# Patient Record
Sex: MALE | Race: WHITE | Employment: OTHER | URBAN - NONMETROPOLITAN AREA
[De-identification: names, ages, dates, MRNs, and addresses within clinical notes are randomized per-mention and may not be internally consistent; named-entity substitution may affect disease eponyms.]

---

## 2017-01-03 ENCOUNTER — TELEPHONE (OUTPATIENT)
Dept: NEUROLOGY | Age: 62
End: 2017-01-03

## 2017-01-26 RX ORDER — OXYCODONE AND ACETAMINOPHEN 10; 325 MG/1; MG/1
1 TABLET ORAL EVERY 6 HOURS PRN
Qty: 90 TABLET | Refills: 0 | Status: SHIPPED | OUTPATIENT
Start: 2017-02-01 | End: 2017-02-27 | Stop reason: SDUPTHER

## 2017-02-27 RX ORDER — ALPRAZOLAM 1 MG/1
1 TABLET ORAL 3 TIMES DAILY PRN
Qty: 90 TABLET | Refills: 2 | Status: SHIPPED | OUTPATIENT
Start: 2017-03-01 | End: 2017-03-31

## 2017-02-27 RX ORDER — OXYCODONE AND ACETAMINOPHEN 10; 325 MG/1; MG/1
1 TABLET ORAL EVERY 6 HOURS PRN
Qty: 90 TABLET | Refills: 0 | Status: SHIPPED | OUTPATIENT
Start: 2017-02-27 | End: 2017-03-24 | Stop reason: SDUPTHER

## 2017-03-17 ENCOUNTER — TELEPHONE (OUTPATIENT)
Dept: NEUROLOGY | Age: 62
End: 2017-03-17

## 2017-03-24 RX ORDER — OXYCODONE AND ACETAMINOPHEN 10; 325 MG/1; MG/1
1 TABLET ORAL EVERY 6 HOURS PRN
Qty: 90 TABLET | Refills: 0 | Status: SHIPPED | OUTPATIENT
Start: 2017-03-31 | End: 2017-03-27 | Stop reason: SDUPTHER

## 2017-03-27 RX ORDER — OXYCODONE AND ACETAMINOPHEN 10; 325 MG/1; MG/1
1 TABLET ORAL EVERY 6 HOURS PRN
Qty: 90 TABLET | Refills: 0 | Status: SHIPPED | OUTPATIENT
Start: 2017-03-29 | End: 2017-04-26 | Stop reason: SDUPTHER

## 2017-03-30 ENCOUNTER — OFFICE VISIT (OUTPATIENT)
Dept: NEUROLOGY | Age: 62
End: 2017-03-30
Payer: MEDICARE

## 2017-03-30 VITALS
RESPIRATION RATE: 16 BRPM | SYSTOLIC BLOOD PRESSURE: 148 MMHG | BODY MASS INDEX: 40.42 KG/M2 | HEART RATE: 67 BPM | WEIGHT: 305 LBS | DIASTOLIC BLOOD PRESSURE: 90 MMHG | HEIGHT: 73 IN

## 2017-03-30 DIAGNOSIS — Z99.89 CPAP (CONTINUOUS POSITIVE AIRWAY PRESSURE) DEPENDENCE: ICD-10-CM

## 2017-03-30 DIAGNOSIS — M47.26 OSTEOARTHRITIS OF SPINE WITH RADICULOPATHY, LUMBAR REGION: Primary | ICD-10-CM

## 2017-03-30 DIAGNOSIS — R00.2 PALPITATIONS: ICD-10-CM

## 2017-03-30 DIAGNOSIS — G47.33 OSA (OBSTRUCTIVE SLEEP APNEA): ICD-10-CM

## 2017-03-30 DIAGNOSIS — R00.0 TACHYCARDIA: ICD-10-CM

## 2017-03-30 DIAGNOSIS — G89.29 CHRONIC BILATERAL LOW BACK PAIN WITH BILATERAL SCIATICA: ICD-10-CM

## 2017-03-30 DIAGNOSIS — M54.42 CHRONIC BILATERAL LOW BACK PAIN WITH BILATERAL SCIATICA: ICD-10-CM

## 2017-03-30 DIAGNOSIS — M54.41 CHRONIC BILATERAL LOW BACK PAIN WITH BILATERAL SCIATICA: ICD-10-CM

## 2017-03-30 PROCEDURE — 3017F COLORECTAL CA SCREEN DOC REV: CPT | Performed by: PHYSICIAN ASSISTANT

## 2017-03-30 PROCEDURE — 99214 OFFICE O/P EST MOD 30 MIN: CPT | Performed by: PHYSICIAN ASSISTANT

## 2017-03-30 PROCEDURE — G8484 FLU IMMUNIZE NO ADMIN: HCPCS | Performed by: PHYSICIAN ASSISTANT

## 2017-03-30 PROCEDURE — G8427 DOCREV CUR MEDS BY ELIG CLIN: HCPCS | Performed by: PHYSICIAN ASSISTANT

## 2017-03-30 PROCEDURE — 1036F TOBACCO NON-USER: CPT | Performed by: PHYSICIAN ASSISTANT

## 2017-03-30 PROCEDURE — G8417 CALC BMI ABV UP PARAM F/U: HCPCS | Performed by: PHYSICIAN ASSISTANT

## 2017-04-26 RX ORDER — ALPRAZOLAM 1 MG/1
1 TABLET ORAL 3 TIMES DAILY PRN
Qty: 90 TABLET | Refills: 2 | Status: SHIPPED | OUTPATIENT
Start: 2017-04-27 | End: 2017-05-25 | Stop reason: SDUPTHER

## 2017-04-26 RX ORDER — OXYCODONE AND ACETAMINOPHEN 10; 325 MG/1; MG/1
1 TABLET ORAL EVERY 6 HOURS PRN
Qty: 90 TABLET | Refills: 0 | Status: SHIPPED | OUTPATIENT
Start: 2017-04-28 | End: 2017-05-25 | Stop reason: SDUPTHER

## 2017-05-25 RX ORDER — ALPRAZOLAM 1 MG/1
1 TABLET ORAL 3 TIMES DAILY PRN
Qty: 90 TABLET | Refills: 2 | Status: SHIPPED | OUTPATIENT
Start: 2017-05-25 | End: 2017-06-22 | Stop reason: SDUPTHER

## 2017-05-25 RX ORDER — OXYCODONE AND ACETAMINOPHEN 10; 325 MG/1; MG/1
1 TABLET ORAL EVERY 6 HOURS PRN
Qty: 90 TABLET | Refills: 0 | Status: SHIPPED | OUTPATIENT
Start: 2017-05-25 | End: 2017-06-22 | Stop reason: SDUPTHER

## 2017-06-22 RX ORDER — ALPRAZOLAM 1 MG/1
1 TABLET ORAL 3 TIMES DAILY PRN
Qty: 90 TABLET | Refills: 0 | Status: SHIPPED | OUTPATIENT
Start: 2017-06-22 | End: 2017-07-21 | Stop reason: SDUPTHER

## 2017-06-22 RX ORDER — OXYCODONE AND ACETAMINOPHEN 10; 325 MG/1; MG/1
1 TABLET ORAL EVERY 6 HOURS PRN
Qty: 90 TABLET | Refills: 0 | Status: SHIPPED | OUTPATIENT
Start: 2017-06-22 | End: 2017-07-21 | Stop reason: SDUPTHER

## 2017-07-03 ENCOUNTER — OFFICE VISIT (OUTPATIENT)
Dept: NEUROLOGY | Age: 62
End: 2017-07-03
Payer: MEDICARE

## 2017-07-03 VITALS
HEIGHT: 73 IN | HEART RATE: 47 BPM | WEIGHT: 309 LBS | DIASTOLIC BLOOD PRESSURE: 69 MMHG | SYSTOLIC BLOOD PRESSURE: 115 MMHG | BODY MASS INDEX: 40.95 KG/M2 | OXYGEN SATURATION: 98 %

## 2017-07-03 DIAGNOSIS — G89.29 CHRONIC BILATERAL LOW BACK PAIN WITH BILATERAL SCIATICA: ICD-10-CM

## 2017-07-03 DIAGNOSIS — G47.33 OSA (OBSTRUCTIVE SLEEP APNEA): ICD-10-CM

## 2017-07-03 DIAGNOSIS — R00.2 PALPITATIONS: ICD-10-CM

## 2017-07-03 DIAGNOSIS — M54.6 THORACIC SPINE PAIN: ICD-10-CM

## 2017-07-03 DIAGNOSIS — M54.41 CHRONIC BILATERAL LOW BACK PAIN WITH BILATERAL SCIATICA: ICD-10-CM

## 2017-07-03 DIAGNOSIS — M47.26 OSTEOARTHRITIS OF SPINE WITH RADICULOPATHY, LUMBAR REGION: Primary | ICD-10-CM

## 2017-07-03 DIAGNOSIS — M54.42 CHRONIC BILATERAL LOW BACK PAIN WITH BILATERAL SCIATICA: ICD-10-CM

## 2017-07-03 PROCEDURE — 1036F TOBACCO NON-USER: CPT | Performed by: PSYCHIATRY & NEUROLOGY

## 2017-07-03 PROCEDURE — G8417 CALC BMI ABV UP PARAM F/U: HCPCS | Performed by: PSYCHIATRY & NEUROLOGY

## 2017-07-03 PROCEDURE — 99214 OFFICE O/P EST MOD 30 MIN: CPT | Performed by: PSYCHIATRY & NEUROLOGY

## 2017-07-03 PROCEDURE — 3017F COLORECTAL CA SCREEN DOC REV: CPT | Performed by: PSYCHIATRY & NEUROLOGY

## 2017-07-03 PROCEDURE — G8427 DOCREV CUR MEDS BY ELIG CLIN: HCPCS | Performed by: PSYCHIATRY & NEUROLOGY

## 2017-07-21 RX ORDER — OXYCODONE AND ACETAMINOPHEN 10; 325 MG/1; MG/1
1 TABLET ORAL EVERY 6 HOURS PRN
Qty: 90 TABLET | Refills: 0 | Status: SHIPPED | OUTPATIENT
Start: 2017-07-21 | End: 2017-08-17 | Stop reason: SDUPTHER

## 2017-07-21 RX ORDER — ALPRAZOLAM 1 MG/1
1 TABLET ORAL 3 TIMES DAILY PRN
Qty: 90 TABLET | Refills: 0 | Status: SHIPPED | OUTPATIENT
Start: 2017-07-21 | End: 2017-08-17 | Stop reason: SDUPTHER

## 2017-07-26 ENCOUNTER — HOSPITAL ENCOUNTER (OUTPATIENT)
Dept: NEUROLOGY | Age: 62
Discharge: HOME | End: 2017-07-26
Payer: MEDICARE

## 2017-07-26 ENCOUNTER — HOSPITAL ENCOUNTER (OUTPATIENT)
Dept: MRI IMAGING | Age: 62
Discharge: HOME | End: 2017-07-26
Payer: MEDICARE

## 2017-07-26 DIAGNOSIS — M54.42 CHRONIC BILATERAL LOW BACK PAIN WITH BILATERAL SCIATICA: ICD-10-CM

## 2017-07-26 DIAGNOSIS — M47.26 OSTEOARTHRITIS OF SPINE WITH RADICULOPATHY, LUMBAR REGION: ICD-10-CM

## 2017-07-26 DIAGNOSIS — M54.41 CHRONIC BILATERAL LOW BACK PAIN WITH BILATERAL SCIATICA: ICD-10-CM

## 2017-07-26 DIAGNOSIS — G89.29 CHRONIC BILATERAL LOW BACK PAIN WITH BILATERAL SCIATICA: ICD-10-CM

## 2017-07-26 PROCEDURE — 95908 NRV CNDJ TST 3-4 STUDIES: CPT

## 2017-07-26 PROCEDURE — 95908 NRV CNDJ TST 3-4 STUDIES: CPT | Performed by: PSYCHIATRY & NEUROLOGY

## 2017-07-26 PROCEDURE — 72148 MRI LUMBAR SPINE W/O DYE: CPT

## 2017-07-26 PROCEDURE — 95886 MUSC TEST DONE W/N TEST COMP: CPT

## 2017-07-26 PROCEDURE — 95886 MUSC TEST DONE W/N TEST COMP: CPT | Performed by: PSYCHIATRY & NEUROLOGY

## 2017-07-30 DIAGNOSIS — G89.29 CHRONIC BILATERAL LOW BACK PAIN WITH RIGHT-SIDED SCIATICA: ICD-10-CM

## 2017-07-30 DIAGNOSIS — M47.26 OSTEOARTHRITIS OF SPINE WITH RADICULOPATHY, LUMBAR REGION: Primary | ICD-10-CM

## 2017-07-30 DIAGNOSIS — M54.41 CHRONIC BILATERAL LOW BACK PAIN WITH RIGHT-SIDED SCIATICA: ICD-10-CM

## 2017-07-31 ENCOUNTER — TELEPHONE (OUTPATIENT)
Dept: NEUROLOGY | Age: 62
End: 2017-07-31

## 2017-08-01 ENCOUNTER — TELEPHONE (OUTPATIENT)
Dept: NEUROSURGERY | Age: 62
End: 2017-08-01

## 2017-08-09 ENCOUNTER — OFFICE VISIT (OUTPATIENT)
Dept: CARDIOLOGY | Age: 62
End: 2017-08-09
Payer: MEDICARE

## 2017-08-09 VITALS
HEIGHT: 73 IN | WEIGHT: 311 LBS | BODY MASS INDEX: 41.22 KG/M2 | DIASTOLIC BLOOD PRESSURE: 70 MMHG | SYSTOLIC BLOOD PRESSURE: 118 MMHG | HEART RATE: 70 BPM

## 2017-08-09 DIAGNOSIS — R00.0 TACHYCARDIA: Primary | ICD-10-CM

## 2017-08-09 DIAGNOSIS — I10 ESSENTIAL HYPERTENSION: ICD-10-CM

## 2017-08-09 DIAGNOSIS — R00.2 INTERMITTENT PALPITATIONS: ICD-10-CM

## 2017-08-09 DIAGNOSIS — Z82.49 FAMILY HISTORY OF ATRIAL FIBRILLATION: ICD-10-CM

## 2017-08-09 DIAGNOSIS — Z86.69 HISTORY OF SLEEP APNEA: ICD-10-CM

## 2017-08-09 PROCEDURE — 3017F COLORECTAL CA SCREEN DOC REV: CPT | Performed by: NURSE PRACTITIONER

## 2017-08-09 PROCEDURE — G8417 CALC BMI ABV UP PARAM F/U: HCPCS | Performed by: NURSE PRACTITIONER

## 2017-08-09 PROCEDURE — 1036F TOBACCO NON-USER: CPT | Performed by: NURSE PRACTITIONER

## 2017-08-09 PROCEDURE — 99215 OFFICE O/P EST HI 40 MIN: CPT | Performed by: NURSE PRACTITIONER

## 2017-08-09 PROCEDURE — 93000 ELECTROCARDIOGRAM COMPLETE: CPT | Performed by: NURSE PRACTITIONER

## 2017-08-09 PROCEDURE — G8427 DOCREV CUR MEDS BY ELIG CLIN: HCPCS | Performed by: NURSE PRACTITIONER

## 2017-08-09 RX ORDER — METOPROLOL SUCCINATE 25 MG/1
25 TABLET, EXTENDED RELEASE ORAL NIGHTLY
Qty: 30 TABLET | Refills: 5 | Status: SHIPPED | OUTPATIENT
Start: 2017-08-09 | End: 2017-09-18 | Stop reason: SINTOL

## 2017-08-17 ENCOUNTER — HOSPITAL ENCOUNTER (OUTPATIENT)
Dept: GENERAL RADIOLOGY | Age: 62
Discharge: HOME | End: 2017-08-17
Payer: MEDICARE

## 2017-08-17 ENCOUNTER — OFFICE VISIT (OUTPATIENT)
Dept: NEUROSURGERY | Age: 62
End: 2017-08-17
Payer: MEDICARE

## 2017-08-17 VITALS
HEIGHT: 73 IN | SYSTOLIC BLOOD PRESSURE: 118 MMHG | WEIGHT: 311 LBS | DIASTOLIC BLOOD PRESSURE: 75 MMHG | OXYGEN SATURATION: 97 % | HEART RATE: 56 BPM | BODY MASS INDEX: 41.22 KG/M2

## 2017-08-17 DIAGNOSIS — M48.061 FORAMINAL STENOSIS OF LUMBAR REGION: ICD-10-CM

## 2017-08-17 DIAGNOSIS — G89.29 CHRONIC MIDLINE LOW BACK PAIN WITHOUT SCIATICA: Primary | ICD-10-CM

## 2017-08-17 DIAGNOSIS — G89.29 CHRONIC MIDLINE LOW BACK PAIN WITHOUT SCIATICA: ICD-10-CM

## 2017-08-17 DIAGNOSIS — M54.50 CHRONIC MIDLINE LOW BACK PAIN WITHOUT SCIATICA: Primary | ICD-10-CM

## 2017-08-17 DIAGNOSIS — M54.50 CHRONIC MIDLINE LOW BACK PAIN WITHOUT SCIATICA: ICD-10-CM

## 2017-08-17 DIAGNOSIS — M48.061 LUMBAR CANAL STENOSIS: ICD-10-CM

## 2017-08-17 PROCEDURE — 3017F COLORECTAL CA SCREEN DOC REV: CPT | Performed by: NEUROLOGICAL SURGERY

## 2017-08-17 PROCEDURE — G8427 DOCREV CUR MEDS BY ELIG CLIN: HCPCS | Performed by: NEUROLOGICAL SURGERY

## 2017-08-17 PROCEDURE — 72110 X-RAY EXAM L-2 SPINE 4/>VWS: CPT

## 2017-08-17 PROCEDURE — 99203 OFFICE O/P NEW LOW 30 MIN: CPT | Performed by: NEUROLOGICAL SURGERY

## 2017-08-17 PROCEDURE — G8417 CALC BMI ABV UP PARAM F/U: HCPCS | Performed by: NEUROLOGICAL SURGERY

## 2017-08-17 PROCEDURE — 1036F TOBACCO NON-USER: CPT | Performed by: NEUROLOGICAL SURGERY

## 2017-08-17 RX ORDER — ALPRAZOLAM 1 MG/1
1 TABLET ORAL 3 TIMES DAILY PRN
Qty: 90 TABLET | Refills: 0 | Status: SHIPPED | OUTPATIENT
Start: 2017-08-17 | End: 2017-09-18 | Stop reason: SDUPTHER

## 2017-08-17 RX ORDER — OXYCODONE AND ACETAMINOPHEN 10; 325 MG/1; MG/1
1 TABLET ORAL EVERY 6 HOURS PRN
Qty: 90 TABLET | Refills: 0 | Status: SHIPPED | OUTPATIENT
Start: 2017-08-17 | End: 2017-09-18 | Stop reason: SDUPTHER

## 2017-08-17 RX ORDER — ZOLPIDEM TARTRATE 10 MG/1
TABLET ORAL
Refills: 2 | COMMUNITY
Start: 2017-05-30 | End: 2018-09-24 | Stop reason: DRUGHIGH

## 2017-08-17 ASSESSMENT — ENCOUNTER SYMPTOMS
EYES NEGATIVE: 1
ORTHOPNEA: 0
BACK PAIN: 1
RESPIRATORY NEGATIVE: 1
GASTROINTESTINAL NEGATIVE: 1

## 2017-08-23 ENCOUNTER — HOSPITAL ENCOUNTER (OUTPATIENT)
Dept: NON INVASIVE DIAGNOSTICS | Age: 62
Discharge: HOME | End: 2017-08-23
Payer: MEDICARE

## 2017-08-23 DIAGNOSIS — R00.2 INTERMITTENT PALPITATIONS: ICD-10-CM

## 2017-08-23 DIAGNOSIS — R00.0 TACHYCARDIA: ICD-10-CM

## 2017-08-23 DIAGNOSIS — I10 ESSENTIAL HYPERTENSION: ICD-10-CM

## 2017-08-23 LAB
LV EF: 63 %
LVEF MODALITY: NORMAL

## 2017-08-23 PROCEDURE — 93306 TTE W/DOPPLER COMPLETE: CPT

## 2017-09-05 ENCOUNTER — TELEPHONE (OUTPATIENT)
Dept: CARDIOLOGY | Age: 62
End: 2017-09-05

## 2017-09-05 DIAGNOSIS — R00.2 PALPITATIONS: Primary | ICD-10-CM

## 2017-09-14 ENCOUNTER — OFFICE VISIT (OUTPATIENT)
Dept: CARDIOLOGY | Age: 62
End: 2017-09-14
Payer: MEDICARE

## 2017-09-14 VITALS
DIASTOLIC BLOOD PRESSURE: 72 MMHG | SYSTOLIC BLOOD PRESSURE: 118 MMHG | HEART RATE: 72 BPM | WEIGHT: 315 LBS | HEIGHT: 73 IN | BODY MASS INDEX: 41.75 KG/M2

## 2017-09-14 DIAGNOSIS — R00.0 TACHYCARDIA: ICD-10-CM

## 2017-09-14 DIAGNOSIS — I10 ESSENTIAL HYPERTENSION: ICD-10-CM

## 2017-09-14 DIAGNOSIS — Z78.9 BETA-BLOCKER INTOLERANCE: Primary | ICD-10-CM

## 2017-09-14 PROCEDURE — G8417 CALC BMI ABV UP PARAM F/U: HCPCS | Performed by: INTERNAL MEDICINE

## 2017-09-14 PROCEDURE — 1036F TOBACCO NON-USER: CPT | Performed by: INTERNAL MEDICINE

## 2017-09-14 PROCEDURE — G8427 DOCREV CUR MEDS BY ELIG CLIN: HCPCS | Performed by: INTERNAL MEDICINE

## 2017-09-14 PROCEDURE — 99212 OFFICE O/P EST SF 10 MIN: CPT | Performed by: INTERNAL MEDICINE

## 2017-09-14 PROCEDURE — 3017F COLORECTAL CA SCREEN DOC REV: CPT | Performed by: INTERNAL MEDICINE

## 2017-09-18 RX ORDER — OXYCODONE AND ACETAMINOPHEN 10; 325 MG/1; MG/1
1 TABLET ORAL EVERY 6 HOURS PRN
Qty: 90 TABLET | Refills: 0 | Status: SHIPPED | OUTPATIENT
Start: 2017-09-18 | End: 2017-10-16 | Stop reason: SDUPTHER

## 2017-09-18 RX ORDER — ALPRAZOLAM 1 MG/1
1 TABLET ORAL 3 TIMES DAILY PRN
Qty: 90 TABLET | Refills: 0 | Status: SHIPPED | OUTPATIENT
Start: 2017-09-18 | End: 2017-10-16 | Stop reason: SDUPTHER

## 2017-10-03 ENCOUNTER — HOSPITAL ENCOUNTER (OUTPATIENT)
Dept: GENERAL RADIOLOGY | Age: 62
Discharge: HOME | End: 2017-10-03
Payer: MEDICARE

## 2017-10-03 ENCOUNTER — HOSPITAL ENCOUNTER (OUTPATIENT)
Age: 62
Setting detail: OUTPATIENT SURGERY
Discharge: HOME | End: 2017-10-03
Attending: PHYSICAL MEDICINE & REHABILITATION | Admitting: PHYSICAL MEDICINE & REHABILITATION

## 2017-10-03 VITALS
SYSTOLIC BLOOD PRESSURE: 154 MMHG | DIASTOLIC BLOOD PRESSURE: 93 MMHG | BODY MASS INDEX: 39.76 KG/M2 | HEART RATE: 59 BPM | OXYGEN SATURATION: 97 % | HEIGHT: 73 IN | WEIGHT: 300 LBS | RESPIRATION RATE: 18 BRPM

## 2017-10-03 DIAGNOSIS — R52 PAIN: ICD-10-CM

## 2017-10-03 PROCEDURE — 3209999900 FLUORO FOR SURGICAL PROCEDURES

## 2017-10-03 PROCEDURE — G8918 PT W/O PREOP ORDER IV AB PRO: HCPCS

## 2017-10-03 PROCEDURE — 62323 NJX INTERLAMINAR LMBR/SAC: CPT

## 2017-10-03 PROCEDURE — G8907 PT DOC NO EVENTS ON DISCHARG: HCPCS

## 2017-10-03 RX ORDER — 0.9 % SODIUM CHLORIDE 0.9 %
VIAL (ML) INJECTION PRN
Status: DISCONTINUED | OUTPATIENT
Start: 2017-10-03 | End: 2017-10-03 | Stop reason: HOSPADM

## 2017-10-03 RX ORDER — LIDOCAINE HYDROCHLORIDE 10 MG/ML
INJECTION, SOLUTION INFILTRATION; PERINEURAL PRN
Status: DISCONTINUED | OUTPATIENT
Start: 2017-10-03 | End: 2017-10-03 | Stop reason: HOSPADM

## 2017-10-03 RX ORDER — METHYLPREDNISOLONE ACETATE 80 MG/ML
INJECTION, SUSPENSION INTRA-ARTICULAR; INTRALESIONAL; INTRAMUSCULAR; SOFT TISSUE PRN
Status: DISCONTINUED | OUTPATIENT
Start: 2017-10-03 | End: 2017-10-03 | Stop reason: HOSPADM

## 2017-10-05 ENCOUNTER — OFFICE VISIT (OUTPATIENT)
Dept: NEUROLOGY | Age: 62
End: 2017-10-05
Payer: MEDICARE

## 2017-10-05 ENCOUNTER — HOSPITAL ENCOUNTER (EMERGENCY)
Age: 62
Discharge: HOME | End: 2017-10-05
Attending: EMERGENCY MEDICINE
Payer: MEDICARE

## 2017-10-05 ENCOUNTER — APPOINTMENT (OUTPATIENT)
Dept: CT IMAGING | Age: 62
End: 2017-10-05
Payer: MEDICARE

## 2017-10-05 VITALS
HEART RATE: 54 BPM | BODY MASS INDEX: 39.76 KG/M2 | OXYGEN SATURATION: 97 % | RESPIRATION RATE: 16 BRPM | TEMPERATURE: 98 F | WEIGHT: 300 LBS | HEIGHT: 73 IN | SYSTOLIC BLOOD PRESSURE: 136 MMHG | DIASTOLIC BLOOD PRESSURE: 84 MMHG

## 2017-10-05 VITALS
BODY MASS INDEX: 41.75 KG/M2 | HEART RATE: 76 BPM | HEIGHT: 73 IN | SYSTOLIC BLOOD PRESSURE: 155 MMHG | DIASTOLIC BLOOD PRESSURE: 89 MMHG | WEIGHT: 315 LBS | RESPIRATION RATE: 18 BRPM

## 2017-10-05 DIAGNOSIS — R73.9 HYPERGLYCEMIA: Primary | ICD-10-CM

## 2017-10-05 DIAGNOSIS — Z92.241 S/P EPIDURAL STEROID INJECTION: ICD-10-CM

## 2017-10-05 DIAGNOSIS — M47.26 OSTEOARTHRITIS OF SPINE WITH RADICULOPATHY, LUMBAR REGION: Primary | ICD-10-CM

## 2017-10-05 DIAGNOSIS — G89.29 CHRONIC BILATERAL LOW BACK PAIN WITH BILATERAL SCIATICA: ICD-10-CM

## 2017-10-05 DIAGNOSIS — M54.42 CHRONIC BILATERAL LOW BACK PAIN WITH BILATERAL SCIATICA: ICD-10-CM

## 2017-10-05 DIAGNOSIS — G47.33 OSA (OBSTRUCTIVE SLEEP APNEA): ICD-10-CM

## 2017-10-05 DIAGNOSIS — M54.41 CHRONIC BILATERAL LOW BACK PAIN WITH BILATERAL SCIATICA: ICD-10-CM

## 2017-10-05 LAB
ALBUMIN SERPL-MCNC: 4.3 G/DL (ref 3.5–5.2)
ALP BLD-CCNC: 89 U/L (ref 40–130)
ALT SERPL-CCNC: 21 U/L (ref 5–41)
AMYLASE: 48 U/L (ref 28–100)
ANION GAP SERPL CALCULATED.3IONS-SCNC: 14 MMOL/L (ref 7–19)
AST SERPL-CCNC: 12 U/L (ref 5–40)
BASOPHILS ABSOLUTE: 0 K/UL (ref 0–0.2)
BASOPHILS RELATIVE PERCENT: 0.6 % (ref 0–1)
BILIRUB SERPL-MCNC: 0.3 MG/DL (ref 0.2–1.2)
BILIRUBIN URINE: NEGATIVE
BLOOD, URINE: NEGATIVE
BUN BLDV-MCNC: 15 MG/DL (ref 8–23)
CALCIUM SERPL-MCNC: 9.4 MG/DL (ref 8.8–10.2)
CHLORIDE BLD-SCNC: 96 MMOL/L (ref 98–111)
CHP ED QC CHECK: NORMAL
CLARITY: CLEAR
CO2: 27 MMOL/L (ref 22–29)
COLOR: YELLOW
CREAT SERPL-MCNC: 0.9 MG/DL (ref 0.5–1.2)
EKG P AXIS: 50 DEGREES
EKG P-R INTERVAL: 154 MS
EKG Q-T INTERVAL: 396 MS
EKG QRS DURATION: 96 MS
EKG QTC CALCULATION (BAZETT): 418 MS
EKG T AXIS: 44 DEGREES
EOSINOPHILS ABSOLUTE: 0.1 K/UL (ref 0–0.6)
EOSINOPHILS RELATIVE PERCENT: 0.9 % (ref 0–5)
GFR NON-AFRICAN AMERICAN: >60
GLUCOSE BLD-MCNC: 179 MG/DL
GLUCOSE BLD-MCNC: 179 MG/DL (ref 70–99)
GLUCOSE BLD-MCNC: 187 MG/DL (ref 74–109)
GLUCOSE URINE: NEGATIVE MG/DL
HCT VFR BLD CALC: 43.9 % (ref 42–52)
HEMOGLOBIN: 14.4 G/DL (ref 14–18)
KETONES, URINE: NEGATIVE MG/DL
LEUKOCYTE ESTERASE, URINE: NEGATIVE
LIPASE: 33 U/L (ref 13–60)
LYMPHOCYTES ABSOLUTE: 1.8 K/UL (ref 1.1–4.5)
LYMPHOCYTES RELATIVE PERCENT: 26 % (ref 20–40)
MCH RBC QN AUTO: 28.9 PG (ref 27–31)
MCHC RBC AUTO-ENTMCNC: 32.8 G/DL (ref 33–37)
MCV RBC AUTO: 88.2 FL (ref 80–94)
MONOCYTES ABSOLUTE: 0.7 K/UL (ref 0–0.9)
MONOCYTES RELATIVE PERCENT: 10.4 % (ref 0–10)
NEUTROPHILS ABSOLUTE: 4.2 K/UL (ref 1.5–7.5)
NEUTROPHILS RELATIVE PERCENT: 61.7 % (ref 50–65)
NITRITE, URINE: NEGATIVE
PDW BLD-RTO: 13.8 % (ref 11.5–14.5)
PERFORMED ON: ABNORMAL
PERFORMED ON: NORMAL
PH UA: 6.5
PLATELET # BLD: 176 K/UL (ref 130–400)
PMV BLD AUTO: 11.2 FL (ref 9.4–12.4)
POC TROPONIN I: 0 NG/ML (ref 0–0.08)
POTASSIUM SERPL-SCNC: 3.8 MMOL/L (ref 3.5–5)
PROTEIN UA: NEGATIVE MG/DL
RBC # BLD: 4.98 M/UL (ref 4.7–6.1)
SODIUM BLD-SCNC: 137 MMOL/L (ref 136–145)
SPECIFIC GRAVITY UA: 1.01
TOTAL PROTEIN: 7.5 G/DL (ref 6.6–8.7)
UROBILINOGEN, URINE: 0.2 E.U./DL
WBC # BLD: 6.9 K/UL (ref 4.8–10.8)

## 2017-10-05 PROCEDURE — G8484 FLU IMMUNIZE NO ADMIN: HCPCS | Performed by: PSYCHIATRY & NEUROLOGY

## 2017-10-05 PROCEDURE — 81003 URINALYSIS AUTO W/O SCOPE: CPT

## 2017-10-05 PROCEDURE — 85025 COMPLETE CBC W/AUTO DIFF WBC: CPT

## 2017-10-05 PROCEDURE — 80053 COMPREHEN METABOLIC PANEL: CPT

## 2017-10-05 PROCEDURE — 3017F COLORECTAL CA SCREEN DOC REV: CPT | Performed by: PSYCHIATRY & NEUROLOGY

## 2017-10-05 PROCEDURE — 99213 OFFICE O/P EST LOW 20 MIN: CPT | Performed by: PSYCHIATRY & NEUROLOGY

## 2017-10-05 PROCEDURE — 93005 ELECTROCARDIOGRAM TRACING: CPT

## 2017-10-05 PROCEDURE — 99284 EMERGENCY DEPT VISIT MOD MDM: CPT

## 2017-10-05 PROCEDURE — G8427 DOCREV CUR MEDS BY ELIG CLIN: HCPCS | Performed by: PSYCHIATRY & NEUROLOGY

## 2017-10-05 PROCEDURE — G8417 CALC BMI ABV UP PARAM F/U: HCPCS | Performed by: PSYCHIATRY & NEUROLOGY

## 2017-10-05 PROCEDURE — 99283 EMERGENCY DEPT VISIT LOW MDM: CPT | Performed by: EMERGENCY MEDICINE

## 2017-10-05 PROCEDURE — 2580000003 HC RX 258: Performed by: EMERGENCY MEDICINE

## 2017-10-05 PROCEDURE — 1036F TOBACCO NON-USER: CPT | Performed by: PSYCHIATRY & NEUROLOGY

## 2017-10-05 PROCEDURE — 82150 ASSAY OF AMYLASE: CPT

## 2017-10-05 PROCEDURE — 82948 REAGENT STRIP/BLOOD GLUCOSE: CPT

## 2017-10-05 PROCEDURE — 70450 CT HEAD/BRAIN W/O DYE: CPT

## 2017-10-05 PROCEDURE — 36415 COLL VENOUS BLD VENIPUNCTURE: CPT

## 2017-10-05 PROCEDURE — 83690 ASSAY OF LIPASE: CPT

## 2017-10-05 PROCEDURE — 84484 ASSAY OF TROPONIN QUANT: CPT

## 2017-10-05 RX ORDER — LOVASTATIN 10 MG/1
10 TABLET ORAL DAILY
Status: ON HOLD | COMMUNITY
End: 2018-10-24

## 2017-10-05 RX ORDER — 0.9 % SODIUM CHLORIDE 0.9 %
1000 INTRAVENOUS SOLUTION INTRAVENOUS ONCE
Status: COMPLETED | OUTPATIENT
Start: 2017-10-05 | End: 2017-10-05

## 2017-10-05 RX ADMIN — SODIUM CHLORIDE 1000 ML: 9 INJECTION, SOLUTION INTRAVENOUS at 03:12

## 2017-10-05 ASSESSMENT — ENCOUNTER SYMPTOMS
VOMITING: 0
ABDOMINAL PAIN: 0
WHEEZING: 0
COUGH: 0
RHINORRHEA: 0
NAUSEA: 1
SHORTNESS OF BREATH: 0

## 2017-10-05 NOTE — ED PROVIDER NOTES
vomiting. Genitourinary: Positive for frequency. Negative for dysuria. Neurological: Positive for dizziness. Negative for syncope. All other systems reviewed and are negative. PAST MEDICAL HISTORY     Past Medical History:   Diagnosis Date    Acute sinusitis     Anxiety     Chronic back pain     Chronic bilateral low back pain with bilateral sciatica     Constipation     COPD (chronic obstructive pulmonary disease) (HCC)     CPAP (continuous positive airway pressure) dependence     12cm to 20cm    Depression     Hyperlipidemia     Hypertensive disorder     OA (osteoarthritis of the spine)     Obesity     SUSANA (obstructive sleep apnea)     AHI:  85.3 per HST 11/2013    Osteoarthritis of spine with radiculopathy, lumbar region     Otitis media     [facility]     Spinal stenosis     Type 2 diabetes mellitus ([facility] 3/23/2016    Unspecified sleep apnea     uses bipap    Vertigo          SURGICAL HISTORY       Past Surgical History:   Procedure Laterality Date    ADENOIDECTOMY      BROW LIFT      due to vision    COLONOSCOPY  30 yrs ago    [place], [place], normal per patient    HERNIA REPAIR      KNEE ARTHROSCOPY Left 2009    IN Girish Rodrick Radha 84 DX/THER SBST EPIDURAL/SUBARACH LUMBAR/SACRAL N/A 10/3/2017    L3-4 EPIDURAL STEROID INJECTION performed by Krystal Alejo at [address]       Previous Medications    ALPRAZOLAM (XANAX) 1 MG TABLET    Take 1 tablet by mouth 3 times daily as needed for Sleep    LISINOPRIL-HYDROCHLOROTHIAZIDE (PRINZIDE;ZESTORETIC) 20-12.5 MG PER TABLET    Take 1 tablet by mouth daily Indications: takes 1/2 tablet     METFORMIN (GLUCOPHAGE) 500 MG TABLET    Take 500 mg by mouth 2 times daily (with meals)    OXYCODONE-ACETAMINOPHEN (PERCOCET)  MG PER TABLET    Take 1 tablet by mouth every 6 hours as needed for Pain (Pain) . OXYCONTIN 20 MG EXTENDED RELEASE TABLET    Take 1 tablet by mouth every 12 hours . POLYETHYLENE GLYCOL (MIRALAX) POWD POWDER    Take 17 g by mouth every 3 days    ZOLPIDEM (AMBIEN) 10 MG TABLET    TK 1 T PO QHS       ALLERGIES     Clindamycin/lincomycin and Benadryl [diphenhydramine]    FAMILY HISTORY       Family History   Problem Relation Age of Onset    Heart Disease Father 64    Cancer Father      malignant tumor of pharynx    Diabetes Mother     Hypertension Mother     Diabetes Sister      x 2    Diabetes Brother     Colon Cancer Neg Hx     Colon Polyps Neg Hx     Liver Disease Neg Hx     Rectal Cancer Neg Hx     Stomach Cancer Neg Hx           SOCIAL HISTORY       Social History     Social History    Marital status:      Spouse name: N/A    Number of children: N/A    Years of education: N/A     Social History Main Topics    Smoking status: Former Smoker    Smokeless tobacco: Never Used    Alcohol use No    Drug use: No    Sexual activity: Not Asked     Other Topics Concern    None     Social History Narrative       SCREENINGS             PHYSICAL EXAM    (up to 7 for level 4, 8 or more for level 5)   ED Triage Vitals   BP Temp Temp Source Pulse Resp SpO2 Height Weight   10/05/17 0237 10/05/17 0237 10/05/17 0237 10/05/17 0237 10/05/17 0237 10/05/17 0237 10/05/17 0237 10/05/17 0237   183/94 98 °F (36.7 °C) Oral 82 16 97 % 6' 1\" (1.854 m) 300 lb (136.1 kg)       Physical Exam   Constitutional: He is oriented to person, place, and time. He is cooperative. HENT:   Head: Atraumatic. Mouth/Throat: Mucous membranes are not dry. No posterior oropharyngeal erythema. Eyes: Pupils are equal, round, and reactive to light. No scleral icterus. Neck: No tracheal deviation present. Cardiovascular: Normal rate, regular rhythm, normal heart sounds and intact distal pulses. No murmur heard. Pulmonary/Chest: Effort normal and breath sounds normal. No stridor. No respiratory distress. Abdominal: Soft. He exhibits no distension. There is no tenderness.  There is no guarding. Neurological: He is alert and oriented to person, place, and time. He has normal strength. Skin: No rash noted. No pallor. Nursing note and vitals reviewed. DIAGNOSTIC RESULTS     EKG: All EKG's are interpreted by the Emergency Department Physician who either signs or Co-signs this chart in the absence of a cardiologist.    Lamont Ortega: NSR @ 74, no ST changes    RADIOLOGY:   Non-plain film images such as CT, Ultrasound and MRI are read by the radiologist. Plain radiographic images are visualized and preliminarily interpreted by the emergency physician with the below findings:      CT Head WO Contrast    (Results Pending)     CT HEAD:    No identifiable acute or recent territorial infarct, which may initially be inapparent by CT and for which MRI is considered more sensitive. No bleed, midline shift, mass effect or hydrocephalus. Minimal periventricular white matter hypodensity that is most commonly seen on a chronic small vessel ischemic basis. Intracranial atherosclerosis is seen involving the cavernous ICAs. Trace ethmoid mucosal thickening. The mastoid air cells are clear. LABS:  Labs Reviewed   CBC WITH AUTO DIFFERENTIAL - Abnormal; Notable for the following:        Result Value    MCHC 32.8 (*)     Monocytes % 10.4 (*)     All other components within normal limits   COMPREHENSIVE METABOLIC PANEL - Abnormal; Notable for the following:     Chloride 96 (*)     Glucose 187 (*)     All other components within normal limits   POCT GLUCOSE - Abnormal; Notable for the following:     POC Glucose 179 (*)     All other components within normal limits   POCT GLUCOSE - Normal   AMYLASE   URINALYSIS   LIPASE   POCT TROPONIN   POCT VENOUS       All other labs were within normal range or not returned as of this dictation.     EMERGENCY DEPARTMENT COURSE and DIFFERENTIAL DIAGNOSIS/MDM:   Vitals:    Vitals:    10/05/17 0237 10/05/17 0246 10/05/17 0332 10/05/17 0432   BP: (!) 183/94  (!) 150/84

## 2017-10-05 NOTE — MR AVS SNAPSHOT
After Visit Summary             Gloria Beard   10/5/2017 1:45 PM   Office Visit    Description:  Male : 1955   Provider:  Jasper Nelson MD   Department:  [facility] Sleep              Your Follow-Up and Future Appointments         Below is a list of your follow-up and future appointments. This may not be a complete list as you may have made appointments directly with providers that we are not aware of or your providers may have made some for you. Please call your providers to confirm appointments. It is important to keep your appointments. Please bring your current insurance card, photo ID, co-pay, and all medication bottles to your appointment. If self-pay, payment is expected at the time of service. Your To-Do List     Future Appointments Provider Department Dept Phone    10/26/2017 1:45 PM Carmen Randolph MD [facility] [phone]    Please arrive 15 minutes prior to appointment time, bring insurance card and photo ID.     2018 1:45 PM Jasper Nelson MD [facility] [phone]    Please arrive 15 minutes prior to appointment, bring photo ID and insurance card. Follow-Up    Return in about 4 months (around 2018). Information from Your Visit        Department     Name Address Phone Fax    [address] [place] [address]  [address] [phone]      You Were Seen for:         Comments    Osteoarthritis of spine with radiculopathy, lumbar region   [[id number]]         Vital Signs     Blood Pressure Pulse Respirations Height Weight Body Mass Index    155/89 (Site: Right Arm) 76 18 6' 1\" (1.854 m) 316 lb (143.3 kg) 41.69 kg/m2    Smoking Status                   Former Smoker           Additional Information about your Body Mass Index (BMI)           Your BMI as listed above is considered obese (30 or more).  BMI is an Family history of atrial fibrillation    Thoracic spine pain    CPAP (continuous positive airway pressure) dependence    Osteoarthritis of spine with radiculopathy, lumbar region    Chronic bilateral low back pain with bilateral sciatica    Chest discomfort    Hypertension    Hypercholesteremia    Type 2 diabetes mellitus (HCC)    SUSANA (obstructive sleep apnea)    Obesity      Immunizations as of 10/5/2017     Name Date    Influenza Virus Vaccine 11/1/2014      Preventive Care        Date Due    Hepatitis C screening is recommended for all adults regardless of risk factors born between [facility] at least once (lifetime) who have never been tested. 1955    Diabetic Foot Exam 11/19/1965    Hemoglobin A1C (Test For Long-Term Glucose Control) 11/19/1965    Eye Exam By An Eye Doctor 11/19/1965    Cholesterol Screening 11/19/1965    HIV screening is recommended for all people regardless of risk factors  aged 15-65 years at least once (lifetime) who have never been HIV tested. 11/19/1970    Urine Check For Kidney Problems 11/19/1973    Tetanus Combination Vaccine (1 - Tdap) 11/19/1974    Pneumococcal Vaccine - Pneumovax for adults aged 19-64 years with: chronic heart disease, chronic lung disease, diabetes mellitus, alcoholism, chronic liver disease, or cigarette smoking. (1 of 1 - PPSV23) 11/19/1974    Zoster Vaccine 11/19/2015    Yearly Flu Vaccine (1) 9/1/2017    Colonoscopy 9/10/2020            [employer] Signup           Our records indicate that you have an active [employer] account. You can view your After Visit Summary by going to [email or web address] and logging in with your [employer] username and password. If you don't have a [employer] username and password but a parent or guardian has access to your record, the parent or guardian should login with their own [employer] username and password and access your record to view the After Visit Summary.      Additional Information If you have questions, please contact the physician practice where you receive care. Remember, MyChart is NOT to be used for urgent needs. For medical emergencies, dial 911. For questions regarding your MyChart account call [phone]. If you have a clinical question, please call your doctor's office.

## 2017-10-05 NOTE — PROGRESS NOTES
 AL NJX DX/THER SBST EPIDURAL/SUBARACH LUMBAR/SACRAL N/A 10/3/2017    L3-4 EPIDURAL STEROID INJECTION performed by Daina Agee at [address] [place] [address]  · None    Significant Injuries  · None    Habits  Tai Perez reports that he has quit smoking. He has never used smokeless tobacco. He reports that he does not drink alcohol or use illicit drugs. Family History   Problem Relation Age of Onset    Heart Disease Father 64    Cancer Father      malignant tumor of pharynx    Diabetes Mother     Hypertension Mother     Diabetes Sister      x 2    Diabetes Brother     Colon Cancer Neg Hx     Colon Polyps Neg Hx     Liver Disease Neg Hx     Rectal Cancer Neg Hx     Stomach Cancer Neg Hx        Social History  Tai Perez is , lives in [place], [place], and is disabled. Medications:  Current Outpatient Prescriptions   Medication Sig Dispense Refill    lovastatin (MEVACOR) 10 MG tablet Take 10 mg by mouth nightly      OXYCONTIN 20 MG extended release tablet Take 1 tablet by mouth every 12 hours . 60 each 0    oxyCODONE-acetaminophen (PERCOCET)  MG per tablet Take 1 tablet by mouth every 6 hours as needed for Pain (Pain) . 90 tablet 0    ALPRAZolam (XANAX) 1 MG tablet Take 1 tablet by mouth 3 times daily as needed for Sleep 90 tablet 0    zolpidem (AMBIEN) 10 MG tablet TK 1 T PO QHS  2    metFORMIN (GLUCOPHAGE) 500 MG tablet Take 500 mg by mouth 2 times daily (with meals)      polyethylene glycol (MIRALAX) POWD powder Take 17 g by mouth every 3 days      lisinopril-hydrochlorothiazide (PRINZIDE;ZESTORETIC) 20-12.5 MG per tablet Take 1 tablet by mouth daily Indications: takes 1/2 tablet        No current facility-administered medications for this visit. Allergies:   Allergies as of 10/05/2017 - Review Complete 10/05/2017   Allergen Reaction Noted    Clindamycin/lincomycin Anaphylaxis 07/15/2015    Benadryl [diphenhydramine] Other (See Comments) 07/15/2015       Examination:  Vitals:  BP (!) 155/89 (Site: Right Arm)  Pulse 76  Resp 18  Ht 6' 1\" (1.854 m)  Wt (!) 316 lb (143.3 kg)  BMI 41.69 kg/m2  General appearance:  alert and cooperative with exam  HEENT:  Sclera clear, anicteric, Oropharynx clear, no lesions, Neck supple with midline trachea, Thyroid without masses and Trachea midline  Heart[de-identified]  regular rate and rhythm, S1, S2 normal, no murmur, click, rub or gallop  Lungs:  clear to auscultation bilaterally  Extremities:  edema moderate lower leg and venous stasis dermatitis noted  Neurologic:  Extraocular movements are intact without nystagmus. Visual fields are full to confrontation. Facial movements are symmetrical and normal.  Speech is precise. Extremity strength is normal in both uppers and lowers. Deep tendon reflexes are intact and symmetrical.  Rapid alternating movements are unimpaired. Finger-to-nose testing is performed well, without dysmetria. Gait is normal.    Pertinent Diagnostic Studies:  JAKOB reviewed. NCS/EMG RLS suggested a mild PN. Assessment:       ICD-10-CM ICD-9-CM    1. Osteoarthritis of spine with radiculopathy, lumbar region M47.26 721.3    2. Chronic bilateral low back pain with bilateral sciatica M54.42 724.2     M54.41 724.3     G89.29 338.29    3. SUSANA (obstructive sleep apnea) G47.33 327.23    Clinically stable. I discussed risks of sedating and respiratory suppressant medications including the risks of combining medications. I discussed medication dependence and addiction. He has shown no indications of addiction, no diversionary tactics or expressions. Plan:   1. Continue present care  2. FU in 4 months.     Electronically signed by Henna Echeverria MD on 10/5/2017

## 2017-10-05 NOTE — ED AVS SNAPSHOT
After Visit Summary  (Discharge Instructions)    Medication List for Home    Based on the information you provided to us as well as any changes during this visit, the following is your updated medication list.  Compare this with your prescription bottles at home. If you have any questions or concerns, contact your primary care physician's office. Daily Medication List (This medication list can be shared with any Healthcare provider who is helping you manage your medications)      ASK your doctor about these medications if you have questions     ALPRAZolam 1 MG tablet   Commonly known as:  XANAX   Take 1 tablet by mouth 3 times daily as needed for Sleep       lisinopril-hydrochlorothiazide 20-12.5 MG per tablet   Commonly known as:  PRINZIDE;ZESTORETIC   Take 1 tablet by mouth daily Indications: takes 1/2 tablet       metFORMIN 500 MG tablet   Commonly known as:  GLUCOPHAGE   Take 500 mg by mouth 2 times daily (with meals)       oxyCODONE-acetaminophen  MG per tablet   Commonly known as:  PERCOCET   Take 1 tablet by mouth every 6 hours as needed for Pain (Pain) . OXYCONTIN 20 MG extended release tablet   Generic drug:  oxyCODONE   Take 1 tablet by mouth every 12 hours . polyethylene glycol Powd powder   Commonly known as:  MIRALAX   Take 17 g by mouth every 3 days       zolpidem 10 MG tablet   Commonly known as:  AMBIEN   TK 1 T PO QHS               Allergies as of 10/5/2017        Reactions    Clindamycin/lincomycin Anaphylaxis    Benadryl [Diphenhydramine] Other (See Comments)    \"makes legs jump around\"      Immunizations as of 10/5/2017     Name Date Dose VIS Date Route    Influenza Virus Vaccine 11/1/2014 -- -- --         After Visit Summary    This summary was created for you. Thank you for entrusting your care to us.   The following information includes details about your hospital/visit stay along with steps you should take to help with your recovery once you leave the hospital.  In this packet, you will find information about the topics listed below:    · Instructions about your medications including a list of your home medications  · A summary of your hospital visit  · Follow-up appointments once you have left the hospital  · Your care plan at home      You may receive a survey regarding the care you received during your stay. Your input is valuable to us. We encourage you to complete and return your survey in the envelope provided. We hope you will choose us in the future for your healthcare needs. Patient Information     Patient Name CHUY Arias 1955      Care Provided at:     Name Address Phone       [address] [phone]            Your Visit    Here you will find information about your visit, including the reason for your visit. Please take this sheet with you when you visit your doctor or other health care provider in the future. It will help determine the best possible medical care for you at that time. If you have any questions once you leave the hospital, please call the department phone number listed below. Diagnoses this visit     Your diagnoses were HYPERGLYCEMIA and S/P EPIDURAL STEROID INJECTION. Visit Information     Date of Visit Department Dept Phone    10/5/2017 [facility] EM[facility] [phone]      You were seen by     You were seen by Jermain Banda MD.       Follow-up Appointments    Below is a list of your follow-up and future appointments. This may not be a complete list as you may have made appointments directly with providers that we are not aware of or your providers may have made some for you. Please call your providers to confirm appointments. It is important to keep your appointments.  Please bring your current insurance card, photo ID, co-pay, and all medication bottles to your appointment. If self-pay, payment is expected at the time of service. Follow-up Information     Follow up with Clinton Issa MD.    Specialty:  General Surgery    Contact information:    [address] [place] [address] [phone]        Future Appointments     10/5/2017 1:45 PM     Appointment with Cesar Mccauley MD at [address] [place] [address] ([phone])   Please arrive 15 minutes prior to appointment, bring photo ID and insurance card. [address]       10/26/2017 1:45 PM     Appointment with Cuauhtemoc Girard MD at [facility] ([phone])   Please arrive 15 minutes prior to appointment time, bring insurance card and photo ID.    [address]         Preventive Care        Date Due    Hepatitis C screening is recommended for all adults regardless of risk factors born between [facility] at least once (lifetime) who have never been tested. 1955    Diabetic Foot Exam 11/19/1965    Hemoglobin A1C (Test For Long-Term Glucose Control) 11/19/1965    Eye Exam By An Eye Doctor 11/19/1965    Cholesterol Screening 11/19/1965    HIV screening is recommended for all people regardless of risk factors  aged 15-65 years at least once (lifetime) who have never been HIV tested.  11/19/1970    Urine Check For Kidney Problems 11/19/1973    Tetanus Combination Vaccine (1 - Tdap) 11/19/1974    Pneumococcal Vaccine - Pneumovax for adults aged 19-64 years with: chronic heart disease, chronic lung disease, diabetes mellitus, alcoholism, chronic liver disease, or cigarette smoking. (1 of 1 - PPSV23) 11/19/1974    Zoster Vaccine 11/19/2015    Yearly Flu Vaccine (1) 9/1/2017    Colonoscopy 9/10/2020                 Care Plan Once You Return Home    This section includes instructions you will need to follow once you leave the hospital.  Your care team will discuss these with you, so you and If you don't have a [employer] username and password but a parent or guardian has access to your record, the parent or guardian should login with their own [employer] username and password and access your record to view the After Visit Summary. Additional Information  If you have questions, please contact the physician practice where you receive care. Remember, MyChart is NOT to be used for urgent needs. For medical emergencies, dial 911. For questions regarding your MyChart account call [phone]. If you have a clinical question, please call your doctor's office. View your information online  ? Review your current list of  medications, immunization, and allergies. ? Review your future test results online . ? Review your discharge instructions provided by your caregivers at discharge    Certain functionality such as prescription refills, scheduling appointments or sending messages to your provider are not activated if your provider does not use [employer] in his/her office    For questions regarding your MyChart account call [phone]. If you have a clinical question, please call your doctor's office. The information on all pages of the After Visit Summary has been reviewed with me, the patient and/or responsible adult, by my health care provider(s). I had the opportunity to ask questions regarding this information. I understand I should dispose of my armband safely at home to protect my health information. A complete copy of the After Visit Summary has been given to me, the patient and/or responsible adult.          Patient Signature/Responsible Adult: ___________________________________    Nurse Signature: ___________________________________  Resident/MLP Signature: ___________________________________  Attending Signature: ___________________________________    Date:____________Time:____________              Discharge Instructions            Learning About High Blood Sugar What is high blood sugar? Your body turns the food you eat into glucose (sugar), which it uses for energy. But if your body isn't able to use the sugar right away, it can build up in your blood and lead to high blood sugar. When the amount of sugar in your blood stays too high for too much of the time, you may have diabetes. Diabetes is a disease that can cause serious health problems. The good news is that lifestyle changes may help you get your blood sugar back to normal and avoid or delay diabetes. What causes high blood sugar? Sugar (glucose) can build up in your blood if you:  · Are overweight. · Have a family history of diabetes. · Take certain medicines, such as steroids. What are the symptoms? Having high blood sugar may not cause any symptoms at all. Or it may make you feel very thirsty or very hungry. You may also urinate more often than usual, have blurry vision, or lose weight without trying. How is high blood sugar treated? You can take steps to lower your blood sugar level if you understand what makes it get higher. Your doctor may want you to learn how to test your blood sugar level at home. Then you can see how illness, stress, or different kinds of food or medicine raise or lower your blood sugar level. Other tests may be needed to see if you have diabetes. How can you prevent high blood sugar? · Watch your weight. If you're overweight, losing just a small amount of weight may help. Reducing fat around your waist is most important. · Limit the amount of calories, sweets, and unhealthy fat you eat. Ask your doctor if a dietitian can help you. A registered dietitian can help you create meal plans that fit your lifestyle. · Get at least 30 minutes of exercise on most days of the week. Exercise helps control your blood sugar. It also helps you maintain a healthy weight. Walking is a good choice.  You also may want to do other

## 2017-10-06 ENCOUNTER — HOSPITAL ENCOUNTER (EMERGENCY)
Age: 62
Discharge: HOME | End: 2017-10-06
Attending: EMERGENCY MEDICINE
Payer: MEDICARE

## 2017-10-06 VITALS
TEMPERATURE: 98 F | BODY MASS INDEX: 39.76 KG/M2 | SYSTOLIC BLOOD PRESSURE: 122 MMHG | HEIGHT: 73 IN | HEART RATE: 76 BPM | OXYGEN SATURATION: 99 % | RESPIRATION RATE: 20 BRPM | DIASTOLIC BLOOD PRESSURE: 78 MMHG | WEIGHT: 300 LBS

## 2017-10-06 DIAGNOSIS — F41.1 ANXIETY STATE: Primary | ICD-10-CM

## 2017-10-06 LAB
ACETAMINOPHEN LEVEL: <15 UG/ML
ALBUMIN SERPL-MCNC: 4.4 G/DL (ref 3.5–5.2)
ALP BLD-CCNC: 65 U/L (ref 40–130)
ALT SERPL-CCNC: 24 U/L (ref 5–41)
AMPHETAMINE SCREEN, URINE: NEGATIVE
ANION GAP SERPL CALCULATED.3IONS-SCNC: 14 MMOL/L (ref 7–19)
AST SERPL-CCNC: 14 U/L (ref 5–40)
BARBITURATE SCREEN URINE: NEGATIVE
BASOPHILS ABSOLUTE: 0.1 K/UL (ref 0–0.2)
BASOPHILS RELATIVE PERCENT: 0.8 % (ref 0–1)
BENZODIAZEPINE SCREEN, URINE: POSITIVE
BILIRUB SERPL-MCNC: 0.4 MG/DL (ref 0.2–1.2)
BILIRUBIN URINE: NEGATIVE
BLOOD, URINE: NEGATIVE
BUN BLDV-MCNC: 12 MG/DL (ref 8–23)
CALCIUM SERPL-MCNC: 10 MG/DL (ref 8.8–10.2)
CANNABINOID SCREEN URINE: NEGATIVE
CHLORIDE BLD-SCNC: 98 MMOL/L (ref 98–111)
CLARITY: CLEAR
CO2: 29 MMOL/L (ref 22–29)
COCAINE METABOLITE SCREEN URINE: NEGATIVE
COLOR: YELLOW
CREAT SERPL-MCNC: 0.9 MG/DL (ref 0.5–1.2)
EOSINOPHILS ABSOLUTE: 0.1 K/UL (ref 0–0.6)
EOSINOPHILS RELATIVE PERCENT: 0.9 % (ref 0–5)
ETHANOL: <10 MG/DL (ref 0–0.08)
GFR NON-AFRICAN AMERICAN: >60
GLUCOSE BLD-MCNC: 154 MG/DL (ref 74–109)
GLUCOSE URINE: NEGATIVE MG/DL
HCT VFR BLD CALC: 44.2 % (ref 42–52)
HEMOGLOBIN: 14.8 G/DL (ref 14–18)
KETONES, URINE: NEGATIVE MG/DL
LEUKOCYTE ESTERASE, URINE: NEGATIVE
LYMPHOCYTES ABSOLUTE: 1.6 K/UL (ref 1.1–4.5)
LYMPHOCYTES RELATIVE PERCENT: 24.3 % (ref 20–40)
Lab: ABNORMAL
MCH RBC QN AUTO: 29.2 PG (ref 27–31)
MCHC RBC AUTO-ENTMCNC: 33.5 G/DL (ref 33–37)
MCV RBC AUTO: 87.4 FL (ref 80–94)
MONOCYTES ABSOLUTE: 0.7 K/UL (ref 0–0.9)
MONOCYTES RELATIVE PERCENT: 10 % (ref 0–10)
NEUTROPHILS ABSOLUTE: 4.1 K/UL (ref 1.5–7.5)
NEUTROPHILS RELATIVE PERCENT: 63.5 % (ref 50–65)
NITRITE, URINE: NEGATIVE
OPIATE SCREEN URINE: NEGATIVE
PDW BLD-RTO: 13.8 % (ref 11.5–14.5)
PH UA: 7
PLATELET # BLD: 180 K/UL (ref 130–400)
PMV BLD AUTO: 10.9 FL (ref 9.4–12.4)
POTASSIUM SERPL-SCNC: 3.8 MMOL/L (ref 3.5–5)
PROTEIN UA: NEGATIVE MG/DL
RBC # BLD: 5.06 M/UL (ref 4.7–6.1)
SALICYLATE, SERUM: <3 MG/DL (ref 3–10)
SODIUM BLD-SCNC: 141 MMOL/L (ref 136–145)
SPECIFIC GRAVITY UA: 1.01
TOTAL PROTEIN: 7.8 G/DL (ref 6.6–8.7)
UROBILINOGEN, URINE: 1 E.U./DL
WBC # BLD: 6.5 K/UL (ref 4.8–10.8)

## 2017-10-06 PROCEDURE — 99283 EMERGENCY DEPT VISIT LOW MDM: CPT | Performed by: EMERGENCY MEDICINE

## 2017-10-06 PROCEDURE — G0480 DRUG TEST DEF 1-7 CLASSES: HCPCS

## 2017-10-06 PROCEDURE — 80307 DRUG TEST PRSMV CHEM ANLYZR: CPT

## 2017-10-06 PROCEDURE — 36415 COLL VENOUS BLD VENIPUNCTURE: CPT

## 2017-10-06 PROCEDURE — 85025 COMPLETE CBC W/AUTO DIFF WBC: CPT

## 2017-10-06 PROCEDURE — 96374 THER/PROPH/DIAG INJ IV PUSH: CPT

## 2017-10-06 PROCEDURE — 81003 URINALYSIS AUTO W/O SCOPE: CPT

## 2017-10-06 PROCEDURE — 99283 EMERGENCY DEPT VISIT LOW MDM: CPT

## 2017-10-06 PROCEDURE — 6360000002 HC RX W HCPCS: Performed by: EMERGENCY MEDICINE

## 2017-10-06 PROCEDURE — 80053 COMPREHEN METABOLIC PANEL: CPT

## 2017-10-06 RX ORDER — ALPRAZOLAM 1 MG/1
1 TABLET ORAL 3 TIMES DAILY PRN
Qty: 15 TABLET | Refills: 0 | Status: SHIPPED | OUTPATIENT
Start: 2017-10-06 | End: 2017-10-16

## 2017-10-06 RX ORDER — LORAZEPAM 2 MG/ML
2 INJECTION INTRAMUSCULAR ONCE
Status: COMPLETED | OUTPATIENT
Start: 2017-10-06 | End: 2017-10-06

## 2017-10-06 RX ADMIN — LORAZEPAM 2 MG: 2 INJECTION INTRAMUSCULAR; INTRAVENOUS at 19:05

## 2017-10-06 ASSESSMENT — ENCOUNTER SYMPTOMS
CHEST TIGHTNESS: 0
ALLERGIC/IMMUNOLOGIC NEGATIVE: 1
RESPIRATORY NEGATIVE: 1
EYES NEGATIVE: 1
EYE ITCHING: 0
ANAL BLEEDING: 0
APNEA: 0
COUGH: 0
ABDOMINAL DISTENTION: 0
ABDOMINAL PAIN: 0
CHOKING: 0
GASTROINTESTINAL NEGATIVE: 1
EYE DISCHARGE: 0

## 2017-10-06 NOTE — ED PROVIDER NOTES
adenopathy. Psychiatric/Behavioral: Negative for behavioral problems, confusion and decreased concentration. All other systems reviewed and are negative. PAST MEDICAL HISTORY     Past Medical History:   Diagnosis Date    Acute sinusitis     Anxiety     Chronic back pain     Chronic bilateral low back pain with bilateral sciatica     Constipation     COPD (chronic obstructive pulmonary disease) (HCC)     CPAP (continuous positive airway pressure) dependence     12cm to 20cm    Depression     Hyperlipidemia     Hypertensive disorder     OA (osteoarthritis of the spine)     Obesity     SUSANA (obstructive sleep apnea)     AHI:  85.3 per HST 11/2013    Osteoarthritis of spine with radiculopathy, lumbar region     Otitis media     [facility] arthritis [facility] [address]     Spinal stenosis     Type 2 diabetes mellitus ([facility] [address] 3/23/2016    Unspecified sleep apnea     uses bipap    Vertigo          SURGICAL HISTORY       Past Surgical History:   Procedure Laterality Date    ADENOIDECTOMY      BROW LIFT      due to vision    COLONOSCOPY  30 yrs ago    Yaniv Aceves, normal per patient    HERNIA REPAIR      KNEE ARTHROSCOPY Left 2009    IN [facility]ish [facility] [address]ha 84 DX/THER SBST EPIDURAL/SUBARACH LUMBAR/SACRAL N/A 10/3/2017    L3-4 EPIDURAL STEROID INJECTION performed by Fina Alejo at [address]       Previous Medications    ALPRAZOLAM (XANAX) 1 MG TABLET    Take 1 tablet by mouth 3 times daily as needed for Sleep    LISINOPRIL-HYDROCHLOROTHIAZIDE (PRINZIDE;ZESTORETIC) 20-12.5 MG PER TABLET    Take 1 tablet by mouth daily Indications: takes 1/2 tablet     LOVASTATIN (MEVACOR) 10 MG TABLET    Take 10 mg by mouth nightly    METFORMIN (GLUCOPHAGE) 500 MG TABLET    Take 500 mg by mouth 2 times daily (with meals)    OXYCODONE-ACETAMINOPHEN (PERCOCET)  MG PER TABLET    Take 1 tablet by mouth every 6 hours as needed for Pain (Pain) .     OXYCONTIN 20 MG EXTENDED RELEASE TABLET    Take 1 tablet by mouth every 12 hours . POLYETHYLENE GLYCOL (MIRALAX) POWD POWDER    Take 17 g by mouth every 3 days    ZOLPIDEM (AMBIEN) 10 MG TABLET    TK 1 T PO QHS       ALLERGIES     Clindamycin/lincomycin and Benadryl [diphenhydramine]    FAMILY HISTORY       Family History   Problem Relation Age of Onset    Heart Disease Father 64    Cancer Father      malignant tumor of pharynx    Diabetes Mother     Hypertension Mother     Diabetes Sister      x 2    Diabetes Brother     Colon Cancer Neg Hx     Colon Polyps Neg Hx     Liver Disease Neg Hx     Rectal Cancer Neg Hx     Stomach Cancer Neg Hx           SOCIAL HISTORY       Social History     Social History    Marital status:      Spouse name: N/A    Number of children: N/A    Years of education: N/A     Social History Main Topics    Smoking status: Former Smoker    Smokeless tobacco: Never Used    Alcohol use No    Drug use: No    Sexual activity: Not Asked     Other Topics Concern    None     Social History Narrative       SCREENINGS             PHYSICAL EXAM    (up to 7 for level 4, 8 or more for level 5)   ED Triage Vitals   BP Temp Temp src Pulse Resp SpO2 Height Weight   10/06/17 1709 10/06/17 1709 -- 10/06/17 1709 10/06/17 1709 10/06/17 1709 10/06/17 1709 10/06/17 1709   144/104 98.2 °F (36.8 °C)  66 17 97 % 6' 1\" (1.854 m) 300 lb (136.1 kg)       Physical Exam   Constitutional: He appears well-developed and well-nourished. HENT:   Head: Normocephalic and atraumatic. Eyes: Conjunctivae are normal. Pupils are equal, round, and reactive to light. Neck: No tracheal deviation present. No thyromegaly present. Cardiovascular: Normal rate and normal heart sounds. Pulmonary/Chest: No respiratory distress. He has no wheezes. He has no rales. Abdominal: He exhibits no distension. There is no tenderness. There is no rebound and no guarding. Musculoskeletal: He exhibits no edema, tenderness or deformity. Neurological: No cranial nerve deficit. Coordination normal.   Skin: No rash noted. No erythema. No pallor. DIAGNOSTIC RESULTS     EKG: All EKG's are interpreted by the Emergency Department Physician who either signs or Co-signs this chart in the absence of a cardiologist.        RADIOLOGY:   Non-plain film images such as CT, Ultrasound and MRI are read by the radiologist. Plain radiographic images are visualized and preliminarily interpreted by the emergency physician with the below findings:          No orders to display           LABS:  [address] - Abnormal; Notable for the following:        Result Value    Glucose 154 (*)     All other components within normal limits   URINE DRUG SCREEN - Abnormal; Notable for the following:     Benzodiazepine Screen, Urine Positive (*)     All other components within normal limits   ACETAMINOPHEN LEVEL   CBC WITH AUTO DIFFERENTIAL   ETHANOL   SALICYLATE LEVEL   URINALYSIS   CBC   COMPREHENSIVE METABOLIC PANEL   TSH WITHOUT REFLEX       All other labs were within normal range or not returned as of this dictation. EMERGENCY DEPARTMENT COURSE and DIFFERENTIAL DIAGNOSIS/MDM:   Vitals:    Vitals:    10/06/17 1709   BP: (!) 144/104   Pulse: 66   Resp: 17   Temp: 98.2 °F (36.8 °C)   SpO2: 97%   Weight: 300 lb (136.1 kg)   Height: 6' 1\" (1.854 m)       MDM    Reassessment    CONSULTS:  None    PROCEDURES:  Unless otherwise noted below, none     Procedures    FINAL IMPRESSION      1. Anxiety state          DISPOSITION/PLAN   DISPOSITION Decision to Discharge    PATIENT REFERRED TO:  No follow-up provider specified.     DISCHARGE MEDICATIONS:  New Prescriptions    ALPRAZOLAM (XANAX) 1 MG TABLET    Take 1 tablet by mouth 3 times daily as needed for Anxiety          (Please note that portions of this note were completed with a voice recognition program.  Efforts were made to edit the dictations but occasionally words are mis-transcribed.)    Joseph Edwards MD (electronically signed)  Attending Emergency Physician           Joseph Edwards MD  10/06/17 2029

## 2017-10-06 NOTE — ED AVS SNAPSHOT
After Visit Summary  (Discharge Instructions)    Medication List for Home    Based on the information you provided to us as well as any changes during this visit, the following is your updated medication list.  Compare this with your prescription bottles at home. If you have any questions or concerns, contact your primary care physician's office. Daily Medication List (This medication list can be shared with any Healthcare provider who is helping you manage your medications)      You told us you were taking these medications at home, but the amount or how often you take this medication has CHANGED     * ALPRAZolam 1 MG tablet   Commonly known as:  XANAX   Take 1 tablet by mouth 3 times daily as needed for Sleep   What changed:  Another medication with the same name was added. Make sure you understand how and when to take each. * ALPRAZolam 1 MG tablet   Commonly known as:  XANAX   Take 1 tablet by mouth 3 times daily as needed for Anxiety   What changed: You were already taking a medication with the same name, and this prescription was added. Make sure you understand how and when to take each. * Notice: This list has 2 medication(s) that are the same as other medications prescribed for you. Read the directions carefully, and ask your doctor or other care provider to review them with you. ASK your doctor about these medications if you have questions     lisinopril-hydrochlorothiazide 20-12.5 MG per tablet   Commonly known as:  PRINZIDE;ZESTORETIC   Take 1 tablet by mouth daily Indications: takes 1/2 tablet       lovastatin 10 MG tablet   Commonly known as:  MEVACOR   Take 10 mg by mouth nightly       metFORMIN 500 MG tablet   Commonly known as:  GLUCOPHAGE   Take 500 mg by mouth 2 times daily (with meals)       oxyCODONE-acetaminophen  MG per tablet   Commonly known as:  PERCOCET   Take 1 tablet by mouth every 6 hours as needed for Pain (Pain) . your provider does not use [facility] in his/her office    For questions regarding your MyChart account call [phone]. If you have a clinical question, please call your doctor's office. The information on all pages of the After Visit Summary has been reviewed with me, the patient and/or responsible adult, by my health care provider(s). I had the opportunity to ask questions regarding this information. I understand I should dispose of my armband safely at home to protect my health information. A complete copy of the After Visit Summary has been given to me, the patient and/or responsible adult. Patient Signature/Responsible Adult: ___________________________________    Nurse Signature: ___________________________________  Resident/MLP Signature: ___________________________________  Attending Signature: ___________________________________    Date:____________Time:____________              Discharge Instructions            Anxiety Disorder: Care Instructions  Your Care Instructions  Anxiety is a normal reaction to stress. Difficult situations can cause you to have symptoms such as sweaty palms and a nervous feeling. In an anxiety disorder, the symptoms are far more severe. Constant worry, muscle tension, trouble sleeping, nausea and diarrhea, and other symptoms can make normal daily activities difficult or impossible. These symptoms may occur for no reason, and they can affect your work, school, or social life. Medicines, counseling, and self-care can all help. Follow-up care is a key part of your treatment and safety. Be sure to make and go to all appointments, and call your doctor if you are having problems. It's also a good idea to know your test results and keep a list of the medicines you take. How can you care for yourself at home? · Take medicines exactly as directed. Call your doctor if you think you are having a problem with your medicine. · Do not hold your breath. · Breathe like this for 5 to 10 minutes. Notice the feeling of calmness throughout your whole body. As you continue to breathe slowly and deeply, relax by doing the following for another 5 to 10 minutes:  · Tighten and relax each muscle group in your body. You can begin at your toes and work your way up to your head. · Imagine your muscle groups relaxing and becoming heavy. · Empty your mind of all thoughts. · Let yourself relax more and more deeply. · Become aware of the state of calmness that surrounds you. · When your relaxation time is over, you can bring yourself back to alertness by moving your fingers and toes and then your hands and feet and then stretching and moving your entire body. Sometimes people fall asleep during relaxation, but they usually wake up shortly afterward. · Always give yourself time to return to full alertness before you drive a car or do anything that might cause an accident if you are not fully alert. Never play a relaxation tape while you drive a car. When should you call for help? Call 911 anytime you think you may need emergency care. For example, call if:  · You feel you cannot stop from hurting yourself or someone else. Keep the numbers for these national suicide hotlines: 0-646-208-TALK (1-880.576.8192) and 6-696-BHAEJUW (7-274.558.5802). If you or someone you know talks about suicide or feeling hopeless, get help right away. Watch closely for changes in your health, and be sure to contact your doctor if:  · You have anxiety or fear that affects your life. · You have symptoms of anxiety that are new or different from those you had before. Where can you learn more? Go to https://Online Dealernirewamy.Lookback. org and sign in to your [employer] account. Enter P754 in the [employer] box to learn more about \"Anxiety Disorder: Care Instructions. \"     If you do not have an account, please click on the \"Sign Up Now\" link. Current as of: July 26, 2016  Content Version: 11.3  © 2778-0234 [employer]. Care instructions adapted under license by [facility] ([facility]). If you have questions about a medical condition or this instruction, always ask your healthcare professional. [address] any warranty or liability for your use of this information.

## 2017-10-06 NOTE — ED NOTES
Bed: 18  Expected date:   Expected time:   Means of arrival:   Comments:  EMS WITHDRAWAL     Mane Nowak RN  10/06/17 2482

## 2017-10-07 NOTE — ED NOTES
ER MD Sanchez signed prescribed medications for Dr. Arlene Alcala in his absence     [facility], RN  10/06/17 2047

## 2017-10-09 LAB
EKG P AXIS: 57 DEGREES
EKG P-R INTERVAL: 176 MS
EKG Q-T INTERVAL: 426 MS
EKG QRS DURATION: 94 MS
EKG QTC CALCULATION (BAZETT): 425 MS
EKG T AXIS: 26 DEGREES

## 2017-10-16 RX ORDER — ALPRAZOLAM 1 MG/1
1 TABLET ORAL 3 TIMES DAILY PRN
Qty: 90 TABLET | Refills: 0 | Status: SHIPPED | OUTPATIENT
Start: 2017-10-16 | End: 2017-11-14 | Stop reason: SDUPTHER

## 2017-10-16 RX ORDER — OXYCODONE AND ACETAMINOPHEN 10; 325 MG/1; MG/1
1 TABLET ORAL EVERY 6 HOURS PRN
Qty: 90 TABLET | Refills: 0 | Status: SHIPPED | OUTPATIENT
Start: 2017-10-16 | End: 2017-11-14 | Stop reason: SDUPTHER

## 2017-10-25 ENCOUNTER — TELEPHONE (OUTPATIENT)
Dept: CARDIOLOGY | Age: 62
End: 2017-10-25

## 2017-11-09 ENCOUNTER — TELEPHONE (OUTPATIENT)
Dept: NEUROLOGY | Age: 62
End: 2017-11-09

## 2017-11-10 NOTE — TELEPHONE ENCOUNTER
Talked to patient via phone patient was in [facility] ER. Please review records. Patient feels like he is having electrical shocks at base of neck and head. Patient feels that the xanex 1mg  TID  is not working as well for him as good as it use to. Patient has tried to take the xanex 4 times daily seems to help with panic attaches.

## 2017-11-14 RX ORDER — ALPRAZOLAM 1 MG/1
1 TABLET ORAL 3 TIMES DAILY PRN
Qty: 90 TABLET | Refills: 0 | Status: SHIPPED | OUTPATIENT
Start: 2017-11-14 | End: 2017-11-27 | Stop reason: SDUPTHER

## 2017-11-14 RX ORDER — OXYCODONE AND ACETAMINOPHEN 10; 325 MG/1; MG/1
1 TABLET ORAL EVERY 6 HOURS PRN
Qty: 90 TABLET | Refills: 0 | Status: SHIPPED | OUTPATIENT
Start: 2017-11-14 | End: 2017-12-13 | Stop reason: SDUPTHER

## 2017-11-14 NOTE — TELEPHONE ENCOUNTER
Patient requesting refill for Xanax, Oxycodone, Percocet  Last ov 10/5/17  Next ov 11/16/17  Last rx 10/16/17  Romy Holguin October 2017

## 2017-11-16 ENCOUNTER — OFFICE VISIT (OUTPATIENT)
Dept: NEUROLOGY | Age: 62
End: 2017-11-16
Payer: MEDICARE

## 2017-11-16 VITALS
BODY MASS INDEX: 41.35 KG/M2 | WEIGHT: 312 LBS | SYSTOLIC BLOOD PRESSURE: 117 MMHG | HEIGHT: 73 IN | DIASTOLIC BLOOD PRESSURE: 72 MMHG | HEART RATE: 75 BPM

## 2017-11-16 DIAGNOSIS — R41.3 MEMORY LOSS: ICD-10-CM

## 2017-11-16 DIAGNOSIS — M54.2 CERVICALGIA: ICD-10-CM

## 2017-11-16 DIAGNOSIS — R00.2 PALPITATIONS: ICD-10-CM

## 2017-11-16 DIAGNOSIS — M54.42 CHRONIC BILATERAL LOW BACK PAIN WITH BILATERAL SCIATICA: ICD-10-CM

## 2017-11-16 DIAGNOSIS — R00.0 TACHYCARDIA: ICD-10-CM

## 2017-11-16 DIAGNOSIS — M47.26 OSTEOARTHRITIS OF SPINE WITH RADICULOPATHY, LUMBAR REGION: Primary | ICD-10-CM

## 2017-11-16 DIAGNOSIS — M54.6 THORACIC SPINE PAIN: ICD-10-CM

## 2017-11-16 DIAGNOSIS — G47.33 OSA (OBSTRUCTIVE SLEEP APNEA): ICD-10-CM

## 2017-11-16 DIAGNOSIS — R29.818 LHERMITTE'S SIGN POSITIVE: ICD-10-CM

## 2017-11-16 DIAGNOSIS — R27.0 ATAXIA: ICD-10-CM

## 2017-11-16 DIAGNOSIS — G89.29 CHRONIC BILATERAL LOW BACK PAIN WITH BILATERAL SCIATICA: ICD-10-CM

## 2017-11-16 DIAGNOSIS — M54.41 CHRONIC BILATERAL LOW BACK PAIN WITH BILATERAL SCIATICA: ICD-10-CM

## 2017-11-16 PROCEDURE — G8484 FLU IMMUNIZE NO ADMIN: HCPCS | Performed by: PSYCHIATRY & NEUROLOGY

## 2017-11-16 PROCEDURE — 3017F COLORECTAL CA SCREEN DOC REV: CPT | Performed by: PSYCHIATRY & NEUROLOGY

## 2017-11-16 PROCEDURE — 1036F TOBACCO NON-USER: CPT | Performed by: PSYCHIATRY & NEUROLOGY

## 2017-11-16 PROCEDURE — G8417 CALC BMI ABV UP PARAM F/U: HCPCS | Performed by: PSYCHIATRY & NEUROLOGY

## 2017-11-16 PROCEDURE — 99214 OFFICE O/P EST MOD 30 MIN: CPT | Performed by: PSYCHIATRY & NEUROLOGY

## 2017-11-16 PROCEDURE — G8427 DOCREV CUR MEDS BY ELIG CLIN: HCPCS | Performed by: PSYCHIATRY & NEUROLOGY

## 2017-11-16 NOTE — PATIENT INSTRUCTIONS
INSTRUCTIONS:  1. Will arrange an MRI head and cervical spine  2. Additional labs  3. Increase the alprazolam to 1 mg, 1 1/2 tablet three times a day. Can increase to 2 mg three times a day. 4. Will have Dr. Ines Fitch put in a loop recorder  5.  Continue BiPAP use

## 2017-11-16 NOTE — PROGRESS NOTES
[facility] Neurology Follow Up Encounter  2017 3:34 PM    Information:   Patient Name: Annmarie Mcbride  :   1955  Age:   64 y.o. MRN:   [id number]  Account #:  [de-identified]  Today:  17    Provider: Rupal Redman M.D. Chief Complaint:   Chief Complaint   Patient presents with    Follow-Up from Hospital     Pt went to ER        Subjective:   Annmarie Mcbride is a 64 y.o. man  with a history of chronic back pain and obstructive sleep apnea who is following up. He had an epidural injection about 7 weeks ago. It made him very nervous, tremulous. He went to the ER where labs, CT head were normal.  He was treated with IV Ativan that helped. He has a history of vertigo. He has had some dizziness with movements lately. Recently when he wakens and stretches his neck, he gets a shock like sensation in the left posterior neck and head. His balance is poor. He has dyspnea and tachycardia on exertion. He has had recurrent episodes of anxiety. He is using his CPAP and does sleep better. He still has some forgetfulness.       Objective:     Past Medical History:  Past Medical History:   Diagnosis Date    Acute sinusitis     Anxiety     Chronic back pain     Chronic bilateral low back pain with bilateral sciatica     Constipation     COPD (chronic obstructive pulmonary disease) (HCC)     CPAP (continuous positive airway pressure) dependence     12cm to 20cm    Depression     Hyperlipidemia     Hypertensive disorder     OA (osteoarthritis of the spine)     Obesity     SUSANA (obstructive sleep apnea)     AHI:  85.3 per HST 2013    Osteoarthritis of spine with radiculopathy, lumbar region     Otitis media     Rheumatoid arthritis ([address]     Spinal stenosis     Type 2 diabetes mellitus ([address] 3/23/2016    Unspecified sleep apnea     uses bipap    Vertigo        Past Surgical History:   Procedure Laterality Date    ADENOIDECTOMY      BROW LIFT      due to vision    COLONOSCOPY  30 yrs residual AHI is 0.9. Assessment:       ICD-10-CM ICD-9-CM    1. Osteoarthritis of spine with radiculopathy, lumbar region M47.26 721.3    2. Chronic bilateral low back pain with bilateral sciatica M54.42 724.2     M54.41 724.3     G89.29 338.29    3. Thoracic spine pain M54.6 724.1    4. SUSANA (obstructive sleep apnea) G47.33 327.23    5. Palpitations R00.2 785.1    6. Tachycardia R00.0 785.0    7. Memory loss R41.3 780.93    8. Cervicalgia M54.2 723.1    9. Ataxia R27.0 781.3    10. Lhermitte's sign positive R29.818 781.99    He has chronic back pain and sleep apnea that are stable. He has had episodes of vertigo. He balance impairment may be residua from that. He has developed spells of anxiety, palpitations, and dyspnea. He has had holter monitors and even a zio patch in the past.      Plan:   1. Will arrange an MRI head and cervical spine  2. Additional labs  3. Increase the alprazolam to 1 mg, 1 1/2 tablet three times a day. Can increase to 2 mg three times a day. 4. Will have Dr. Jayla Contreras put in a loop recorder  5. Continue BiPAP use  6. FU in 3 months.     Electronically signed by Donita Knott MD on 11/16/2017

## 2017-11-17 ENCOUNTER — TELEPHONE (OUTPATIENT)
Dept: NEUROSURGERY | Age: 62
End: 2017-11-17

## 2017-11-27 ENCOUNTER — TELEPHONE (OUTPATIENT)
Dept: NEUROLOGY | Age: 62
End: 2017-11-27

## 2017-11-27 RX ORDER — ALPRAZOLAM 1 MG/1
1.5 TABLET ORAL 3 TIMES DAILY PRN
Qty: 135 TABLET | Refills: 2 | Status: SHIPPED | OUTPATIENT
Start: 2017-11-27 | End: 2017-12-26 | Stop reason: SDUPTHER

## 2017-11-27 NOTE — TELEPHONE ENCOUNTER
Patient called to let Dr. Omar Brunner know the 1.5 mg of Xanax is working and that he will need to have a new prescription called into the pharmacy as discussed at his 11/16/17 office visit.

## 2017-12-04 ENCOUNTER — HOSPITAL ENCOUNTER (OUTPATIENT)
Dept: MRI IMAGING | Age: 62
Discharge: HOME | End: 2017-12-04
Payer: MEDICARE

## 2017-12-04 DIAGNOSIS — R00.2 PALPITATIONS: ICD-10-CM

## 2017-12-04 DIAGNOSIS — G89.29 CHRONIC BILATERAL LOW BACK PAIN WITH BILATERAL SCIATICA: ICD-10-CM

## 2017-12-04 DIAGNOSIS — M54.2 CERVICALGIA: ICD-10-CM

## 2017-12-04 DIAGNOSIS — R41.3 MEMORY LOSS: ICD-10-CM

## 2017-12-04 DIAGNOSIS — R00.0 TACHYCARDIA: ICD-10-CM

## 2017-12-04 DIAGNOSIS — G47.33 OSA (OBSTRUCTIVE SLEEP APNEA): ICD-10-CM

## 2017-12-04 DIAGNOSIS — R27.0 ATAXIA: ICD-10-CM

## 2017-12-04 DIAGNOSIS — M47.26 OSTEOARTHRITIS OF SPINE WITH RADICULOPATHY, LUMBAR REGION: ICD-10-CM

## 2017-12-04 DIAGNOSIS — R29.818 LHERMITTE'S SIGN POSITIVE: ICD-10-CM

## 2017-12-04 DIAGNOSIS — M54.41 CHRONIC BILATERAL LOW BACK PAIN WITH BILATERAL SCIATICA: ICD-10-CM

## 2017-12-04 DIAGNOSIS — M54.42 CHRONIC BILATERAL LOW BACK PAIN WITH BILATERAL SCIATICA: ICD-10-CM

## 2017-12-04 DIAGNOSIS — M54.6 THORACIC SPINE PAIN: ICD-10-CM

## 2017-12-04 PROCEDURE — 70551 MRI BRAIN STEM W/O DYE: CPT

## 2017-12-04 PROCEDURE — 72141 MRI NECK SPINE W/O DYE: CPT

## 2017-12-13 ENCOUNTER — OFFICE VISIT (OUTPATIENT)
Dept: CARDIOLOGY | Age: 62
End: 2017-12-13
Payer: MEDICARE

## 2017-12-13 VITALS
HEIGHT: 73 IN | BODY MASS INDEX: 41.75 KG/M2 | SYSTOLIC BLOOD PRESSURE: 120 MMHG | HEART RATE: 64 BPM | DIASTOLIC BLOOD PRESSURE: 68 MMHG | WEIGHT: 315 LBS

## 2017-12-13 DIAGNOSIS — M54.41 CHRONIC BILATERAL LOW BACK PAIN WITH BILATERAL SCIATICA: ICD-10-CM

## 2017-12-13 DIAGNOSIS — I49.1 PREMATURE ATRIAL CONTRACTIONS: ICD-10-CM

## 2017-12-13 DIAGNOSIS — M47.26 OSTEOARTHRITIS OF SPINE WITH RADICULOPATHY, LUMBAR REGION: ICD-10-CM

## 2017-12-13 DIAGNOSIS — M54.42 CHRONIC BILATERAL LOW BACK PAIN WITH BILATERAL SCIATICA: ICD-10-CM

## 2017-12-13 DIAGNOSIS — I10 ESSENTIAL HYPERTENSION: ICD-10-CM

## 2017-12-13 DIAGNOSIS — G47.33 OSA (OBSTRUCTIVE SLEEP APNEA): ICD-10-CM

## 2017-12-13 DIAGNOSIS — R27.0 ATAXIA: ICD-10-CM

## 2017-12-13 DIAGNOSIS — M54.2 CERVICALGIA: ICD-10-CM

## 2017-12-13 DIAGNOSIS — M54.6 THORACIC SPINE PAIN: ICD-10-CM

## 2017-12-13 DIAGNOSIS — Z86.69 HISTORY OF SLEEP APNEA: ICD-10-CM

## 2017-12-13 DIAGNOSIS — R29.818 LHERMITTE'S SIGN POSITIVE: ICD-10-CM

## 2017-12-13 DIAGNOSIS — R00.0 TACHYCARDIA: Primary | ICD-10-CM

## 2017-12-13 DIAGNOSIS — R00.0 TACHYCARDIA: ICD-10-CM

## 2017-12-13 DIAGNOSIS — G89.29 CHRONIC BILATERAL LOW BACK PAIN WITH BILATERAL SCIATICA: ICD-10-CM

## 2017-12-13 DIAGNOSIS — R41.3 MEMORY LOSS: ICD-10-CM

## 2017-12-13 DIAGNOSIS — R00.2 PALPITATIONS: ICD-10-CM

## 2017-12-13 LAB
FOLATE: 15.6 NG/ML (ref 4.5–32.2)
SEDIMENTATION RATE, ERYTHROCYTE: 12 MM/HR (ref 0–15)
TSH SERPL DL<=0.05 MIU/L-ACNC: 1.5 UIU/ML (ref 0.27–4.2)
VITAMIN B-12: 550 PG/ML (ref 211–946)

## 2017-12-13 PROCEDURE — 99214 OFFICE O/P EST MOD 30 MIN: CPT | Performed by: NURSE PRACTITIONER

## 2017-12-13 PROCEDURE — G8417 CALC BMI ABV UP PARAM F/U: HCPCS | Performed by: NURSE PRACTITIONER

## 2017-12-13 PROCEDURE — G8427 DOCREV CUR MEDS BY ELIG CLIN: HCPCS | Performed by: NURSE PRACTITIONER

## 2017-12-13 PROCEDURE — 1036F TOBACCO NON-USER: CPT | Performed by: NURSE PRACTITIONER

## 2017-12-13 PROCEDURE — G8484 FLU IMMUNIZE NO ADMIN: HCPCS | Performed by: NURSE PRACTITIONER

## 2017-12-13 PROCEDURE — 3017F COLORECTAL CA SCREEN DOC REV: CPT | Performed by: NURSE PRACTITIONER

## 2017-12-13 RX ORDER — DILTIAZEM HYDROCHLORIDE 180 MG/1
180 CAPSULE, COATED, EXTENDED RELEASE ORAL NIGHTLY
Qty: 30 CAPSULE | Refills: 5 | Status: SHIPPED | OUTPATIENT
Start: 2017-12-13 | End: 2017-12-19

## 2017-12-13 NOTE — TELEPHONE ENCOUNTER
Patient requesting refill on Oxycontin and Percocet  Last ov 10/5/17  Next ov 11/16/17  Last rx 10/16/17  Alek Bundy October 2017

## 2017-12-13 NOTE — PROGRESS NOTES
myalgia. No problems with gait. Extremities - no clubbing, cyanosis or edema. Skin  no color change or rash. No pallor. No new surgical incision. Neurologic  no speech difficulty, facial asymmetry or lateralizing weakness. No seizures, presyncope or syncope. No significant dizziness. Hematologic  no easy bruising or excessive bleeding. Psychiatric  no severe anxiety or insomnia. No confusion. All other review of systems are negative. Objective  Vital Signs - /68   Pulse 64   Ht 6' 1\" (1.854 m)   Wt (!) 317 lb (143.8 kg)   BMI 41.82 kg/m²   General - Daleen Nissen is alert, cooperative, and pleasant. Well groomed. No acute distress. Body habitus - Body mass index is 41.82 kg/m². HEENT  Head is normocephalic. No circumoral cyanosis. Dentition is normal.  EYES -   Lids normal without ptosis. No discharge, edema or subconjunctival hemorrhage. Neck - Symmetrical without apparent mass or lymphadenopathy. Respiratory - Normal respiratory effort without use of accessory muscles. Ausculatation reveals vesicular breath sounds without crackles, wheezes, rub or rhonchi. Cardiovascular  No jugular venous distention. Auscultation reveals regular rate and rhythm. No audible clicks, gallop or rub. No murmur. No lower extremity varicosities. No carotid bruits. Abdominal -  No visible distention, mass or pulsations. Extremities - No clubbing or cyanosis. No statis dermatitis or ulcers. No edema. Musculoskeletal -   No Osler's nodes. No kyphosis or scoliosis. Gait is even and regular without limp or shuffle. Ambulates without assistance. Skin -  Warm and dry; no rash or pallor. No new surgical wound. Neurological - No focal neurological deficits. Thought processes coherent. No apparent tremor. Oriented to person, place and time. Psychiatric -  Appropriate affect and mood. Assessment:    1. Tachycardia     2. Essential hypertension     3.  Premature atrial contractions     4. History of sleep apnea       Dr. Travis Suggs consulted as patient requested. Decreased Lisinopril HCT to 1/2 tab daily. Started Cardizem  mg (1) at bedtime. Dr. Travis Suggs discussed possibility of loop recorder with persistent arrhythmia issues. Also, discussed ongoing issues with tachy makenzie rhythms may warrant pacemaker therapy in the future. Patient had heart rate in 40's on BB therapy in the past which he did not tolerate well. Patient is compliant with medication regimen. BP Readings from Last 3 Encounters:   12/13/17 120/68   11/16/17 117/72   10/06/17 122/78    Pulse Readings from Last 3 Encounters:   12/13/17 64   11/16/17 75   10/06/17 76        Wt Readings from Last 3 Encounters:   12/13/17 (!) 317 lb (143.8 kg)   11/16/17 (!) 312 lb (141.5 kg)   10/06/17 300 lb (136.1 kg)     Plan  Previous cardiac history and records reviewed. Decrease Lisinopril HCT to 1/2 tablet daily. Start Diltiazem  mg (1) at bedtime to help reduce fast heart rates. Continue other current medications as prescribed. Continue to follow up with primary care provider for non cardiac medical problems. Call the office with any problems, questions or concerns at [phone]. Follow up as scheduled with your cardiologist- Dr. Travis Suggs 3 weeks. The following educational material has been included in this after visit summary for your review: tachycardia.     Additional instructions:  Coronary artery disease risk factors you can control: Smoking, high blood pressure, high cholesterol, diabetes, being overweight, lack of exercise and stress. Continue heart healthy diet. Take medications as directed. Exercise as tolerated. Strive for 15 minutes of exercise most days of the week. If asked to keep a blood pressure log, do so for 2 weeks. Call the office to report readings at [phone]. Blood pressure goal is 140/90 or less. If you are a diabetic, the goal is 130/80 or less.   If you are taking cholesterol lowering medications, it is recommended that lab work be checked annually. Always keep a current medication list. Bring your medications to every office visit.       HEENA Fairbanks

## 2017-12-14 RX ORDER — OXYCODONE AND ACETAMINOPHEN 10; 325 MG/1; MG/1
1 TABLET ORAL EVERY 6 HOURS PRN
Qty: 90 TABLET | Refills: 0 | Status: SHIPPED | OUTPATIENT
Start: 2017-12-14 | End: 2018-01-11 | Stop reason: SDUPTHER

## 2017-12-15 ENCOUNTER — TELEPHONE (OUTPATIENT)
Dept: NEUROLOGY | Age: 62
End: 2017-12-15

## 2017-12-15 LAB
SEX HORMONE BINDING GLOBULIN: 46 NMOL/L (ref 11–80)
TESTOSTERONE FREE PERCENT: 1.5 % (ref 1.6–2.9)
TESTOSTERONE FREE, CALC: 34 PG/ML (ref 47–244)
TESTOSTERONE TOTAL-MALE: 238 NG/DL (ref 300–720)

## 2017-12-15 NOTE — TELEPHONE ENCOUNTER
----- Message from Charla Harrison MD sent at 12/14/2017 11:47 PM CST -----  Not sure if he was notified of test results. MRI head was normal.  MRI C spine showed disc protrusions and arthritis. It is pretty bad at C3-4 and C5-6 with some indentation of the spinal cord and moderate spinal stenosis. Does not appear quite bad enough to require surgery but can refer to spine surgeon if he wants.   Labs were normal but testosterone is pending

## 2017-12-19 ENCOUNTER — TELEPHONE (OUTPATIENT)
Dept: CARDIOLOGY | Age: 62
End: 2017-12-19

## 2017-12-19 RX ORDER — VERAPAMIL HYDROCHLORIDE 100 MG/1
1 CAPSULE, EXTENDED RELEASE ORAL NIGHTLY
Qty: 30 CAPSULE | Refills: 3 | Status: SHIPPED | OUTPATIENT
Start: 2017-12-19 | End: 2018-04-15 | Stop reason: SDUPTHER

## 2017-12-19 NOTE — TELEPHONE ENCOUNTER
Patient spouse elizabeth was notified per NP to stop diltiazem and we will send over new prescription for verelan  mg 1 capsule at bedtime. Understanding voiced.

## 2017-12-27 RX ORDER — ALPRAZOLAM 1 MG/1
1.5 TABLET ORAL 3 TIMES DAILY PRN
Qty: 135 TABLET | Refills: 2 | Status: SHIPPED | OUTPATIENT
Start: 2017-12-27 | End: 2018-01-25 | Stop reason: SDUPTHER

## 2018-01-03 ENCOUNTER — OFFICE VISIT (OUTPATIENT)
Dept: CARDIOLOGY | Age: 63
End: 2018-01-03
Payer: MEDICARE

## 2018-01-03 VITALS
BODY MASS INDEX: 41.75 KG/M2 | HEART RATE: 64 BPM | DIASTOLIC BLOOD PRESSURE: 82 MMHG | HEIGHT: 73 IN | SYSTOLIC BLOOD PRESSURE: 134 MMHG | WEIGHT: 315 LBS

## 2018-01-03 DIAGNOSIS — I10 ESSENTIAL HYPERTENSION: ICD-10-CM

## 2018-01-03 DIAGNOSIS — R60.0 BILATERAL LOWER EXTREMITY EDEMA: Primary | ICD-10-CM

## 2018-01-03 DIAGNOSIS — I49.8 ATRIAL ARRHYTHMIA: ICD-10-CM

## 2018-01-03 PROCEDURE — G8427 DOCREV CUR MEDS BY ELIG CLIN: HCPCS | Performed by: INTERNAL MEDICINE

## 2018-01-03 PROCEDURE — 99213 OFFICE O/P EST LOW 20 MIN: CPT | Performed by: INTERNAL MEDICINE

## 2018-01-03 PROCEDURE — G8417 CALC BMI ABV UP PARAM F/U: HCPCS | Performed by: INTERNAL MEDICINE

## 2018-01-03 PROCEDURE — G8484 FLU IMMUNIZE NO ADMIN: HCPCS | Performed by: INTERNAL MEDICINE

## 2018-01-03 PROCEDURE — 1036F TOBACCO NON-USER: CPT | Performed by: INTERNAL MEDICINE

## 2018-01-03 PROCEDURE — 3017F COLORECTAL CA SCREEN DOC REV: CPT | Performed by: INTERNAL MEDICINE

## 2018-01-03 NOTE — PATIENT INSTRUCTIONS
Start taking Hydrochlorothiazide (HCTZ) 25 mg one tablet daily along with your Lisinopril/HCTZ    Lower Extremity Edema   Swelling of the feet, or edema, is the abundance of fluid found between the tissue cells. Edema is usually seen in the legs, ankles and feet, but can occur anywhere in the body. To reduce foot swelling it is recommended to: Increase activity with light exercise such as walking. Avoid standing for long periods of time. Sit with the feet elevated as much as possible. Limit sodium intake. Drink lots of water.

## 2018-01-04 RX ORDER — HYDROCHLOROTHIAZIDE 25 MG/1
25 TABLET ORAL DAILY
Qty: 30 TABLET | Refills: 3 | Status: SHIPPED | OUTPATIENT
Start: 2018-01-04 | End: 2018-06-29 | Stop reason: SDUPTHER

## 2018-01-04 NOTE — PROGRESS NOTES
[facility]  [place] [address], [address] [place] [address], [address] [place] [address], [address] [facility] [phone] [id number]  The following was transcribed by Chris Black M.T.     Harvey Shaffer : 1955, 58 y.o. Male        Office Visit:  1/3/2018    Chief Complaint   Patient presents with    Follow-up     3-week follow up for tachycardia - we decreased his Lisinopril HCTZ to 1/2 tablet daily and started Diltiazem  mg tablet at bedtime. Patient is having no symptoms today. HISTORY OF PRESENT ILLNESS  Mr. Harvey Shaffer is seen here for hypertension, atrial arrhythmia and tachycardia. This is a one month follow up. Mr. Alejandro De León present without angina, overt heart failure or syncope. He is having some lower extremity edema since his hydrochlorothiazide was cut back.       Patient Active Problem List   Diagnosis Code    Chest discomfort R07.89    Essential hypertension I10    Hypercholesteremia E78.00    Type 2 diabetes mellitus (HCC) E11.9    SUSANA (obstructive sleep apnea) G47.33    Obesity E66.9    Palpitations R00.2    Osteoarthritis of spine with radiculopathy, lumbar region M47.26    Chronic bilateral low back pain with bilateral sciatica M54.42, M54.41, G89.29    CPAP (continuous positive airway pressure) dependence Z99.89    Thoracic spine pain M54.6    History of sleep apnea Z86.69    Tachycardia R00.0    Intermittent palpitations R00.2    Family history of atrial fibrillation Z82.49    Memory loss R41.3    Premature atrial contractions I49.1     Past Medical History:   Diagnosis Date    Acute sinusitis     Anxiety     Chronic back pain     Chronic bilateral low back pain with bilateral sciatica     Constipation     COPD [facility]chronic obstructive pulmonary disease) ([facility])     CPAP (continuous positive airway pressure) dependence     12cm to 20cm    Depression     Hyperlipidemia     Hypertensive disorder     OA (osteoarthritis of the spine)     Obesity     SUSANA (obstructive sleep Take 10 mg by mouth nightly      metFORMIN (GLUCOPHAGE) 500 MG tablet Take 500 mg by mouth 2 times daily (with meals)      polyethylene glycol (MIRALAX) POWD powder Take 17 g by mouth every 3 days      lisinopril-hydrochlorothiazide (PRINZIDE;ZESTORETIC) 20-12.5 MG per tablet Take 0.5 tablets by mouth daily        Data:  BP Readings from Last 3 Encounters:   01/03/18 134/82   12/13/17 120/68   11/16/17 117/72    Pulse Readings from Last 3 Encounters:   01/03/18 64   12/13/17 64   11/16/17 75        REVIEW OF SYSTEMS  Constitutional:  Negative for diaphoresis, fever, appetite change or unexpected weight change. HENT:  Negative for nosebleeds, facial swelling, rhinorrhea and neck stiffness. RESPIRATORY:  Negative shortness of breath, cough or sputum production. No wheezing or stridor. CARDIOVASCULAR:  There is no angina, no overt heart failure, and no syncope. GASTROINTESTINAL:   Negative for abdominal distention. GENITOURINARY:  Negative for dysuria, urgency and frequency. MUSCULOSKELETAL:   Negative for myalgia, arthralgia and gait problem. SKIN:  Negative for color change, pallor, rash and wound. NEUROLOGICAL:   Negative for dizziness, weakness, light-headedness, numbness and headaches. Negative for speech difficulty. HEMATOLOGICAL:   Negative for bruising and bleeding easily. PSYCHIATRIC/BEHAVIORAL:   No excessive anxiety or confusion. Except as noted in the HPI, all other systems are negative. PHYSICAL EXAMINATION  GENERAL:  Alert and oriented x3 in no apparent distress. Short-term and long-term memory intact. Judgment intact. Oriented to time, place and person. No depression, anxiety or agitation. Vital Signs:  /82   Pulse 64   Ht 6' 1\" (1.854 m)   Wt (!) 323 lb (146.5 kg)   BMI 42.61 kg/m²    HEAD:  Normocephalic without evidence of old or recent trauma. EYES:  Sclerae clear. Conjunctivae pink. Pupils equal and round.   NOSE:  Negative nasal discharge or

## 2018-01-11 RX ORDER — OXYCODONE AND ACETAMINOPHEN 10; 325 MG/1; MG/1
1 TABLET ORAL EVERY 6 HOURS PRN
Qty: 90 TABLET | Refills: 0 | Status: SHIPPED | OUTPATIENT
Start: 2018-01-11 | End: 2018-02-09 | Stop reason: SDUPTHER

## 2018-01-11 NOTE — TELEPHONE ENCOUNTER
Patient requesting refill on medication. Patient would like to be able to  the medication on 1/12/18 due to the upcoming weather.          Patient requesting refill on Oxycontin and Percocet  Last ov 11/16/17  Next ov 2/5/18  Last rx 12/14/17  Katie Yanez October 2017

## 2018-01-25 RX ORDER — ALPRAZOLAM 1 MG/1
1.5 TABLET ORAL 3 TIMES DAILY PRN
Qty: 77 TABLET | Refills: 0 | Status: SHIPPED | OUTPATIENT
Start: 2018-01-25 | End: 2018-02-09 | Stop reason: SDUPTHER

## 2018-01-25 NOTE — TELEPHONE ENCOUNTER
Only teed up 17 days so the patient will be able to  all 3 of his medications the same time next month.        Last ov 11/16/17  Next ov 2/5/18  Last rx 12/27/17  Angy Sainz October 2017

## 2018-02-09 RX ORDER — ALPRAZOLAM 1 MG/1
1.5 TABLET ORAL 3 TIMES DAILY PRN
Qty: 135 TABLET | Refills: 0 | Status: SHIPPED | OUTPATIENT
Start: 2018-02-09 | End: 2018-03-07 | Stop reason: SDUPTHER

## 2018-02-09 RX ORDER — OXYCODONE AND ACETAMINOPHEN 10; 325 MG/1; MG/1
1 TABLET ORAL EVERY 6 HOURS PRN
Qty: 90 TABLET | Refills: 0 | Status: SHIPPED | OUTPATIENT
Start: 2018-02-09 | End: 2018-03-07 | Stop reason: SDUPTHER

## 2018-03-07 NOTE — TELEPHONE ENCOUNTER
Patient requesting refill on Oxycontin, Percocet, and Xanax     Last ov 11/16/17  Next ov 6/21/18  Last rx 2/9/18  Ezella Prow up to date

## 2018-03-08 RX ORDER — OXYCODONE AND ACETAMINOPHEN 10; 325 MG/1; MG/1
1 TABLET ORAL EVERY 6 HOURS PRN
Qty: 90 TABLET | Refills: 0 | Status: SHIPPED | OUTPATIENT
Start: 2018-03-08 | End: 2018-04-09 | Stop reason: SDUPTHER

## 2018-03-08 RX ORDER — ALPRAZOLAM 1 MG/1
1.5 TABLET ORAL 3 TIMES DAILY PRN
Qty: 135 TABLET | Refills: 2 | Status: SHIPPED | OUTPATIENT
Start: 2018-03-08 | End: 2018-04-09 | Stop reason: SDUPTHER

## 2018-04-09 RX ORDER — ALPRAZOLAM 1 MG/1
1.5 TABLET ORAL 3 TIMES DAILY PRN
Qty: 135 TABLET | Refills: 2 | Status: SHIPPED | OUTPATIENT
Start: 2018-04-09 | End: 2018-08-29 | Stop reason: SDUPTHER

## 2018-04-09 RX ORDER — OXYCODONE AND ACETAMINOPHEN 10; 325 MG/1; MG/1
1 TABLET ORAL EVERY 6 HOURS PRN
Qty: 90 TABLET | Refills: 0 | Status: SHIPPED | OUTPATIENT
Start: 2018-04-09 | End: 2018-05-07 | Stop reason: SDUPTHER

## 2018-04-16 RX ORDER — VERAPAMIL HYDROCHLORIDE 100 MG/1
CAPSULE, EXTENDED RELEASE ORAL
Qty: 30 CAPSULE | Refills: 5 | Status: SHIPPED | OUTPATIENT
Start: 2018-04-16 | End: 2018-07-03 | Stop reason: SDUPTHER

## 2018-05-07 RX ORDER — OXYCODONE AND ACETAMINOPHEN 10; 325 MG/1; MG/1
1 TABLET ORAL EVERY 6 HOURS PRN
Qty: 90 TABLET | Refills: 0 | Status: SHIPPED | OUTPATIENT
Start: 2018-05-07 | End: 2018-06-05 | Stop reason: SDUPTHER

## 2018-06-05 DIAGNOSIS — M54.42 CHRONIC BILATERAL LOW BACK PAIN WITH BILATERAL SCIATICA: ICD-10-CM

## 2018-06-05 DIAGNOSIS — G89.29 CHRONIC BILATERAL LOW BACK PAIN WITH BILATERAL SCIATICA: ICD-10-CM

## 2018-06-05 DIAGNOSIS — M54.41 CHRONIC BILATERAL LOW BACK PAIN WITH BILATERAL SCIATICA: ICD-10-CM

## 2018-06-05 DIAGNOSIS — M47.26 OSTEOARTHRITIS OF SPINE WITH RADICULOPATHY, LUMBAR REGION: Primary | ICD-10-CM

## 2018-06-05 RX ORDER — OXYCODONE AND ACETAMINOPHEN 10; 325 MG/1; MG/1
1 TABLET ORAL EVERY 6 HOURS PRN
Qty: 90 TABLET | Refills: 0 | Status: SHIPPED | OUTPATIENT
Start: 2018-06-05 | End: 2018-07-02 | Stop reason: SDUPTHER

## 2018-06-21 ENCOUNTER — OFFICE VISIT (OUTPATIENT)
Dept: NEUROLOGY | Age: 63
End: 2018-06-21
Payer: MEDICARE

## 2018-06-21 VITALS
DIASTOLIC BLOOD PRESSURE: 86 MMHG | WEIGHT: 272 LBS | SYSTOLIC BLOOD PRESSURE: 138 MMHG | HEART RATE: 65 BPM | BODY MASS INDEX: 36.05 KG/M2 | HEIGHT: 73 IN

## 2018-06-21 DIAGNOSIS — G89.29 CHRONIC BILATERAL LOW BACK PAIN WITH BILATERAL SCIATICA: Primary | ICD-10-CM

## 2018-06-21 DIAGNOSIS — M54.42 CHRONIC BILATERAL LOW BACK PAIN WITH BILATERAL SCIATICA: Primary | ICD-10-CM

## 2018-06-21 DIAGNOSIS — M05.742 RHEUMATOID ARTHRITIS INVOLVING BOTH HANDS WITH POSITIVE RHEUMATOID FACTOR (HCC): ICD-10-CM

## 2018-06-21 DIAGNOSIS — M05.741 RHEUMATOID ARTHRITIS INVOLVING BOTH HANDS WITH POSITIVE RHEUMATOID FACTOR (HCC): ICD-10-CM

## 2018-06-21 DIAGNOSIS — M54.41 CHRONIC BILATERAL LOW BACK PAIN WITH BILATERAL SCIATICA: Primary | ICD-10-CM

## 2018-06-21 PROCEDURE — 1036F TOBACCO NON-USER: CPT | Performed by: PSYCHIATRY & NEUROLOGY

## 2018-06-21 PROCEDURE — 99213 OFFICE O/P EST LOW 20 MIN: CPT | Performed by: PSYCHIATRY & NEUROLOGY

## 2018-06-21 PROCEDURE — 3017F COLORECTAL CA SCREEN DOC REV: CPT | Performed by: PSYCHIATRY & NEUROLOGY

## 2018-06-21 PROCEDURE — G8427 DOCREV CUR MEDS BY ELIG CLIN: HCPCS | Performed by: PSYCHIATRY & NEUROLOGY

## 2018-06-21 PROCEDURE — G8417 CALC BMI ABV UP PARAM F/U: HCPCS | Performed by: PSYCHIATRY & NEUROLOGY

## 2018-06-29 DIAGNOSIS — R60.0 BILATERAL LOWER EXTREMITY EDEMA: ICD-10-CM

## 2018-06-29 RX ORDER — HYDROCHLOROTHIAZIDE 25 MG/1
25 TABLET ORAL DAILY
Qty: 30 TABLET | Refills: 5 | Status: SHIPPED | OUTPATIENT
Start: 2018-06-29 | End: 2018-07-03 | Stop reason: SDUPTHER

## 2018-07-02 DIAGNOSIS — G89.29 CHRONIC BILATERAL LOW BACK PAIN WITH BILATERAL SCIATICA: ICD-10-CM

## 2018-07-02 DIAGNOSIS — M47.26 OSTEOARTHRITIS OF SPINE WITH RADICULOPATHY, LUMBAR REGION: ICD-10-CM

## 2018-07-02 DIAGNOSIS — M54.41 CHRONIC BILATERAL LOW BACK PAIN WITH BILATERAL SCIATICA: ICD-10-CM

## 2018-07-02 DIAGNOSIS — M54.42 CHRONIC BILATERAL LOW BACK PAIN WITH BILATERAL SCIATICA: ICD-10-CM

## 2018-07-02 RX ORDER — OXYCODONE AND ACETAMINOPHEN 10; 325 MG/1; MG/1
1 TABLET ORAL EVERY 6 HOURS PRN
Qty: 90 TABLET | Refills: 0 | Status: SHIPPED | OUTPATIENT
Start: 2018-07-02 | End: 2018-07-31 | Stop reason: SDUPTHER

## 2018-07-03 ENCOUNTER — OFFICE VISIT (OUTPATIENT)
Dept: CARDIOLOGY | Age: 63
End: 2018-07-03
Payer: MEDICARE

## 2018-07-03 VITALS
DIASTOLIC BLOOD PRESSURE: 68 MMHG | SYSTOLIC BLOOD PRESSURE: 128 MMHG | BODY MASS INDEX: 38.57 KG/M2 | HEART RATE: 70 BPM | HEIGHT: 73 IN | WEIGHT: 291 LBS

## 2018-07-03 DIAGNOSIS — R00.2 PALPITATIONS: Primary | ICD-10-CM

## 2018-07-03 DIAGNOSIS — I10 ESSENTIAL HYPERTENSION: ICD-10-CM

## 2018-07-03 DIAGNOSIS — G47.33 OSA (OBSTRUCTIVE SLEEP APNEA): ICD-10-CM

## 2018-07-03 PROCEDURE — G8427 DOCREV CUR MEDS BY ELIG CLIN: HCPCS | Performed by: CLINICAL NURSE SPECIALIST

## 2018-07-03 PROCEDURE — G8417 CALC BMI ABV UP PARAM F/U: HCPCS | Performed by: CLINICAL NURSE SPECIALIST

## 2018-07-03 PROCEDURE — 99213 OFFICE O/P EST LOW 20 MIN: CPT | Performed by: CLINICAL NURSE SPECIALIST

## 2018-07-03 PROCEDURE — 1036F TOBACCO NON-USER: CPT | Performed by: CLINICAL NURSE SPECIALIST

## 2018-07-03 PROCEDURE — 3017F COLORECTAL CA SCREEN DOC REV: CPT | Performed by: CLINICAL NURSE SPECIALIST

## 2018-07-03 RX ORDER — HYDROCHLOROTHIAZIDE 25 MG/1
25 TABLET ORAL DAILY
Qty: 90 TABLET | Refills: 3 | Status: SHIPPED | OUTPATIENT
Start: 2018-07-03 | End: 2020-03-09

## 2018-07-03 RX ORDER — VERAPAMIL HYDROCHLORIDE 100 MG/1
1 CAPSULE, EXTENDED RELEASE ORAL NIGHTLY
Qty: 90 CAPSULE | Refills: 3 | Status: SHIPPED | OUTPATIENT
Start: 2018-07-03 | End: 2019-07-15 | Stop reason: SDUPTHER

## 2018-07-03 ASSESSMENT — ENCOUNTER SYMPTOMS
BLURRED VISION: 0
SHORTNESS OF BREATH: 0
COUGH: 0
ORTHOPNEA: 0
HEARTBURN: 0
BLOOD IN STOOL: 0
NAUSEA: 0
VOMITING: 0

## 2018-07-03 NOTE — PROGRESS NOTES
Take 500 mg by mouth 2 times daily (with meals)      polyethylene glycol (MIRALAX) POWD powder Take 17 g by mouth every 3 days      lisinopril-hydrochlorothiazide (PRINZIDE;ZESTORETIC) 20-12.5 MG per tablet Take 0.5 tablets by mouth daily        No current facility-administered medications for this visit. Allergies: Clindamycin/lincomycin and Benadryl [diphenhydramine]    Review of Systems  Review of Systems   Constitutional: Negative for chills, fever and malaise/fatigue. HENT: Negative for nosebleeds. Eyes: Negative for blurred vision. Respiratory: Negative for cough and shortness of breath. Cardiovascular: Negative for chest pain, palpitations, orthopnea, leg swelling and PND. Gastrointestinal: Negative for blood in stool, heartburn, nausea and vomiting. Musculoskeletal: Negative for falls and myalgias. Skin: Negative for rash. Neurological: Negative for dizziness, sensory change, speech change and focal weakness. Endo/Heme/Allergies: Does not bruise/bleed easily. Psychiatric/Behavioral: Negative for depression. The patient is not nervous/anxious. Objective  Vital Signs - /68   Pulse 70   Ht 6' 1\" (1.854 m)   Wt 291 lb (132 kg)   BMI 38.39 kg/m²   Physical Exam   Constitutional: He is oriented to person, place, and time. He appears well-developed and well-nourished. No distress. Obese   HENT:   Head: Normocephalic and atraumatic. Eyes: Pupils are equal, round, and reactive to light. Right eye exhibits no discharge. Left eye exhibits no discharge. Neck: No JVD present. No tracheal deviation present. Cardiovascular: Normal rate, regular rhythm, normal heart sounds and intact distal pulses. Exam reveals no gallop and no friction rub. No murmur heard. No carotid bruit   Pulmonary/Chest: Effort normal and breath sounds normal. No respiratory distress. He has no wheezes. He has no rales. Abdominal: Soft. There is no tenderness.    Musculoskeletal: He exhibits no edema. Normal gait and station   Neurological: He is alert and oriented to person, place, and time. No cranial nerve deficit. Skin: Skin is warm and dry. No rash noted. Psychiatric: He has a normal mood and affect. His behavior is normal. Judgment normal.   Nursing note and vitals reviewed. Assessment:     Diagnosis Orders   1. Palpitations   Well controlled on Verapamil    2. Essential hypertension   Well-controlled current regimen     3. SUSANA (obstructive sleep apnea)   Wearing CPAP regularly       Patient's symptoms have been well controlled for a long period of time. He may follow with our office on an as-needed basis and continue to follow with his PCP    Stable cardiovascular status. No evidence of overt heart failure, angina or dysrhythmia. Plan    Follow up as needed  Call for palpitations    Call with any questions or concerns  Follow up with Amilcar Guerra MD for non cardiac problems  Report any new problems  Cardiovascular Fitness-Exercise as tolerated. Strive for 15 minutes of exercise most days of the week. Cardiac / Healthy Diet  Continue current medications as directed  Continue plan of treatment  It is always recommended that you bring your medications bottles with you to each visit - this is for your safety!        HEENA Gimenez

## 2018-07-03 NOTE — PATIENT INSTRUCTIONS
Follow up as needed  Call for palpitations    Call with any questions or concerns  Follow up with Rebekah Jauregui MD for non cardiac problems  Report any new problems  Cardiovascular Fitness-Exercise as tolerated. Strive for 15 minutes of exercise most days of the week. Cardiac / Healthy Diet  Continue current medications as directed  Continue plan of treatment  It is always recommended that you bring your medications bottles with you to each visit - this is for your safety! Patient Education        Palpitations: Care Instructions  Your Care Instructions    Heart palpitations are the uncomfortable sensation that your heart is beating fast or irregularly. You might feel pounding or fluttering in your chest. It might feel like your heart is skipping a beat. Although palpitations may be caused by a heart problem, they also occur because of stress, fatigue, or use of alcohol, caffeine, or nicotine. Many medicines, including diet pills, antihistamines, decongestants, and some herbal products, can cause heart palpitations. Nearly everyone has palpitations from time to time. Depending on your symptoms, your doctor may need to do more tests to try to find the cause of your palpitations. Follow-up care is a key part of your treatment and safety. Be sure to make and go to all appointments, and call your doctor if you are having problems. It's also a good idea to know your test results and keep a list of the medicines you take. How can you care for yourself at home? · Avoid caffeine, nicotine, and excess alcohol. · Do not take illegal drugs, such as methamphetamines and cocaine. · Do not take weight loss or diet medicines unless you talk with your doctor first.  · Get plenty of sleep. · Do not overeat. · If you have palpitations again, take deep breaths and try to relax. This may slow a racing heart.   · If you start to feel lightheaded, lie down to avoid injuries that might result if you pass out and fall down.  · Keep a record of your palpitations and bring it to your next doctor's appointment. Write down:  ¨ The date and time. ¨ Your pulse. (If your heart is beating fast, it may be hard to count your pulse.)  ¨ What you were doing when the palpitations started. ¨ How long the palpitations lasted. ¨ Any other symptoms. · If an activity causes palpitations, slow down or stop. Talk to your doctor before you do that activity again. · Take your medicines exactly as prescribed. Call your doctor if you think you are having a problem with your medicine. When should you call for help? Call 911 anytime you think you may need emergency care. For example, call if:    · You passed out (lost consciousness).     · You have symptoms of a heart attack. These may include:  ¨ Chest pain or pressure, or a strange feeling in the chest.  ¨ Sweating. ¨ Shortness of breath. ¨ Pain, pressure, or a strange feeling in the back, neck, jaw, or upper belly or in one or both shoulders or arms. ¨ Lightheadedness or sudden weakness. ¨ A fast or irregular heartbeat. After you call 911, the  may tell you to chew 1 adult-strength or 2 to 4 low-dose aspirin. Wait for an ambulance. Do not try to drive yourself.     · You have symptoms of a stroke. These may include:  ¨ Sudden numbness, tingling, weakness, or loss of movement in your face, arm, or leg, especially on only one side of your body. ¨ Sudden vision changes. ¨ Sudden trouble speaking. ¨ Sudden confusion or trouble understanding simple statements. ¨ Sudden problems with walking or balance. ¨ A sudden, severe headache that is different from past headaches.    Call your doctor now or seek immediate medical care if:    · You have heart palpitations and:  ¨ Are dizzy or lightheaded, or you feel like you may faint.   ¨ Have new or increased shortness of breath.    Watch closely for changes in your health, and be sure to contact your doctor if:    · You continue to have heart

## 2018-07-18 ENCOUNTER — TELEPHONE (OUTPATIENT)
Dept: NEUROLOGY | Age: 63
End: 2018-07-18

## 2018-07-18 NOTE — TELEPHONE ENCOUNTER
Pt wife called stating that Dr. Vin Hills told him at last OV he was going to change some medication after pt obtained labs from Dr. Alfie Gonzalez. Pt has completed labs they are scanned into media. Pt wife states he is in a lot of pain and hasen't been outside in days. They are wanting to see if Dr. Vin Hills will start him on the Plaquenil discussed at [address]

## 2018-07-31 DIAGNOSIS — M47.26 OSTEOARTHRITIS OF SPINE WITH RADICULOPATHY, LUMBAR REGION: ICD-10-CM

## 2018-07-31 DIAGNOSIS — M54.42 CHRONIC BILATERAL LOW BACK PAIN WITH BILATERAL SCIATICA: ICD-10-CM

## 2018-07-31 DIAGNOSIS — M54.41 CHRONIC BILATERAL LOW BACK PAIN WITH BILATERAL SCIATICA: ICD-10-CM

## 2018-07-31 DIAGNOSIS — G89.29 CHRONIC BILATERAL LOW BACK PAIN WITH BILATERAL SCIATICA: ICD-10-CM

## 2018-07-31 RX ORDER — OXYCODONE AND ACETAMINOPHEN 10; 325 MG/1; MG/1
1 TABLET ORAL EVERY 6 HOURS PRN
Qty: 90 TABLET | Refills: 0 | Status: SHIPPED | OUTPATIENT
Start: 2018-08-01 | End: 2018-08-29 | Stop reason: SDUPTHER

## 2018-07-31 NOTE — TELEPHONE ENCOUNTER
Requested Prescriptions     Pending Prescriptions Disp Refills    oxyCODONE-acetaminophen (PERCOCET)  MG per tablet 90 tablet 0     Sig: Take 1 tablet by mouth every 6 hours as needed for Pain (Pain) for up to 30 days. Cecily Bhatti Date: 8/1/18    OXYCONTIN 20 MG extended release tablet 60 each 0     Sig: Take 1 tablet by mouth every 12 hours for 30 days. Cecily Bhatti Date: 8/1/18       Last OV 6/21/18  Next OV 10/22/18  Last Rx 7/2/18  Lary Donald up to date

## 2018-08-13 ENCOUNTER — HOSPITAL ENCOUNTER (EMERGENCY)
Age: 63
Discharge: HOME | End: 2018-08-13
Attending: EMERGENCY MEDICINE
Payer: MEDICARE

## 2018-08-13 ENCOUNTER — TELEPHONE (OUTPATIENT)
Dept: NEUROLOGY | Age: 63
End: 2018-08-13

## 2018-08-13 ENCOUNTER — APPOINTMENT (OUTPATIENT)
Dept: GENERAL RADIOLOGY | Age: 63
End: 2018-08-13
Payer: MEDICARE

## 2018-08-13 ENCOUNTER — OFFICE VISIT (OUTPATIENT)
Dept: CARDIOLOGY | Age: 63
End: 2018-08-13
Payer: MEDICARE

## 2018-08-13 VITALS
OXYGEN SATURATION: 97 % | TEMPERATURE: 97.7 F | SYSTOLIC BLOOD PRESSURE: 108 MMHG | DIASTOLIC BLOOD PRESSURE: 66 MMHG | HEART RATE: 69 BPM | WEIGHT: 285 LBS | HEIGHT: 72 IN | BODY MASS INDEX: 38.6 KG/M2 | RESPIRATION RATE: 12 BRPM

## 2018-08-13 VITALS
HEIGHT: 73 IN | SYSTOLIC BLOOD PRESSURE: 138 MMHG | BODY MASS INDEX: 38.3 KG/M2 | DIASTOLIC BLOOD PRESSURE: 78 MMHG | WEIGHT: 289 LBS | HEART RATE: 90 BPM

## 2018-08-13 DIAGNOSIS — G47.33 OSA (OBSTRUCTIVE SLEEP APNEA): ICD-10-CM

## 2018-08-13 DIAGNOSIS — R00.2 PALPITATIONS: Primary | ICD-10-CM

## 2018-08-13 DIAGNOSIS — R00.0 TACHYCARDIA: Primary | ICD-10-CM

## 2018-08-13 DIAGNOSIS — R00.2 PALPITATIONS: ICD-10-CM

## 2018-08-13 DIAGNOSIS — I10 ESSENTIAL HYPERTENSION: ICD-10-CM

## 2018-08-13 DIAGNOSIS — R53.83 FATIGUE, UNSPECIFIED TYPE: ICD-10-CM

## 2018-08-13 LAB
ALBUMIN SERPL-MCNC: 4.1 G/DL (ref 3.5–5.2)
ALP BLD-CCNC: 72 U/L (ref 40–130)
ALT SERPL-CCNC: 21 U/L (ref 5–41)
ANION GAP SERPL CALCULATED.3IONS-SCNC: 11 MMOL/L (ref 7–19)
AST SERPL-CCNC: 17 U/L (ref 5–40)
BASOPHILS ABSOLUTE: 0.1 K/UL (ref 0–0.2)
BASOPHILS RELATIVE PERCENT: 1.1 % (ref 0–1)
BILIRUB SERPL-MCNC: <0.2 MG/DL (ref 0.2–1.2)
BILIRUBIN URINE: NEGATIVE
BLOOD, URINE: NEGATIVE
BUN BLDV-MCNC: 16 MG/DL (ref 8–23)
CALCIUM SERPL-MCNC: 9.6 MG/DL (ref 8.8–10.2)
CHLORIDE BLD-SCNC: 98 MMOL/L (ref 98–111)
CLARITY: CLEAR
CO2: 28 MMOL/L (ref 22–29)
COLOR: YELLOW
CREAT SERPL-MCNC: 1 MG/DL (ref 0.5–1.2)
EOSINOPHILS ABSOLUTE: 0.1 K/UL (ref 0–0.6)
EOSINOPHILS RELATIVE PERCENT: 2.4 % (ref 0–5)
GFR NON-AFRICAN AMERICAN: >60
GLUCOSE BLD-MCNC: 103 MG/DL
GLUCOSE BLD-MCNC: 103 MG/DL (ref 70–99)
GLUCOSE BLD-MCNC: 120 MG/DL (ref 74–109)
GLUCOSE URINE: NEGATIVE MG/DL
HCT VFR BLD CALC: 43.2 % (ref 42–52)
HEMOGLOBIN: 13.7 G/DL (ref 14–18)
INR BLD: 0.99 (ref 0.88–1.18)
KETONES, URINE: NEGATIVE MG/DL
LEUKOCYTE ESTERASE, URINE: NEGATIVE
LIPASE: 31 U/L (ref 13–60)
LYMPHOCYTES ABSOLUTE: 2.1 K/UL (ref 1.1–4.5)
LYMPHOCYTES RELATIVE PERCENT: 38.9 % (ref 20–40)
MCH RBC QN AUTO: 28.1 PG (ref 27–31)
MCHC RBC AUTO-ENTMCNC: 31.7 G/DL (ref 33–37)
MCV RBC AUTO: 88.7 FL (ref 80–94)
MONOCYTES ABSOLUTE: 0.7 K/UL (ref 0–0.9)
MONOCYTES RELATIVE PERCENT: 12.4 % (ref 0–10)
NEUTROPHILS ABSOLUTE: 2.4 K/UL (ref 1.5–7.5)
NEUTROPHILS RELATIVE PERCENT: 45 % (ref 50–65)
NITRITE, URINE: NEGATIVE
PDW BLD-RTO: 14.5 % (ref 11.5–14.5)
PERFORMED ON: ABNORMAL
PERFORMED ON: NORMAL
PH UA: 6
PLATELET # BLD: 158 K/UL (ref 130–400)
PMV BLD AUTO: 11.1 FL (ref 9.4–12.4)
POC TROPONIN I: 0 NG/ML (ref 0–0.08)
POTASSIUM SERPL-SCNC: 3.8 MMOL/L (ref 3.5–5)
PRO-BNP: 155 PG/ML (ref 0–900)
PROTEIN UA: NEGATIVE MG/DL
PROTHROMBIN TIME: 13 SEC (ref 12–14.6)
RBC # BLD: 4.87 M/UL (ref 4.7–6.1)
SODIUM BLD-SCNC: 137 MMOL/L (ref 136–145)
SPECIFIC GRAVITY UA: 1.01
TOTAL PROTEIN: 7.1 G/DL (ref 6.6–8.7)
TROPONIN: <0.01 NG/ML (ref 0–0.03)
TROPONIN: <0.01 NG/ML (ref 0–0.03)
URINE REFLEX TO CULTURE: NORMAL
UROBILINOGEN, URINE: 0.2 E.U./DL
WBC # BLD: 5.3 K/UL (ref 4.8–10.8)

## 2018-08-13 PROCEDURE — 99213 OFFICE O/P EST LOW 20 MIN: CPT | Performed by: CLINICAL NURSE SPECIALIST

## 2018-08-13 PROCEDURE — 83880 ASSAY OF NATRIURETIC PEPTIDE: CPT

## 2018-08-13 PROCEDURE — 99284 EMERGENCY DEPT VISIT MOD MDM: CPT | Performed by: EMERGENCY MEDICINE

## 2018-08-13 PROCEDURE — 82948 REAGENT STRIP/BLOOD GLUCOSE: CPT

## 2018-08-13 PROCEDURE — 71046 X-RAY EXAM CHEST 2 VIEWS: CPT

## 2018-08-13 PROCEDURE — 99285 EMERGENCY DEPT VISIT HI MDM: CPT

## 2018-08-13 PROCEDURE — 0296T PR EXT ECG > 48HR TO 21 DAY RCRD W/CONECT INTL RCRD: CPT | Performed by: CLINICAL NURSE SPECIALIST

## 2018-08-13 PROCEDURE — 36415 COLL VENOUS BLD VENIPUNCTURE: CPT

## 2018-08-13 PROCEDURE — 6360000002 HC RX W HCPCS: Performed by: EMERGENCY MEDICINE

## 2018-08-13 PROCEDURE — 83690 ASSAY OF LIPASE: CPT

## 2018-08-13 PROCEDURE — 93005 ELECTROCARDIOGRAM TRACING: CPT

## 2018-08-13 PROCEDURE — G8427 DOCREV CUR MEDS BY ELIG CLIN: HCPCS | Performed by: CLINICAL NURSE SPECIALIST

## 2018-08-13 PROCEDURE — 1036F TOBACCO NON-USER: CPT | Performed by: CLINICAL NURSE SPECIALIST

## 2018-08-13 PROCEDURE — 2580000003 HC RX 258: Performed by: EMERGENCY MEDICINE

## 2018-08-13 PROCEDURE — 84484 ASSAY OF TROPONIN QUANT: CPT

## 2018-08-13 PROCEDURE — 3017F COLORECTAL CA SCREEN DOC REV: CPT | Performed by: CLINICAL NURSE SPECIALIST

## 2018-08-13 PROCEDURE — 96374 THER/PROPH/DIAG INJ IV PUSH: CPT

## 2018-08-13 PROCEDURE — 85025 COMPLETE CBC W/AUTO DIFF WBC: CPT

## 2018-08-13 PROCEDURE — 81003 URINALYSIS AUTO W/O SCOPE: CPT

## 2018-08-13 PROCEDURE — G8417 CALC BMI ABV UP PARAM F/U: HCPCS | Performed by: CLINICAL NURSE SPECIALIST

## 2018-08-13 PROCEDURE — 85610 PROTHROMBIN TIME: CPT

## 2018-08-13 PROCEDURE — 80053 COMPREHEN METABOLIC PANEL: CPT

## 2018-08-13 RX ORDER — 0.9 % SODIUM CHLORIDE 0.9 %
1000 INTRAVENOUS SOLUTION INTRAVENOUS ONCE
Status: COMPLETED | OUTPATIENT
Start: 2018-08-13 | End: 2018-08-13

## 2018-08-13 RX ORDER — ONDANSETRON 2 MG/ML
4 INJECTION INTRAMUSCULAR; INTRAVENOUS ONCE
Status: COMPLETED | OUTPATIENT
Start: 2018-08-13 | End: 2018-08-13

## 2018-08-13 RX ADMIN — ONDANSETRON 4 MG: 2 INJECTION INTRAMUSCULAR; INTRAVENOUS at 02:30

## 2018-08-13 RX ADMIN — SODIUM CHLORIDE 1000 ML: 9 INJECTION, SOLUTION INTRAVENOUS at 03:03

## 2018-08-13 ASSESSMENT — ENCOUNTER SYMPTOMS
VOMITING: 0
BLURRED VISION: 0
NAUSEA: 1
ORTHOPNEA: 0
VOMITING: 0
HEARTBURN: 0
COUGH: 0
SHORTNESS OF BREATH: 1
ABDOMINAL PAIN: 0
SHORTNESS OF BREATH: 0
NAUSEA: 1
COUGH: 0

## 2018-08-13 NOTE — ED NOTES
Pt able to stand without assistance during orthostatic BP readings.      Rivera Mata RN  08/13/18 [id number]

## 2018-08-13 NOTE — PATIENT INSTRUCTIONS
Version: 11.7  © 5410-5198 [employer]. Care instructions adapted under license by [facility] ([facility]). If you have questions about a medical condition or this instruction, always ask your healthcare professional. [address] any warranty or liability for your use of this information.

## 2018-08-13 NOTE — ED NOTES
ASSESSMENT: Pt presents feeling weakness throughout body, emesis and adv of hypotension but is a little hypertensive at this time    PT ALERT/ORIENTED X4. PUPILS EQUAL/REACTIVE    SKIN:  WARM/DRY PINK CAPILLARY REFILL < 2SECS    CARDIAC:  S1 S2 NOTED     LUNGS: CLEAR UPPER AND LOWER LOBES, RESPIRATIONS EVEN/UNLABORED     ABDOMEN: BOWEL SOUNDS NOTED UPPER AND LOWER QUADRANTS                     SOFT AND NONTENDER. EXTREMITIES:  BILATERAL DP AND PT AND NO EDEMA NOTED. NO DISTRESS NOTED. SIDE RAILS UP AND CALL LIGHT IN REACH.      Cameron Bowman RN  08/13/18 2201

## 2018-08-13 NOTE — PROGRESS NOTES
[facility] [address]  [address]  Phone: [phone]  Fax: [phone]    OFFICE VISIT:  2018    Adam Sahu - : 1955    Reason For Visit:  Lisandro Zaragoza is a 58 y.o. male who is here for Follow-up (ER FU  patient is having fasst heart rates  ) and Palpitations    HPI   Patient follows with our office the history of palpitations. He has been well-controlled until recently on verapamil. He states the last 10 days he's been having more frequent episodes of fast heart rate. He has documented some of these episodes on his home pulse oximeter. Sometimes rates or spasms 190 and can last up to 40 minutes. He feels extremely fatigued, nauseated, and has symptoms of near-syncope while the episodes occur. He reports he is staying well-hydrated and avoids caffeine other than one cup of coffee in the morning. The episodes are very scary to him and he went to the ER very early this morning, however by the time he got there his symptoms had subsided. Nothing was found via blood work. Patient has had a negative stress test in the past. Patient states his mother had a history of atrial fib     Santa Valencia MD is PCP.   Adam Sahu has the following history as recorded in [facility]:    Patient Active Problem List    Diagnosis Date Noted    Palpitations      Priority: High    Rheumatoid arthritis involving both hands with positive rheumatoid factor ([facility] [address] 2018    Premature atrial contractions 2017    Memory loss 2017    History of sleep apnea 2017    Tachycardia 2017    Intermittent palpitations 2017    Family history of atrial fibrillation 2017    Thoracic spine pain 2017    CPAP (continuous positive airway pressure) dependence     Osteoarthritis of spine with radiculopathy, lumbar region     Chronic bilateral low back pain with bilateral sciatica     Chest discomfort 2016    Essential well-nourished. No distress. Obese   HENT:   Head: Normocephalic and atraumatic. Eyes: Pupils are equal, round, and reactive to light. Right eye exhibits no discharge. Left eye exhibits no discharge. Neck: No JVD present. No tracheal deviation present. Cardiovascular: Normal rate, regular rhythm, normal heart sounds and intact distal pulses. Exam reveals no gallop and no friction rub. No murmur heard. No carotid bruit   Pulmonary/Chest: Effort normal and breath sounds normal. No respiratory distress. He has no wheezes. He has no rales. Abdominal: Soft. There is no tenderness. Musculoskeletal: He exhibits no edema. Normal gait and station   Neurological: He is alert and oriented to person, place, and time. No cranial nerve deficit. Skin: Skin is warm and dry. No rash noted. Psychiatric: He has a normal mood and affect. His behavior is normal. Judgment normal.   Nursing note and vitals reviewed. Assessment:     Diagnosis Orders   1. Tachycardia  VA EXT ECG > 48HR TO 21 DAY RCRD W/CONECT INTL RCRD   2. Palpitations     3. SUSANA (obstructive sleep apnea)     4. Essential hypertension         Patient with tachycardia/palpitations happening more frequently. Plan will be to check a two-week Cardio-Key monitor. I am suspicious for A. fib with his family history and his sleep apnea. He states he treats his sleep apnea regularly. Told patient he can wear the monitor up to 2 weeks, but as soon as he has an episode that is captured, he can remove the monitor and send it in for evaluation. Go to ER for worsening symptoms    Plan    Orders Placed This Encounter   Procedures    VA EXT ECG > 48HR TO 21 DAY RCRD W/CONECT INTL RCRD     No Follow-up on file.    Cardio Key Heart Monitor up to 2 weeks (send in once episode occurs)  May use vagal manuveurs to slow heart rate  Continue Verapamil      Call with any questions or concerns  Follow up with Ayanna Singh MD for non cardiac problems  Report any new

## 2018-08-13 NOTE — ED PROVIDER NOTES
metFORMIN (GLUCOPHAGE) 500 MG tablet Take 500 mg by mouth 2 times daily (with meals)      polyethylene glycol (MIRALAX) POWD powder Take 17 g by mouth every 3 days      lisinopril-hydrochlorothiazide (PRINZIDE;ZESTORETIC) 20-12.5 MG per tablet Take 0.5 tablets by mouth daily Historical Med             ALLERGIES     Clindamycin/lincomycin; Benadryl [diphenhydramine]; and Prednisone    FAMILY HISTORY       Family History   Problem Relation Age of Onset    Heart Disease Father 64    Cancer Father         malignant tumor of pharynx    Diabetes Mother     Hypertension Mother     Diabetes Sister         x 2    Diabetes Brother     Colon Cancer Neg Hx     Colon Polyps Neg Hx     Liver Disease Neg Hx     Rectal Cancer Neg Hx     Stomach Cancer Neg Hx           SOCIAL HISTORY       Social History     Social History    Marital status:      Spouse name: N/A    Number of children: N/A    Years of education: N/A     Social History Main Topics    Smoking status: Former Smoker    Smokeless tobacco: Never Used    Alcohol use No    Drug use: No    Sexual activity: No     Other Topics Concern    None     Social History Narrative    None       SCREENINGS             PHYSICAL EXAM    (up to 7 for level 4, 8 or more for level 5)     ED Triage Vitals [08/13/18 0202]   BP Temp Temp Source Pulse Resp SpO2 Height Weight   (!) 153/97 98.1 °F (36.7 °C) Temporal 70 18 95 % 6' (1.829 m) 285 lb (129.3 kg)       Physical Exam   Constitutional: He is oriented to person, place, and time. He appears well-developed and well-nourished. Obese sitting up in bed appears fatigued   HENT:   Head: Normocephalic and atraumatic. Eyes: Pupils are equal, round, and reactive to light. EOM are normal.   Neck: Normal range of motion. Neck supple. Cardiovascular: Normal rate, regular rhythm and normal heart sounds. Pulmonary/Chest: Effort normal and breath sounds normal. No respiratory distress. He has no wheezes.    Abdominal: Soft. Bowel sounds are normal. There is no tenderness. Musculoskeletal: Normal range of motion. He exhibits no edema or tenderness. Neurological: He is alert and oriented to person, place, and time. Skin: Skin is warm and dry. Psychiatric:   Mildly anxious    Vitals reviewed. DIAGNOSTIC RESULTS     EKG: All EKG's are interpreted by the Emergency Department Physician who either signs or Co-signs this chart in the absence of a cardiologist.  EKG sinus 66 no acute ST wave changes. RADIOLOGY:   Non-plain film images such as CT, Ultrasound and MRI are read by the radiologist. Plain radiographic images are visualized and preliminarily interpreted by the emergency physician with the below findings:        Interpretation per the Radiologist below, if available at the time of this note:    XR CHEST STANDARD (2 VW)   Final Result   Impression:   No acute cardiopulmonary disease. Signed by Dr Christopher Wheeler on 8/13/2018 7:10 AM            ED BEDSIDE ULTRASOUND:   Performed by ED Physician - none    LABS:  Labs Reviewed   COMPREHENSIVE METABOLIC PANEL - Abnormal; Notable for the following:        Result Value    Glucose 120 (*)     All other components within normal limits   CBC WITH AUTO DIFFERENTIAL - Abnormal; Notable for the following:     Hemoglobin 13.7 (*)     MCHC 31.7 (*)     Neutrophils % 45.0 (*)     Monocytes % 12.4 (*)     Basophils % 1.1 (*)     All other components within normal limits   POCT GLUCOSE - Abnormal; Notable for the following:     POC Glucose 103 (*)     All other components within normal limits   POCT GLUCOSE - Normal   LIPASE   PROTIME-INR   TROPONIN   BRAIN NATRIURETIC PEPTIDE   URINE RT REFLEX TO CULTURE   TROPONIN   POCT TROPONIN   POCT VENOUS       All other labs were within normal range or not returned as of this dictation.     EMERGENCY DEPARTMENT COURSE and DIFFERENTIAL DIAGNOSIS/MDM:   Vitals:    Vitals:    08/13/18 0202 08/13/18 0415   BP: (!) 153/97 108/66   Pulse: 70 69 Resp: 18 12   Temp: 98.1 °F (36.7 °C) 97.7 °F (36.5 °C)   TempSrc: Temporal Oral   SpO2: 95% 97%   Weight: 285 lb (129.3 kg)    Height: 6' (1.829 m)        MDM  Number of Diagnoses or Management Options  Fatigue, unspecified type:   Palpitations:   Diagnosis management comments: Pt with pSVT vs pAF likely. Nothing captured. Trops neg hours later stable for dc. Somewhat anxious about it all but HR and vss in ER. Discussed with him to see cardiology get a ZIO patch or something like that again. Amount and/or Complexity of Data Reviewed  Clinical lab tests: ordered and reviewed  Tests in the radiology section of CPT®: ordered and reviewed  Decide to obtain previous medical records or to obtain history from someone other than the patient: yes  Obtain history from someone other than the patient: yes  Independent visualization of images, tracings, or specimens: yes    Patient Progress  Patient progress: improved        CONSULTS:  None    PROCEDURES:  Unless otherwise noted below, none     Procedures    FINAL IMPRESSION      1. Palpitations    2.  Fatigue, unspecified type          DISPOSITION/PLAN   DISPOSITION Decision To Discharge 08/13/2018 04:41:03 AM      PATIENT REFERRED TO:  Dora Overton MD  [address] [place]  [phone]    Schedule an appointment as soon as possible for a visit   asap to discuss your palpitations and your need for possible ZIO patch again    Mishel Felton MD  [address] [place] [address]  [phone]    Schedule an appointment as soon as possible for a visit in 3 days        DISCHARGE MEDICATIONS:  Discharge Medication List as of 8/13/2018  4:42 AM             (Please note that portions of this note were completed with a voice recognition program.  Efforts were made to edit the dictations but occasionally words are mis-transcribed.)    Kerrie Manzanares MD (electronically signed)  Attending Emergency Physician          Kerrie Manzanares, MD  08/16/18 1147

## 2018-08-15 LAB
EKG P AXIS: 26 DEGREES
EKG P AXIS: 63 DEGREES
EKG P-R INTERVAL: 146 MS
EKG P-R INTERVAL: 150 MS
EKG Q-T INTERVAL: 434 MS
EKG Q-T INTERVAL: 484 MS
EKG QRS DURATION: 92 MS
EKG QRS DURATION: 94 MS
EKG QTC CALCULATION (BAZETT): 444 MS
EKG QTC CALCULATION (BAZETT): 476 MS
EKG T AXIS: 20 DEGREES
EKG T AXIS: 28 DEGREES

## 2018-08-22 ENCOUNTER — TELEPHONE (OUTPATIENT)
Dept: CARDIOLOGY | Age: 63
End: 2018-08-22

## 2018-08-22 NOTE — TELEPHONE ENCOUNTER
Please let patient know his heart monitor shows runs of SVT, or fast heart rate which is likely what he is experiencing.   I would recommend adding metoprolol XL 25 mg daily to suppress fast heart rate

## 2018-08-27 DIAGNOSIS — I10 ESSENTIAL HYPERTENSION: Primary | ICD-10-CM

## 2018-08-27 RX ORDER — METOPROLOL SUCCINATE 25 MG/1
25 TABLET, EXTENDED RELEASE ORAL DAILY
Qty: 30 TABLET | Refills: 3 | Status: ON HOLD | OUTPATIENT
Start: 2018-08-27 | End: 2018-10-23 | Stop reason: ALTCHOICE

## 2018-08-29 DIAGNOSIS — F41.9 ANXIETY: Primary | ICD-10-CM

## 2018-08-29 DIAGNOSIS — M47.26 OSTEOARTHRITIS OF SPINE WITH RADICULOPATHY, LUMBAR REGION: ICD-10-CM

## 2018-08-29 DIAGNOSIS — M54.42 CHRONIC BILATERAL LOW BACK PAIN WITH BILATERAL SCIATICA: ICD-10-CM

## 2018-08-29 DIAGNOSIS — G89.29 CHRONIC BILATERAL LOW BACK PAIN WITH BILATERAL SCIATICA: ICD-10-CM

## 2018-08-29 DIAGNOSIS — M54.41 CHRONIC BILATERAL LOW BACK PAIN WITH BILATERAL SCIATICA: ICD-10-CM

## 2018-08-29 RX ORDER — OXYCODONE AND ACETAMINOPHEN 10; 325 MG/1; MG/1
1 TABLET ORAL EVERY 6 HOURS PRN
Qty: 90 TABLET | Refills: 0 | Status: SHIPPED | OUTPATIENT
Start: 2018-08-31 | End: 2018-10-01 | Stop reason: SDUPTHER

## 2018-08-29 RX ORDER — ALPRAZOLAM 1 MG/1
1.5 TABLET ORAL 3 TIMES DAILY PRN
Qty: 135 TABLET | Refills: 2 | Status: SHIPPED | OUTPATIENT
Start: 2018-08-29 | End: 2018-09-24 | Stop reason: DRUGHIGH

## 2018-09-24 ENCOUNTER — OFFICE VISIT (OUTPATIENT)
Dept: CARDIOLOGY | Age: 63
End: 2018-09-24
Payer: MEDICARE

## 2018-09-24 VITALS
WEIGHT: 290 LBS | HEIGHT: 73 IN | SYSTOLIC BLOOD PRESSURE: 112 MMHG | DIASTOLIC BLOOD PRESSURE: 66 MMHG | BODY MASS INDEX: 38.43 KG/M2 | HEART RATE: 96 BPM

## 2018-09-24 DIAGNOSIS — R00.2 PALPITATIONS: Primary | ICD-10-CM

## 2018-09-24 DIAGNOSIS — I47.1 PAROXYSMAL SVT (SUPRAVENTRICULAR TACHYCARDIA) (HCC): ICD-10-CM

## 2018-09-24 DIAGNOSIS — Z99.89 CPAP (CONTINUOUS POSITIVE AIRWAY PRESSURE) DEPENDENCE: ICD-10-CM

## 2018-09-24 DIAGNOSIS — R06.02 SHORTNESS OF BREATH: ICD-10-CM

## 2018-09-24 PROBLEM — I47.10 PAROXYSMAL SVT (SUPRAVENTRICULAR TACHYCARDIA): Status: ACTIVE | Noted: 2018-09-24

## 2018-09-24 PROCEDURE — 3017F COLORECTAL CA SCREEN DOC REV: CPT | Performed by: CLINICAL NURSE SPECIALIST

## 2018-09-24 PROCEDURE — 1036F TOBACCO NON-USER: CPT | Performed by: CLINICAL NURSE SPECIALIST

## 2018-09-24 PROCEDURE — G8417 CALC BMI ABV UP PARAM F/U: HCPCS | Performed by: CLINICAL NURSE SPECIALIST

## 2018-09-24 PROCEDURE — 99213 OFFICE O/P EST LOW 20 MIN: CPT | Performed by: CLINICAL NURSE SPECIALIST

## 2018-09-24 PROCEDURE — G8427 DOCREV CUR MEDS BY ELIG CLIN: HCPCS | Performed by: CLINICAL NURSE SPECIALIST

## 2018-09-24 RX ORDER — ZOLPIDEM TARTRATE 10 MG/1
10 TABLET ORAL PRN
COMMUNITY
End: 2019-12-05 | Stop reason: SDUPTHER

## 2018-09-24 RX ORDER — ALPRAZOLAM 1 MG/1
1 TABLET ORAL 3 TIMES DAILY
COMMUNITY
End: 2018-09-27 | Stop reason: SDUPTHER

## 2018-09-24 RX ORDER — OXYCODONE HCL 20 MG/1
20 TABLET, FILM COATED, EXTENDED RELEASE ORAL 2 TIMES DAILY
COMMUNITY
End: 2018-09-27 | Stop reason: SDUPTHER

## 2018-09-24 ASSESSMENT — ENCOUNTER SYMPTOMS
COUGH: 0
VOMITING: 0
HEARTBURN: 0
ORTHOPNEA: 0
SHORTNESS OF BREATH: 1
BLURRED VISION: 0
NAUSEA: 0

## 2018-09-24 NOTE — PATIENT INSTRUCTIONS
[place] at the [address] [facility] and [address] [facility] located on the first floor of [place] [address] through hospital main entrance and turn immediately to your left. Patient's contact number:  [phone] (home)      Lexiscan Stress Test      Lexiscan (regadenoson injection) is a prescription drug given through an IV line that increases blood flow through the arteries of the heart during a cardiac nuclear stress test.     There are two parts to a Lexiscan stress test: the rest portion and the exercise portion. For the rest portion, a radioactive tracer is injected into your arm through the IV. After 30 to 60 minutes, the process of imaging will begin. A nuclear camera will be placed on your chest area and images are taken for the next 15 to 20 minutes. For the exercise portion, a nurse will attach EKG electrodes to your chest to monitor your heart rate. The drug Enedina Lowe is administered to simulate stress on the heart. Your heart rhythm will then be monitored for the next few minutes. Your blood pressure will also be monitored throughout the exercise portion. [place] through the exercise portion, a second round of radioactive tracer is injected into your body. Your heart rate and EKG will be monitored for another few minutes after administering the drug. Test Preparation:     Bring a list of your current medications. Do not take any of your medications the morning of the test, but bring all morning medications with you as you will take them after the stress portion of the test is completed.  Do not eat Bananas 24 hours prior to test.     No caffeine 24 hours prior to the testing. This includes: coffee, pop/soda, chocolate, cold medications, etc.  Any product that might contain caffeine.  No nicotine or alcohol 12 hours prior to your test.    Nothing to eat or drink 4-6 hours prior to appointment time.   It is okay to drink small amounts of water during the four hours prior

## 2018-09-27 ENCOUNTER — TELEPHONE (OUTPATIENT)
Dept: NEUROLOGY | Age: 63
End: 2018-09-27

## 2018-09-27 DIAGNOSIS — F41.9 ANXIETY: Primary | ICD-10-CM

## 2018-09-27 DIAGNOSIS — M54.42 CHRONIC BILATERAL LOW BACK PAIN WITH BILATERAL SCIATICA: ICD-10-CM

## 2018-09-27 DIAGNOSIS — M47.26 OSTEOARTHRITIS OF SPINE WITH RADICULOPATHY, LUMBAR REGION: ICD-10-CM

## 2018-09-27 DIAGNOSIS — M54.41 CHRONIC BILATERAL LOW BACK PAIN WITH BILATERAL SCIATICA: ICD-10-CM

## 2018-09-27 DIAGNOSIS — G89.29 CHRONIC BILATERAL LOW BACK PAIN WITH BILATERAL SCIATICA: ICD-10-CM

## 2018-09-27 RX ORDER — ALPRAZOLAM 1 MG/1
1 TABLET ORAL 3 TIMES DAILY PRN
Qty: 90 TABLET | Refills: 2 | Status: SHIPPED | OUTPATIENT
Start: 2018-09-27 | End: 2018-10-22 | Stop reason: SDUPTHER

## 2018-09-27 RX ORDER — OXYCODONE AND ACETAMINOPHEN 10; 325 MG/1; MG/1
1 TABLET ORAL EVERY 6 HOURS PRN
Qty: 90 TABLET | Refills: 0 | Status: CANCELLED | OUTPATIENT
Start: 2018-09-27 | End: 2018-10-27

## 2018-09-27 RX ORDER — OXYCODONE HCL 20 MG/1
20 TABLET, FILM COATED, EXTENDED RELEASE ORAL 2 TIMES DAILY
Qty: 60 TABLET | Refills: 0 | Status: SHIPPED | OUTPATIENT
Start: 2018-09-27 | End: 2018-10-26 | Stop reason: SDUPTHER

## 2018-09-27 NOTE — TELEPHONE ENCOUNTER
Requested Prescriptions     Pending Prescriptions Disp Refills    oxyCODONE-acetaminophen (PERCOCET)  MG per tablet 90 tablet 0     Sig: Take 1 tablet by mouth every 6 hours as needed for Pain (Pain) for up to 30 days. .     Last ov 6/21/2018  Next ov 10/22/2018  Last rx   8/31/2018  Deepika Funes

## 2018-09-27 NOTE — TELEPHONE ENCOUNTER
Requested Prescriptions     Pending Prescriptions Disp Refills    ALPRAZolam (XANAX) 1 MG tablet       Sig: Take 1 tablet by mouth 3 times daily. Rey Nobles oxyCODONE (OXYCONTIN) 20 MG extended release tablet       Sig: Take 1 tablet by mouth 2 times daily. .     Last ov 6/21/2018  Next ov 10/22/2018  Last rx   8/29/2018  Arelis Olson

## 2018-10-01 DIAGNOSIS — M47.26 OSTEOARTHRITIS OF SPINE WITH RADICULOPATHY, LUMBAR REGION: ICD-10-CM

## 2018-10-01 DIAGNOSIS — M54.42 CHRONIC BILATERAL LOW BACK PAIN WITH BILATERAL SCIATICA: ICD-10-CM

## 2018-10-01 DIAGNOSIS — G89.29 CHRONIC BILATERAL LOW BACK PAIN WITH BILATERAL SCIATICA: ICD-10-CM

## 2018-10-01 DIAGNOSIS — M54.41 CHRONIC BILATERAL LOW BACK PAIN WITH BILATERAL SCIATICA: ICD-10-CM

## 2018-10-01 RX ORDER — OXYCODONE AND ACETAMINOPHEN 10; 325 MG/1; MG/1
1 TABLET ORAL EVERY 6 HOURS PRN
Qty: 90 TABLET | Refills: 0 | Status: SHIPPED | OUTPATIENT
Start: 2018-10-01 | End: 2018-10-26 | Stop reason: SDUPTHER

## 2018-10-01 NOTE — TELEPHONE ENCOUNTER
Requested Prescriptions     Pending Prescriptions Disp Refills    oxyCODONE-acetaminophen (PERCOCET)  MG per tablet 90 tablet 0     Sig: Take 1 tablet by mouth every 6 hours as needed for Pain (Pain) for up to 30 days. .       Last OV 6/21/18  Next OV 10/22/18  Last Rx 8/31/18  Nida Prieto up to date.  Just needs scanned in

## 2018-10-12 ENCOUNTER — HOSPITAL ENCOUNTER (OUTPATIENT)
Dept: NON INVASIVE DIAGNOSTICS | Age: 63
Discharge: HOME | End: 2018-10-12
Payer: MEDICARE

## 2018-10-12 ENCOUNTER — HOSPITAL ENCOUNTER (OUTPATIENT)
Dept: NUCLEAR MEDICINE | Age: 63
Discharge: HOME | End: 2018-10-14
Payer: MEDICARE

## 2018-10-12 ENCOUNTER — HOSPITAL ENCOUNTER (OUTPATIENT)
Dept: NUCLEAR MEDICINE | Age: 63
End: 2018-10-12
Payer: MEDICARE

## 2018-10-12 DIAGNOSIS — R00.2 PALPITATIONS: ICD-10-CM

## 2018-10-12 DIAGNOSIS — R06.02 SHORTNESS OF BREATH: ICD-10-CM

## 2018-10-12 PROCEDURE — 3430000000 HC RX DIAGNOSTIC RADIOPHARMACEUTICAL: Performed by: CLINICAL NURSE SPECIALIST

## 2018-10-12 PROCEDURE — 78452 HT MUSCLE IMAGE SPECT MULT: CPT

## 2018-10-12 PROCEDURE — A9500 TC99M SESTAMIBI: HCPCS | Performed by: CLINICAL NURSE SPECIALIST

## 2018-10-12 PROCEDURE — 93017 CV STRESS TEST TRACING ONLY: CPT

## 2018-10-12 PROCEDURE — 6360000002 HC RX W HCPCS: Performed by: CLINICAL NURSE SPECIALIST

## 2018-10-12 RX ADMIN — REGADENOSON 0.4 MG: 0.08 INJECTION, SOLUTION INTRAVENOUS at 14:45

## 2018-10-12 RX ADMIN — TETRAKIS(2-METHOXYISOBUTYLISOCYANIDE)COPPER(I) TETRAFLUOROBORATE 30 MILLICURIE: 1 INJECTION, POWDER, LYOPHILIZED, FOR SOLUTION INTRAVENOUS at 14:45

## 2018-10-12 RX ADMIN — TETRAKIS(2-METHOXYISOBUTYLISOCYANIDE)COPPER(I) TETRAFLUOROBORATE 10 MILLICURIE: 1 INJECTION, POWDER, LYOPHILIZED, FOR SOLUTION INTRAVENOUS at 14:45

## 2018-10-13 ENCOUNTER — OFFICE VISIT (OUTPATIENT)
Dept: URGENT CARE | Age: 63
End: 2018-10-13
Payer: MEDICARE

## 2018-10-13 VITALS
WEIGHT: 290.38 LBS | RESPIRATION RATE: 14 BRPM | HEART RATE: 68 BPM | BODY MASS INDEX: 38.48 KG/M2 | DIASTOLIC BLOOD PRESSURE: 74 MMHG | TEMPERATURE: 98.8 F | HEIGHT: 73 IN | SYSTOLIC BLOOD PRESSURE: 115 MMHG | OXYGEN SATURATION: 95 %

## 2018-10-13 DIAGNOSIS — J02.9 ACUTE PHARYNGITIS, UNSPECIFIED ETIOLOGY: ICD-10-CM

## 2018-10-13 DIAGNOSIS — J02.9 SORE THROAT: Primary | ICD-10-CM

## 2018-10-13 LAB — S PYO AG THROAT QL: NORMAL

## 2018-10-13 PROCEDURE — 99213 OFFICE O/P EST LOW 20 MIN: CPT | Performed by: CLINICAL NURSE SPECIALIST

## 2018-10-13 PROCEDURE — 87880 STREP A ASSAY W/OPTIC: CPT | Performed by: CLINICAL NURSE SPECIALIST

## 2018-10-13 RX ORDER — AZITHROMYCIN 250 MG/1
TABLET, FILM COATED ORAL
Qty: 1 PACKET | Refills: 1 | Status: SHIPPED | OUTPATIENT
Start: 2018-10-13 | End: 2018-10-17

## 2018-10-13 ASSESSMENT — ENCOUNTER SYMPTOMS
GASTROINTESTINAL NEGATIVE: 1
COUGH: 1
EYES NEGATIVE: 1

## 2018-10-13 NOTE — PROGRESS NOTES
[address] [facility] CARE  [address] [facility] [place] [address]  Dept: [phone]  Loc: [phone]    Jearldine Boeck is a 58 y.o. male who presents today for his medical conditions/complaintsas noted below. Jearldine Boeck is c/o of Cough (onset 2 days ago (HX Bronchitis)); Chest Congestion; and Pharyngitis        HPI:     Mr. Ritchie Gambino arrives here today with complaints of cough and congestion. This started yesterday and continues to worsen. Denies fever, chest congestion, or shortness of breath.          Past Medical History:   Diagnosis Date    Acute sinusitis     Anxiety     Chronic back pain     Chronic bilateral low back pain with bilateral sciatica     Constipation     [facility] obstructive [facility] disease) [facility]     CPAP (continuous positive airway pressure) dependence     12cm to 20cm    Depression     Hyperlipidemia     Hypertensive disorder     OA (osteoarthritis of the spine)     Obesity     SUSANA (obstructive sleep apnea)     AHI:  85.3 per HST 11/2013    Osteoarthritis of spine with radiculopathy, lumbar region     Otitis media     Rhe[facility]     Spinal stenosis     Type 2 diabetes mellitus ([facility] 3/23/2016    Unspecified sleep apnea     uses bipap    Vertigo       Past Surgical History:   Procedure Laterality Date    ADENOIDECTOMY      BROW LIFT      due to vision    COLONOSCOPY  27 yrs ago    [place], normal per patient    HERNIA REPAIR      KNEE ARTHROSCOPY Left 2009    VT Girish Rodrick Radha 84 DX/THER SBST EPIDURAL/SUBARACH LUMBAR/SACRAL N/A 10/3/2017    L3-4 EPIDURAL STEROID INJECTION performed by Melissa Arevalo at [facility] OR    TONSILLECTOMY         Family History   Problem Relation Age of Onset    Heart Disease Father 64    Cancer Father         malignant tumor of pharynx    Diabetes Mother     Hypertension Mother     Diabetes Sister         x 2    Diabetes Brother     Colon Cancer Neg Hx     Colon infections with home care. This may include drinking lots of fluids and taking over-the-counter pain medicine. You will probably feel better in 4 to 10 days. The doctor has checked you carefully, but problems can develop later. If you notice any problems or new symptoms, get medical treatment right away. Follow-up care is a key part of your treatment and safety. Be sure to make and go to all appointments, and call your doctor if you are having problems. It's also a good idea to know your test results and keep a list of the medicines you take. How can you care for yourself at home? · To prevent dehydration, drink plenty of fluids, enough so that your urine is light yellow or clear like water. Choose water and other caffeine-free clear liquids until you feel better. If you have kidney, heart, or liver disease and have to limit fluids, talk with your doctor before you increase the amount of fluids you drink. · Take an over-the-counter pain medicine, such as acetaminophen (Tylenol), ibuprofen (Advil, Motrin), or naproxen (Aleve). Read and follow all instructions on the label. · Before you use cough and cold medicines, check the label. These medicines may not be safe for young children or for people with certain health problems. · Be careful when taking over-the-counter cold or flu medicines and Tylenol at the same time. Many of these medicines have acetaminophen, which is Tylenol. Read the labels to make sure that you are not taking more than the recommended dose. Too much acetaminophen (Tylenol) can be harmful. · Get plenty of rest.  · Do not smoke or allow others to smoke around you. If you need help quitting, talk to your doctor about stop-smoking programs and medicines. These can increase your chances of quitting for good. When should you call for help? Call 911 anytime you think you may need emergency care.  For example, call if:    · You have severe trouble breathing.    Call your doctor now or seek immediate medical care if:    · You seem to be getting much sicker.     · You have new or worse trouble breathing.     · You have a new or higher fever.     · You have a new rash.    Watch closely for changes in your health, and be sure to contact your doctor if:    · You have a new symptom, such as a sore throat, an earache, or sinus pain.     · You cough more deeply or more often, especially if you notice more mucus or a change in the color of your mucus.     · You do not get better as expected. Where can you learn more? Go to https://[employer]eb.TeamStreamz. org and sign in to your c8apps account. Enter M973 in the [employer] box to learn more about \"Upper Respiratory Infection (Cold): Care Instructions. \"     If you do not have an account, please click on the \"Sign Up Now\" link. Current as of: December 6, 2017  Content Version: 11.7  © 5202-2085 [employer], [employer]. Care instructions adapted under license by [facility] ([facility]). If you have questions about a medical condition or this instruction, always ask your healthcare professional. [place] [address] any warranty or liability for your use of this information.              Electronically signed by HEENA Martin on 10/13/2018 at 2:19 PM

## 2018-10-15 LAB
LV EF: 53 %
LVEF MODALITY: NORMAL

## 2018-10-22 ENCOUNTER — OFFICE VISIT (OUTPATIENT)
Dept: NEUROLOGY | Age: 63
End: 2018-10-22
Payer: MEDICARE

## 2018-10-22 VITALS
SYSTOLIC BLOOD PRESSURE: 121 MMHG | DIASTOLIC BLOOD PRESSURE: 74 MMHG | RESPIRATION RATE: 20 BRPM | WEIGHT: 294.2 LBS | HEART RATE: 60 BPM | HEIGHT: 73 IN | BODY MASS INDEX: 38.99 KG/M2

## 2018-10-22 DIAGNOSIS — G89.29 CHRONIC BILATERAL LOW BACK PAIN WITH BILATERAL SCIATICA: Primary | ICD-10-CM

## 2018-10-22 DIAGNOSIS — M05.741 RHEUMATOID ARTHRITIS INVOLVING BOTH HANDS WITH POSITIVE RHEUMATOID FACTOR (HCC): ICD-10-CM

## 2018-10-22 DIAGNOSIS — M54.42 CHRONIC BILATERAL LOW BACK PAIN WITH BILATERAL SCIATICA: Primary | ICD-10-CM

## 2018-10-22 DIAGNOSIS — M05.742 RHEUMATOID ARTHRITIS INVOLVING BOTH HANDS WITH POSITIVE RHEUMATOID FACTOR (HCC): ICD-10-CM

## 2018-10-22 DIAGNOSIS — M54.41 CHRONIC BILATERAL LOW BACK PAIN WITH BILATERAL SCIATICA: Primary | ICD-10-CM

## 2018-10-22 DIAGNOSIS — F41.9 ANXIETY: ICD-10-CM

## 2018-10-22 DIAGNOSIS — G47.33 OSA (OBSTRUCTIVE SLEEP APNEA): ICD-10-CM

## 2018-10-22 LAB
C-REACTIVE PROTEIN: 0.73 MG/DL (ref 0–0.5)
RHEUMATOID FACTOR: 34 IU/ML
SEDIMENTATION RATE, ERYTHROCYTE: 22 MM/HR (ref 0–15)

## 2018-10-22 PROCEDURE — 3017F COLORECTAL CA SCREEN DOC REV: CPT | Performed by: PSYCHIATRY & NEUROLOGY

## 2018-10-22 PROCEDURE — 99214 OFFICE O/P EST MOD 30 MIN: CPT | Performed by: PSYCHIATRY & NEUROLOGY

## 2018-10-22 PROCEDURE — G8417 CALC BMI ABV UP PARAM F/U: HCPCS | Performed by: PSYCHIATRY & NEUROLOGY

## 2018-10-22 PROCEDURE — G8484 FLU IMMUNIZE NO ADMIN: HCPCS | Performed by: PSYCHIATRY & NEUROLOGY

## 2018-10-22 PROCEDURE — 1036F TOBACCO NON-USER: CPT | Performed by: PSYCHIATRY & NEUROLOGY

## 2018-10-22 PROCEDURE — G8427 DOCREV CUR MEDS BY ELIG CLIN: HCPCS | Performed by: PSYCHIATRY & NEUROLOGY

## 2018-10-22 RX ORDER — ALPRAZOLAM 1 MG/1
1.5 TABLET ORAL 3 TIMES DAILY PRN
Qty: 135 TABLET | Refills: 2 | Status: SHIPPED | OUTPATIENT
Start: 2018-10-22 | End: 2019-01-16 | Stop reason: SDUPTHER

## 2018-10-22 NOTE — PROGRESS NOTES
nightly 90 capsule 3    hydrochlorothiazide (HYDRODIURIL) 25 MG tablet Take 1 tablet by mouth daily 90 tablet 3    lovastatin (MEVACOR) 10 MG tablet Take 10 mg by mouth daily       metFORMIN (GLUCOPHAGE) 500 MG tablet Take 500 mg by mouth 2 times daily (with meals)      polyethylene glycol (MIRALAX) POWD powder Take 17 g by mouth daily       lisinopril-hydrochlorothiazide (PRINZIDE;ZESTORETIC) 20-12.5 MG per tablet Take 0.5 tablets by mouth daily       metoprolol succinate (TOPROL XL) 25 MG extended release tablet Take 1 tablet by mouth daily 30 tablet 3     No current facility-administered medications for this visit. Allergies:   Allergies as of 10/22/2018 - Review Complete 10/22/2018   Allergen Reaction Noted    Clindamycin/lincomycin Anaphylaxis 07/15/2015    Benadryl [diphenhydramine] Other (See Comments) 07/15/2015    Prednisone  08/13/2018       Review of Systems:  General ROS: negative for - chills or fever  Psychological ROS: negative for - anxiety or depression  ENT ROS: negative for - headaches or sinus pain  Hematological and Lymphatic ROS: negative for - bleeding problems, bruising or swollen lymph nodes  Respiratory ROS: negative for - cough, hemoptysis or shortness of breath  Cardiovascular ROS: negative for - chest pain or palpitations  Gastrointestinal ROS: negative for - blood in stools, constipation, diarrhea or nausea/vomiting  Genito-Urinary ROS: negative for - hematuria or urinary frequency/urgency  Musculoskeletal ROS: positive for - joint pain, joint stiffness or joint swelling  Neurological ROS: negative for - memory loss, numbness/tingling or weakness     Examination:  Vitals:  /74   Pulse 60   Resp 20   Ht 6' 1\" (1.854 m)   Wt 294 lb 3.2 oz (133.4 kg)   BMI 38.82 kg/m²   General appearance:  alert and cooperative with exam  HEENT:  Sclera clear, anicteric, Oropharynx clear, no lesions, Neck supple with midline trachea, Thyroid without masses and Trachea

## 2018-10-23 ENCOUNTER — HOSPITAL ENCOUNTER (OUTPATIENT)
Dept: CARDIAC CATH/INVASIVE PROCEDURES | Age: 63
Setting detail: OBSERVATION
Discharge: HOME | End: 2018-10-24
Attending: INTERNAL MEDICINE | Admitting: INTERNAL MEDICINE
Payer: MEDICARE

## 2018-10-23 ENCOUNTER — APPOINTMENT (OUTPATIENT)
Dept: GENERAL RADIOLOGY | Age: 63
End: 2018-10-23
Attending: INTERNAL MEDICINE
Payer: MEDICARE

## 2018-10-23 PROBLEM — R93.1 ABNORMAL NUCLEAR CARDIAC IMAGING TEST: Status: ACTIVE | Noted: 2018-10-23

## 2018-10-23 LAB
ANION GAP SERPL CALCULATED.3IONS-SCNC: 10 MMOL/L (ref 7–19)
BUN BLDV-MCNC: 11 MG/DL (ref 8–23)
CALCIUM SERPL-MCNC: 9.7 MG/DL (ref 8.8–10.2)
CHLORIDE BLD-SCNC: 100 MMOL/L (ref 98–111)
CHOLESTEROL, TOTAL: 172 MG/DL (ref 160–199)
CO2: 30 MMOL/L (ref 22–29)
CREAT SERPL-MCNC: 0.9 MG/DL (ref 0.5–1.2)
GFR NON-AFRICAN AMERICAN: >60
GLUCOSE BLD-MCNC: 143 MG/DL (ref 74–109)
GLUCOSE BLD-MCNC: 145 MG/DL (ref 70–99)
HCT VFR BLD CALC: 41.6 % (ref 42–52)
HDLC SERPL-MCNC: 50 MG/DL (ref 55–121)
HEMOGLOBIN: 13.2 G/DL (ref 14–18)
LDL CHOLESTEROL CALCULATED: 106 MG/DL
MCH RBC QN AUTO: 28.6 PG (ref 27–31)
MCHC RBC AUTO-ENTMCNC: 31.7 G/DL (ref 33–37)
MCV RBC AUTO: 90 FL (ref 80–94)
PDW BLD-RTO: 14 % (ref 11.5–14.5)
PERFORMED ON: ABNORMAL
PLATELET # BLD: 165 K/UL (ref 130–400)
PMV BLD AUTO: 10.6 FL (ref 9.4–12.4)
POTASSIUM REFLEX MAGNESIUM: 4.2 MMOL/L (ref 3.5–5)
RBC # BLD: 4.62 M/UL (ref 4.7–6.1)
SODIUM BLD-SCNC: 140 MMOL/L (ref 136–145)
TRIGL SERPL-MCNC: 81 MG/DL (ref 0–149)
TROPONIN: <0.01 NG/ML (ref 0–0.03)
WBC # BLD: 5 K/UL (ref 4.8–10.8)

## 2018-10-23 PROCEDURE — 93458 L HRT ARTERY/VENTRICLE ANGIO: CPT | Performed by: INTERNAL MEDICINE

## 2018-10-23 PROCEDURE — 85027 COMPLETE CBC AUTOMATED: CPT

## 2018-10-23 PROCEDURE — G0378 HOSPITAL OBSERVATION PER HR: HCPCS

## 2018-10-23 PROCEDURE — C1760 CLOSURE DEV, VASC: HCPCS

## 2018-10-23 PROCEDURE — C1874 STENT, COATED/COV W/DEL SYS: HCPCS

## 2018-10-23 PROCEDURE — 92928 PRQ TCAT PLMT NTRAC ST 1 LES: CPT | Performed by: INTERNAL MEDICINE

## 2018-10-23 PROCEDURE — 84484 ASSAY OF TROPONIN QUANT: CPT

## 2018-10-23 PROCEDURE — 82948 REAGENT STRIP/BLOOD GLUCOSE: CPT

## 2018-10-23 PROCEDURE — 6370000000 HC RX 637 (ALT 250 FOR IP)

## 2018-10-23 PROCEDURE — 2700000000 HC OXYGEN THERAPY PER DAY

## 2018-10-23 PROCEDURE — 6370000000 HC RX 637 (ALT 250 FOR IP): Performed by: INTERNAL MEDICINE

## 2018-10-23 PROCEDURE — 6360000002 HC RX W HCPCS

## 2018-10-23 PROCEDURE — 71046 X-RAY EXAM CHEST 2 VIEWS: CPT

## 2018-10-23 PROCEDURE — C1894 INTRO/SHEATH, NON-LASER: HCPCS

## 2018-10-23 PROCEDURE — 2580000003 HC RX 258: Performed by: INTERNAL MEDICINE

## 2018-10-23 PROCEDURE — 2709999900 HC NON-CHARGEABLE SUPPLY

## 2018-10-23 PROCEDURE — C1769 GUIDE WIRE: HCPCS

## 2018-10-23 PROCEDURE — 80048 BASIC METABOLIC PNL TOTAL CA: CPT

## 2018-10-23 PROCEDURE — 80061 LIPID PANEL: CPT

## 2018-10-23 PROCEDURE — C1887 CATHETER, GUIDING: HCPCS

## 2018-10-23 PROCEDURE — 36415 COLL VENOUS BLD VENIPUNCTURE: CPT

## 2018-10-23 RX ORDER — LISINOPRIL AND HYDROCHLOROTHIAZIDE 20; 12.5 MG/1; MG/1
0.5 TABLET ORAL DAILY
Status: DISCONTINUED | OUTPATIENT
Start: 2018-10-23 | End: 2018-10-23

## 2018-10-23 RX ORDER — ASPIRIN 81 MG/1
81 TABLET, CHEWABLE ORAL DAILY
Status: DISCONTINUED | OUTPATIENT
Start: 2018-10-24 | End: 2018-10-24 | Stop reason: HOSPADM

## 2018-10-23 RX ORDER — POLYETHYLENE GLYCOL 3350 17 G/17G
17 POWDER, FOR SOLUTION ORAL DAILY
Status: DISCONTINUED | OUTPATIENT
Start: 2018-10-23 | End: 2018-10-24 | Stop reason: HOSPADM

## 2018-10-23 RX ORDER — SODIUM CHLORIDE 0.9 % (FLUSH) 0.9 %
10 SYRINGE (ML) INJECTION PRN
Status: DISCONTINUED | OUTPATIENT
Start: 2018-10-23 | End: 2018-10-24 | Stop reason: HOSPADM

## 2018-10-23 RX ORDER — VERAPAMIL HYDROCHLORIDE 100 MG/1
100 CAPSULE, EXTENDED RELEASE ORAL NIGHTLY
Status: DISCONTINUED | OUTPATIENT
Start: 2018-10-23 | End: 2018-10-24 | Stop reason: HOSPADM

## 2018-10-23 RX ORDER — OXYCODONE AND ACETAMINOPHEN 10; 325 MG/1; MG/1
1 TABLET ORAL EVERY 6 HOURS PRN
Status: DISCONTINUED | OUTPATIENT
Start: 2018-10-23 | End: 2018-10-24 | Stop reason: HOSPADM

## 2018-10-23 RX ORDER — ZOLPIDEM TARTRATE 5 MG/1
10 TABLET ORAL PRN
Status: DISCONTINUED | OUTPATIENT
Start: 2018-10-23 | End: 2018-10-24 | Stop reason: HOSPADM

## 2018-10-23 RX ORDER — HYDROCHLOROTHIAZIDE 25 MG/1
6.25 TABLET ORAL DAILY
Status: DISCONTINUED | OUTPATIENT
Start: 2018-10-24 | End: 2018-10-24 | Stop reason: HOSPADM

## 2018-10-23 RX ORDER — OXYCODONE HCL 20 MG/1
20 TABLET, FILM COATED, EXTENDED RELEASE ORAL 2 TIMES DAILY
Status: DISCONTINUED | OUTPATIENT
Start: 2018-10-23 | End: 2018-10-24 | Stop reason: HOSPADM

## 2018-10-23 RX ORDER — ONDANSETRON 2 MG/ML
4 INJECTION INTRAMUSCULAR; INTRAVENOUS EVERY 6 HOURS PRN
Status: DISCONTINUED | OUTPATIENT
Start: 2018-10-23 | End: 2018-10-24 | Stop reason: HOSPADM

## 2018-10-23 RX ORDER — LISINOPRIL 10 MG/1
10 TABLET ORAL DAILY
Status: DISCONTINUED | OUTPATIENT
Start: 2018-10-24 | End: 2018-10-24 | Stop reason: HOSPADM

## 2018-10-23 RX ORDER — SODIUM CHLORIDE 9 MG/ML
INJECTION, SOLUTION INTRAVENOUS CONTINUOUS
Status: DISCONTINUED | OUTPATIENT
Start: 2018-10-23 | End: 2018-10-24 | Stop reason: HOSPADM

## 2018-10-23 RX ORDER — SODIUM CHLORIDE 0.9 % (FLUSH) 0.9 %
10 SYRINGE (ML) INJECTION EVERY 12 HOURS SCHEDULED
Status: DISCONTINUED | OUTPATIENT
Start: 2018-10-23 | End: 2018-10-24 | Stop reason: HOSPADM

## 2018-10-23 RX ORDER — PRASUGREL 10 MG/1
10 TABLET, FILM COATED ORAL DAILY
Status: DISCONTINUED | OUTPATIENT
Start: 2018-10-24 | End: 2018-10-24 | Stop reason: HOSPADM

## 2018-10-23 RX ORDER — SIMVASTATIN 10 MG
10 TABLET ORAL NIGHTLY
Status: DISCONTINUED | OUTPATIENT
Start: 2018-10-24 | End: 2018-10-24

## 2018-10-23 RX ORDER — ACETAMINOPHEN 325 MG/1
650 TABLET ORAL EVERY 4 HOURS PRN
Status: DISCONTINUED | OUTPATIENT
Start: 2018-10-23 | End: 2018-10-24 | Stop reason: HOSPADM

## 2018-10-23 RX ADMIN — VERAPAMIL HYDROCHLORIDE 100 MG: 100 CAPSULE, EXTENDED RELEASE ORAL at 20:42

## 2018-10-23 RX ADMIN — SODIUM CHLORIDE: 9 INJECTION, SOLUTION INTRAVENOUS at 19:36

## 2018-10-23 RX ADMIN — SODIUM CHLORIDE: 9 INJECTION, SOLUTION INTRAVENOUS at 13:04

## 2018-10-23 RX ADMIN — POLYETHYLENE GLYCOL 3350 17 G: 17 POWDER, FOR SOLUTION ORAL at 21:27

## 2018-10-23 NOTE — H&P
monitor. Patient was given metoprolol which caused severe fatigue. He no longer wants to take this medication and wants to continue his verapamil only. He continues to treat his sleep apnea regularly. With his shortness of breath and SVT, plan will be to check a Lexiscan nuclear stress test     Plan     Orders Placed This Encounter   Procedures    NM MYOCARDIAL SPECT REST EXERCISE OR RX       With myocardial perfusion study with sestamibi       Standing Status:   Future       Standing Expiration Date:   9/24/2019       Order Specific Question:   Reason for Exam?       Answer:   Shortness of breath       Order Specific Question:   Procedure Type       Answer:   Rx       Order Specific Question:   Reason for exam:       Answer:   palpitations, shortness of breath      Return in about 3 months (around 12/24/2018) for Dr. Dolores Mcconnell. [facility] Nuclear Stress Test  Call for worsening palpitations     Call with any questions or concerns  Follow up with Brigette Vargas MD for non cardiac problems  Report any new problems  Cardiovascular Fitness-Exercise as tolerated. Strive for 15 minutes of exercise most days of the week. Cardiac / Healthy Diet  Continue current medications as directed  Continue plan of treatment  It is always recommended that you bring your medications bottles with you to each visit - this is for your safety!         Castro Cedillo, APRN             10/23/2018       Proposed Procedure:  Selective left heart and coronary arteriography, (femoral approach), today     :  LASHELL Morgan MD    Indications:  12/12/2018  lexiscan Positive for inferior MI + myocardial ischemia, EF 53%, 2% ischemic myocardium on stress, low risk findings, AUC indication 15, AUC score 4    I have discussed the risks, benefits and options with the patient and his family. They appear to understand, have no questions, and wish to proceed. This procedure is scheduled for today.       Electronically signed by Darcy Lo

## 2018-10-24 VITALS
BODY MASS INDEX: 38.3 KG/M2 | HEIGHT: 73 IN | WEIGHT: 289 LBS | OXYGEN SATURATION: 95 % | HEART RATE: 77 BPM | TEMPERATURE: 98.7 F | SYSTOLIC BLOOD PRESSURE: 134 MMHG | RESPIRATION RATE: 16 BRPM | DIASTOLIC BLOOD PRESSURE: 67 MMHG

## 2018-10-24 LAB
ANA IGG, ELISA: DETECTED
EKG P AXIS: 2 DEGREES
EKG P-R INTERVAL: 154 MS
EKG Q-T INTERVAL: 446 MS
EKG QRS DURATION: 92 MS
EKG QTC CALCULATION (BAZETT): 446 MS
EKG T AXIS: 21 DEGREES
GLUCOSE BLD-MCNC: 124 MG/DL (ref 70–99)
HCT VFR BLD CALC: 36.7 % (ref 42–52)
HEMOGLOBIN: 11.7 G/DL (ref 14–18)
MCH RBC QN AUTO: 28.7 PG (ref 27–31)
MCHC RBC AUTO-ENTMCNC: 31.9 G/DL (ref 33–37)
MCV RBC AUTO: 90.2 FL (ref 80–94)
PDW BLD-RTO: 14 % (ref 11.5–14.5)
PERFORMED ON: ABNORMAL
PLATELET # BLD: 161 K/UL (ref 130–400)
PMV BLD AUTO: 11.4 FL (ref 9.4–12.4)
RBC # BLD: 4.07 M/UL (ref 4.7–6.1)
TROPONIN: <0.01 NG/ML (ref 0–0.03)
TROPONIN: <0.01 NG/ML (ref 0–0.03)
WBC # BLD: 5.5 K/UL (ref 4.8–10.8)

## 2018-10-24 PROCEDURE — 85027 COMPLETE CBC AUTOMATED: CPT

## 2018-10-24 PROCEDURE — 93005 ELECTROCARDIOGRAM TRACING: CPT

## 2018-10-24 PROCEDURE — 36415 COLL VENOUS BLD VENIPUNCTURE: CPT

## 2018-10-24 PROCEDURE — 2580000003 HC RX 258: Performed by: INTERNAL MEDICINE

## 2018-10-24 PROCEDURE — 82948 REAGENT STRIP/BLOOD GLUCOSE: CPT

## 2018-10-24 PROCEDURE — 6370000000 HC RX 637 (ALT 250 FOR IP): Performed by: INTERNAL MEDICINE

## 2018-10-24 PROCEDURE — 84484 ASSAY OF TROPONIN QUANT: CPT

## 2018-10-24 PROCEDURE — G0378 HOSPITAL OBSERVATION PER HR: HCPCS

## 2018-10-24 PROCEDURE — 99217 PR OBSERVATION CARE DISCHARGE MANAGEMENT: CPT | Performed by: INTERNAL MEDICINE

## 2018-10-24 RX ORDER — METOPROLOL SUCCINATE 25 MG/1
25 TABLET, EXTENDED RELEASE ORAL DAILY
Qty: 30 TABLET | Refills: 5 | Status: CANCELLED | OUTPATIENT
Start: 2018-10-24

## 2018-10-24 RX ORDER — METOPROLOL SUCCINATE 25 MG/1
25 TABLET, EXTENDED RELEASE ORAL DAILY
Qty: 30 TABLET | Refills: 3 | Status: SHIPPED | OUTPATIENT
Start: 2018-10-24 | End: 2018-11-27 | Stop reason: SINTOL

## 2018-10-24 RX ORDER — ASPIRIN 81 MG/1
81 TABLET, CHEWABLE ORAL DAILY
Qty: 30 TABLET | Refills: 5 | Status: SHIPPED | OUTPATIENT
Start: 2018-10-25 | End: 2019-03-19 | Stop reason: SDUPTHER

## 2018-10-24 RX ORDER — SIMVASTATIN 40 MG
40 TABLET ORAL NIGHTLY
Status: DISCONTINUED | OUTPATIENT
Start: 2018-10-24 | End: 2018-10-24 | Stop reason: HOSPADM

## 2018-10-24 RX ORDER — METOPROLOL SUCCINATE 25 MG/1
25 TABLET, EXTENDED RELEASE ORAL DAILY
Status: DISCONTINUED | OUTPATIENT
Start: 2018-10-24 | End: 2018-10-24 | Stop reason: HOSPADM

## 2018-10-24 RX ORDER — PRASUGREL 10 MG/1
10 TABLET, FILM COATED ORAL DAILY
Qty: 30 TABLET | Refills: 5 | Status: SHIPPED | OUTPATIENT
Start: 2018-10-25 | End: 2019-04-14 | Stop reason: SDUPTHER

## 2018-10-24 RX ORDER — LOVASTATIN 10 MG/1
40 TABLET ORAL DAILY
Qty: 30 TABLET | Refills: 3 | Status: SHIPPED | OUTPATIENT
Start: 2018-10-24 | End: 2019-02-16 | Stop reason: SDUPTHER

## 2018-10-24 RX ADMIN — HYDROCHLOROTHIAZIDE 6.25 MG: 25 TABLET ORAL at 07:25

## 2018-10-24 RX ADMIN — OXYCODONE HYDROCHLORIDE 20 MG: 20 TABLET, FILM COATED, EXTENDED RELEASE ORAL at 07:25

## 2018-10-24 RX ADMIN — ASPIRIN 81 MG 81 MG: 81 TABLET ORAL at 07:25

## 2018-10-24 RX ADMIN — ALPRAZOLAM 1.5 MG: 1 TABLET ORAL at 07:26

## 2018-10-24 RX ADMIN — LISINOPRIL 10 MG: 10 TABLET ORAL at 07:25

## 2018-10-24 RX ADMIN — Medication 10 ML: at 07:26

## 2018-10-24 RX ADMIN — PRASUGREL 10 MG: 10 TABLET, FILM COATED ORAL at 07:25

## 2018-10-24 ASSESSMENT — PAIN SCALES - GENERAL: PAINLEVEL_OUTOF10: 4

## 2018-10-24 NOTE — DISCHARGE SUMMARY
diet    Labs: For convenience and continuity at follow-up the following most recent labs are provided:    CBC:    Lab Results   Component Value Date    WBC 5.5 10/24/2018    HGB 11.7 10/24/2018    HCT 36.7 10/24/2018     10/24/2018       Renal:    Lab Results   Component Value Date     10/23/2018    K 4.2 10/23/2018     10/23/2018    CO2 30 10/23/2018    BUN 11 10/23/2018    CREATININE 0.9 10/23/2018    CALCIUM 9.7 10/23/2018         Discharge Medications:     Current Discharge Medication List           Details   aspirin 81 MG chewable tablet Take 1 tablet by mouth daily  Qty: 30 tablet, Refills: 5      prasugrel (EFFIENT) 10 MG TABS Take 1 tablet by mouth daily  Qty: 30 tablet, Refills: 5      metoprolol succinate (TOPROL XL) 25 MG extended release tablet Take 1 tablet by mouth daily  Qty: 30 tablet, Refills: 3              Details   metFORMIN (GLUCOPHAGE) 500 MG tablet Hold Metformin for 48 hrs after cardiac catheterization. Resume on 10/26/18. Qty: 60 tablet, Refills: 3      lovastatin (MEVACOR) 10 MG tablet Take 4 tablets by mouth daily  Qty: 30 tablet, Refills: 3              Details   ALPRAZolam (XANAX) 1 MG tablet Take 1.5 tablets by mouth 3 times daily as needed for Sleep or Anxiety for up to 90 days. Janice Saner: 135 tablet, Refills: 2    Associated Diagnoses: Anxiety      oxyCODONE-acetaminophen (PERCOCET)  MG per tablet Take 1 tablet by mouth every 6 hours as needed for Pain (Pain) for up to 30 days. Janice Saner: 90 tablet, Refills: 0    Comments: Fill on 8/31/18  Associated Diagnoses: Osteoarthritis of spine with radiculopathy, lumbar region; Chronic bilateral low back pain with bilateral sciatica      oxyCODONE (OXYCONTIN) 20 MG extended release tablet Take 1 tablet by mouth 2 times daily for 30 days. Janice Saner: 60 tablet, Refills: 0    Associated Diagnoses: Osteoarthritis of spine with radiculopathy, lumbar region; Chronic bilateral low back pain with bilateral sciatica      zolpidem (AMBIEN)

## 2018-10-24 NOTE — PROGRESS NOTES
Pt's bedrest is over. Pt's cath site is without any signs of hematoma, no bleeding, no bruising. Pedal pulses are +2. Will continue to monitor.

## 2018-10-24 NOTE — PROGRESS NOTES
4 Eyes Skin Assessment    Jearldine Boeck is being assessed upon: Admission    I agree that I, Alysa Vigil, along with Steven Ny RN (either 2 RN's or 1 LPN and 1 RN) have performed a thorough Head to Toe Skin Assessment on the patient. ALL assessment sites listed below have been assessed. Areas assessed by both nurses:     [x]   Head, Face, and Ears   [x]   Shoulders, Back, and Chest  [x]   Arms, Elbows, and Hands   [x]   Coccyx, Sacrum, and Ischium  [x]   Legs, Feet, and Heels    Does the Patient have Skin Breakdown?  No    [place] Prevention initiated: Yes  Wound Care Orders initiated: NA    [facility] nurse consulted for Pressure Injury (Stage 3,4, Unstageable, DTI, NWPT, and Complex wounds) and New or Established Ostomies: NA        Primary Nurse eSignature: Alysa Vigil RN on 10/23/2018 at 10:30 PM      Co-Signer eSignature: Electronically signed by Francisco Kelley RN on 10/24/18 at 12:37 AM

## 2018-10-24 NOTE — PLAN OF CARE
Problem: Falls - Risk of:  Goal: Will remain free from falls  Will remain free from falls   Outcome: Met This Shift    Goal: Absence of physical injury  Absence of physical injury   Outcome: Met This Shift      Problem: Tissue Perfusion - Cardiopulmonary, Altered:  Goal: Absence of angina  Absence of angina  Outcome: Met This Shift    Goal: Hemodynamic stability will improve  Hemodynamic stability will improve  Outcome: Ongoing

## 2018-10-25 LAB — ANA BY IFA: ABNORMAL

## 2018-10-26 ENCOUNTER — TELEPHONE (OUTPATIENT)
Dept: NEUROLOGY | Age: 63
End: 2018-10-26

## 2018-10-26 DIAGNOSIS — M54.42 CHRONIC BILATERAL LOW BACK PAIN WITH BILATERAL SCIATICA: ICD-10-CM

## 2018-10-26 DIAGNOSIS — M47.26 OSTEOARTHRITIS OF SPINE WITH RADICULOPATHY, LUMBAR REGION: ICD-10-CM

## 2018-10-26 DIAGNOSIS — G89.29 CHRONIC BILATERAL LOW BACK PAIN WITH BILATERAL SCIATICA: ICD-10-CM

## 2018-10-26 DIAGNOSIS — M54.41 CHRONIC BILATERAL LOW BACK PAIN WITH BILATERAL SCIATICA: ICD-10-CM

## 2018-10-26 RX ORDER — OXYCODONE HCL 20 MG/1
20 TABLET, FILM COATED, EXTENDED RELEASE ORAL 2 TIMES DAILY
Qty: 60 TABLET | Refills: 0 | Status: SHIPPED | OUTPATIENT
Start: 2018-10-28 | End: 2018-11-26 | Stop reason: SDUPTHER

## 2018-10-26 RX ORDER — OXYCODONE AND ACETAMINOPHEN 10; 325 MG/1; MG/1
1 TABLET ORAL EVERY 6 HOURS PRN
Qty: 90 TABLET | Refills: 0 | Status: SHIPPED | OUTPATIENT
Start: 2018-10-28 | End: 2018-11-26 | Stop reason: SDUPTHER

## 2018-10-26 NOTE — TELEPHONE ENCOUNTER
----- Message from Carmella Magana MD sent at 10/25/2018 11:59 PM CDT -----  Labs showed a high rheumatoid factor and high OJSE R confirming rheumatoid arthritis. Have him start Methotrexate 2.5 mg tablets, 4 pills once a week. Called in the [facility].  I noted he had to have a stent placed. MTX can cause nausea so watch for that. It is a mild immune suppressant so he will need to have influenza, pneumococcal, and shingles vaccines. It can elevated liver enzymes so will be checking those in a month of two. Have him call in a month let us know if helping. May have to up the dose.

## 2018-11-06 ENCOUNTER — TELEPHONE (OUTPATIENT)
Dept: NEUROLOGY | Age: 63
End: 2018-11-06

## 2018-11-16 ENCOUNTER — PATIENT MESSAGE (OUTPATIENT)
Dept: NEUROLOGY | Age: 63
End: 2018-11-16

## 2018-11-26 DIAGNOSIS — M54.41 CHRONIC BILATERAL LOW BACK PAIN WITH BILATERAL SCIATICA: ICD-10-CM

## 2018-11-26 DIAGNOSIS — G89.29 CHRONIC BILATERAL LOW BACK PAIN WITH BILATERAL SCIATICA: ICD-10-CM

## 2018-11-26 DIAGNOSIS — M47.26 OSTEOARTHRITIS OF SPINE WITH RADICULOPATHY, LUMBAR REGION: ICD-10-CM

## 2018-11-26 DIAGNOSIS — M54.42 CHRONIC BILATERAL LOW BACK PAIN WITH BILATERAL SCIATICA: ICD-10-CM

## 2018-11-26 RX ORDER — OXYCODONE HCL 20 MG/1
20 TABLET, FILM COATED, EXTENDED RELEASE ORAL 2 TIMES DAILY
Qty: 60 TABLET | Refills: 0 | Status: SHIPPED | OUTPATIENT
Start: 2018-11-27 | End: 2018-11-27 | Stop reason: SDUPTHER

## 2018-11-26 RX ORDER — OXYCODONE AND ACETAMINOPHEN 10; 325 MG/1; MG/1
1 TABLET ORAL EVERY 6 HOURS PRN
Qty: 90 TABLET | Refills: 0 | Status: SHIPPED | OUTPATIENT
Start: 2018-11-27 | End: 2018-12-21 | Stop reason: SDUPTHER

## 2018-11-26 NOTE — TELEPHONE ENCOUNTER
From: Agustin Mack  Sent: 11/23/2018 3:18 PM CST  Subject: Medication Renewal Request    Jose Jean.  Wander Olivo would like a refill of the following medications:     methotrexate (RHEUMATREX) 2.5 MG chemo tablet [Hola Bosch MD]     oxyCODONE (OXYCONTIN) 20 MG extended release tablet Argenis Mcdaniel MD]     oxyCODONE-acetaminophen (PERCOCET)  MG per tablet Argenis Mcdaniel MD]    Preferred pharmacy: [address], [address] [place] [address] [phone]

## 2018-11-27 ENCOUNTER — OFFICE VISIT (OUTPATIENT)
Dept: CARDIOLOGY | Age: 63
End: 2018-11-27
Payer: MEDICARE

## 2018-11-27 VITALS
SYSTOLIC BLOOD PRESSURE: 90 MMHG | DIASTOLIC BLOOD PRESSURE: 52 MMHG | WEIGHT: 290 LBS | HEIGHT: 73 IN | BODY MASS INDEX: 38.43 KG/M2 | HEART RATE: 60 BPM

## 2018-11-27 DIAGNOSIS — E78.00 HYPERCHOLESTEREMIA: ICD-10-CM

## 2018-11-27 DIAGNOSIS — M47.26 OSTEOARTHRITIS OF SPINE WITH RADICULOPATHY, LUMBAR REGION: ICD-10-CM

## 2018-11-27 DIAGNOSIS — I10 ESSENTIAL HYPERTENSION: ICD-10-CM

## 2018-11-27 DIAGNOSIS — G89.29 CHRONIC BILATERAL LOW BACK PAIN WITH BILATERAL SCIATICA: ICD-10-CM

## 2018-11-27 DIAGNOSIS — I25.10 CORONARY ARTERY DISEASE INVOLVING NATIVE CORONARY ARTERY OF NATIVE HEART WITHOUT ANGINA PECTORIS: ICD-10-CM

## 2018-11-27 DIAGNOSIS — M54.41 CHRONIC BILATERAL LOW BACK PAIN WITH BILATERAL SCIATICA: ICD-10-CM

## 2018-11-27 DIAGNOSIS — M54.42 CHRONIC BILATERAL LOW BACK PAIN WITH BILATERAL SCIATICA: ICD-10-CM

## 2018-11-27 DIAGNOSIS — I47.1 PAROXYSMAL SVT (SUPRAVENTRICULAR TACHYCARDIA) (HCC): Primary | ICD-10-CM

## 2018-11-27 PROCEDURE — G8484 FLU IMMUNIZE NO ADMIN: HCPCS | Performed by: INTERNAL MEDICINE

## 2018-11-27 PROCEDURE — G8598 ASA/ANTIPLAT THER USED: HCPCS | Performed by: INTERNAL MEDICINE

## 2018-11-27 PROCEDURE — 1036F TOBACCO NON-USER: CPT | Performed by: INTERNAL MEDICINE

## 2018-11-27 PROCEDURE — G8428 CUR MEDS NOT DOCUMENT: HCPCS | Performed by: INTERNAL MEDICINE

## 2018-11-27 PROCEDURE — 99212 OFFICE O/P EST SF 10 MIN: CPT | Performed by: INTERNAL MEDICINE

## 2018-11-27 PROCEDURE — G8417 CALC BMI ABV UP PARAM F/U: HCPCS | Performed by: INTERNAL MEDICINE

## 2018-11-27 PROCEDURE — 3017F COLORECTAL CA SCREEN DOC REV: CPT | Performed by: INTERNAL MEDICINE

## 2018-11-27 RX ORDER — LISINOPRIL 10 MG/1
10 TABLET ORAL DAILY
COMMUNITY
End: 2019-07-01 | Stop reason: SDUPTHER

## 2018-11-27 RX ORDER — OXYCODONE HCL 20 MG/1
20 TABLET, FILM COATED, EXTENDED RELEASE ORAL 2 TIMES DAILY
Qty: 60 TABLET | Refills: 0 | Status: SHIPPED | OUTPATIENT
Start: 2018-11-27 | End: 2018-12-21 | Stop reason: SDUPTHER

## 2018-11-27 NOTE — PROGRESS NOTES
to 20cm    Depression     Hyperlipidemia     Hypertensive disorder     OA (osteoarthritis of the spine)     Obesity     SUSANA (obstructive sleep apnea)     AHI:  85.3 per HST 11/2013    Osteoarthritis of spine with radiculopathy, lumbar region     Otitis media     [facility]     Spinal stenosis     Type 2 diabetes mellitus ([facility] 3/23/2016    Unspecified sleep apnea     uses bipap    Vertigo      Past Surgical History:   Procedure Laterality Date    ADENOIDECTOMY      BROW LIFT      due to vision    COLONOSCOPY  27 yrs ago    [place] [facility], normal per patient   [address]  ARTHROSCOPY Left 2009    MS Girish Garcia 84 DX/THER SBST EPIDURAL/SUBARACH LUMBAR/SACRAL N/A 10/3/2017    L3-4 EPIDURAL STEROID INJECTION performed by Bonnie Alejo at [address] [place] [address] TONSILLECTOMY       Family History   Problem Relation Age of Onset    Heart Disease Father 64    Cancer Father         malignant tumor of pharynx    Diabetes Mother     Hypertension Mother     Diabetes Sister         x 2    Diabetes Brother     Colon Cancer Neg Hx     Colon Polyps Neg Hx     Liver Disease Neg Hx     Rectal Cancer Neg Hx     Stomach Cancer Neg Hx      Social History   Substance Use Topics    Smoking status: Former Smoker     Types: Cigarettes    Smokeless tobacco: Never Used    Alcohol use No          Review of Systems:    General:      Complaint / Symptom Yes / No / Description if Yes       Fatigue No   Weight gain N/A   Insomnia N/A       Respiratory:        Complaint / Symptom Yes / No / Description if Yes       Cough No   Horseness N/A       Cardiovascular:    Complaint / Symptom Yes / No / Description if Yes       Chest Pain No   Shortness of Air / Orthopnea No   Presyncope / Syncope No   Palpitations No         Objective:    BP (!) 90/52   Pulse 60   Ht 6' 1\" (1.854 m)   Wt 290 lb (131.5 kg)   BMI 38.26 kg/m²     GENERAL - well developed and well nourished, conversant  HEENT -  PERRLA, Hearing

## 2018-12-21 DIAGNOSIS — M47.26 OSTEOARTHRITIS OF SPINE WITH RADICULOPATHY, LUMBAR REGION: ICD-10-CM

## 2018-12-21 DIAGNOSIS — M54.41 CHRONIC BILATERAL LOW BACK PAIN WITH BILATERAL SCIATICA: ICD-10-CM

## 2018-12-21 DIAGNOSIS — G89.29 CHRONIC BILATERAL LOW BACK PAIN WITH BILATERAL SCIATICA: ICD-10-CM

## 2018-12-21 DIAGNOSIS — M54.42 CHRONIC BILATERAL LOW BACK PAIN WITH BILATERAL SCIATICA: ICD-10-CM

## 2018-12-21 RX ORDER — OXYCODONE HCL 20 MG/1
20 TABLET, FILM COATED, EXTENDED RELEASE ORAL 2 TIMES DAILY
Qty: 60 TABLET | Refills: 0 | Status: SHIPPED | OUTPATIENT
Start: 2018-12-27 | End: 2019-01-23 | Stop reason: SDUPTHER

## 2018-12-21 RX ORDER — OXYCODONE AND ACETAMINOPHEN 10; 325 MG/1; MG/1
1 TABLET ORAL EVERY 6 HOURS PRN
Qty: 90 TABLET | Refills: 0 | Status: SHIPPED | OUTPATIENT
Start: 2018-12-27 | End: 2019-01-23 | Stop reason: SDUPTHER

## 2018-12-29 DIAGNOSIS — R60.0 BILATERAL LOWER EXTREMITY EDEMA: ICD-10-CM

## 2018-12-30 DIAGNOSIS — M05.742 RHEUMATOID ARTHRITIS INVOLVING BOTH HANDS WITH POSITIVE RHEUMATOID FACTOR (HCC): Primary | ICD-10-CM

## 2018-12-30 DIAGNOSIS — M05.741 RHEUMATOID ARTHRITIS INVOLVING BOTH HANDS WITH POSITIVE RHEUMATOID FACTOR (HCC): Primary | ICD-10-CM

## 2018-12-31 RX ORDER — HYDROCHLOROTHIAZIDE 25 MG/1
25 TABLET ORAL DAILY
Qty: 30 TABLET | Refills: 5 | Status: SHIPPED | OUTPATIENT
Start: 2018-12-31 | End: 2019-03-19 | Stop reason: SDUPTHER

## 2019-01-16 DIAGNOSIS — F41.9 ANXIETY: ICD-10-CM

## 2019-01-16 RX ORDER — ALPRAZOLAM 1 MG/1
TABLET ORAL
Qty: 135 TABLET | Refills: 2 | Status: SHIPPED | OUTPATIENT
Start: 2019-01-20 | End: 2019-04-18 | Stop reason: SDUPTHER

## 2019-01-23 DIAGNOSIS — M05.741 RHEUMATOID ARTHRITIS INVOLVING BOTH HANDS WITH POSITIVE RHEUMATOID FACTOR (HCC): Primary | ICD-10-CM

## 2019-01-23 DIAGNOSIS — M47.26 OSTEOARTHRITIS OF SPINE WITH RADICULOPATHY, LUMBAR REGION: ICD-10-CM

## 2019-01-23 DIAGNOSIS — G89.29 CHRONIC BILATERAL LOW BACK PAIN WITH BILATERAL SCIATICA: ICD-10-CM

## 2019-01-23 DIAGNOSIS — M54.41 CHRONIC BILATERAL LOW BACK PAIN WITH BILATERAL SCIATICA: ICD-10-CM

## 2019-01-23 DIAGNOSIS — M05.742 RHEUMATOID ARTHRITIS INVOLVING BOTH HANDS WITH POSITIVE RHEUMATOID FACTOR (HCC): Primary | ICD-10-CM

## 2019-01-23 DIAGNOSIS — M54.42 CHRONIC BILATERAL LOW BACK PAIN WITH BILATERAL SCIATICA: ICD-10-CM

## 2019-01-23 RX ORDER — OXYCODONE HCL 20 MG/1
20 TABLET, FILM COATED, EXTENDED RELEASE ORAL 2 TIMES DAILY
Qty: 60 TABLET | Refills: 0 | Status: SHIPPED | OUTPATIENT
Start: 2019-01-23 | End: 2019-02-20 | Stop reason: SDUPTHER

## 2019-01-23 RX ORDER — OXYCODONE AND ACETAMINOPHEN 10; 325 MG/1; MG/1
1 TABLET ORAL EVERY 6 HOURS PRN
Qty: 90 TABLET | Refills: 0 | Status: SHIPPED | OUTPATIENT
Start: 2019-01-23 | End: 2019-02-20 | Stop reason: SDUPTHER

## 2019-02-01 ENCOUNTER — TELEPHONE (OUTPATIENT)
Dept: NEUROLOGY | Age: 64
End: 2019-02-01

## 2019-02-18 RX ORDER — LOVASTATIN 40 MG/1
TABLET ORAL
Qty: 90 TABLET | Refills: 3 | Status: SHIPPED | OUTPATIENT
Start: 2019-02-18 | End: 2020-02-10

## 2019-02-20 DIAGNOSIS — M54.41 CHRONIC BILATERAL LOW BACK PAIN WITH BILATERAL SCIATICA: ICD-10-CM

## 2019-02-20 DIAGNOSIS — G89.29 CHRONIC BILATERAL LOW BACK PAIN WITH BILATERAL SCIATICA: ICD-10-CM

## 2019-02-20 DIAGNOSIS — M54.42 CHRONIC BILATERAL LOW BACK PAIN WITH BILATERAL SCIATICA: ICD-10-CM

## 2019-02-20 DIAGNOSIS — M47.26 OSTEOARTHRITIS OF SPINE WITH RADICULOPATHY, LUMBAR REGION: ICD-10-CM

## 2019-02-20 RX ORDER — OXYCODONE AND ACETAMINOPHEN 10; 325 MG/1; MG/1
1 TABLET ORAL EVERY 6 HOURS PRN
Qty: 90 TABLET | Refills: 0 | Status: SHIPPED | OUTPATIENT
Start: 2019-02-22 | End: 2019-03-20 | Stop reason: SDUPTHER

## 2019-02-20 RX ORDER — OXYCODONE HCL 20 MG/1
20 TABLET, FILM COATED, EXTENDED RELEASE ORAL 2 TIMES DAILY
Qty: 60 TABLET | Refills: 0 | Status: SHIPPED | OUTPATIENT
Start: 2019-02-22 | End: 2019-03-20 | Stop reason: SDUPTHER

## 2019-03-12 ENCOUNTER — OFFICE VISIT (OUTPATIENT)
Dept: NEUROLOGY | Age: 64
End: 2019-03-12
Payer: MEDICARE

## 2019-03-12 VITALS
HEIGHT: 73 IN | WEIGHT: 289 LBS | HEART RATE: 74 BPM | BODY MASS INDEX: 38.3 KG/M2 | SYSTOLIC BLOOD PRESSURE: 117 MMHG | RESPIRATION RATE: 18 BRPM | DIASTOLIC BLOOD PRESSURE: 74 MMHG

## 2019-03-12 DIAGNOSIS — M54.42 CHRONIC BILATERAL LOW BACK PAIN WITH BILATERAL SCIATICA: Primary | ICD-10-CM

## 2019-03-12 DIAGNOSIS — M05.741 RHEUMATOID ARTHRITIS INVOLVING BOTH HANDS WITH POSITIVE RHEUMATOID FACTOR (HCC): ICD-10-CM

## 2019-03-12 DIAGNOSIS — M54.41 CHRONIC BILATERAL LOW BACK PAIN WITH BILATERAL SCIATICA: Primary | ICD-10-CM

## 2019-03-12 DIAGNOSIS — M47.26 OSTEOARTHRITIS OF SPINE WITH RADICULOPATHY, LUMBAR REGION: ICD-10-CM

## 2019-03-12 DIAGNOSIS — G89.29 CHRONIC BILATERAL LOW BACK PAIN WITH BILATERAL SCIATICA: Primary | ICD-10-CM

## 2019-03-12 DIAGNOSIS — M05.742 RHEUMATOID ARTHRITIS INVOLVING BOTH HANDS WITH POSITIVE RHEUMATOID FACTOR (HCC): ICD-10-CM

## 2019-03-12 DIAGNOSIS — G47.33 OSA (OBSTRUCTIVE SLEEP APNEA): ICD-10-CM

## 2019-03-12 PROCEDURE — G8417 CALC BMI ABV UP PARAM F/U: HCPCS | Performed by: PSYCHIATRY & NEUROLOGY

## 2019-03-12 PROCEDURE — 3017F COLORECTAL CA SCREEN DOC REV: CPT | Performed by: PSYCHIATRY & NEUROLOGY

## 2019-03-12 PROCEDURE — 1036F TOBACCO NON-USER: CPT | Performed by: PSYCHIATRY & NEUROLOGY

## 2019-03-12 PROCEDURE — 99214 OFFICE O/P EST MOD 30 MIN: CPT | Performed by: PSYCHIATRY & NEUROLOGY

## 2019-03-12 PROCEDURE — G8484 FLU IMMUNIZE NO ADMIN: HCPCS | Performed by: PSYCHIATRY & NEUROLOGY

## 2019-03-12 PROCEDURE — G8427 DOCREV CUR MEDS BY ELIG CLIN: HCPCS | Performed by: PSYCHIATRY & NEUROLOGY

## 2019-03-18 ENCOUNTER — TELEPHONE (OUTPATIENT)
Dept: NEUROLOGY | Age: 64
End: 2019-03-18

## 2019-03-19 DIAGNOSIS — R60.0 BILATERAL LOWER EXTREMITY EDEMA: ICD-10-CM

## 2019-03-19 RX ORDER — HYDROCHLOROTHIAZIDE 25 MG/1
TABLET ORAL
Qty: 30 TABLET | Refills: 5 | Status: SHIPPED | OUTPATIENT
Start: 2019-03-19 | End: 2019-03-25

## 2019-03-19 RX ORDER — ASPIRIN 81 MG/1
81 TABLET, CHEWABLE ORAL DAILY
Qty: 30 TABLET | Refills: 5 | Status: SHIPPED | OUTPATIENT
Start: 2019-03-19 | End: 2019-09-03 | Stop reason: SDUPTHER

## 2019-03-20 DIAGNOSIS — M54.42 CHRONIC BILATERAL LOW BACK PAIN WITH BILATERAL SCIATICA: ICD-10-CM

## 2019-03-20 DIAGNOSIS — M47.26 OSTEOARTHRITIS OF SPINE WITH RADICULOPATHY, LUMBAR REGION: ICD-10-CM

## 2019-03-20 DIAGNOSIS — G89.29 CHRONIC BILATERAL LOW BACK PAIN WITH BILATERAL SCIATICA: ICD-10-CM

## 2019-03-20 DIAGNOSIS — M54.41 CHRONIC BILATERAL LOW BACK PAIN WITH BILATERAL SCIATICA: ICD-10-CM

## 2019-03-20 RX ORDER — LIDOCAINE 50 MG/G
1 PATCH TOPICAL EVERY 12 HOURS
Qty: 30 PATCH | Refills: 0 | Status: SHIPPED | OUTPATIENT
Start: 2019-03-20 | End: 2019-04-03

## 2019-03-21 RX ORDER — OXYCODONE AND ACETAMINOPHEN 10; 325 MG/1; MG/1
1 TABLET ORAL EVERY 6 HOURS PRN
Qty: 90 TABLET | Refills: 0 | Status: SHIPPED | OUTPATIENT
Start: 2019-03-23 | End: 2019-04-18 | Stop reason: SDUPTHER

## 2019-03-21 RX ORDER — OXYCODONE HCL 20 MG/1
20 TABLET, FILM COATED, EXTENDED RELEASE ORAL 2 TIMES DAILY
Qty: 60 TABLET | Refills: 0 | Status: SHIPPED | OUTPATIENT
Start: 2019-03-23 | End: 2019-04-18 | Stop reason: SDUPTHER

## 2019-03-25 ENCOUNTER — OFFICE VISIT (OUTPATIENT)
Dept: PRIMARY CARE CLINIC | Age: 64
End: 2019-03-25
Payer: MEDICARE

## 2019-03-25 VITALS
HEART RATE: 85 BPM | TEMPERATURE: 98.7 F | HEIGHT: 73 IN | OXYGEN SATURATION: 98 % | SYSTOLIC BLOOD PRESSURE: 143 MMHG | WEIGHT: 289 LBS | DIASTOLIC BLOOD PRESSURE: 72 MMHG | BODY MASS INDEX: 38.3 KG/M2

## 2019-03-25 DIAGNOSIS — M54.41 CHRONIC BILATERAL LOW BACK PAIN WITH BILATERAL SCIATICA: ICD-10-CM

## 2019-03-25 DIAGNOSIS — Z12.5 PROSTATE CANCER SCREENING: ICD-10-CM

## 2019-03-25 DIAGNOSIS — E78.00 HYPERCHOLESTEREMIA: ICD-10-CM

## 2019-03-25 DIAGNOSIS — G89.29 CHRONIC BILATERAL LOW BACK PAIN WITH BILATERAL SCIATICA: ICD-10-CM

## 2019-03-25 DIAGNOSIS — M05.742 RHEUMATOID ARTHRITIS INVOLVING BOTH HANDS WITH POSITIVE RHEUMATOID FACTOR (HCC): ICD-10-CM

## 2019-03-25 DIAGNOSIS — E66.01 CLASS 2 SEVERE OBESITY WITH SERIOUS COMORBIDITY AND BODY MASS INDEX (BMI) OF 38.0 TO 38.9 IN ADULT, UNSPECIFIED OBESITY TYPE (HCC): ICD-10-CM

## 2019-03-25 DIAGNOSIS — I47.1 PAROXYSMAL SVT (SUPRAVENTRICULAR TACHYCARDIA) (HCC): ICD-10-CM

## 2019-03-25 DIAGNOSIS — E11.9 TYPE 2 DIABETES MELLITUS WITHOUT COMPLICATION, WITHOUT LONG-TERM CURRENT USE OF INSULIN (HCC): ICD-10-CM

## 2019-03-25 DIAGNOSIS — M05.741 RHEUMATOID ARTHRITIS INVOLVING BOTH HANDS WITH POSITIVE RHEUMATOID FACTOR (HCC): ICD-10-CM

## 2019-03-25 DIAGNOSIS — I10 ESSENTIAL HYPERTENSION: Primary | ICD-10-CM

## 2019-03-25 DIAGNOSIS — M54.42 CHRONIC BILATERAL LOW BACK PAIN WITH BILATERAL SCIATICA: ICD-10-CM

## 2019-03-25 DIAGNOSIS — G47.33 OSA (OBSTRUCTIVE SLEEP APNEA): ICD-10-CM

## 2019-03-25 PROCEDURE — G8427 DOCREV CUR MEDS BY ELIG CLIN: HCPCS | Performed by: NURSE PRACTITIONER

## 2019-03-25 PROCEDURE — G8417 CALC BMI ABV UP PARAM F/U: HCPCS | Performed by: NURSE PRACTITIONER

## 2019-03-25 PROCEDURE — 3046F HEMOGLOBIN A1C LEVEL >9.0%: CPT | Performed by: NURSE PRACTITIONER

## 2019-03-25 PROCEDURE — 99214 OFFICE O/P EST MOD 30 MIN: CPT | Performed by: NURSE PRACTITIONER

## 2019-03-25 PROCEDURE — G8484 FLU IMMUNIZE NO ADMIN: HCPCS | Performed by: NURSE PRACTITIONER

## 2019-03-25 PROCEDURE — 1036F TOBACCO NON-USER: CPT | Performed by: NURSE PRACTITIONER

## 2019-03-25 PROCEDURE — 2022F DILAT RTA XM EVC RTNOPTHY: CPT | Performed by: NURSE PRACTITIONER

## 2019-03-25 PROCEDURE — 3017F COLORECTAL CA SCREEN DOC REV: CPT | Performed by: NURSE PRACTITIONER

## 2019-03-25 RX ORDER — ALPRAZOLAM 1 MG/1
1.5 TABLET ORAL 3 TIMES DAILY PRN
COMMUNITY
End: 2019-05-20

## 2019-03-25 ASSESSMENT — ENCOUNTER SYMPTOMS
VOMITING: 0
TROUBLE SWALLOWING: 0
NAUSEA: 0
SORE THROAT: 0
RHINORRHEA: 0
DIARRHEA: 0
COUGH: 0
ABDOMINAL PAIN: 0
BACK PAIN: 1
CONSTIPATION: 0
SHORTNESS OF BREATH: 0

## 2019-03-25 ASSESSMENT — PATIENT HEALTH QUESTIONNAIRE - PHQ9
SUM OF ALL RESPONSES TO PHQ9 QUESTIONS 1 & 2: 0
1. LITTLE INTEREST OR PLEASURE IN DOING THINGS: 0
SUM OF ALL RESPONSES TO PHQ QUESTIONS 1-9: 0
2. FEELING DOWN, DEPRESSED OR HOPELESS: 0
SUM OF ALL RESPONSES TO PHQ QUESTIONS 1-9: 0

## 2019-03-26 ENCOUNTER — PATIENT MESSAGE (OUTPATIENT)
Dept: NEUROLOGY | Age: 64
End: 2019-03-26

## 2019-03-26 DIAGNOSIS — E11.9 TYPE 2 DIABETES MELLITUS WITHOUT COMPLICATION, WITHOUT LONG-TERM CURRENT USE OF INSULIN (HCC): ICD-10-CM

## 2019-03-26 DIAGNOSIS — E78.00 HYPERCHOLESTEREMIA: ICD-10-CM

## 2019-03-26 DIAGNOSIS — I10 ESSENTIAL HYPERTENSION: ICD-10-CM

## 2019-03-26 DIAGNOSIS — Z12.5 PROSTATE CANCER SCREENING: ICD-10-CM

## 2019-03-26 LAB
ALBUMIN SERPL-MCNC: 4.2 G/DL (ref 3.5–5.2)
ALP BLD-CCNC: 64 U/L (ref 40–130)
ALT SERPL-CCNC: 20 U/L (ref 5–41)
ANION GAP SERPL CALCULATED.3IONS-SCNC: 15 MMOL/L (ref 7–19)
AST SERPL-CCNC: 16 U/L (ref 5–40)
BASOPHILS ABSOLUTE: 0 K/UL (ref 0–0.2)
BASOPHILS RELATIVE PERCENT: 0.9 % (ref 0–1)
BILIRUB SERPL-MCNC: 0.4 MG/DL (ref 0.2–1.2)
BUN BLDV-MCNC: 13 MG/DL (ref 8–23)
CALCIUM SERPL-MCNC: 9.7 MG/DL (ref 8.8–10.2)
CHLORIDE BLD-SCNC: 100 MMOL/L (ref 98–111)
CHOLESTEROL, TOTAL: 144 MG/DL (ref 160–199)
CO2: 27 MMOL/L (ref 22–29)
CREAT SERPL-MCNC: 0.9 MG/DL (ref 0.5–1.2)
CREATININE URINE: 68.8 MG/DL (ref 4.2–622)
EOSINOPHILS ABSOLUTE: 0.1 K/UL (ref 0–0.6)
EOSINOPHILS RELATIVE PERCENT: 2.3 % (ref 0–5)
GFR NON-AFRICAN AMERICAN: >60
GLUCOSE BLD-MCNC: 137 MG/DL (ref 74–109)
HBA1C MFR BLD: 6.4 % (ref 4–6)
HCT VFR BLD CALC: 42.8 % (ref 42–52)
HDLC SERPL-MCNC: 50 MG/DL (ref 55–121)
HEMOGLOBIN: 13.5 G/DL (ref 14–18)
LDL CHOLESTEROL CALCULATED: 76 MG/DL
LYMPHOCYTES ABSOLUTE: 1.6 K/UL (ref 1.1–4.5)
LYMPHOCYTES RELATIVE PERCENT: 36 % (ref 20–40)
MCH RBC QN AUTO: 29.2 PG (ref 27–31)
MCHC RBC AUTO-ENTMCNC: 31.5 G/DL (ref 33–37)
MCV RBC AUTO: 92.4 FL (ref 80–94)
MICROALBUMIN UR-MCNC: <1.2 MG/DL (ref 0–19)
MICROALBUMIN/CREAT UR-RTO: NORMAL MG/G
MONOCYTES ABSOLUTE: 0.6 K/UL (ref 0–0.9)
MONOCYTES RELATIVE PERCENT: 13.2 % (ref 0–10)
NEUTROPHILS ABSOLUTE: 2.1 K/UL (ref 1.5–7.5)
NEUTROPHILS RELATIVE PERCENT: 47.4 % (ref 50–65)
PDW BLD-RTO: 13.5 % (ref 11.5–14.5)
PLATELET # BLD: 151 K/UL (ref 130–400)
PMV BLD AUTO: 12 FL (ref 9.4–12.4)
POTASSIUM SERPL-SCNC: 3.6 MMOL/L (ref 3.5–5)
PROSTATE SPECIFIC ANTIGEN: 1.52 NG/ML (ref 0–4)
RBC # BLD: 4.63 M/UL (ref 4.7–6.1)
SODIUM BLD-SCNC: 142 MMOL/L (ref 136–145)
T4 FREE: 1 NG/DL (ref 0.9–1.7)
TOTAL PROTEIN: 7.1 G/DL (ref 6.6–8.7)
TRIGL SERPL-MCNC: 92 MG/DL (ref 0–149)
TSH SERPL DL<=0.05 MIU/L-ACNC: 1.67 UIU/ML (ref 0.27–4.2)
WBC # BLD: 4.4 K/UL (ref 4.8–10.8)

## 2019-04-03 RX ORDER — LIDOCAINE 50 MG/G
1 PATCH TOPICAL DAILY
COMMUNITY
End: 2019-07-01

## 2019-04-15 RX ORDER — PRASUGREL 10 MG/1
TABLET, FILM COATED ORAL
Qty: 30 TABLET | Refills: 5 | Status: SHIPPED | OUTPATIENT
Start: 2019-04-15 | End: 2019-10-11 | Stop reason: SDUPTHER

## 2019-04-19 ENCOUNTER — HOSPITAL ENCOUNTER (OUTPATIENT)
Dept: GENERAL RADIOLOGY | Age: 64
Discharge: HOME | End: 2019-04-19
Payer: MEDICARE

## 2019-04-19 DIAGNOSIS — M25.541 PAIN IN JOINTS OF RIGHT HAND: ICD-10-CM

## 2019-04-19 DIAGNOSIS — M25.542 PAIN IN JOINTS OF LEFT HAND: ICD-10-CM

## 2019-04-19 PROCEDURE — 73130 X-RAY EXAM OF HAND: CPT

## 2019-05-19 DIAGNOSIS — M47.26 OSTEOARTHRITIS OF SPINE WITH RADICULOPATHY, LUMBAR REGION: ICD-10-CM

## 2019-05-19 DIAGNOSIS — G89.29 CHRONIC BILATERAL LOW BACK PAIN WITH BILATERAL SCIATICA: ICD-10-CM

## 2019-05-19 DIAGNOSIS — M54.42 CHRONIC BILATERAL LOW BACK PAIN WITH BILATERAL SCIATICA: ICD-10-CM

## 2019-05-19 DIAGNOSIS — M54.41 CHRONIC BILATERAL LOW BACK PAIN WITH BILATERAL SCIATICA: ICD-10-CM

## 2019-05-19 DIAGNOSIS — F41.9 ANXIETY: ICD-10-CM

## 2019-05-19 RX ORDER — ALPRAZOLAM 1 MG/1
TABLET ORAL
Qty: 135 TABLET | Refills: 0 | Status: CANCELLED | OUTPATIENT
Start: 2019-05-19 | End: 2019-06-18

## 2019-05-20 RX ORDER — OXYCODONE HCL 20 MG/1
20 TABLET, FILM COATED, EXTENDED RELEASE ORAL 2 TIMES DAILY
Qty: 60 TABLET | Refills: 0 | Status: SHIPPED | OUTPATIENT
Start: 2019-05-22 | End: 2019-06-18 | Stop reason: SDUPTHER

## 2019-05-20 RX ORDER — ALPRAZOLAM 1 MG/1
TABLET ORAL
Qty: 135 TABLET | Refills: 3 | Status: SHIPPED | OUTPATIENT
Start: 2019-05-20 | End: 2019-09-12 | Stop reason: SDUPTHER

## 2019-05-20 RX ORDER — OXYCODONE AND ACETAMINOPHEN 10; 325 MG/1; MG/1
1 TABLET ORAL EVERY 6 HOURS PRN
Qty: 90 TABLET | Refills: 0 | Status: SHIPPED | OUTPATIENT
Start: 2019-05-22 | End: 2019-06-18 | Stop reason: SDUPTHER

## 2019-06-18 DIAGNOSIS — M54.42 CHRONIC BILATERAL LOW BACK PAIN WITH BILATERAL SCIATICA: ICD-10-CM

## 2019-06-18 DIAGNOSIS — M54.41 CHRONIC BILATERAL LOW BACK PAIN WITH BILATERAL SCIATICA: ICD-10-CM

## 2019-06-18 DIAGNOSIS — M47.26 OSTEOARTHRITIS OF SPINE WITH RADICULOPATHY, LUMBAR REGION: ICD-10-CM

## 2019-06-18 DIAGNOSIS — G89.29 CHRONIC BILATERAL LOW BACK PAIN WITH BILATERAL SCIATICA: ICD-10-CM

## 2019-06-19 RX ORDER — OXYCODONE AND ACETAMINOPHEN 10; 325 MG/1; MG/1
1 TABLET ORAL EVERY 6 HOURS PRN
Qty: 90 TABLET | Refills: 0 | Status: SHIPPED | OUTPATIENT
Start: 2019-06-21 | End: 2019-06-20 | Stop reason: SDUPTHER

## 2019-06-19 RX ORDER — OXYCODONE HCL 20 MG/1
20 TABLET, FILM COATED, EXTENDED RELEASE ORAL 2 TIMES DAILY
Qty: 60 TABLET | Refills: 0 | Status: SHIPPED | OUTPATIENT
Start: 2019-06-21 | End: 2019-07-19 | Stop reason: SDUPTHER

## 2019-06-20 ENCOUNTER — OFFICE VISIT (OUTPATIENT)
Dept: NEUROLOGY | Age: 64
End: 2019-06-20
Payer: MEDICARE

## 2019-06-20 VITALS
SYSTOLIC BLOOD PRESSURE: 138 MMHG | DIASTOLIC BLOOD PRESSURE: 81 MMHG | WEIGHT: 288 LBS | HEART RATE: 83 BPM | HEIGHT: 73 IN | RESPIRATION RATE: 16 BRPM | BODY MASS INDEX: 38.17 KG/M2

## 2019-06-20 DIAGNOSIS — M54.42 CHRONIC BILATERAL LOW BACK PAIN WITH BILATERAL SCIATICA: ICD-10-CM

## 2019-06-20 DIAGNOSIS — M47.26 OSTEOARTHRITIS OF SPINE WITH RADICULOPATHY, LUMBAR REGION: Primary | ICD-10-CM

## 2019-06-20 DIAGNOSIS — M54.41 CHRONIC BILATERAL LOW BACK PAIN WITH BILATERAL SCIATICA: ICD-10-CM

## 2019-06-20 DIAGNOSIS — M05.742 RHEUMATOID ARTHRITIS INVOLVING BOTH HANDS WITH POSITIVE RHEUMATOID FACTOR (HCC): ICD-10-CM

## 2019-06-20 DIAGNOSIS — G89.29 CHRONIC BILATERAL LOW BACK PAIN WITH BILATERAL SCIATICA: ICD-10-CM

## 2019-06-20 DIAGNOSIS — M05.741 RHEUMATOID ARTHRITIS INVOLVING BOTH HANDS WITH POSITIVE RHEUMATOID FACTOR (HCC): ICD-10-CM

## 2019-06-20 PROCEDURE — 1036F TOBACCO NON-USER: CPT | Performed by: PSYCHIATRY & NEUROLOGY

## 2019-06-20 PROCEDURE — 99213 OFFICE O/P EST LOW 20 MIN: CPT | Performed by: PSYCHIATRY & NEUROLOGY

## 2019-06-20 PROCEDURE — G8427 DOCREV CUR MEDS BY ELIG CLIN: HCPCS | Performed by: PSYCHIATRY & NEUROLOGY

## 2019-06-20 PROCEDURE — G8417 CALC BMI ABV UP PARAM F/U: HCPCS | Performed by: PSYCHIATRY & NEUROLOGY

## 2019-06-20 PROCEDURE — 3017F COLORECTAL CA SCREEN DOC REV: CPT | Performed by: PSYCHIATRY & NEUROLOGY

## 2019-06-20 RX ORDER — HYDROXYCHLOROQUINE SULFATE 200 MG/1
400 TABLET, FILM COATED ORAL DAILY
Refills: 2 | COMMUNITY
Start: 2019-04-29

## 2019-06-20 RX ORDER — OXYCODONE AND ACETAMINOPHEN 10; 325 MG/1; MG/1
1 TABLET ORAL EVERY 6 HOURS PRN
Qty: 120 TABLET | Refills: 0 | Status: SHIPPED | OUTPATIENT
Start: 2019-06-21 | End: 2019-07-19 | Stop reason: SDUPTHER

## 2019-06-20 NOTE — PROGRESS NOTES
[facility]  [address] [place] [address] [address], [address]  [facility], [facility] [address]  Phone [phone]  Fax [phone]     [facility] Follow Up Encounter  2019 4:00 PM    Information:   Patient Name: Lucretia Velasquez  :   1955  Age:   61 y.o. MRN:   [id number]  Account #:  [de-identified]  Today:  19    Provider: Lesli Simpson M.D. Chief Complaint:   Chief Complaint   Patient presents with    Follow-up       Subjective:   Lucretia Velasquez is a 61 y.o. man with a history of chronic back pain, rheumatoid arthritis, and obstructive sleep apnea who is following up. He uses his BiPAP every night.  His back pain is stable but does require daily analgesics which does help.  He complains of pain in his hands. Akila Alvarado says he can hardly use them.  He has stiffness and cannot make a fist. Akila Alvarado has had side effects to steroids in the past (epidural injection). He has had enlarging finger joints and right knee joint.  His finger joints are very tender and he has pain with movement of them.  His RA factor was high at 34. He was treated with methotrexate but did not tolerate that. He has seen Dr. Rhett Sterling. He was started on Plaquenil.         Objective:     Past Medical History:  Past Medical History:   Diagnosis Date    Acute sinusitis     Anxiety     Chronic back pain     Chronic bilateral low back pain with bilateral sciatica     Constipation     [facility]     CPAP (continuous positive airway pressure) dependence     12cm to 20cm    Depression     Hyperlipidemia     Hypertensive disorder     OA (osteoarthritis of the spine)     Obesity     SUSANA (obstructive sleep apnea)     AHI:  85.3 per HST 2013    Osteoarthritis of spine with radiculopathy, lumbar region     Otitis media     Rheumatoid arthritis ([address]     Spinal stenosis     Type 2 diabetes mellitus ([address] 3/23/2016    Unspecified sleep apnea     uses bipap    Vertigo        Past Surgical History: TABLET BY MOUTH EVERY DAY 90 tablet 3    lisinopril (PRINIVIL;ZESTRIL) 10 MG tablet Take 10 mg by mouth daily      metFORMIN (GLUCOPHAGE) 500 MG tablet Hold Metformin for 48 hrs after cardiac catheterization. Resume on 10/26/18. (Patient taking differently: Take 500 mg by mouth 2 times daily (with meals) Hold Metformin for 48 hrs after cardiac catheterization. Resume on 10/26/18.) 60 tablet 3    zolpidem (AMBIEN) 10 MG tablet Take 10 mg by mouth as needed for Sleep. Rosalene Rolling verapamil (VERELAN PM) 100 MG CP24 extended release capsule Take 1 capsule by mouth nightly 90 capsule 3    hydrochlorothiazide (HYDRODIURIL) 25 MG tablet Take 1 tablet by mouth daily 90 tablet 3    polyethylene glycol (MIRALAX) POWD powder Take 17 g by mouth daily       lidocaine (LIDODERM) 5 % Place 1 patch onto the skin daily 12 hours on, 12 hours off. No current facility-administered medications for this visit. Allergies:   Allergies as of 06/20/2019 - Review Complete 06/20/2019   Allergen Reaction Noted    Clindamycin/lincomycin Anaphylaxis 07/15/2015    Benadryl [diphenhydramine] Other (See Comments) 07/15/2015    Prednisone  08/13/2018       Review of Systems:  General ROS: negative for - chills or fever  Psychological ROS: negative for - anxiety or depression  ENT ROS: negative for - headaches or sinus pain  Hematological and Lymphatic ROS: negative for - bleeding problems, bruising or swollen lymph nodes  Respiratory ROS: negative for - cough, hemoptysis or shortness of breath  Cardiovascular ROS: negative for - chest pain or palpitations  Gastrointestinal ROS: negative for - blood in stools, constipation, diarrhea or nausea/vomiting  Genito-Urinary ROS: negative for - hematuria or urinary frequency/urgency  Musculoskeletal ROS: positive for - joint pain, joint stiffness or joint swelling  Neurological ROS: negative for - memory loss, numbness/tingling or weakness     Examination:  Vitals:  /81   Pulse 83

## 2019-07-01 ENCOUNTER — OFFICE VISIT (OUTPATIENT)
Dept: PRIMARY CARE CLINIC | Age: 64
End: 2019-07-01
Payer: MEDICARE

## 2019-07-01 VITALS
OXYGEN SATURATION: 94 % | DIASTOLIC BLOOD PRESSURE: 75 MMHG | TEMPERATURE: 97.9 F | SYSTOLIC BLOOD PRESSURE: 134 MMHG | BODY MASS INDEX: 38.13 KG/M2 | WEIGHT: 289 LBS | HEART RATE: 91 BPM

## 2019-07-01 DIAGNOSIS — E78.00 HYPERCHOLESTEREMIA: ICD-10-CM

## 2019-07-01 DIAGNOSIS — I10 ESSENTIAL HYPERTENSION: ICD-10-CM

## 2019-07-01 DIAGNOSIS — E11.9 TYPE 2 DIABETES MELLITUS WITHOUT COMPLICATION, WITHOUT LONG-TERM CURRENT USE OF INSULIN (HCC): Primary | ICD-10-CM

## 2019-07-01 DIAGNOSIS — Z11.4 ENCOUNTER FOR SCREENING FOR HIV: ICD-10-CM

## 2019-07-01 DIAGNOSIS — Z11.59 NEED FOR HEPATITIS C SCREENING TEST: ICD-10-CM

## 2019-07-01 PROCEDURE — 3044F HG A1C LEVEL LT 7.0%: CPT | Performed by: NURSE PRACTITIONER

## 2019-07-01 PROCEDURE — 1036F TOBACCO NON-USER: CPT | Performed by: NURSE PRACTITIONER

## 2019-07-01 PROCEDURE — G8417 CALC BMI ABV UP PARAM F/U: HCPCS | Performed by: NURSE PRACTITIONER

## 2019-07-01 PROCEDURE — G8427 DOCREV CUR MEDS BY ELIG CLIN: HCPCS | Performed by: NURSE PRACTITIONER

## 2019-07-01 PROCEDURE — 2022F DILAT RTA XM EVC RTNOPTHY: CPT | Performed by: NURSE PRACTITIONER

## 2019-07-01 PROCEDURE — 99214 OFFICE O/P EST MOD 30 MIN: CPT | Performed by: NURSE PRACTITIONER

## 2019-07-01 PROCEDURE — 3017F COLORECTAL CA SCREEN DOC REV: CPT | Performed by: NURSE PRACTITIONER

## 2019-07-01 RX ORDER — LISINOPRIL 10 MG/1
10 TABLET ORAL DAILY
Qty: 90 TABLET | Refills: 3 | Status: SHIPPED | OUTPATIENT
Start: 2019-07-01 | End: 2019-10-21

## 2019-07-01 ASSESSMENT — ENCOUNTER SYMPTOMS
VOMITING: 0
ABDOMINAL PAIN: 0
SHORTNESS OF BREATH: 0
SORE THROAT: 0
RHINORRHEA: 0
CONSTIPATION: 0
COUGH: 0
DIARRHEA: 0
NAUSEA: 0
TROUBLE SWALLOWING: 0

## 2019-07-01 NOTE — PROGRESS NOTES
HENT: Negative for ear pain, rhinorrhea, sore throat and trouble swallowing. Eyes: Negative for visual disturbance. Respiratory: Negative for cough and shortness of breath. Cardiovascular: Negative for chest pain, palpitations and leg swelling. Gastrointestinal: Negative for abdominal pain, constipation, diarrhea, nausea and vomiting. Genitourinary: Negative for flank pain. Musculoskeletal: Negative for arthralgias, myalgias, neck pain and neck stiffness. Neurological: Negative for headaches. Psychiatric/Behavioral: Negative for decreased concentration and sleep disturbance. The patient is not nervous/anxious. Prior to Visit Medications    Medication Sig Taking? Authorizing Provider   lisinopril (PRINIVIL;ZESTRIL) 10 MG tablet Take 1 tablet by mouth daily Yes HEENA Dubose   hydroxychloroquine (PLAQUENIL) 200 MG tablet TK 2 TS PO QD WF OR MILK Yes Historical Provider, MD   VOLTAREN 1 % GEL APPLY 2 GRAMS TO HAND QID Yes Historical Provider, MD   oxyCODONE-acetaminophen (PERCOCET)  MG per tablet Take 1 tablet by mouth every 6 hours as needed for Pain (Pain) for up to 30 days. Yes Blanka Fair MD   oxyCODONE (OXYCONTIN) 20 MG extended release tablet Take 1 tablet by mouth 2 times daily for 30 days. Yes Blanka Fair MD   prasugrel (EFFIENT) 10 MG TABS TAKE 1 TABLET BY MOUTH EVERY DAY Yes HEENA Fernandez   aspirin 81 MG chewable tablet Take 1 tablet by mouth daily Yes HEENA Smith   lovastatin (MEVACOR) 40 MG tablet TAKE 1 TABLET BY MOUTH EVERY DAY Yes HEENA Fernandez   metFORMIN (GLUCOPHAGE) 500 MG tablet Hold Metformin for 48 hrs after cardiac catheterization. Resume on 10/26/18. Patient taking differently: Take 500 mg by mouth 2 times daily (with meals) Hold Metformin for 48 hrs after cardiac catheterization. Resume on 10/26/18. Yes Dorothy Harris MD   zolpidem (AMBIEN) 10 MG tablet Take 10 mg by mouth as needed for Sleep. . Yes

## 2019-07-15 RX ORDER — VERAPAMIL HYDROCHLORIDE 100 MG/1
CAPSULE, EXTENDED RELEASE ORAL
Qty: 90 CAPSULE | Refills: 3 | Status: SHIPPED | OUTPATIENT
Start: 2019-07-15 | End: 2019-10-14 | Stop reason: DRUGHIGH

## 2019-07-19 DIAGNOSIS — G89.29 CHRONIC BILATERAL LOW BACK PAIN WITH BILATERAL SCIATICA: ICD-10-CM

## 2019-07-19 DIAGNOSIS — M54.41 CHRONIC BILATERAL LOW BACK PAIN WITH BILATERAL SCIATICA: ICD-10-CM

## 2019-07-19 DIAGNOSIS — M47.26 OSTEOARTHRITIS OF SPINE WITH RADICULOPATHY, LUMBAR REGION: ICD-10-CM

## 2019-07-19 DIAGNOSIS — M54.42 CHRONIC BILATERAL LOW BACK PAIN WITH BILATERAL SCIATICA: ICD-10-CM

## 2019-07-22 ENCOUNTER — TELEPHONE (OUTPATIENT)
Dept: NEUROLOGY | Age: 64
End: 2019-07-22

## 2019-07-22 RX ORDER — OXYCODONE HCL 20 MG/1
20 TABLET, FILM COATED, EXTENDED RELEASE ORAL 2 TIMES DAILY
Qty: 60 TABLET | Refills: 0 | Status: SHIPPED | OUTPATIENT
Start: 2019-07-22 | End: 2019-08-20 | Stop reason: SDUPTHER

## 2019-07-22 RX ORDER — OXYCODONE AND ACETAMINOPHEN 10; 325 MG/1; MG/1
1 TABLET ORAL EVERY 6 HOURS PRN
Qty: 120 TABLET | Refills: 0 | Status: SHIPPED | OUTPATIENT
Start: 2019-07-22 | End: 2019-08-20 | Stop reason: SDUPTHER

## 2019-08-14 ENCOUNTER — PATIENT MESSAGE (OUTPATIENT)
Dept: PRIMARY CARE CLINIC | Age: 64
End: 2019-08-14

## 2019-08-14 RX ORDER — NALOXONE HYDROCHLORIDE 4 MG/.1ML
1 SPRAY NASAL PRN
Qty: 1 EACH | Refills: 0 | Status: SHIPPED | OUTPATIENT
Start: 2019-08-14 | End: 2019-08-27

## 2019-08-14 RX ORDER — LANCETS
1 EACH MISCELLANEOUS DAILY
Qty: 100 EACH | Refills: 3 | Status: SHIPPED | OUTPATIENT
Start: 2019-08-14

## 2019-08-20 DIAGNOSIS — G89.29 CHRONIC BILATERAL LOW BACK PAIN WITH BILATERAL SCIATICA: ICD-10-CM

## 2019-08-20 DIAGNOSIS — M54.41 CHRONIC BILATERAL LOW BACK PAIN WITH BILATERAL SCIATICA: ICD-10-CM

## 2019-08-20 DIAGNOSIS — M54.42 CHRONIC BILATERAL LOW BACK PAIN WITH BILATERAL SCIATICA: ICD-10-CM

## 2019-08-20 DIAGNOSIS — M47.26 OSTEOARTHRITIS OF SPINE WITH RADICULOPATHY, LUMBAR REGION: ICD-10-CM

## 2019-08-20 RX ORDER — OXYCODONE AND ACETAMINOPHEN 10; 325 MG/1; MG/1
1 TABLET ORAL EVERY 6 HOURS PRN
Qty: 120 TABLET | Refills: 0 | Status: SHIPPED | OUTPATIENT
Start: 2019-08-21 | End: 2019-09-17 | Stop reason: SDUPTHER

## 2019-08-20 RX ORDER — OXYCODONE HCL 20 MG/1
20 TABLET, FILM COATED, EXTENDED RELEASE ORAL 2 TIMES DAILY
Qty: 60 TABLET | Refills: 0 | Status: SHIPPED | OUTPATIENT
Start: 2019-08-21 | End: 2019-09-17 | Stop reason: SDUPTHER

## 2019-08-20 NOTE — TELEPHONE ENCOUNTER
Requested Prescriptions     Pending Prescriptions Disp Refills    oxyCODONE (OXYCONTIN) 20 MG extended release tablet 60 tablet 0     Sig: Take 1 tablet by mouth 2 times daily for 30 days.  oxyCODONE-acetaminophen (PERCOCET)  MG per tablet 120 tablet 0     Sig: Take 1 tablet by mouth every 6 hours as needed for Pain (Pain) for up to 30 days.        Last Office Visit:  6/20/2019  Next Office Visit:  10/21/2019  Last Medication Refill:  7/22/19   Jose Mike up to date:  8/18/19    *RX updated to reflect   8/21/19  fill date*

## 2019-08-27 ENCOUNTER — OFFICE VISIT (OUTPATIENT)
Dept: CARDIOLOGY | Age: 64
End: 2019-08-27
Payer: MEDICARE

## 2019-08-27 VITALS
BODY MASS INDEX: 38.04 KG/M2 | SYSTOLIC BLOOD PRESSURE: 130 MMHG | HEIGHT: 73 IN | DIASTOLIC BLOOD PRESSURE: 78 MMHG | WEIGHT: 287 LBS | HEART RATE: 66 BPM

## 2019-08-27 DIAGNOSIS — E78.2 MIXED HYPERLIPIDEMIA: ICD-10-CM

## 2019-08-27 DIAGNOSIS — Z95.5 HISTORY OF CORONARY ARTERY STENT PLACEMENT: ICD-10-CM

## 2019-08-27 DIAGNOSIS — I25.10 CORONARY ARTERY DISEASE INVOLVING NATIVE CORONARY ARTERY OF NATIVE HEART WITHOUT ANGINA PECTORIS: Primary | ICD-10-CM

## 2019-08-27 DIAGNOSIS — I10 ESSENTIAL HYPERTENSION: ICD-10-CM

## 2019-08-27 DIAGNOSIS — I47.1 PAROXYSMAL SVT (SUPRAVENTRICULAR TACHYCARDIA) (HCC): ICD-10-CM

## 2019-08-27 PROCEDURE — G8427 DOCREV CUR MEDS BY ELIG CLIN: HCPCS | Performed by: NURSE PRACTITIONER

## 2019-08-27 PROCEDURE — G8598 ASA/ANTIPLAT THER USED: HCPCS | Performed by: NURSE PRACTITIONER

## 2019-08-27 PROCEDURE — G8417 CALC BMI ABV UP PARAM F/U: HCPCS | Performed by: NURSE PRACTITIONER

## 2019-08-27 PROCEDURE — 3017F COLORECTAL CA SCREEN DOC REV: CPT | Performed by: NURSE PRACTITIONER

## 2019-08-27 PROCEDURE — 99213 OFFICE O/P EST LOW 20 MIN: CPT | Performed by: NURSE PRACTITIONER

## 2019-08-27 PROCEDURE — 1036F TOBACCO NON-USER: CPT | Performed by: NURSE PRACTITIONER

## 2019-08-27 NOTE — PROGRESS NOTES
[facility], [place]. [address], [address], [address] [facility]  [phone] office  [phone] fax      OFFICE VISIT:  8/27/2019    [place] [address]: 1955  Reason For Visit:  Thu Bah is a 61 y.o. male who is here for 6 Month Follow-Up (Patient denies any cardiac symptoms, the back of patients R leg after heart cath still hurts.); Hypertension; and Hyperlipidemia    History:   Diagnosis Orders   1. Coronary artery disease involving native coronary artery of native heart without angina pectoris     2. Paroxysmal SVT (supraventricular tachycardia) ([facility] [address]     3. Essential hypertension     4. Mixed hyperlipidemia     5. History of coronary artery stent placement       The patient presents today for cardiology follow up. Patient is s/p SYL to proximal LAD on 10/23/18. Only has arrhythmia sensation when lying on left side at night. The patient denies symptoms to suggest myocardial ischemia, heart failure or sustained arrhythmia. BP is well controlled on current regimen. The patient's PCP monitors cholesterol. Lisbet Meyer denies exertional chest pain, shortness of breath, orthopnea, paroxysmal nocturnal dyspnea, syncope, presyncope, sensed arrhythmia, edema and fatigue. The patient denies numbness or weakness to suggest cerebrovascular accident or transient ischemic attack.     Annemarie Aponte has the following history as recorded in [facility]:  Patient Active Problem List   Diagnosis Code    Chest discomfort R07.89    Essential hypertension I10    Hypercholesteremia E78.00    Type 2 diabetes mellitus ([address] E11.9    SUSANA (obstructive sleep apnea) G47.33    Obesity E66.9    Palpitations R00.2    Osteoarthritis of spine with radiculopathy, lumbar region M47.26    Chronic bilateral low back pain with bilateral sciatica M54.42, M54.41, G89.29    CPAP (continuous positive airway pressure) dependence Z99.89    Thoracic spine pain

## 2019-08-27 NOTE — PATIENT INSTRUCTIONS
day. Dark green, deep orange, red, or yellow fruits and vegetables are especially good for you. Examples include spinach, carrots, peaches, and berries.     · Foods high in fiber can reduce your cholesterol and provide important vitamins and minerals. High-fiber foods include whole-grain cereals and breads, oatmeal, beans, brown rice, citrus fruits, and apples.     · Limit drinks and foods with added sugar. These include candy, desserts, and soda pop.    Lifestyle changes    · If your doctor recommends it, get more exercise. Walking is a good choice. Bit by bit, increase the amount you walk every day. Try for at least 30 minutes on most days of the week. You also may want to swim, bike, or do other activities.     · Do not smoke. If you need help quitting, talk to your doctor about stop-smoking programs and medicines. These can increase your chances of quitting for good. Quitting smoking may be the most important step you can take to protect your heart. It is never too late to quit. You will get health benefits right away.     · Limit alcohol to 2 drinks a day for men and 1 drink a day for women. Too much alcohol can cause health problems. Medicines    · Take your medicines exactly as prescribed. Call your doctor if you think you are having a problem with your medicine.     · If your doctor recommends aspirin, take the amount directed each day. Make sure you take aspirin and not another kind of pain reliever, such as acetaminophen (Tylenol). If you take ibuprofen (such as Advil or Motrin) for other problems, take aspirin at least 2 hours before taking ibuprofen. When should you call for help? Call 911 if you have symptoms of a heart attack.  These may include:    · Chest pain or pressure, or a strange feeling in the chest.     · Sweating.     · Shortness of breath.     · Pain, pressure, or a strange feeling in the back, neck, jaw, or upper belly or in one or both shoulders or arms.     · Lightheadedness or

## 2019-09-03 RX ORDER — ASPIRIN 81 MG
TABLET,CHEWABLE ORAL
Qty: 30 TABLET | Refills: 5 | Status: SHIPPED | OUTPATIENT
Start: 2019-09-03 | End: 2020-03-02

## 2019-09-11 DIAGNOSIS — R60.0 BILATERAL LOWER EXTREMITY EDEMA: ICD-10-CM

## 2019-09-11 RX ORDER — HYDROCHLOROTHIAZIDE 25 MG/1
TABLET ORAL
Qty: 30 TABLET | Refills: 5 | Status: SHIPPED | OUTPATIENT
Start: 2019-09-11 | End: 2019-10-14

## 2019-09-12 DIAGNOSIS — F41.9 ANXIETY: ICD-10-CM

## 2019-09-12 RX ORDER — ALPRAZOLAM 1 MG/1
TABLET ORAL
Qty: 135 TABLET | Refills: 3 | Status: SHIPPED | OUTPATIENT
Start: 2019-09-12 | End: 2019-10-12

## 2019-09-17 DIAGNOSIS — M54.42 CHRONIC BILATERAL LOW BACK PAIN WITH BILATERAL SCIATICA: ICD-10-CM

## 2019-09-17 DIAGNOSIS — M54.41 CHRONIC BILATERAL LOW BACK PAIN WITH BILATERAL SCIATICA: ICD-10-CM

## 2019-09-17 DIAGNOSIS — M47.26 OSTEOARTHRITIS OF SPINE WITH RADICULOPATHY, LUMBAR REGION: ICD-10-CM

## 2019-09-17 DIAGNOSIS — G89.29 CHRONIC BILATERAL LOW BACK PAIN WITH BILATERAL SCIATICA: ICD-10-CM

## 2019-09-18 RX ORDER — OXYCODONE HCL 20 MG/1
20 TABLET, FILM COATED, EXTENDED RELEASE ORAL 2 TIMES DAILY
Qty: 60 TABLET | Refills: 0 | Status: SHIPPED | OUTPATIENT
Start: 2019-09-20 | End: 2019-10-17 | Stop reason: SDUPTHER

## 2019-09-18 RX ORDER — OXYCODONE AND ACETAMINOPHEN 10; 325 MG/1; MG/1
1 TABLET ORAL EVERY 6 HOURS PRN
Qty: 120 TABLET | Refills: 0 | Status: SHIPPED | OUTPATIENT
Start: 2019-09-20 | End: 2019-10-17 | Stop reason: SDUPTHER

## 2019-09-30 ENCOUNTER — HOSPITAL ENCOUNTER (EMERGENCY)
Age: 64
Discharge: HOME | End: 2019-09-30
Attending: EMERGENCY MEDICINE
Payer: MEDICARE

## 2019-09-30 VITALS
BODY MASS INDEX: 37.6 KG/M2 | WEIGHT: 285 LBS | SYSTOLIC BLOOD PRESSURE: 114 MMHG | HEART RATE: 79 BPM | TEMPERATURE: 98.4 F | RESPIRATION RATE: 22 BRPM | OXYGEN SATURATION: 95 % | DIASTOLIC BLOOD PRESSURE: 67 MMHG

## 2019-09-30 DIAGNOSIS — I47.1 PAROXYSMAL SUPRAVENTRICULAR TACHYCARDIA (HCC): Primary | ICD-10-CM

## 2019-09-30 LAB
ALBUMIN SERPL-MCNC: 4.9 G/DL (ref 3.5–5.2)
ALP BLD-CCNC: 92 U/L (ref 40–130)
ALT SERPL-CCNC: 21 U/L (ref 5–41)
ANION GAP SERPL CALCULATED.3IONS-SCNC: 15 MMOL/L (ref 7–19)
APTT: 30.2 SEC (ref 26–36.2)
AST SERPL-CCNC: 17 U/L (ref 5–40)
BASOPHILS ABSOLUTE: 0.1 K/UL (ref 0–0.2)
BASOPHILS RELATIVE PERCENT: 0.7 % (ref 0–1)
BILIRUB SERPL-MCNC: 0.3 MG/DL (ref 0.2–1.2)
BUN BLDV-MCNC: 19 MG/DL (ref 8–23)
CALCIUM SERPL-MCNC: 10.5 MG/DL (ref 8.8–10.2)
CHLORIDE BLD-SCNC: 97 MMOL/L (ref 98–111)
CO2: 27 MMOL/L (ref 22–29)
CREAT SERPL-MCNC: 1.1 MG/DL (ref 0.5–1.2)
EOSINOPHILS ABSOLUTE: 0.1 K/UL (ref 0–0.6)
EOSINOPHILS RELATIVE PERCENT: 1.2 % (ref 0–5)
GFR NON-AFRICAN AMERICAN: >60
GLUCOSE BLD-MCNC: 160 MG/DL (ref 74–109)
HCT VFR BLD CALC: 47.3 % (ref 42–52)
HEMOGLOBIN: 15.3 G/DL (ref 14–18)
IMMATURE GRANULOCYTES #: 0 K/UL
INR BLD: 1.11 (ref 0.88–1.18)
LYMPHOCYTES ABSOLUTE: 1.7 K/UL (ref 1.1–4.5)
LYMPHOCYTES RELATIVE PERCENT: 20.4 % (ref 20–40)
MCH RBC QN AUTO: 28.5 PG (ref 27–31)
MCHC RBC AUTO-ENTMCNC: 32.3 G/DL (ref 33–37)
MCV RBC AUTO: 88.2 FL (ref 80–94)
MONOCYTES ABSOLUTE: 0.8 K/UL (ref 0–0.9)
MONOCYTES RELATIVE PERCENT: 9.4 % (ref 0–10)
NEUTROPHILS ABSOLUTE: 5.6 K/UL (ref 1.5–7.5)
NEUTROPHILS RELATIVE PERCENT: 67.9 % (ref 50–65)
PDW BLD-RTO: 13.8 % (ref 11.5–14.5)
PLATELET # BLD: 180 K/UL (ref 130–400)
PMV BLD AUTO: 11.7 FL (ref 9.4–12.4)
POTASSIUM SERPL-SCNC: 4 MMOL/L (ref 3.5–5)
PROTHROMBIN TIME: 13.7 SEC (ref 12–14.6)
RBC # BLD: 5.36 M/UL (ref 4.7–6.1)
SODIUM BLD-SCNC: 139 MMOL/L (ref 136–145)
T4 FREE: 1.2 NG/DL (ref 0.9–1.7)
TOTAL PROTEIN: 8.5 G/DL (ref 6.6–8.7)
TROPONIN: <0.01 NG/ML (ref 0–0.03)
TSH SERPL DL<=0.05 MIU/L-ACNC: 1.14 UIU/ML (ref 0.27–4.2)
WBC # BLD: 8.2 K/UL (ref 4.8–10.8)

## 2019-09-30 PROCEDURE — 93005 ELECTROCARDIOGRAM TRACING: CPT | Performed by: EMERGENCY MEDICINE

## 2019-09-30 PROCEDURE — 85610 PROTHROMBIN TIME: CPT

## 2019-09-30 PROCEDURE — 84443 ASSAY THYROID STIM HORMONE: CPT

## 2019-09-30 PROCEDURE — 99285 EMERGENCY DEPT VISIT HI MDM: CPT

## 2019-09-30 PROCEDURE — 85025 COMPLETE CBC W/AUTO DIFF WBC: CPT

## 2019-09-30 PROCEDURE — 84439 ASSAY OF FREE THYROXINE: CPT

## 2019-09-30 PROCEDURE — 80053 COMPREHEN METABOLIC PANEL: CPT

## 2019-09-30 PROCEDURE — 2500000003 HC RX 250 WO HCPCS: Performed by: EMERGENCY MEDICINE

## 2019-09-30 PROCEDURE — 84484 ASSAY OF TROPONIN QUANT: CPT

## 2019-09-30 PROCEDURE — 85730 THROMBOPLASTIN TIME PARTIAL: CPT

## 2019-09-30 PROCEDURE — 36415 COLL VENOUS BLD VENIPUNCTURE: CPT

## 2019-09-30 PROCEDURE — 96374 THER/PROPH/DIAG INJ IV PUSH: CPT

## 2019-09-30 RX ORDER — DILTIAZEM HYDROCHLORIDE 5 MG/ML
25 INJECTION INTRAVENOUS ONCE
Status: COMPLETED | OUTPATIENT
Start: 2019-09-30 | End: 2019-09-30

## 2019-09-30 RX ORDER — DILTIAZEM HYDROCHLORIDE 5 MG/ML
0.25 INJECTION INTRAVENOUS ONCE
Status: DISCONTINUED | OUTPATIENT
Start: 2019-09-30 | End: 2019-09-30

## 2019-09-30 RX ADMIN — DILTIAZEM HYDROCHLORIDE 25 MG: 5 INJECTION INTRAVENOUS at 19:27

## 2019-09-30 ASSESSMENT — ENCOUNTER SYMPTOMS
SINUS PRESSURE: 0
NAUSEA: 0
CHEST TIGHTNESS: 1
EYE PAIN: 0
ABDOMINAL PAIN: 0
WHEEZING: 0
COLOR CHANGE: 0
TROUBLE SWALLOWING: 0
BACK PAIN: 0
SHORTNESS OF BREATH: 1
PHOTOPHOBIA: 0
VOMITING: 0
CONSTIPATION: 0
DIARRHEA: 0

## 2019-10-01 LAB
EKG P AXIS: 39 DEGREES
EKG P AXIS: NORMAL DEGREES
EKG P-R INTERVAL: 186 MS
EKG P-R INTERVAL: NORMAL MS
EKG Q-T INTERVAL: 284 MS
EKG Q-T INTERVAL: 372 MS
EKG QRS DURATION: 84 MS
EKG QRS DURATION: 88 MS
EKG QTC CALCULATION (BAZETT): 422 MS
EKG QTC CALCULATION (BAZETT): 459 MS
EKG T AXIS: 37 DEGREES
EKG T AXIS: 42 DEGREES

## 2019-10-01 PROCEDURE — 93010 ELECTROCARDIOGRAM REPORT: CPT | Performed by: INTERNAL MEDICINE

## 2019-10-02 ENCOUNTER — TELEPHONE (OUTPATIENT)
Dept: CARDIOLOGY | Age: 64
End: 2019-10-02

## 2019-10-11 RX ORDER — PRASUGREL 10 MG/1
TABLET, FILM COATED ORAL
Qty: 30 TABLET | Refills: 5 | Status: SHIPPED | OUTPATIENT
Start: 2019-10-11 | End: 2019-12-05

## 2019-10-14 ENCOUNTER — OFFICE VISIT (OUTPATIENT)
Dept: CARDIOLOGY | Age: 64
End: 2019-10-14
Payer: MEDICARE

## 2019-10-14 VITALS
HEART RATE: 60 BPM | HEIGHT: 73 IN | SYSTOLIC BLOOD PRESSURE: 110 MMHG | BODY MASS INDEX: 38.3 KG/M2 | DIASTOLIC BLOOD PRESSURE: 70 MMHG | WEIGHT: 289 LBS

## 2019-10-14 DIAGNOSIS — I10 ESSENTIAL HYPERTENSION: ICD-10-CM

## 2019-10-14 DIAGNOSIS — Z95.5 HISTORY OF CORONARY ARTERY STENT PLACEMENT: ICD-10-CM

## 2019-10-14 DIAGNOSIS — I47.1 PAROXYSMAL SVT (SUPRAVENTRICULAR TACHYCARDIA) (HCC): Primary | ICD-10-CM

## 2019-10-14 DIAGNOSIS — I25.10 CORONARY ARTERY DISEASE INVOLVING NATIVE CORONARY ARTERY OF NATIVE HEART WITHOUT ANGINA PECTORIS: ICD-10-CM

## 2019-10-14 DIAGNOSIS — E78.2 MIXED HYPERLIPIDEMIA: ICD-10-CM

## 2019-10-14 PROCEDURE — G8484 FLU IMMUNIZE NO ADMIN: HCPCS | Performed by: NURSE PRACTITIONER

## 2019-10-14 PROCEDURE — 93000 ELECTROCARDIOGRAM COMPLETE: CPT | Performed by: NURSE PRACTITIONER

## 2019-10-14 PROCEDURE — G8417 CALC BMI ABV UP PARAM F/U: HCPCS | Performed by: NURSE PRACTITIONER

## 2019-10-14 PROCEDURE — 1036F TOBACCO NON-USER: CPT | Performed by: NURSE PRACTITIONER

## 2019-10-14 PROCEDURE — 3017F COLORECTAL CA SCREEN DOC REV: CPT | Performed by: NURSE PRACTITIONER

## 2019-10-14 PROCEDURE — G8427 DOCREV CUR MEDS BY ELIG CLIN: HCPCS | Performed by: NURSE PRACTITIONER

## 2019-10-14 PROCEDURE — 99214 OFFICE O/P EST MOD 30 MIN: CPT | Performed by: NURSE PRACTITIONER

## 2019-10-14 PROCEDURE — G8598 ASA/ANTIPLAT THER USED: HCPCS | Performed by: NURSE PRACTITIONER

## 2019-10-14 RX ORDER — ALPRAZOLAM 1 MG/1
1 TABLET ORAL 3 TIMES DAILY PRN
COMMUNITY
End: 2020-01-09 | Stop reason: SDUPTHER

## 2019-10-14 RX ORDER — FLUTICASONE PROPIONATE 50 MCG
SPRAY, SUSPENSION (ML) NASAL
COMMUNITY
End: 2019-12-05 | Stop reason: SDUPTHER

## 2019-10-14 RX ORDER — VERAPAMIL HYDROCHLORIDE 200 MG/1
200 CAPSULE, EXTENDED RELEASE ORAL NIGHTLY
Qty: 30 CAPSULE | Refills: 5 | Status: SHIPPED | OUTPATIENT
Start: 2019-10-14 | End: 2020-04-06

## 2019-10-17 ENCOUNTER — TELEPHONE (OUTPATIENT)
Dept: CARDIOLOGY | Age: 64
End: 2019-10-17

## 2019-10-17 DIAGNOSIS — M54.42 CHRONIC BILATERAL LOW BACK PAIN WITH BILATERAL SCIATICA: ICD-10-CM

## 2019-10-17 DIAGNOSIS — G89.29 CHRONIC BILATERAL LOW BACK PAIN WITH BILATERAL SCIATICA: ICD-10-CM

## 2019-10-17 DIAGNOSIS — M54.41 CHRONIC BILATERAL LOW BACK PAIN WITH BILATERAL SCIATICA: ICD-10-CM

## 2019-10-17 DIAGNOSIS — M47.26 OSTEOARTHRITIS OF SPINE WITH RADICULOPATHY, LUMBAR REGION: ICD-10-CM

## 2019-10-18 RX ORDER — OXYCODONE AND ACETAMINOPHEN 10; 325 MG/1; MG/1
1 TABLET ORAL EVERY 6 HOURS PRN
Qty: 120 TABLET | Refills: 0 | Status: SHIPPED | OUTPATIENT
Start: 2019-10-18 | End: 2019-11-20 | Stop reason: SDUPTHER

## 2019-10-18 RX ORDER — OXYCODONE HCL 20 MG/1
20 TABLET, FILM COATED, EXTENDED RELEASE ORAL 2 TIMES DAILY
Qty: 60 TABLET | Refills: 0 | Status: SHIPPED | OUTPATIENT
Start: 2019-10-18 | End: 2019-11-20 | Stop reason: SDUPTHER

## 2019-10-21 ENCOUNTER — OFFICE VISIT (OUTPATIENT)
Dept: NEUROLOGY | Age: 64
End: 2019-10-21
Payer: MEDICARE

## 2019-10-21 VITALS
HEART RATE: 62 BPM | DIASTOLIC BLOOD PRESSURE: 81 MMHG | RESPIRATION RATE: 16 BRPM | HEIGHT: 73 IN | SYSTOLIC BLOOD PRESSURE: 136 MMHG | BODY MASS INDEX: 38.78 KG/M2 | WEIGHT: 292.6 LBS

## 2019-10-21 DIAGNOSIS — M54.41 CHRONIC BILATERAL LOW BACK PAIN WITH BILATERAL SCIATICA: ICD-10-CM

## 2019-10-21 DIAGNOSIS — G47.33 OSA (OBSTRUCTIVE SLEEP APNEA): ICD-10-CM

## 2019-10-21 DIAGNOSIS — M05.741 RHEUMATOID ARTHRITIS INVOLVING BOTH HANDS WITH POSITIVE RHEUMATOID FACTOR (HCC): ICD-10-CM

## 2019-10-21 DIAGNOSIS — G89.29 CHRONIC BILATERAL LOW BACK PAIN WITH BILATERAL SCIATICA: ICD-10-CM

## 2019-10-21 DIAGNOSIS — M54.42 CHRONIC BILATERAL LOW BACK PAIN WITH BILATERAL SCIATICA: ICD-10-CM

## 2019-10-21 DIAGNOSIS — M05.742 RHEUMATOID ARTHRITIS INVOLVING BOTH HANDS WITH POSITIVE RHEUMATOID FACTOR (HCC): ICD-10-CM

## 2019-10-21 DIAGNOSIS — M47.26 OSTEOARTHRITIS OF SPINE WITH RADICULOPATHY, LUMBAR REGION: Primary | ICD-10-CM

## 2019-10-21 PROCEDURE — 3017F COLORECTAL CA SCREEN DOC REV: CPT | Performed by: PSYCHIATRY & NEUROLOGY

## 2019-10-21 PROCEDURE — G8598 ASA/ANTIPLAT THER USED: HCPCS | Performed by: PSYCHIATRY & NEUROLOGY

## 2019-10-21 PROCEDURE — G8427 DOCREV CUR MEDS BY ELIG CLIN: HCPCS | Performed by: PSYCHIATRY & NEUROLOGY

## 2019-10-21 PROCEDURE — 99213 OFFICE O/P EST LOW 20 MIN: CPT | Performed by: PSYCHIATRY & NEUROLOGY

## 2019-10-21 PROCEDURE — G8417 CALC BMI ABV UP PARAM F/U: HCPCS | Performed by: PSYCHIATRY & NEUROLOGY

## 2019-10-21 PROCEDURE — G8484 FLU IMMUNIZE NO ADMIN: HCPCS | Performed by: PSYCHIATRY & NEUROLOGY

## 2019-10-21 PROCEDURE — 1036F TOBACCO NON-USER: CPT | Performed by: PSYCHIATRY & NEUROLOGY

## 2019-11-19 DIAGNOSIS — G89.29 CHRONIC BILATERAL LOW BACK PAIN WITH BILATERAL SCIATICA: ICD-10-CM

## 2019-11-19 DIAGNOSIS — M54.42 CHRONIC BILATERAL LOW BACK PAIN WITH BILATERAL SCIATICA: ICD-10-CM

## 2019-11-19 DIAGNOSIS — M47.26 OSTEOARTHRITIS OF SPINE WITH RADICULOPATHY, LUMBAR REGION: ICD-10-CM

## 2019-11-19 DIAGNOSIS — M54.41 CHRONIC BILATERAL LOW BACK PAIN WITH BILATERAL SCIATICA: ICD-10-CM

## 2019-11-20 RX ORDER — OXYCODONE AND ACETAMINOPHEN 10; 325 MG/1; MG/1
1 TABLET ORAL EVERY 6 HOURS PRN
Qty: 120 TABLET | Refills: 0 | Status: SHIPPED | OUTPATIENT
Start: 2019-11-20 | End: 2019-12-18 | Stop reason: SDUPTHER

## 2019-11-20 RX ORDER — OXYCODONE HCL 20 MG/1
20 TABLET, FILM COATED, EXTENDED RELEASE ORAL 2 TIMES DAILY
Qty: 60 TABLET | Refills: 0 | Status: SHIPPED | OUTPATIENT
Start: 2019-11-20 | End: 2019-12-13

## 2019-11-25 DIAGNOSIS — E11.9 TYPE 2 DIABETES MELLITUS WITHOUT COMPLICATION, WITHOUT LONG-TERM CURRENT USE OF INSULIN (HCC): ICD-10-CM

## 2019-11-25 DIAGNOSIS — Z11.4 ENCOUNTER FOR SCREENING FOR HIV: ICD-10-CM

## 2019-11-25 DIAGNOSIS — E78.00 HYPERCHOLESTEREMIA: ICD-10-CM

## 2019-11-25 DIAGNOSIS — Z11.59 NEED FOR HEPATITIS C SCREENING TEST: ICD-10-CM

## 2019-11-25 LAB
ALBUMIN SERPL-MCNC: 4.1 G/DL (ref 3.5–5.2)
ALP BLD-CCNC: 77 U/L (ref 40–130)
ALT SERPL-CCNC: 13 U/L (ref 5–41)
ANION GAP SERPL CALCULATED.3IONS-SCNC: 15 MMOL/L (ref 7–19)
AST SERPL-CCNC: 14 U/L (ref 5–40)
BILIRUB SERPL-MCNC: 0.4 MG/DL (ref 0.2–1.2)
BUN BLDV-MCNC: 10 MG/DL (ref 8–23)
CALCIUM SERPL-MCNC: 9.7 MG/DL (ref 8.8–10.2)
CHLORIDE BLD-SCNC: 99 MMOL/L (ref 98–111)
CHOLESTEROL, TOTAL: 123 MG/DL (ref 160–199)
CO2: 28 MMOL/L (ref 22–29)
CREAT SERPL-MCNC: 0.8 MG/DL (ref 0.5–1.2)
GFR NON-AFRICAN AMERICAN: >60
GLUCOSE BLD-MCNC: 133 MG/DL (ref 74–109)
HBA1C MFR BLD: 6.2 % (ref 4–6)
HDLC SERPL-MCNC: 47 MG/DL (ref 55–121)
HEPATITIS C ANTIBODY INTERPRETATION: NORMAL
LDL CHOLESTEROL CALCULATED: 62 MG/DL
POTASSIUM SERPL-SCNC: 4.4 MMOL/L (ref 3.5–5)
SODIUM BLD-SCNC: 142 MMOL/L (ref 136–145)
TOTAL PROTEIN: 7 G/DL (ref 6.6–8.7)
TRIGL SERPL-MCNC: 71 MG/DL (ref 0–149)

## 2019-11-26 ENCOUNTER — OFFICE VISIT (OUTPATIENT)
Dept: CARDIOLOGY | Age: 64
End: 2019-11-26
Payer: MEDICARE

## 2019-11-26 ENCOUNTER — TELEPHONE (OUTPATIENT)
Dept: PRIMARY CARE CLINIC | Age: 64
End: 2019-11-26

## 2019-11-26 VITALS
HEART RATE: 60 BPM | DIASTOLIC BLOOD PRESSURE: 80 MMHG | BODY MASS INDEX: 39.1 KG/M2 | SYSTOLIC BLOOD PRESSURE: 134 MMHG | WEIGHT: 295 LBS | HEIGHT: 73 IN

## 2019-11-26 DIAGNOSIS — Z95.5 HISTORY OF CORONARY ARTERY STENT PLACEMENT: ICD-10-CM

## 2019-11-26 DIAGNOSIS — I10 ESSENTIAL HYPERTENSION: ICD-10-CM

## 2019-11-26 DIAGNOSIS — I47.1 PAROXYSMAL SVT (SUPRAVENTRICULAR TACHYCARDIA) (HCC): ICD-10-CM

## 2019-11-26 DIAGNOSIS — I25.10 CORONARY ARTERY DISEASE INVOLVING NATIVE CORONARY ARTERY OF NATIVE HEART WITHOUT ANGINA PECTORIS: Primary | ICD-10-CM

## 2019-11-26 DIAGNOSIS — E78.2 MIXED HYPERLIPIDEMIA: ICD-10-CM

## 2019-11-26 PROCEDURE — G8417 CALC BMI ABV UP PARAM F/U: HCPCS | Performed by: NURSE PRACTITIONER

## 2019-11-26 PROCEDURE — G8598 ASA/ANTIPLAT THER USED: HCPCS | Performed by: NURSE PRACTITIONER

## 2019-11-26 PROCEDURE — 3017F COLORECTAL CA SCREEN DOC REV: CPT | Performed by: NURSE PRACTITIONER

## 2019-11-26 PROCEDURE — 99213 OFFICE O/P EST LOW 20 MIN: CPT | Performed by: NURSE PRACTITIONER

## 2019-11-26 PROCEDURE — 1036F TOBACCO NON-USER: CPT | Performed by: NURSE PRACTITIONER

## 2019-11-26 PROCEDURE — G8484 FLU IMMUNIZE NO ADMIN: HCPCS | Performed by: NURSE PRACTITIONER

## 2019-11-26 PROCEDURE — G8427 DOCREV CUR MEDS BY ELIG CLIN: HCPCS | Performed by: NURSE PRACTITIONER

## 2019-11-27 LAB — HIV 1,2 COMBO ANTIGEN/ANTIBODY: NEGATIVE

## 2019-12-05 ENCOUNTER — OFFICE VISIT (OUTPATIENT)
Dept: PRIMARY CARE CLINIC | Age: 64
End: 2019-12-05
Payer: MEDICARE

## 2019-12-05 VITALS
BODY MASS INDEX: 42.52 KG/M2 | DIASTOLIC BLOOD PRESSURE: 88 MMHG | WEIGHT: 297 LBS | SYSTOLIC BLOOD PRESSURE: 132 MMHG | TEMPERATURE: 98.4 F | HEART RATE: 87 BPM | HEIGHT: 70 IN | OXYGEN SATURATION: 96 %

## 2019-12-05 DIAGNOSIS — E78.2 MIXED HYPERLIPIDEMIA: ICD-10-CM

## 2019-12-05 DIAGNOSIS — G47.33 OSA (OBSTRUCTIVE SLEEP APNEA): ICD-10-CM

## 2019-12-05 DIAGNOSIS — Z00.00 ROUTINE GENERAL MEDICAL EXAMINATION AT A HEALTH CARE FACILITY: ICD-10-CM

## 2019-12-05 DIAGNOSIS — I25.10 CORONARY ARTERY DISEASE INVOLVING NATIVE CORONARY ARTERY OF NATIVE HEART WITHOUT ANGINA PECTORIS: ICD-10-CM

## 2019-12-05 DIAGNOSIS — M05.742 RHEUMATOID ARTHRITIS INVOLVING BOTH HANDS WITH POSITIVE RHEUMATOID FACTOR (HCC): ICD-10-CM

## 2019-12-05 DIAGNOSIS — G47.00 INSOMNIA, UNSPECIFIED TYPE: ICD-10-CM

## 2019-12-05 DIAGNOSIS — J30.9 ALLERGIC RHINITIS, UNSPECIFIED SEASONALITY, UNSPECIFIED TRIGGER: ICD-10-CM

## 2019-12-05 DIAGNOSIS — E11.9 TYPE 2 DIABETES MELLITUS WITHOUT COMPLICATION, WITHOUT LONG-TERM CURRENT USE OF INSULIN (HCC): Primary | ICD-10-CM

## 2019-12-05 DIAGNOSIS — Z23 NEED FOR VACCINATION FOR STREP PNEUMONIAE: ICD-10-CM

## 2019-12-05 DIAGNOSIS — M05.741 RHEUMATOID ARTHRITIS INVOLVING BOTH HANDS WITH POSITIVE RHEUMATOID FACTOR (HCC): ICD-10-CM

## 2019-12-05 DIAGNOSIS — Z12.5 PROSTATE CANCER SCREENING: ICD-10-CM

## 2019-12-05 DIAGNOSIS — I10 ESSENTIAL HYPERTENSION: ICD-10-CM

## 2019-12-05 DIAGNOSIS — L30.9 ECZEMA, UNSPECIFIED TYPE: ICD-10-CM

## 2019-12-05 PROCEDURE — G8598 ASA/ANTIPLAT THER USED: HCPCS | Performed by: NURSE PRACTITIONER

## 2019-12-05 PROCEDURE — G8417 CALC BMI ABV UP PARAM F/U: HCPCS | Performed by: NURSE PRACTITIONER

## 2019-12-05 PROCEDURE — 3044F HG A1C LEVEL LT 7.0%: CPT | Performed by: NURSE PRACTITIONER

## 2019-12-05 PROCEDURE — 2022F DILAT RTA XM EVC RTNOPTHY: CPT | Performed by: NURSE PRACTITIONER

## 2019-12-05 PROCEDURE — G8427 DOCREV CUR MEDS BY ELIG CLIN: HCPCS | Performed by: NURSE PRACTITIONER

## 2019-12-05 PROCEDURE — 99214 OFFICE O/P EST MOD 30 MIN: CPT | Performed by: NURSE PRACTITIONER

## 2019-12-05 PROCEDURE — G0439 PPPS, SUBSEQ VISIT: HCPCS | Performed by: NURSE PRACTITIONER

## 2019-12-05 PROCEDURE — 3017F COLORECTAL CA SCREEN DOC REV: CPT | Performed by: NURSE PRACTITIONER

## 2019-12-05 PROCEDURE — 90732 PPSV23 VACC 2 YRS+ SUBQ/IM: CPT | Performed by: NURSE PRACTITIONER

## 2019-12-05 PROCEDURE — G8482 FLU IMMUNIZE ORDER/ADMIN: HCPCS | Performed by: NURSE PRACTITIONER

## 2019-12-05 PROCEDURE — G0009 ADMIN PNEUMOCOCCAL VACCINE: HCPCS | Performed by: NURSE PRACTITIONER

## 2019-12-05 PROCEDURE — 1036F TOBACCO NON-USER: CPT | Performed by: NURSE PRACTITIONER

## 2019-12-05 RX ORDER — FLUTICASONE PROPIONATE 50 MCG
2 SPRAY, SUSPENSION (ML) NASAL DAILY
Qty: 1 BOTTLE | Refills: 11 | Status: SHIPPED | OUTPATIENT
Start: 2019-12-05 | End: 2021-06-16 | Stop reason: SDUPTHER

## 2019-12-05 RX ORDER — TRIAMCINOLONE ACETONIDE 1 MG/G
CREAM TOPICAL
Qty: 45 G | Refills: 0 | Status: SHIPPED | OUTPATIENT
Start: 2019-12-05 | End: 2021-04-05

## 2019-12-05 RX ORDER — ZOLPIDEM TARTRATE 10 MG/1
10 TABLET ORAL PRN
Qty: 30 TABLET | Refills: 2 | Status: SHIPPED | OUTPATIENT
Start: 2019-12-05 | End: 2022-07-11 | Stop reason: SDUPTHER

## 2019-12-05 ASSESSMENT — ENCOUNTER SYMPTOMS
RHINORRHEA: 0
NAUSEA: 0
SHORTNESS OF BREATH: 0
SORE THROAT: 0
VOMITING: 0
DIARRHEA: 0
TROUBLE SWALLOWING: 0
CONSTIPATION: 0
ABDOMINAL PAIN: 0
COUGH: 0

## 2019-12-05 ASSESSMENT — PATIENT HEALTH QUESTIONNAIRE - PHQ9
SUM OF ALL RESPONSES TO PHQ QUESTIONS 1-9: 0
SUM OF ALL RESPONSES TO PHQ QUESTIONS 1-9: 0

## 2019-12-05 ASSESSMENT — LIFESTYLE VARIABLES: HOW OFTEN DO YOU HAVE A DRINK CONTAINING ALCOHOL: 0

## 2019-12-13 ENCOUNTER — OFFICE VISIT (OUTPATIENT)
Dept: PRIMARY CARE CLINIC | Age: 64
End: 2019-12-13
Payer: MEDICARE

## 2019-12-13 VITALS
SYSTOLIC BLOOD PRESSURE: 132 MMHG | DIASTOLIC BLOOD PRESSURE: 82 MMHG | HEIGHT: 72 IN | OXYGEN SATURATION: 95 % | WEIGHT: 294.5 LBS | HEART RATE: 56 BPM | TEMPERATURE: 97.9 F | BODY MASS INDEX: 39.89 KG/M2

## 2019-12-13 DIAGNOSIS — J44.1 COPD EXACERBATION (HCC): Primary | ICD-10-CM

## 2019-12-13 PROCEDURE — G8482 FLU IMMUNIZE ORDER/ADMIN: HCPCS | Performed by: NURSE PRACTITIONER

## 2019-12-13 PROCEDURE — 1036F TOBACCO NON-USER: CPT | Performed by: NURSE PRACTITIONER

## 2019-12-13 PROCEDURE — 3023F SPIROM DOC REV: CPT | Performed by: NURSE PRACTITIONER

## 2019-12-13 PROCEDURE — G8598 ASA/ANTIPLAT THER USED: HCPCS | Performed by: NURSE PRACTITIONER

## 2019-12-13 PROCEDURE — G8427 DOCREV CUR MEDS BY ELIG CLIN: HCPCS | Performed by: NURSE PRACTITIONER

## 2019-12-13 PROCEDURE — 99214 OFFICE O/P EST MOD 30 MIN: CPT | Performed by: NURSE PRACTITIONER

## 2019-12-13 PROCEDURE — 3017F COLORECTAL CA SCREEN DOC REV: CPT | Performed by: NURSE PRACTITIONER

## 2019-12-13 PROCEDURE — G8417 CALC BMI ABV UP PARAM F/U: HCPCS | Performed by: NURSE PRACTITIONER

## 2019-12-13 PROCEDURE — G8926 SPIRO NO PERF OR DOC: HCPCS | Performed by: NURSE PRACTITIONER

## 2019-12-13 RX ORDER — ALBUTEROL SULFATE 90 UG/1
2 AEROSOL, METERED RESPIRATORY (INHALATION) EVERY 6 HOURS PRN
Qty: 1 INHALER | Refills: 3 | Status: SHIPPED | OUTPATIENT
Start: 2019-12-13 | End: 2020-01-24 | Stop reason: SDUPTHER

## 2019-12-13 RX ORDER — AZITHROMYCIN 250 MG/1
250 TABLET, FILM COATED ORAL SEE ADMIN INSTRUCTIONS
Qty: 6 TABLET | Refills: 0 | Status: SHIPPED | OUTPATIENT
Start: 2019-12-13 | End: 2019-12-18

## 2019-12-13 RX ORDER — METHYLPREDNISOLONE 4 MG/1
TABLET ORAL
Qty: 1 KIT | Refills: 0 | Status: SHIPPED | OUTPATIENT
Start: 2019-12-13 | End: 2019-12-19

## 2019-12-13 ASSESSMENT — ENCOUNTER SYMPTOMS
BACK PAIN: 0
COUGH: 1
CHEST TIGHTNESS: 1
DIARRHEA: 0
VOMITING: 0
SHORTNESS OF BREATH: 1
NAUSEA: 0
SORE THROAT: 1
ABDOMINAL PAIN: 0
WHEEZING: 1

## 2019-12-18 DIAGNOSIS — M47.26 OSTEOARTHRITIS OF SPINE WITH RADICULOPATHY, LUMBAR REGION: ICD-10-CM

## 2019-12-18 DIAGNOSIS — M54.42 CHRONIC BILATERAL LOW BACK PAIN WITH BILATERAL SCIATICA: ICD-10-CM

## 2019-12-18 DIAGNOSIS — M54.41 CHRONIC BILATERAL LOW BACK PAIN WITH BILATERAL SCIATICA: ICD-10-CM

## 2019-12-18 DIAGNOSIS — G89.29 CHRONIC BILATERAL LOW BACK PAIN WITH BILATERAL SCIATICA: ICD-10-CM

## 2019-12-19 DIAGNOSIS — M54.41 CHRONIC BILATERAL LOW BACK PAIN WITH BILATERAL SCIATICA: ICD-10-CM

## 2019-12-19 DIAGNOSIS — M47.26 OSTEOARTHRITIS OF SPINE WITH RADICULOPATHY, LUMBAR REGION: ICD-10-CM

## 2019-12-19 DIAGNOSIS — G89.29 CHRONIC BILATERAL LOW BACK PAIN WITH BILATERAL SCIATICA: ICD-10-CM

## 2019-12-19 DIAGNOSIS — M54.42 CHRONIC BILATERAL LOW BACK PAIN WITH BILATERAL SCIATICA: ICD-10-CM

## 2019-12-19 RX ORDER — OXYCODONE HCL 20 MG/1
20 TABLET, FILM COATED, EXTENDED RELEASE ORAL 2 TIMES DAILY
Qty: 60 TABLET | Refills: 0 | Status: SHIPPED | OUTPATIENT
Start: 2019-12-19 | End: 2020-01-17 | Stop reason: SDUPTHER

## 2019-12-19 RX ORDER — OXYCODONE AND ACETAMINOPHEN 10; 325 MG/1; MG/1
1 TABLET ORAL EVERY 6 HOURS PRN
Qty: 120 TABLET | Refills: 0 | Status: SHIPPED | OUTPATIENT
Start: 2019-12-20 | End: 2020-01-17 | Stop reason: SDUPTHER

## 2020-01-09 RX ORDER — ALPRAZOLAM 1 MG/1
1.5 TABLET ORAL 3 TIMES DAILY PRN
Qty: 135 TABLET | Refills: 3 | Status: SHIPPED | OUTPATIENT
Start: 2020-01-10 | End: 2020-05-02 | Stop reason: SDUPTHER

## 2020-01-09 NOTE — TELEPHONE ENCOUNTER
Kathy Millan has requested a refill on his medication. Last office visit : 10/21/2019   Next office visit : 4/21/2020   Last medication refill :9/12/19 w/3RF   Shyann Points : 11.19.19      Requested Prescriptions     Pending Prescriptions Disp Refills    ALPRAZolam (XANAX) 1 MG tablet 135 tablet 2     Sig: Take 1 tablet by mouth 3 times daily as needed (as needed) for up to 30 days.

## 2020-01-17 RX ORDER — OXYCODONE HCL 20 MG/1
20 TABLET, FILM COATED, EXTENDED RELEASE ORAL 2 TIMES DAILY
Qty: 60 TABLET | Refills: 0 | Status: SHIPPED | OUTPATIENT
Start: 2020-01-18 | End: 2020-02-15 | Stop reason: SDUPTHER

## 2020-01-17 RX ORDER — OXYCODONE AND ACETAMINOPHEN 10; 325 MG/1; MG/1
1 TABLET ORAL EVERY 6 HOURS PRN
Qty: 120 TABLET | Refills: 0 | Status: SHIPPED | OUTPATIENT
Start: 2020-01-19 | End: 2020-02-15 | Stop reason: SDUPTHER

## 2020-01-24 ENCOUNTER — OFFICE VISIT (OUTPATIENT)
Dept: PRIMARY CARE CLINIC | Age: 65
End: 2020-01-24
Payer: MEDICARE

## 2020-01-24 ENCOUNTER — HOSPITAL ENCOUNTER (OUTPATIENT)
Dept: GENERAL RADIOLOGY | Age: 65
Discharge: HOME | End: 2020-01-24
Payer: MEDICARE

## 2020-01-24 ENCOUNTER — TELEPHONE (OUTPATIENT)
Dept: PRIMARY CARE CLINIC | Age: 65
End: 2020-01-24

## 2020-01-24 VITALS
BODY MASS INDEX: 39.79 KG/M2 | OXYGEN SATURATION: 97 % | SYSTOLIC BLOOD PRESSURE: 162 MMHG | WEIGHT: 300.25 LBS | DIASTOLIC BLOOD PRESSURE: 100 MMHG | TEMPERATURE: 97.1 F | HEART RATE: 71 BPM | HEIGHT: 73 IN

## 2020-01-24 PROCEDURE — G8482 FLU IMMUNIZE ORDER/ADMIN: HCPCS | Performed by: NURSE PRACTITIONER

## 2020-01-24 PROCEDURE — G8427 DOCREV CUR MEDS BY ELIG CLIN: HCPCS | Performed by: NURSE PRACTITIONER

## 2020-01-24 PROCEDURE — 3023F SPIROM DOC REV: CPT | Performed by: NURSE PRACTITIONER

## 2020-01-24 PROCEDURE — 71046 X-RAY EXAM CHEST 2 VIEWS: CPT

## 2020-01-24 PROCEDURE — G8926 SPIRO NO PERF OR DOC: HCPCS | Performed by: NURSE PRACTITIONER

## 2020-01-24 PROCEDURE — 3017F COLORECTAL CA SCREEN DOC REV: CPT | Performed by: NURSE PRACTITIONER

## 2020-01-24 PROCEDURE — G8417 CALC BMI ABV UP PARAM F/U: HCPCS | Performed by: NURSE PRACTITIONER

## 2020-01-24 PROCEDURE — 99214 OFFICE O/P EST MOD 30 MIN: CPT | Performed by: NURSE PRACTITIONER

## 2020-01-24 PROCEDURE — 1036F TOBACCO NON-USER: CPT | Performed by: NURSE PRACTITIONER

## 2020-01-24 RX ORDER — DOXYCYCLINE HYCLATE 100 MG
100 TABLET ORAL 2 TIMES DAILY
Qty: 20 TABLET | Refills: 0 | Status: SHIPPED | OUTPATIENT
Start: 2020-01-24 | End: 2020-02-03

## 2020-01-24 RX ORDER — ALBUTEROL SULFATE 90 UG/1
2 AEROSOL, METERED RESPIRATORY (INHALATION) EVERY 6 HOURS PRN
Qty: 1 INHALER | Refills: 3 | Status: SHIPPED | OUTPATIENT
Start: 2020-01-24 | End: 2021-04-14 | Stop reason: SDUPTHER

## 2020-01-24 RX ORDER — GUAIFENESIN 600 MG/1
600 TABLET, EXTENDED RELEASE ORAL 2 TIMES DAILY
Qty: 20 TABLET | Refills: 0 | Status: SHIPPED | OUTPATIENT
Start: 2020-01-24 | End: 2020-02-03

## 2020-01-24 ASSESSMENT — ENCOUNTER SYMPTOMS
BACK PAIN: 0
COUGH: 1
SORE THROAT: 1
WHEEZING: 1
SHORTNESS OF BREATH: 1

## 2020-01-24 ASSESSMENT — PATIENT HEALTH QUESTIONNAIRE - PHQ9
1. LITTLE INTEREST OR PLEASURE IN DOING THINGS: 0
SUM OF ALL RESPONSES TO PHQ QUESTIONS 1-9: 0
2. FEELING DOWN, DEPRESSED OR HOPELESS: 0
SUM OF ALL RESPONSES TO PHQ QUESTIONS 1-9: 0
SUM OF ALL RESPONSES TO PHQ9 QUESTIONS 1 & 2: 0

## 2020-01-24 NOTE — PROGRESS NOTES
[address]  [place], [address]  Phone [phone]   Fax [phone]      OFFICE VISIT: 1/24/2020    Lisa Choi [facility]: 1955      Reason For Visit:  Junior Lyles is a 59 y.o. Bhavana Jean is here for Congestion (was treating for bronchitis, took meds, but still having burning in chest, and congestion. Still not feeling well, if gets active he busts out in a sweat.) and Cough         Health Maintenance     HPI      Patient is here for cough and congestion  Was seenon 12/13   Took a round of zpack  (generally helps him)  And steroid taper    With not much relief  He has been trying otc to help  Reports worse with expiration  Reports wheezing  Has some productive phlegm at time is clear    Will go out to the shop  And once gets working he is sweating   And this isn't normal for him  He feels \"sick\"  Denies fever  But does get short of breath  And has been getting worse for 2 weeks    Denies swelling  No change in pillows or bed count     height is 6' 1\" (1.854 m) and weight is 300 lb 4 oz (136.2 kg) (abnormal). His temporal temperature is 97.1 °F (36.2 °C). His blood pressure is 162/100 (abnormal) and his pulse is 71. His oxygen saturation is 97%. Body mass index is 39.61 kg/m².     Results for orders placed or performed in visit on 11/25/19   Hemoglobin A1C   Result Value Ref Range    Hemoglobin A1C 6.2 (H) 4.0 - 6.0 %   Lipid Panel   Result Value Ref Range    Cholesterol, Total 123 (L) 160 - 199 mg/dL    Triglycerides 71 0 - 149 mg/dL    HDL 47 (L) 55 - 121 mg/dL    LDL Calculated 62 <100 mg/dL   Hepatitis C Antibody   Result Value Ref Range    Hep C Ab Interp Non-Reactive    Comprehensive Metabolic Panel   Result Value Ref Range    Sodium 142 136 - 145 mmol/L    Potassium 4.4 3.5 - 5.0 mmol/L    Chloride 99 98 - 111 mmol/L    CO2 28 22 - 29 mmol/L    Anion Gap 15 7 - 19 mmol/L    Glucose 133 (H) 74 - 109 mg/dL    BUN 10 8 - 23 mg/dL    CREATININE 0.8 0.5 - 1.2 mg/dL    GFR Non-African American >60 >60 Calcium 9.7 8.8 - 10.2 mg/dL    Total Protein 7.0 6.6 - 8.7 g/dL    Alb 4.1 3.5 - 5.2 g/dL    Total Bilirubin 0.4 0.2 - 1.2 mg/dL    Alkaline Phosphatase 77 40 - 130 U/L    ALT 13 5 - 41 U/L    AST 14 5 - 40 U/L   HIV-1,2 Combo Ag/Ab EIA with Reflex   Result Value Ref Range    HIV 1,2 Combo Antigen/Antibody Negative Negative       I have reviewed the following with the Mr. Batres   Lab Review   Orders Only on 11/25/2019   Component Date Value    Hemoglobin A1C 11/25/2019 6.2*    Cholesterol, Total 11/25/2019 123*    Triglycerides 11/25/2019 71     HDL 11/25/2019 47*    LDL Calculated 11/25/2019 62     Hep C Ab Interp 11/25/2019 Non-Reactive     Sodium 11/25/2019 142     Potassium 11/25/2019 4.4     Chloride 11/25/2019 99     CO2 11/25/2019 28     Anion Gap 11/25/2019 15     Glucose 11/25/2019 133*    BUN 11/25/2019 10     CREATININE 11/25/2019 0.8     GFR Non- 11/25/2019 >60     Calcium 11/25/2019 9.7     Total Protein 11/25/2019 7.0     Alb 11/25/2019 4.1     Total Bilirubin 11/25/2019 0.4     Alkaline Phosphatase 11/25/2019 77     ALT 11/25/2019 13     AST 11/25/2019 14     HIV 1,2 Combo Antigen/An* 11/25/2019 Negative    Admission on 09/30/2019, Discharged on 09/30/2019   Component Date Value    QRS Duration 09/30/2019 84     Q-T Interval 09/30/2019 284     QTc Calculation (Bazett) 09/30/2019 459     T Axis 09/30/2019 42     Sodium 09/30/2019 139     Potassium 09/30/2019 4.0     Chloride 09/30/2019 97*    CO2 09/30/2019 27     Anion Gap 09/30/2019 15     Glucose 09/30/2019 160*    BUN 09/30/2019 19     CREATININE 09/30/2019 1.1     GFR Non- 09/30/2019 >60     Calcium 09/30/2019 10.5*    Total Protein 09/30/2019 8.5     Alb 09/30/2019 4.9     Total Bilirubin 09/30/2019 0.3     Alkaline Phosphatase 09/30/2019 92     ALT 09/30/2019 21     AST 09/30/2019 17     WBC 09/30/2019 8.2     RBC 09/30/2019 5.36     Hemoglobin 09/30/2019 15.3     Hematocrit 09/30/2019 47.3     MCV 09/30/2019 88.2     MCH 09/30/2019 28.5     MCHC 09/30/2019 32.3*    RDW 09/30/2019 13.8     Platelets 03/43/0334 180     MPV 09/30/2019 11.7     Neutrophils % 09/30/2019 67.9*    Lymphocytes % 09/30/2019 20.4     Monocytes % 09/30/2019 9.4     Eosinophils % 09/30/2019 1.2     Basophils % 09/30/2019 0.7     Neutrophils Absolute 09/30/2019 5.6     Immature Granulocytes # 09/30/2019 0.0     Lymphocytes Absolute 09/30/2019 1.7     Monocytes Absolute 09/30/2019 0.80     Eosinophils Absolute 09/30/2019 0.10     Basophils Absolute 09/30/2019 0.10     aPTT 09/30/2019 30.2     Protime 09/30/2019 13.7     INR 09/30/2019 1.11     P-R Interval 09/30/2019 186     QRS Duration 09/30/2019 88     Q-T Interval 09/30/2019 372     QTc Calculation (Bazett) 09/30/2019 422     P Axis 09/30/2019 39     T Axis 09/30/2019 37     Troponin 09/30/2019 <0.01     TSH 09/30/2019 1.140     T4 Free 09/30/2019 1.2      Copies of these are in the chart. Prior to Visit Medications    Medication Sig Taking? Authorizing Provider   doxycycline hyclate (VIBRA-TABS) 100 MG tablet Take 1 tablet by mouth 2 times daily for 10 days Yes HEENA Albrecht   albuterol sulfate HFA (VENTOLIN HFA) 108 (90 Base) MCG/ACT inhaler Inhale 2 puffs into the lungs every 6 hours as needed for Wheezing Yes HEENA Albrecht   guaiFENesin (MUCINEX) 600 MG extended release tablet Take 1 tablet by mouth 2 times daily for 10 days Yes HEENA Albrecht   oxyCODONE-acetaminophen (PERCOCET)  MG per tablet Take 1 tablet by mouth every 6 hours as needed for Pain (Pain) for up to 30 days. Yes Renan Zacarias MD   oxyCODONE (OXYCONTIN) 20 MG extended release tablet Take 1 tablet by mouth 2 times daily for 30 days. Yes Renan Zacarias MD   ALPRAZolam Jettie Tono) 1 MG tablet Take 1.5 tablets by mouth 3 times daily as needed (as needed) for up to 120 days.  Yes Renan Zacarias MD disorder     OA (osteoarthritis of the spine)     Obesity     SUSANA (obstructive sleep apnea)     AHI:  85.3 per HST 2013    Osteoarthritis of spine with radiculopathy, lumbar region     Otitis media     Rheumatoid arthritis ([address] [address]     Spinal stenosis     Type 2 diabetes mellitus ([address] 3/23/2016    Unspecified sleep apnea     uses bipap    Vertigo        Past Surgical History:   Procedure Laterality Date    ADENOIDECTOMY      BROW LIFT      due to [address]  10/24/2018         COLONOSCOPY  30 yrs ago    [place], [address], normal per patient    HERNIA REPAIR      KNEE ARTHROSCOPY Left     ME Girish [address] DX/THER SBST EPIDURAL/SUBARACH LUMBAR/SACRAL N/A 10/3/2017    L3-4 EPIDURAL STEROID INJECTION performed by Deidre Alejo at [address] [place] [address]         Social History     Tobacco Use    Smoking status: Former Smoker     Packs/day: 0.50     Years: 20.00     Pack years: 10.00     Types: Cigarettes     Start date:      Last attempt to quit:      Years since quittin.0    Smokeless tobacco: Never Used   Substance Use Topics    Alcohol use: No       Review of Systems   Constitutional: Positive for activity change, appetite change and fatigue. Negative for fever. HENT: Positive for congestion, postnasal drip and sore throat. Respiratory: Positive for cough, shortness of breath and wheezing (exp wheeze). Cardiovascular: Negative for chest pain, palpitations and leg swelling. Genitourinary: Negative for difficulty urinating. Musculoskeletal: Negative for arthralgias and back pain. Skin: Negative for rash. Hematological: Negative for adenopathy. Psychiatric/Behavioral: Negative for behavioral problems and sleep disturbance. The patient is not nervous/anxious. Physical Exam  Vitals signs reviewed. Constitutional:       General: He is not in acute distress. Appearance: Normal appearance. He is normal weight.  He is not diaphoretic. HENT:      Head: Normocephalic. Right Ear: Tympanic membrane normal. There is no impacted cerumen. Left Ear: Tympanic membrane normal. There is no impacted cerumen. Nose: No congestion. Mouth/Throat:      Pharynx: No posterior oropharyngeal erythema. Eyes:      General:         Right eye: No discharge. Left eye: No discharge. Neck:      Musculoskeletal: Normal range of motion. Cardiovascular:      Rate and Rhythm: Normal rate and regular rhythm. Pulses: Normal pulses. Heart sounds: No murmur. Pulmonary:      Effort: Pulmonary effort is normal. No respiratory distress. Breath sounds: Wheezing (rare exp wheeze) present. No rhonchi. Comments: Diminished    Abdominal:      General: Bowel sounds are normal.   Musculoskeletal: Normal range of motion. Lymphadenopathy:      Cervical: No cervical adenopathy. Skin:     General: Skin is warm. Capillary Refill: Capillary refill takes less than 2 seconds. Findings: No rash. Neurological:      General: No focal deficit present. Mental Status: He is alert and oriented to person, place, and time. Psychiatric:         Mood and Affect: Mood normal.         Behavior: Behavior normal.         ASSESSMENT/ PLAN    1. Shortness of breath  Increase fluids  Will cont present  Use inhaler every 6 hours  - XR CHEST (SINGLE VIEW FRONTAL); Future  - doxycycline hyclate (VIBRA-TABS) 100 MG tablet; Take 1 tablet by mouth 2 times daily for 10 days  Dispense: 20 tablet; Refill: 0  - guaiFENesin (MUCINEX) 600 MG extended release tablet; Take 1 tablet by mouth 2 times daily for 10 days  Dispense: 20 tablet; Refill: 0    2. Wheezing  Encourage use inhaler every 6 hours  - XR CHEST (SINGLE VIEW FRONTAL); Future  - doxycycline hyclate (VIBRA-TABS) 100 MG tablet; Take 1 tablet by mouth 2 times daily for 10 days  Dispense: 20 tablet; Refill: 0    3.  COPD exacerbation (HCC)  Increase fluids  If not better steroid shot    - XR CHEST (SINGLE VIEW FRONTAL); Future  - doxycycline hyclate (VIBRA-TABS) 100 MG tablet; Take 1 tablet by mouth 2 times daily for 10 days  Dispense: 20 tablet; Refill: 0  - albuterol sulfate HFA (VENTOLIN HFA) 108 (90 Base) MCG/ACT inhaler; Inhale 2 puffs into the lungs every 6 hours as needed for Wheezing  Dispense: 1 Inhaler; Refill: 3  - guaiFENesin (MUCINEX) 600 MG extended release tablet; Take 1 tablet by mouth 2 times daily for 10 days  Dispense: 20 tablet; Refill: 0      Orders Placed This Encounter   Procedures    XR CHEST (SINGLE VIEW FRONTAL)        Return in about 1 week (around 1/31/2020) for breathing follow up. Patient Instructions     Patient Education        Learning About Chronic Bronchitis  What is chronic bronchitis? Chronic bronchitis is long-term swelling and the buildup of mucus in the airways of your lungs. The airways (bronchial tubes) get inflamed and make a lot of mucus. This can narrow or block the airways, making it hard for you to breathe. It is a form of COPD (chronic obstructive pulmonary disease). Chronic bronchitis is usually caused by smoking. But chemical fumes, dust, or air pollution also can cause it over time. What can you expect when you have chronic bronchitis? Chronic bronchitis gets worse over time. You cannot undo the damage to your lungs. Over time, you may find that:  · You get short of breath even when you do simple things like get dressed or fix a meal.  · It is hard to eat or exercise. · You lose weight and feel weaker. Over many years, the swelling and mucus from chronic bronchitis make it more likely that you will get lung infections. But there are things you can do to prevent more damage and feel better. What are the symptoms? The main symptoms of chronic bronchitis are:  · A cough that will not go away. · Mucus that comes up when you cough. · Shortness of breath that gets worse when you exercise.   At times, your symptoms may suddenly think you are having a problem with your medicine. · Oxygen therapy boosts the amount of oxygen in your blood and helps you breathe easier. Use the flow rate your doctor has recommended, and do not change it without talking to your doctor first.  Other care at home  · If your doctor recommends it, get more exercise. Walking is a good choice. Bit by bit, increase the amount you walk every day. Try for at least 30 minutes on most days of the week. · Learn breathing methods--such as breathing through pursed lips--to help you become less short of breath. · If your doctor has not set you up with a pulmonary rehabilitation program, talk to him or her about whether rehab is right for you. Rehab includes exercise programs, education about your disease and how to manage it, help with diet and other changes, and emotional support. · Eat regular, healthy meals. Use bronchodilators about 1 hour before you eat to make it easier to eat. Eat several small meals instead of three large ones. Drink beverages at the end of the meal. Avoid foods that are hard to chew. Follow-up care is a key part of your treatment and safety. Be sure to make and go to all appointments, and call your doctor if you are having problems. It's also a good idea to know your test results and keep a list of the medicines you take. Where can you learn more? Go to https://Mashable.DataMarket. org and sign in to your [employer] account. Enter V808 in the [facility] box to learn more about \"Learning About Chronic Bronchitis. \"     If you do not have an account, please click on the \"Sign Up Now\" link. Current as of: June 9, 2019  Content Version: 12.3  © 9362-3064 Healthwise, Incorporated. Care instructions adapted under license by [facility] ([facility]).  If you have questions about a medical condition or this instruction, always ask your healthcare professional. [address] any warranty or liability for your use of this

## 2020-01-24 NOTE — PATIENT INSTRUCTIONS
fumes, and dust also can make chronic bronchitis worse. · Get a flu shot every year. A shot may keep the flu from turning into something more serious, like pneumonia. A flu shot also may lower your chances of having a flare-up. · Get a pneumococcal shot. A shot can prevent some of the serious complications of pneumonia. Ask your doctor how often you should get this shot. How is chronic bronchitis treated? Chronic bronchitis is treated with medicines and oxygen. You also can take steps at home to stay healthy and keep your condition from getting worse. Medicines and oxygen therapy  · You may be taking medicines such as:  ? Bronchodilators. These help open your airways and make breathing easier. Bronchodilators are either short-acting (work for 6 to 9 hours) or long-acting (work for 24 hours). You inhale most bronchodilators, so they start to act quickly. Always carry your quick-relief inhaler with you in case you need it while you are away from home. ? Corticosteroids. These reduce airway inflammation. They come in pill or inhaled form. You must take these medicines every day for them to work well. ? Antibiotics. These medicines are used when you have a bacterial lung infection. · Take your medicines exactly as prescribed. Call your doctor if you think you are having a problem with your medicine. · Oxygen therapy boosts the amount of oxygen in your blood and helps you breathe easier. Use the flow rate your doctor has recommended, and do not change it without talking to your doctor first.  Other care at home  · If your doctor recommends it, get more exercise. Walking is a good choice. Bit by bit, increase the amount you walk every day. Try for at least 30 minutes on most days of the week. · Learn breathing methods--such as breathing through pursed lips--to help you become less short of breath.   · If your doctor has not set you up with a pulmonary rehabilitation program, talk to him or her about whether rehab is right for you. Rehab includes exercise programs, education about your disease and how to manage it, help with diet and other changes, and emotional support. · Eat regular, healthy meals. Use bronchodilators about 1 hour before you eat to make it easier to eat. Eat several small meals instead of three large ones. Drink beverages at the end of the meal. Avoid foods that are hard to chew. Follow-up care is a key part of your treatment and safety. Be sure to make and go to all appointments, and call your doctor if you are having problems. It's also a good idea to know your test results and keep a list of the medicines you take. Where can you learn more? Go to https://Equals6.Earth Sky. org and sign in to your AdExtent account. Enter D820 in the [employer]Stream box to learn more about \"Learning About Chronic Bronchitis. \"     If you do not have an account, please click on the \"Sign Up Now\" link. Current as of: June 9, 2019  Content Version: 12.3  © 5939-5469 Healthwise, Incorporated. Care instructions adapted under license by [facility] ([facility]). If you have questions about a medical condition or this instruction, always ask your healthcare professional. [place] [address] any warranty or liability for your use of this information.

## 2020-02-03 ENCOUNTER — OFFICE VISIT (OUTPATIENT)
Dept: PRIMARY CARE CLINIC | Age: 65
End: 2020-02-03
Payer: MEDICARE

## 2020-02-03 VITALS
HEART RATE: 57 BPM | HEIGHT: 73 IN | TEMPERATURE: 97.8 F | WEIGHT: 300.25 LBS | OXYGEN SATURATION: 97 % | SYSTOLIC BLOOD PRESSURE: 150 MMHG | DIASTOLIC BLOOD PRESSURE: 98 MMHG | BODY MASS INDEX: 39.79 KG/M2

## 2020-02-03 PROCEDURE — G8417 CALC BMI ABV UP PARAM F/U: HCPCS | Performed by: NURSE PRACTITIONER

## 2020-02-03 PROCEDURE — 3017F COLORECTAL CA SCREEN DOC REV: CPT | Performed by: NURSE PRACTITIONER

## 2020-02-03 PROCEDURE — 94010 BREATHING CAPACITY TEST: CPT | Performed by: NURSE PRACTITIONER

## 2020-02-03 PROCEDURE — G8427 DOCREV CUR MEDS BY ELIG CLIN: HCPCS | Performed by: NURSE PRACTITIONER

## 2020-02-03 PROCEDURE — 1036F TOBACCO NON-USER: CPT | Performed by: NURSE PRACTITIONER

## 2020-02-03 PROCEDURE — 3023F SPIROM DOC REV: CPT | Performed by: NURSE PRACTITIONER

## 2020-02-03 PROCEDURE — 99213 OFFICE O/P EST LOW 20 MIN: CPT | Performed by: NURSE PRACTITIONER

## 2020-02-03 PROCEDURE — G8926 SPIRO NO PERF OR DOC: HCPCS | Performed by: NURSE PRACTITIONER

## 2020-02-03 PROCEDURE — G8482 FLU IMMUNIZE ORDER/ADMIN: HCPCS | Performed by: NURSE PRACTITIONER

## 2020-02-03 ASSESSMENT — ENCOUNTER SYMPTOMS
CONSTIPATION: 0
WHEEZING: 0
ABDOMINAL PAIN: 0
COUGH: 0
SHORTNESS OF BREATH: 0
EYES NEGATIVE: 1
BACK PAIN: 0
NAUSEA: 0
VOMITING: 0
TROUBLE SWALLOWING: 0
DIARRHEA: 0
SORE THROAT: 0

## 2020-02-03 NOTE — PROGRESS NOTES
[address]  [place], [address]  Phone [phone]   Fax [phone]      OFFICE VISIT: 2/3/2020    [facility]: 1955      Reason For Visit:  Junior Lyles is a 59 y.o. Bhavana Celina is here for Follow-up (for breathing)         Health Maintenance     HPI    Patient is here for 1 week follow up on breathing  He is set to get lung scan Friday    Reports he tried to take the anoro   And felt to nervous was shaking and nervous and not able to sleep  Reports he finished the doxy and did well      Reports his breathing is better  He has one day left  Reports no fever  Eating and drinking well  Denies any issues at present    He does use a cpap  He sees Dr Oralia Cunningham  No recent PFT         height is 6' 1\" (1.854 m) and weight is 300 lb 4 oz (136.2 kg) (abnormal). His temporal temperature is 97.8 °F (36.6 °C). His blood pressure is 150/98 (abnormal) and his pulse is 57. His oxygen saturation is 97%. Body mass index is 39.61 kg/m².     Results for orders placed or performed in visit on 11/25/19   Hemoglobin A1C   Result Value Ref Range    Hemoglobin A1C 6.2 (H) 4.0 - 6.0 %   Lipid Panel   Result Value Ref Range    Cholesterol, Total 123 (L) 160 - 199 mg/dL    Triglycerides 71 0 - 149 mg/dL    HDL 47 (L) 55 - 121 mg/dL    LDL Calculated 62 <100 mg/dL   Hepatitis C Antibody   Result Value Ref Range    Hep C Ab Interp Non-Reactive    Comprehensive Metabolic Panel   Result Value Ref Range    Sodium 142 136 - 145 mmol/L    Potassium 4.4 3.5 - 5.0 mmol/L    Chloride 99 98 - 111 mmol/L    CO2 28 22 - 29 mmol/L    Anion Gap 15 7 - 19 mmol/L    Glucose 133 (H) 74 - 109 mg/dL    BUN 10 8 - 23 mg/dL    CREATININE 0.8 0.5 - 1.2 mg/dL    GFR Non-African American >60 >60    Calcium 9.7 8.8 - 10.2 mg/dL    Total Protein 7.0 6.6 - 8.7 g/dL    Alb 4.1 3.5 - 5.2 g/dL    Total Bilirubin 0.4 0.2 - 1.2 mg/dL    Alkaline Phosphatase 77 40 - 130 U/L    ALT 13 5 - 41 U/L    AST 14 5 - 40 U/L   HIV-1,2 Combo Ag/Ab EIA with Reflex Type 2 diabetes mellitus ([facility] [address] 3/23/2016    Unspecified sleep apnea     uses bipap    Vertigo        Past Surgical History:   Procedure Laterality Date    ADENOIDECTOMY      BROW LIFT      due to vision    CARDIAC CATHETERIZATION  10/24/2018         COLONOSCOPY  30 yrs ago    [address], normal per patient    HERNIA REPAIR      KNEE ARTHROSCOPY Left     FL NJX DX/THER SBST EPIDURAL/SUBARACH LUMBAR/SACRAL N/A 10/3/2017    L3-4 EPIDURAL STEROID INJECTION performed by Ovidio Alejo at [address] [place] [address] TONSILLECTOMY         Social History     Tobacco Use    Smoking status: Former Smoker     Packs/day: 0.50     Years: 20.00     Pack years: 10.00     Types: Cigarettes     Start date:      Last attempt to quit:      Years since quittin.1    Smokeless tobacco: Never Used   Substance Use Topics    Alcohol use: No       Review of Systems   Constitutional: Negative for activity change, appetite change, fatigue and fever. HENT: Negative for congestion, nosebleeds, sore throat and trouble swallowing. Eyes: Negative. Respiratory: Negative for cough, shortness of breath and wheezing. Cardiovascular: Negative for chest pain, palpitations and leg swelling. Gastrointestinal: Negative for abdominal pain, constipation, diarrhea, nausea and vomiting. Genitourinary: Negative for difficulty urinating. Musculoskeletal: Negative for arthralgias and back pain. Skin: Negative for rash. Neurological: Negative for seizures, speech difficulty and numbness. Psychiatric/Behavioral: Negative for behavioral problems and sleep disturbance. The patient is not nervous/anxious and is not hyperactive. Physical Exam  Vitals signs reviewed. Constitutional:       General: He is not in acute distress. Appearance: Normal appearance. He is obese. He is not ill-appearing. HENT:      Head: Normocephalic. Right Ear: Tympanic membrane normal. There is no impacted cerumen. Left Ear: Tympanic membrane normal. There is no impacted cerumen. Nose: No congestion. Mouth/Throat:      Pharynx: No posterior oropharyngeal erythema. Eyes:      General:         Right eye: No discharge. Left eye: No discharge. Neck:      Musculoskeletal: Normal range of motion. No muscular tenderness. Cardiovascular:      Rate and Rhythm: Normal rate and regular rhythm. Pulses: Normal pulses. Heart sounds: No murmur. Pulmonary:      Effort: Pulmonary effort is normal. No respiratory distress. Breath sounds: Normal breath sounds. No wheezing or rhonchi. Abdominal:      General: Bowel sounds are normal.   Musculoskeletal: Normal range of motion. Lymphadenopathy:      Cervical: No cervical adenopathy. Skin:     General: Skin is warm. Capillary Refill: Capillary refill takes less than 2 seconds. Findings: No rash. Neurological:      General: No focal deficit present. Mental Status: He is alert and oriented to person, place, and time. Psychiatric:         Mood and Affect: Mood normal.         Behavior: Behavior normal.         ASSESSMENT/ PLAN  PFT   FVC79  FVC 69   Consistent moderate obstruction    1. Tobacco abuse  In past  Doing well    2. Chronic bronchitis, unspecified chronic bronchitis type ([address]  Discussed  Will monitor  For reucurrance  Suggest clean COPD machine    3. Stage 2 moderate COPD by GOLD classification ([address]  Will start incruse discussed prevention  And flairs   Not tolerated anoro        No orders of the defined types were placed in this encounter. Return routine copd follow up and diabetes. Patient Instructions       Patient Education        Chronic Obstructive Pulmonary Disease (COPD): Care Instructions  Your Care Instructions    Chronic obstructive pulmonary disease (COPD) is a general term for a group of lung diseases, including emphysema and chronic bronchitis.  People with COPD have decreased airflow in and out of the day for them to work well.     · A spacer may help you get more inhaled medicine to your lungs. Ask your doctor or pharmacist if a spacer is right for you. If it is, ask how to use it properly.     · Do not take any vitamins, over-the-counter medicine, or herbal products without talking to your doctor first.     · If your doctor prescribed antibiotics, take them as directed. Do not stop taking them just because you feel better. You need to take the full course of antibiotics.     · Oxygen therapy boosts the amount of oxygen in your blood and helps you breathe easier. Use the flow rate your doctor has recommended, and do not change it without talking to your doctor first.   Activity    · Get regular exercise. Walking is an easy way to get exercise. Start out slowly, and walk a little more each day.     · Pay attention to your breathing. You are exercising too hard if you cannot talk while you are exercising.     · Take short rest breaks when doing household chores and other activities.     · Learn breathing methods--such as breathing through pursed lips--to help you become less short of breath.     · If your doctor has not set you up with a pulmonary rehabilitation program, talk to him or her about whether rehab is right for you. Rehab includes exercise programs, education about your disease and how to manage it, help with diet and other changes, and emotional support. Diet    · Eat regular, healthy meals. Use bronchodilators about 1 hour before you eat to make it easier to eat. Eat several small meals instead of three large ones. Drink beverages at the end of the meal. Avoid foods that are hard to chew.     · Eat foods that contain protein so that you do not lose muscle mass.     · Talk with your doctor if you gain too much weight or if you lose weight without trying.    Mental health    · Talk to your family, friends, or a therapist about your feelings.  It is normal to feel frightened, angry, hopeless, helpless, control makes things worse. It causes your airways to close. It also traps the mucus in your lungs. Controlled coughing comes from deep in your lungs. It loosens mucus and moves it though your airways. It is best to do it after you use your inhaler or other medicine. 1. Sit on the edge of a chair. Keep both feet on the floor. 2. Lean forward a little. Relax. 3. Breathe in slowly through your nose. Fold your arms over your belly. 4. Lean forward as you breathe out. Push your arms against your belly. 5. Cough 2 or 3 times as you exhale with your mouth slightly open. Make the coughs short and sharp. Push on your belly with your arms as you cough. The first cough brings the mucus through the lung airways. The next coughs bring it up and out. 6. Inhale again, but do it slowly and gently through your nose. Do not take quick or deep breaths through your mouth. It can block the mucus coming out of the lungs. It also can cause uncontrolled coughing. 7. Rest, and repeat if you need to. How do you do postural drainage? Postural drainage means lying down in different positions to help drain mucus from your lungs. Hold each position for 5 minutes. Do it about 30 minutes after you use your inhaler. Make sure you have an empty stomach. If you need to cough, sit up and do controlled coughing. 1. To drain the front of your lungs: Make sure your chest is lower than your hips. Put two pillows under your hips. Use a small pillow under your head. Keep your arms at your sides. Then follow these instructions for breathing:  ? Breathe in: With one hand on your belly and the other on your chest, breathe in. Push your belly out as far as possible. You should be able to feel the hand on your belly move out, while the hand on your chest should not move. ? Breathe out: When you breathe out, you should be able to feel the hand on your belly move in. This is called diaphragmatic breathing.  You will use it in the other drainage positions too. 2. To drain the sides of your lungs: Place two or three pillows under your hips. Use a small pillow under your head. Make sure your chest is lower than your hips. Use diaphragmatic breathing. After 5 or 10 minutes, switch sides. 3. To drain the back of your lungs: Place two or three pillows under your hips. Use a small pillow under your head. Kneel over the pillows. Place your arms by your head. Use diaphragmatic breathing. How do you do chest percussion? Chest percussion means that you lightly tap your chest and back. The tapping loosens the mucus in your lungs. · Cup your hand, and lightly tap your chest and back. · Ask your doctor where the best spots are to tap. Avoid your spine and breastbone. · It may be easier to have someone do the tapping for you. Follow-up care is a key part of your treatment and safety. Be sure to make and go to all appointments, and call your doctor if you are having problems. It's also a good idea to know your test results and keep a list of the medicines you take. Where can you learn more? Go to https://[employer].vivit. org and sign in to your [employer] account. Enter N629 in the [facility] box to learn more about \"Learning About COPD and Clearing Your Lungs. \"     If you do not have an account, please click on the \"Sign Up Now\" link. Current as of: June 9, 2019  Content Version: 12.3  © 4211-1439 [employer]. Care instructions adapted under license by [facility] ([facility]). If you have questions about a medical condition or this instruction, always ask your healthcare professional. [place] [address] any warranty or liability for your use of this information. Patient Education        umeclidinium  Pronunciation:  ue ME kli DIN ee um  Brand:  Incruse Ellipta  What is the most important information I should know about umeclidinium?   You should not use this medicine if you are allergic to umeclidinium or have a severe allergy to milk proteins. Umeclidinium is not a rescue medicine. It will not work fast enough to treat a bronchospasm attack. Use only a fast acting inhalation medicine for an attack. Seek medical attention if your breathing problems do not improve, or if your symptoms get worse quickly. What is umeclidinium? Umeclidinium is used to prevent airflow obstruction and reduce flare-ups in adults with COPD (chronic obstructive pulmonary disease), including bronchitis and emphysema. This medicine is for use only in people with COPD and should not be used to treat asthma. Umeclidinium may also be used for purposes not listed in this medication guide. What should I discuss with my healthcare provider before using umeclidinium? You should not use umeclidinium if you are allergic to it, or if you have:  · a severe allergy to milk proteins. Tell your doctor if you have ever had:  · glaucoma;  · heart disease;  · an enlarged prostate; or  · urination problems. Tell your doctor if you are pregnant or breastfeeding. Umeclidinium is not approved for use by anyone younger than 25years old. How should I use umeclidinium? Follow all directions on your prescription label and read all medication guides or instruction sheets. Use the medicine exactly as directed. Read and carefully follow any Instructions for Use provided with your medicine. Ask your doctor or pharmacist if you do not understand these instructions. Use the medicine at the same time each day, and not more than once in a 24-hour period. Umeclidinium is not a rescue medicine for bronchospasm attacks. Use only fast-acting inhalation medicine for an attack. Seek medical attention if your breathing problems get worse quickly, or if you think your medications are not working as well. Do not stop using umeclidinium without your doctor's advice, even if you feel fine. Do not try to clean or take apart the inhaler device.  Always use the new inhaler device provided with your refill. Store at room temperature away from moisture, heat, and light. Keep the inhaler device in the sealed foil tray until ready to start using it. Throw the inhaler away 6 weeks after opening, or when the dose indicator shows a zero (whichever comes first). What happens if I miss a dose? Use the medicine as soon as you can, but skip the missed dose if it is almost time for your next dose. Do not use two doses at one time. Do not use more than once in a 24-hour period. What happens if I overdose? Seek emergency medical attention or call the Poison Help line at 1-439.613.6774. What should I avoid while using umeclidinium? Do not use a second inhaled bronchodilator that contains aclidinium, arformoterol, indacaterol, olodaterol, salmeterol, tiotropium, or vilanterol. Follow your doctor's instructions about any restrictions on food, beverages, or activity. What are the possible side effects of umeclidinium? Get emergency medical help if you have signs of an allergic reaction: hives, rash, severe itching; difficult breathing; swelling of your face, lips, tongue, or throat. Call your doctor at once if you have:  · wheezing, choking, or other breathing problems after using this medicine;  · blurred vision, nausea, vomiting, eye pain or redness, or seeing halos or bright colors around lights; or  · painful or difficult urination, or urinating more often. Common side effects may include:  · cold symptoms such as stuffy nose, sneezing, sore throat, cough;  · fast or irregular heartbeats;  · bruising, darkened skin;  · mouth pain, tooth pain;  · muscle or joint pain;  · stomach pain; or  · altered sense of taste. This is not a complete list of side effects and others may occur. Call your doctor for medical advice about side effects. You may report side effects to FDA at 7-526-LMO-6857. What other drugs will affect umeclidinium?   Tell your doctor about all your other medicines, especially:  · atropine;  · medicine to treat depression, anxiety, mood disorders, or mental illness;  · cold or allergy medicine (Benadryl and others);  · medicine to treat Parkinson's disease;  · medicine to treat stomach problems, motion sickness, or irritable bowel syndrome;  · medicine to treat overactive bladder; or  · another bronchodilator medication. This list is not complete. Other drugs may affect umeclidinium, including prescription and over-the-counter medicines, vitamins, and herbal products. Not all possible drug interactions are listed here. Where can I get more information? Your pharmacist can provide more information about umeclidinium. Remember, keep this and all other medicines out of the reach of children, never share your medicines with others, and use this medication only for the indication prescribed. Every effort has been made to ensure that the information provided by [address] is accurate, up-to-date, and complete, but no guarantee is made to that effect. Drug information contained herein may be time sensitive. [facility] information has been compiled for use by healthcare practitioners and consumers in the [place] and therefore [employer] does not warrant that uses outside of the [place] are appropriate, unless specifically indicated otherwise. [facility]'s drug information does not endorse drugs, diagnose patients or recommend therapy. [facility] drug information is an informational resource designed to assist licensed healthcare practitioners in caring for their patients and/or to serve consumers viewing this service as a supplement to, and not a substitute for, the expertise, skill, knowledge and judgment of healthcare practitioners. The absence of a warning for a given drug or drug combination in no way should be construed to indicate that the drug or drug combination is safe, effective or appropriate for any given patient.  [employer] does not assume any responsibility for any aspect of healthcare administered with the aid of information [employer] provides. The information contained herein is not intended to cover all possible uses, directions, precautions, warnings, drug interactions, allergic reactions, or adverse effects. If you have questions about the drugs you are taking, check with your doctor, nurse or pharmacist.  Copyright 9874-0161 [employer] [address] [place] [address]. Version: 4.01. Revision date: 7/24/2019. Care instructions adapted under license by [facility] ([facility]). If you have questions about a medical condition or this instruction, always ask your healthcare professional. [place] [address] any warranty or liability for your use of this information. Controlled Substances Monitoring: Additional Instructions: As always, patient is advisedto bring in medication bottles in order to correctly reconcile with our current list.      Tino Lord received counseling on the following healthy behaviors: none    Patient giveneducational materials on plan of care    I have instructed Tino Lord to complete a self tracking handout on none and instructed them to bring it with them to his next appointment. Discussed use, benefit, and side effects of prescribed medications. Barriers to medication compliance addressed. All patient questions answered. Pt voiced understanding.      HEENA Hidalgo

## 2020-02-07 ENCOUNTER — HOSPITAL ENCOUNTER (OUTPATIENT)
Dept: GENERAL RADIOLOGY | Age: 65
Discharge: HOME | End: 2020-02-07
Payer: MEDICARE

## 2020-02-07 ENCOUNTER — OFFICE VISIT (OUTPATIENT)
Dept: PRIMARY CARE CLINIC | Age: 65
End: 2020-02-07
Payer: MEDICARE

## 2020-02-07 VITALS
HEIGHT: 73 IN | WEIGHT: 297 LBS | TEMPERATURE: 98.3 F | DIASTOLIC BLOOD PRESSURE: 70 MMHG | HEART RATE: 68 BPM | BODY MASS INDEX: 39.36 KG/M2 | OXYGEN SATURATION: 95 % | SYSTOLIC BLOOD PRESSURE: 122 MMHG

## 2020-02-07 PROCEDURE — G8427 DOCREV CUR MEDS BY ELIG CLIN: HCPCS | Performed by: NURSE PRACTITIONER

## 2020-02-07 PROCEDURE — 3023F SPIROM DOC REV: CPT | Performed by: NURSE PRACTITIONER

## 2020-02-07 PROCEDURE — G0297 LDCT FOR LUNG CA SCREEN: HCPCS

## 2020-02-07 PROCEDURE — 1036F TOBACCO NON-USER: CPT | Performed by: NURSE PRACTITIONER

## 2020-02-07 PROCEDURE — 99213 OFFICE O/P EST LOW 20 MIN: CPT | Performed by: NURSE PRACTITIONER

## 2020-02-07 PROCEDURE — 3017F COLORECTAL CA SCREEN DOC REV: CPT | Performed by: NURSE PRACTITIONER

## 2020-02-07 PROCEDURE — G8926 SPIRO NO PERF OR DOC: HCPCS | Performed by: NURSE PRACTITIONER

## 2020-02-07 PROCEDURE — 73562 X-RAY EXAM OF KNEE 3: CPT

## 2020-02-07 PROCEDURE — G8482 FLU IMMUNIZE ORDER/ADMIN: HCPCS | Performed by: NURSE PRACTITIONER

## 2020-02-07 PROCEDURE — G8417 CALC BMI ABV UP PARAM F/U: HCPCS | Performed by: NURSE PRACTITIONER

## 2020-02-07 ASSESSMENT — ENCOUNTER SYMPTOMS
NAUSEA: 0
VOMITING: 0
BACK PAIN: 0
DIARRHEA: 0
SHORTNESS OF BREATH: 0
COUGH: 0
WHEEZING: 0
SORE THROAT: 0
CONSTIPATION: 0
ABDOMINAL PAIN: 0
TROUBLE SWALLOWING: 0
EYES NEGATIVE: 1

## 2020-02-07 NOTE — PATIENT INSTRUCTIONS

## 2020-02-07 NOTE — PROGRESS NOTES
130 U/L    ALT 13 5 - 41 U/L    AST 14 5 - 40 U/L   HIV-1,2 Combo Ag/Ab EIA with Reflex   Result Value Ref Range    HIV 1,2 Combo Antigen/Antibody Negative Negative       I have reviewed the following with the Mr. Batres   Lab Review   Orders Only on 11/25/2019   Component Date Value    Hemoglobin A1C 11/25/2019 6.2*    Cholesterol, Total 11/25/2019 123*    Triglycerides 11/25/2019 71     HDL 11/25/2019 47*    LDL Calculated 11/25/2019 62     Hep C Ab Interp 11/25/2019 Non-Reactive     Sodium 11/25/2019 142     Potassium 11/25/2019 4.4     Chloride 11/25/2019 99     CO2 11/25/2019 28     Anion Gap 11/25/2019 15     Glucose 11/25/2019 133*    BUN 11/25/2019 10     CREATININE 11/25/2019 0.8     GFR Non- 11/25/2019 >60     Calcium 11/25/2019 9.7     Total Protein 11/25/2019 7.0     Alb 11/25/2019 4.1     Total Bilirubin 11/25/2019 0.4     Alkaline Phosphatase 11/25/2019 77     ALT 11/25/2019 13     AST 11/25/2019 14     HIV 1,2 Combo Antigen/An* 11/25/2019 Negative    Admission on 09/30/2019, Discharged on 09/30/2019   Component Date Value    QRS Duration 09/30/2019 84     Q-T Interval 09/30/2019 284     QTc Calculation (Bazett) 09/30/2019 459     T Axis 09/30/2019 42     Sodium 09/30/2019 139     Potassium 09/30/2019 4.0     Chloride 09/30/2019 97*    CO2 09/30/2019 27     Anion Gap 09/30/2019 15     Glucose 09/30/2019 160*    BUN 09/30/2019 19     CREATININE 09/30/2019 1.1     GFR Non- 09/30/2019 >60     Calcium 09/30/2019 10.5*    Total Protein 09/30/2019 8.5     Alb 09/30/2019 4.9     Total Bilirubin 09/30/2019 0.3     Alkaline Phosphatase 09/30/2019 92     ALT 09/30/2019 21     AST 09/30/2019 17     WBC 09/30/2019 8.2     RBC 09/30/2019 5.36     Hemoglobin 09/30/2019 15.3     Hematocrit 09/30/2019 47.3     MCV 09/30/2019 88.2     MCH 09/30/2019 28.5     MCHC 09/30/2019 32.3*    RDW 09/30/2019 13.8     Platelets 61/17/3357 180     MPV 09/30/2019 11.7     Neutrophils % 09/30/2019 67.9*    Lymphocytes % 09/30/2019 20.4     Monocytes % 09/30/2019 9.4     Eosinophils % 09/30/2019 1.2     Basophils % 09/30/2019 0.7     Neutrophils Absolute 09/30/2019 5.6     Immature Granulocytes # 09/30/2019 0.0     Lymphocytes Absolute 09/30/2019 1.7     Monocytes Absolute 09/30/2019 0.80     Eosinophils Absolute 09/30/2019 0.10     Basophils Absolute 09/30/2019 0.10     aPTT 09/30/2019 30.2     Protime 09/30/2019 13.7     INR 09/30/2019 1.11     P-R Interval 09/30/2019 186     QRS Duration 09/30/2019 88     Q-T Interval 09/30/2019 372     QTc Calculation (Bazett) 09/30/2019 422     P Axis 09/30/2019 39     T Axis 09/30/2019 37     Troponin 09/30/2019 <0.01     TSH 09/30/2019 1.140     T4 Free 09/30/2019 1.2      Copies of these are in the chart. Prior to Visit Medications    Medication Sig Taking? Authorizing Provider   Umeclidinium Bromide (INCRUSE ELLIPTA) 62.5 MCG/INH AEPB Inhale 1 puff into the lungs daily Yes HEENA Adorno   albuterol sulfate HFA (VENTOLIN HFA) 108 (90 Base) MCG/ACT inhaler Inhale 2 puffs into the lungs every 6 hours as needed for Wheezing Yes HEENA Adorno   oxyCODONE-acetaminophen (PERCOCET)  MG per tablet Take 1 tablet by mouth every 6 hours as needed for Pain (Pain) for up to 30 days. Yes Kiera Woodward MD   oxyCODONE (OXYCONTIN) 20 MG extended release tablet Take 1 tablet by mouth 2 times daily for 30 days. Yes Kiera Woodward MD   ALPRAZolam Amanda Riddle) 1 MG tablet Take 1.5 tablets by mouth 3 times daily as needed (as needed) for up to 120 days. Yes Kiera Woodward MD   zolpidem (AMBIEN) 10 MG tablet Take 1 tablet by mouth as needed for Sleep for up to 90 days.  Yes HEENA Garza   fluticasone [facility]) 50 MCG/ACT nasal spray 2 sprays by Nasal route daily Yes HEENA Sneed   triamcinolone (KENALOG) 0.1 % cream Apply topically 2 times daily. Yes HEENA Castellanos   verapamil (VERELAN PM) 200 MG extended release capsule Take 1 capsule by mouth nightly Dose increase Yes HEENA Denny   ASPIRIN LOW DOSE 81 MG chewable tablet CHEW AND SWALLOW 1 TABLET BY MOUTH DAILY Yes HEENA Wheeler   blood glucose test strips (CONTOUR NEXT TEST) strip 1 each by In Vitro route daily As needed. Yes HEENA Snell   MICROLET LANCETS MISC 1 each by Does not apply route daily Yes HEENA Snell   metFORMIN (GLUCOPHAGE) 500 MG tablet Take 1 tablet by mouth 2 times daily (with meals) Yes HEENA Marie   hydroxychloroquine (PLAQUENIL) 200 MG tablet TK 2 TS PO QD WF OR MILK Yes Historical Provider, MD   VOLTAREN 1 % GEL APPLY 2 GRAMS TO HAND QID Yes Historical Provider, MD   lovastatin (MEVACOR) 40 MG tablet TAKE 1 TABLET BY MOUTH EVERY DAY Yes HEENA Wheeler   hydrochlorothiazide (HYDRODIURIL) 25 MG tablet Take 1 tablet by mouth daily Yes HEENA Wheeler   polyethylene glycol (MIRALAX) POWD powder Take 17 g by mouth every other day  Yes Historical Provider, MD       Allergies: Clindamycin/lincomycin;  Benadryl [diphenhydramine]; and Prednisone    Past Medical History:   Diagnosis Date    Acute sinusitis     Anxiety     Chronic back pain     Chronic bilateral low back pain with bilateral sciatica     Constipation     COPD (chronic obstructive pulmonary disease) (HCC)     Coronary artery disease involving native coronary artery of native heart without angina pectoris 8/27/2019    CPAP (continuous positive airway pressure) dependence     12cm to 20cm    Depression     Hyperlipidemia     Hypertensive disorder     OA (osteoarthritis of the spine)     Obesity     SUSANA (obstructive sleep apnea)     AHI:  85.3 per HST 11/2013    Osteoarthritis of spine with radiculopathy, lumbar region     Otitis media     Rheumatoid arthritis [facility] [address]     Spinal stenosis     Type 2 diabetes mellitus ([facility] [address] 3/23/2016    Unspecified sleep apnea     uses bipap    Vertigo        Past Surgical History:   Procedure Laterality Date    ADENOIDECTOMY      BROW LIFT      due to vision    CARDIAC CATHETERIZATION  10/24/2018         COLONOSCOPY  30 yrs ago    [place], [place], normal per patient    HERNIA REPAIR      KNEE ARTHROSCOPY Left     OR NJX DX/THER SBST EPIDURAL/SUBARACH LUMBAR/SACRAL N/A 10/3/2017    L3-4 EPIDURAL STEROID INJECTION performed by Rodri Alejo at [address] [place] [address] TONSILLECTOMY         Social History     Tobacco Use    Smoking status: Former Smoker     Packs/day: 0.50     Years: 20.00     Pack years: 10.00     Types: Cigarettes     Start date:      Last attempt to quit:      Years since quittin.1    Smokeless tobacco: Never Used   Substance Use Topics    Alcohol use: No       Review of Systems   Constitutional: Positive for activity change. Negative for appetite change, fatigue and fever. HENT: Negative for congestion, nosebleeds, sore throat and trouble swallowing. Eyes: Negative. Respiratory: Negative for cough, shortness of breath and wheezing. Cardiovascular: Negative for chest pain, palpitations and leg swelling. Gastrointestinal: Negative for abdominal pain, constipation, diarrhea, nausea and vomiting. Genitourinary: Negative for difficulty urinating. Musculoskeletal: Positive for arthralgias (tibia platau left knee ). Negative for back pain. Skin: Negative for rash. Medial circular cyst     Neurological: Negative for seizures, speech difficulty and numbness. Psychiatric/Behavioral: Negative for behavioral problems and sleep disturbance. The patient is not nervous/anxious and is not hyperactive. Physical Exam  Vitals signs reviewed. Constitutional:       General: He is not in acute distress. Appearance: Normal appearance. He is obese. He is not ill-appearing. HENT:      Head: Normocephalic.       Right Ear: Tympanic membrane normal. There is no impacted cerumen. Left Ear: Tympanic membrane normal. There is no impacted cerumen. Nose: No congestion. Mouth/Throat:      Pharynx: No posterior oropharyngeal erythema. Eyes:      General:         Right eye: No discharge. Left eye: No discharge. Neck:      Musculoskeletal: Normal range of motion. Cardiovascular:      Rate and Rhythm: Normal rate and regular rhythm. Pulses: Normal pulses. Heart sounds: No murmur. Pulmonary:      Effort: Pulmonary effort is normal. No respiratory distress. Breath sounds: Normal breath sounds. No rhonchi. Abdominal:      General: Bowel sounds are normal.   Musculoskeletal:         General: No swelling. Left knee: He exhibits normal range of motion, no swelling and no erythema. Tenderness found. Patellar tendon tenderness noted. Lymphadenopathy:      Cervical: No cervical adenopathy. Skin:     General: Skin is warm. Comments: Left inner lateral palp shallow non tender   Neurological:      Mental Status: He is alert. ASSESSMENT/ PLAN    1. Acute pain of left knee  Monitor swelling  Good suportive shoes  - XR KNEE LEFT (3 VIEWS); Future    2. Pain of left tibia  Will monitor  Xray  And to ortho if needed   - XR KNEE LEFT (3 VIEWS); Future    3. Sebaceous cyst  Discussed   Monitor no isuses    4. Stage 2 moderate COPD by GOLD classification ([address]  Stable  Is starting incruse daily  Monitor infections      Orders Placed This Encounter   Procedures    XR KNEE LEFT (3 VIEWS)        Return if symptoms worsen or fail to improve. Patient Instructions       Patient Education        Knee Pain or Injury: Care Instructions  Your Care Instructions    Injuries are a common cause of knee problems. Sudden (acute) injuries may be caused by a direct blow to the knee. They can also be caused by abnormal twisting, bending, or falling on the knee.  Pain, bruising, or swelling may be severe, and may start within minutes of the injury. Overuse is another cause of knee pain. Other causes are climbing stairs, kneeling, and other activities that use the knee. Everyday wear and tear, especially as you get older, also can cause knee pain. Rest, along with home treatment, often relieves pain and allows your knee to heal. If you have a serious knee injury, you may need tests and treatment. Follow-up care is a key part of your treatment and safety. Be sure to make and go to all appointments, and call your doctor if you are having problems. It's also a good idea to know your test results and keep a list of the medicines you take. How can you care for yourself at home? · Be safe with medicines. Read and follow all instructions on the label. ? If the doctor gave you a prescription medicine for pain, take it as prescribed. ? If you are not taking a prescription pain medicine, ask your doctor if you can take an over-the-counter medicine. · Rest and protect your knee. Take a break from any activity that may cause pain. · Put ice or a cold pack on your knee for 10 to 20 minutes at a time. Put a thin cloth between the ice and your skin. · Prop up a sore knee on a pillow when you ice it or anytime you sit or lie down for the next 3 days. Try to keep it above the level of your heart. This will help reduce swelling. · If your knee is not swollen, you can put moist heat, a heating pad, or a warm cloth on your knee. · If your doctor recommends an elastic bandage, sleeve, or other type of support for your knee, wear it as directed. · Follow your doctor's instructions about how much weight you can put on your leg. Use a cane, crutches, or a walker as instructed. · Follow your doctor's instructions about activity during your healing process. If you can do mild exercise, slowly increase your activity. · Reach and stay at a healthy weight. Extra weight can strain the joints, especially the knees and hips, and make the pain worse.  Losing even a Controlled Substances Monitoring: Additional Instructions: As always, patient is advisedto bring in medication bottles in order to correctly reconcile with our current list.      Kirsten Perez received counseling on the following healthy behaviors: none    Patient giveneducational materials on plan of care    I have instructed Kirsten Perez to complete a self tracking handout on none and instructed them to bring it with them to his next appointment. Discussed use, benefit, and side effects of prescribed medications. Barriers to medication compliance addressed. All patient questions answered. Pt voiced understanding.      HEENA Trevino

## 2020-02-10 RX ORDER — LOVASTATIN 40 MG/1
TABLET ORAL
Qty: 90 TABLET | Refills: 3 | Status: SHIPPED | OUTPATIENT
Start: 2020-02-10 | End: 2021-04-12 | Stop reason: SDUPTHER

## 2020-02-11 ENCOUNTER — TELEPHONE (OUTPATIENT)
Dept: PRIMARY CARE CLINIC | Age: 65
End: 2020-02-11

## 2020-02-11 NOTE — TELEPHONE ENCOUNTER
----- Message from HEENA Garcia sent at 2/7/2020  1:34 PM CST -----  Xray knee noted moderate arthritis   No acute findings

## 2020-02-17 RX ORDER — OXYCODONE AND ACETAMINOPHEN 10; 325 MG/1; MG/1
1 TABLET ORAL EVERY 6 HOURS PRN
Qty: 120 TABLET | Refills: 0 | Status: SHIPPED | OUTPATIENT
Start: 2020-02-18 | End: 2020-03-15 | Stop reason: SDUPTHER

## 2020-02-17 RX ORDER — OXYCODONE HCL 20 MG/1
20 TABLET, FILM COATED, EXTENDED RELEASE ORAL 2 TIMES DAILY
Qty: 60 TABLET | Refills: 0 | Status: SHIPPED | OUTPATIENT
Start: 2020-02-17 | End: 2020-03-15 | Stop reason: SDUPTHER

## 2020-02-17 NOTE — TELEPHONE ENCOUNTER
Requested Prescriptions     Pending Prescriptions Disp Refills    oxyCODONE-acetaminophen (PERCOCET)  MG per tablet 120 tablet 0     Sig: Take 1 tablet by mouth every 6 hours as needed for Pain (Pain) for up to 30 days.  oxyCODONE (OXYCONTIN) 20 MG extended release tablet 60 tablet 0     Sig: Take 1 tablet by mouth 2 times daily for 30 days.        Last Office Visit:  10/21/2019  Next Office Visit:  4/21/2020  Last Medication Refill:  1/18/20 1/19/20   Any Albarado up to date: 2/17/20     *RX updated to reflect  Oxycontin - 2/17/0 Percocet - 2/18/20 fill date*

## 2020-03-02 RX ORDER — ASPIRIN 81 MG/1
TABLET, CHEWABLE ORAL
Qty: 30 TABLET | Refills: 5 | Status: SHIPPED | OUTPATIENT
Start: 2020-03-02 | End: 2020-05-17 | Stop reason: SDUPTHER

## 2020-03-09 RX ORDER — HYDROCHLOROTHIAZIDE 25 MG/1
TABLET ORAL
Qty: 30 TABLET | Refills: 1 | Status: SHIPPED | OUTPATIENT
Start: 2020-03-09 | End: 2020-04-30

## 2020-03-17 RX ORDER — OXYCODONE AND ACETAMINOPHEN 10; 325 MG/1; MG/1
1 TABLET ORAL EVERY 6 HOURS PRN
Qty: 120 TABLET | Refills: 0 | Status: SHIPPED | OUTPATIENT
Start: 2020-03-19 | End: 2020-04-17 | Stop reason: SDUPTHER

## 2020-03-17 RX ORDER — OXYCODONE HCL 20 MG/1
20 TABLET, FILM COATED, EXTENDED RELEASE ORAL 2 TIMES DAILY
Qty: 60 TABLET | Refills: 0 | Status: SHIPPED | OUTPATIENT
Start: 2020-03-18 | End: 2020-04-17 | Stop reason: SDUPTHER

## 2020-04-06 RX ORDER — VERAPAMIL HYDROCHLORIDE 200 MG/1
CAPSULE, EXTENDED RELEASE ORAL
Qty: 30 CAPSULE | Refills: 5 | Status: SHIPPED | OUTPATIENT
Start: 2020-04-06 | End: 2020-07-19 | Stop reason: SDUPTHER

## 2020-04-13 ENCOUNTER — TELEPHONE (OUTPATIENT)
Dept: CARDIOLOGY | Age: 65
End: 2020-04-13

## 2020-04-20 RX ORDER — OXYCODONE HCL 20 MG/1
20 TABLET, FILM COATED, EXTENDED RELEASE ORAL 2 TIMES DAILY
Qty: 60 TABLET | Refills: 0 | Status: SHIPPED | OUTPATIENT
Start: 2020-04-20 | End: 2020-05-17 | Stop reason: SDUPTHER

## 2020-04-20 RX ORDER — OXYCODONE AND ACETAMINOPHEN 10; 325 MG/1; MG/1
1 TABLET ORAL EVERY 6 HOURS PRN
Qty: 120 TABLET | Refills: 0 | Status: SHIPPED | OUTPATIENT
Start: 2020-04-20 | End: 2020-05-17 | Stop reason: SDUPTHER

## 2020-04-30 RX ORDER — HYDROCHLOROTHIAZIDE 25 MG/1
TABLET ORAL
Qty: 90 TABLET | Refills: 3 | Status: SHIPPED | OUTPATIENT
Start: 2020-04-30 | End: 2021-06-16 | Stop reason: SDUPTHER

## 2020-04-30 RX ORDER — HYDROCHLOROTHIAZIDE 25 MG/1
TABLET ORAL
Qty: 30 TABLET | Refills: 1 | Status: SHIPPED | OUTPATIENT
Start: 2020-04-30 | End: 2020-04-30

## 2020-05-04 RX ORDER — ALPRAZOLAM 1 MG/1
1.5 TABLET ORAL 3 TIMES DAILY PRN
Qty: 135 TABLET | Refills: 3 | Status: SHIPPED | OUTPATIENT
Start: 2020-05-04 | End: 2020-09-01

## 2020-05-18 RX ORDER — OXYCODONE HCL 20 MG/1
20 TABLET, FILM COATED, EXTENDED RELEASE ORAL 2 TIMES DAILY
Qty: 60 TABLET | Refills: 0 | Status: SHIPPED | OUTPATIENT
Start: 2020-05-20 | End: 2020-06-18 | Stop reason: SDUPTHER

## 2020-05-18 RX ORDER — ASPIRIN 81 MG/1
81 TABLET, CHEWABLE ORAL DAILY
Qty: 30 TABLET | Refills: 5 | Status: SHIPPED | OUTPATIENT
Start: 2020-05-18 | End: 2020-11-02

## 2020-05-18 RX ORDER — OXYCODONE AND ACETAMINOPHEN 10; 325 MG/1; MG/1
1 TABLET ORAL EVERY 6 HOURS PRN
Qty: 120 TABLET | Refills: 0 | Status: SHIPPED | OUTPATIENT
Start: 2020-05-20 | End: 2020-06-18 | Stop reason: SDUPTHER

## 2020-06-19 RX ORDER — OXYCODONE AND ACETAMINOPHEN 10; 325 MG/1; MG/1
1 TABLET ORAL EVERY 6 HOURS PRN
Qty: 120 TABLET | Refills: 0 | Status: SHIPPED | OUTPATIENT
Start: 2020-06-19 | End: 2020-07-19 | Stop reason: SDUPTHER

## 2020-06-19 RX ORDER — OXYCODONE HCL 20 MG/1
20 TABLET, FILM COATED, EXTENDED RELEASE ORAL 2 TIMES DAILY
Qty: 60 TABLET | Refills: 0 | Status: SHIPPED | OUTPATIENT
Start: 2020-06-19 | End: 2020-07-19 | Stop reason: SDUPTHER

## 2020-06-19 NOTE — TELEPHONE ENCOUNTER
Requested Prescriptions     Pending Prescriptions Disp Refills    oxyCODONE-acetaminophen (PERCOCET)  MG per tablet 120 tablet 0     Sig: Take 1 tablet by mouth every 6 hours as needed for Pain (Pain) for up to 30 days.  oxyCODONE (OXYCONTIN) 20 MG extended release tablet 60 tablet 0     Sig: Take 1 tablet by mouth 2 times daily for 30 days.        Last Office Visit:  10/21/2019  Next Office Visit:  6/29/2020  Last Medication Refill: 5/20/20 for beni Bundy up to date: 6/19/20     *RX updated to reflect  6/19/20 for both  fill date*

## 2020-06-24 ENCOUNTER — OFFICE VISIT (OUTPATIENT)
Dept: CARDIOLOGY | Age: 65
End: 2020-06-24
Payer: MEDICARE

## 2020-06-24 VITALS
HEART RATE: 76 BPM | HEIGHT: 73 IN | DIASTOLIC BLOOD PRESSURE: 88 MMHG | WEIGHT: 306 LBS | SYSTOLIC BLOOD PRESSURE: 138 MMHG | BODY MASS INDEX: 40.56 KG/M2

## 2020-06-24 PROCEDURE — G8427 DOCREV CUR MEDS BY ELIG CLIN: HCPCS | Performed by: CLINICAL NURSE SPECIALIST

## 2020-06-24 PROCEDURE — 99213 OFFICE O/P EST LOW 20 MIN: CPT | Performed by: CLINICAL NURSE SPECIALIST

## 2020-06-24 PROCEDURE — G8417 CALC BMI ABV UP PARAM F/U: HCPCS | Performed by: CLINICAL NURSE SPECIALIST

## 2020-06-24 PROCEDURE — 3017F COLORECTAL CA SCREEN DOC REV: CPT | Performed by: CLINICAL NURSE SPECIALIST

## 2020-06-24 PROCEDURE — 1036F TOBACCO NON-USER: CPT | Performed by: CLINICAL NURSE SPECIALIST

## 2020-06-24 NOTE — PROGRESS NOTES
pain 07/03/2017    CPAP (continuous positive airway pressure) dependence     Osteoarthritis of spine with radiculopathy, lumbar region     Chronic bilateral low back pain with bilateral sciatica     Chest discomfort 03/23/2016    Essential hypertension 03/23/2016    Hypercholesteremia 03/23/2016    Type 2 diabetes mellitus ([facility] [address] 03/23/2016    SUSANA (obstructive sleep apnea) 03/23/2016    Obesity 03/23/2016     Past Medical History:   Diagnosis Date    Acute sinusitis     Anxiety     Chronic back pain     Chronic bilateral low back pain with bilateral sciatica     Constipation     COPD (chronic obstructive pulmonary disease) (HCC)     Coronary artery disease involving native coronary artery of native heart without angina pectoris 8/27/2019    CPAP (continuous positive airway pressure) dependence     12cm to 20cm    Depression     Hyperlipidemia     Hypertensive disorder     OA (osteoarthritis of the spine)     Obesity     SUSANA (obstructive sleep apnea)     AHI:  85.3 per HST 11/2013    Osteoarthritis of spine with radiculopathy, lumbar region     Otitis media     Rheumatoid arthritis ([address]     Spinal stenosis     Type 2 diabetes mellitus ([facility] [address] 03/23/2016    Unspecified sleep apnea     uses bipap    Vertigo      Past Surgical History:   Procedure Laterality Date    ADENOIDECTOMY      BROW LIFT      due to vision    CARDIAC CATHETERIZATION  10/24/2018         COLONOSCOPY  30 yrs ago    [facility], normal per patient    HERNIA REPAIR      KNEE ARTHROSCOPY Left 2009    [place] NJX DX/THER SBST EPIDURAL/SUBARACH LUMBAR/SACRAL N/A 10/3/2017    L3-4 EPIDURAL STEROID INJECTION performed by Henna Bradley at [address] [facility] OR    TONSILLECTOMY       Family History   Problem Relation Age of Onset    Heart Disease Father 64    Cancer Father         malignant tumor of pharynx    Diabetes Mother     Hypertension Mother     Diabetes Sister         x 2    Diabetes Brother     Colon Cancer Neg Hx     Colon Polyps Neg Hx     Liver Disease Neg Hx     Rectal Cancer Neg Hx     Stomach Cancer Neg Hx      Social History     Tobacco Use    Smoking status: Former Smoker     Packs/day: 0.50     Years: 20.00     Pack years: 10.00     Types: Cigarettes     Start date:      Last attempt to quit:      Years since quittin.4    Smokeless tobacco: Never Used   Substance Use Topics    Alcohol use: No      Current Outpatient Medications   Medication Sig Dispense Refill    oxyCODONE-acetaminophen (PERCOCET)  MG per tablet Take 1 tablet by mouth every 6 hours as needed for Pain (Pain) for up to 30 days. 120 tablet 0    oxyCODONE (OXYCONTIN) 20 MG extended release tablet Take 1 tablet by mouth 2 times daily for 30 days. 60 tablet 0    aspirin 81 MG chewable tablet Take 1 tablet by mouth daily 30 tablet 5    ALPRAZolam (XANAX) 1 MG tablet Take 1.5 tablets by mouth 3 times daily as needed (as needed) for up to 120 days. 135 tablet 3    hydroCHLOROthiazide (HYDRODIURIL) 25 MG tablet TAKE 1 TABLET BY MOUTH EVERY DAY 90 tablet 3    verapamil (VERELAN) 200 MG extended release capsule TAKE 1 CAPSULE BY MOUTH EVERY NIGHT 30 capsule 5    lovastatin (MEVACOR) 40 MG tablet TAKE 1 TABLET BY MOUTH EVERY DAY 90 tablet 3    Umeclidinium Bromide (INCRUSE ELLIPTA) 62.5 MCG/INH AEPB Inhale 1 puff into the lungs daily 1 each 11    albuterol sulfate HFA (VENTOLIN HFA) 108 (90 Base) MCG/ACT inhaler Inhale 2 puffs into the lungs every 6 hours as needed for Wheezing 1 Inhaler 3    fluticasone (FLONASE) 50 MCG/ACT nasal spray 2 sprays by Nasal route daily 1 Bottle 11    triamcinolone (KENALOG) 0.1 % cream Apply topically 2 times daily. 45 g 0    blood glucose test strips (CONTOUR NEXT TEST) strip 1 each by In Vitro route daily As needed.  100 each 3    MICROLET LANCETS MISC 1 each by Does not apply route daily 100 each 3    metFORMIN (GLUCOPHAGE) 500 MG tablet Take 1 tablet by mouth 2 times daily (with

## 2020-06-29 ENCOUNTER — TELEMEDICINE (OUTPATIENT)
Dept: NEUROLOGY | Age: 65
End: 2020-06-29
Payer: MEDICARE

## 2020-06-29 PROCEDURE — G8427 DOCREV CUR MEDS BY ELIG CLIN: HCPCS | Performed by: PSYCHIATRY & NEUROLOGY

## 2020-06-29 PROCEDURE — 3017F COLORECTAL CA SCREEN DOC REV: CPT | Performed by: PSYCHIATRY & NEUROLOGY

## 2020-06-29 PROCEDURE — 99213 OFFICE O/P EST LOW 20 MIN: CPT | Performed by: PSYCHIATRY & NEUROLOGY

## 2020-06-29 NOTE — PROGRESS NOTES
atrophy. No gross muscle weakness noted. No tremor noted. Coordination:  Rapid alternating movements are normal in both upper and lower extremities. Extension nose testing is unimpaired bilaterally. Gait:  Normal station and gait. Pertinent Diagnostic Studies:  Mariam Maryam is appropriate. BiPAP download is unavailable. Assessment:       ICD-10-CM    1. Osteoarthritis of spine with radiculopathy, lumbar region M47.26    2. Chronic bilateral low back pain with bilateral sciatica M54.42     M54.41     G89.29    3. Rheumatoid arthritis involving both hands with positive rheumatoid factor (HCC) M05.741     M05.742    4. Anxiety F41.9    5. SUSANA (obstructive sleep apnea) G47.33    He is clinically stable. He is benefiting from BiPAP use. He is benefiting from analgesics. He can have a new auto BiPAP at any time if insurance will cover. They may require a sleep study. Plan:   1. Get BiPAP download from [employer]  2. Can order an auto BiPAP whenever he wishes. 3. Continue BiPAP use during sleep  4. Continue present medications  5. FU in 6 months    Pursuant to the emergency declaration under the [facility], [facility] waiver authority and the [employer] Act, this Virtual  Visit was conducted, with patient's consent, to reduce the patient's risk of exposure to COVID-19 and provide continuity of care for an established patient. Services were provided through a video synchronous discussion virtually to substitute for in-person clinic visit.      Electronically signed by Ute Dorantes MD on 6/29/20

## 2020-07-20 RX ORDER — VERAPAMIL HYDROCHLORIDE 200 MG/1
200 CAPSULE, EXTENDED RELEASE ORAL DAILY
Qty: 90 CAPSULE | Refills: 3 | Status: SHIPPED | OUTPATIENT
Start: 2020-07-20 | End: 2021-08-04 | Stop reason: SDUPTHER

## 2020-07-20 RX ORDER — OXYCODONE HCL 20 MG/1
20 TABLET, FILM COATED, EXTENDED RELEASE ORAL 2 TIMES DAILY
Qty: 60 TABLET | Refills: 0 | Status: SHIPPED | OUTPATIENT
Start: 2020-07-20 | End: 2020-08-18 | Stop reason: SDUPTHER

## 2020-07-20 RX ORDER — OXYCODONE AND ACETAMINOPHEN 10; 325 MG/1; MG/1
1 TABLET ORAL EVERY 6 HOURS PRN
Qty: 120 TABLET | Refills: 0 | Status: SHIPPED | OUTPATIENT
Start: 2020-07-20 | End: 2020-08-18 | Stop reason: SDUPTHER

## 2020-07-20 NOTE — TELEPHONE ENCOUNTER
Requested Prescriptions     Pending Prescriptions Disp Refills    oxyCODONE-acetaminophen (PERCOCET)  MG per tablet 120 tablet 0     Sig: Take 1 tablet by mouth every 6 hours as needed for Pain (Pain) for up to 30 days.  oxyCODONE (OXYCONTIN) 20 MG extended release tablet 60 tablet 0     Sig: Take 1 tablet by mouth 2 times daily for 30 days.        Last Office Visit:  10/21/2019  Next Office Visit:  12/21/2020  Last Medication Refill: 6/19/20 for both   Abhi Liriano up to date: 6/19/20      *RX updated to reflect   7/20/20  For both fill date*

## 2020-08-20 NOTE — TELEPHONE ENCOUNTER
Requested Prescriptions     Pending Prescriptions Disp Refills    oxyCODONE-acetaminophen (PERCOCET)  MG per tablet 120 tablet 0     Sig: Take 1 tablet by mouth every 6 hours as needed for Pain (Pain) for up to 30 days.  oxyCODONE (OXYCONTIN) 20 MG extended release tablet 60 tablet 0     Sig: Take 1 tablet by mouth 2 times daily for 30 days.        Last Office Visit:  6/29/2020  Next Office Visit:  12/21/2020  Last Medication Refill:  7/20/20  Alden Gonzalez up to date: 6/19/20     *RX updated to reflect  8/20/20  fill date*

## 2020-08-21 RX ORDER — OXYCODONE HCL 20 MG/1
20 TABLET, FILM COATED, EXTENDED RELEASE ORAL 2 TIMES DAILY
Qty: 60 TABLET | Refills: 0 | Status: SHIPPED | OUTPATIENT
Start: 2020-08-21 | End: 2020-09-16 | Stop reason: SDUPTHER

## 2020-08-21 RX ORDER — OXYCODONE AND ACETAMINOPHEN 10; 325 MG/1; MG/1
1 TABLET ORAL EVERY 6 HOURS PRN
Qty: 120 TABLET | Refills: 0 | Status: SHIPPED | OUTPATIENT
Start: 2020-08-21 | End: 2020-09-16 | Stop reason: SDUPTHER

## 2020-08-31 NOTE — TELEPHONE ENCOUNTER
Requested Prescriptions     Pending Prescriptions Disp Refills    ALPRAZolam (XANAX) 1 MG tablet [Pharmacy Med Name: ALPRAZOLAM 1 MG TABS 1 TAB] 135 tablet 2     Sig: TAKE ONE AND A HALF TABLETS BY MOUTH THREE TIMES DAILY AS NEEDED       Last Office Visit:  6/29/2020  Next Office Visit:  12/21/2020  Last Medication Refill:  7/30/20  Chastity Lie up to date:  8/31/20    *RX updated to reflect   8/31/20  fill date*

## 2020-09-01 RX ORDER — ALPRAZOLAM 1 MG/1
TABLET ORAL
Qty: 135 TABLET | Refills: 5 | Status: SHIPPED | OUTPATIENT
Start: 2020-09-01 | End: 2021-02-19

## 2020-09-03 DIAGNOSIS — I25.10 CORONARY ARTERY DISEASE INVOLVING NATIVE CORONARY ARTERY OF NATIVE HEART WITHOUT ANGINA PECTORIS: ICD-10-CM

## 2020-09-03 DIAGNOSIS — E78.2 MIXED HYPERLIPIDEMIA: ICD-10-CM

## 2020-09-03 DIAGNOSIS — Z12.5 PROSTATE CANCER SCREENING: ICD-10-CM

## 2020-09-03 DIAGNOSIS — E11.9 TYPE 2 DIABETES MELLITUS WITHOUT COMPLICATION, WITHOUT LONG-TERM CURRENT USE OF INSULIN (HCC): ICD-10-CM

## 2020-09-03 DIAGNOSIS — I10 ESSENTIAL HYPERTENSION: ICD-10-CM

## 2020-09-03 LAB
ALBUMIN SERPL-MCNC: 4.2 G/DL (ref 3.5–5.2)
ALP BLD-CCNC: 65 U/L (ref 40–130)
ALT SERPL-CCNC: 17 U/L (ref 5–41)
ANION GAP SERPL CALCULATED.3IONS-SCNC: 15 MMOL/L (ref 7–19)
AST SERPL-CCNC: 16 U/L (ref 5–40)
BASOPHILS ABSOLUTE: 0.1 K/UL (ref 0–0.2)
BASOPHILS RELATIVE PERCENT: 1.1 % (ref 0–1)
BILIRUB SERPL-MCNC: 0.5 MG/DL (ref 0.2–1.2)
BUN BLDV-MCNC: 14 MG/DL (ref 8–23)
CALCIUM SERPL-MCNC: 9.8 MG/DL (ref 8.8–10.2)
CHLORIDE BLD-SCNC: 99 MMOL/L (ref 98–111)
CHOLESTEROL, TOTAL: 146 MG/DL (ref 160–199)
CO2: 27 MMOL/L (ref 22–29)
CREAT SERPL-MCNC: 0.9 MG/DL (ref 0.5–1.2)
CREATININE URINE: 87.1 MG/DL (ref 4.2–622)
EOSINOPHILS ABSOLUTE: 0.1 K/UL (ref 0–0.6)
EOSINOPHILS RELATIVE PERCENT: 1.9 % (ref 0–5)
GFR AFRICAN AMERICAN: >59
GFR NON-AFRICAN AMERICAN: >60
GLUCOSE BLD-MCNC: 141 MG/DL (ref 74–109)
HBA1C MFR BLD: 6.9 % (ref 4–6)
HCT VFR BLD CALC: 44.8 % (ref 42–52)
HDLC SERPL-MCNC: 49 MG/DL (ref 55–121)
HEMOGLOBIN: 14.2 G/DL (ref 14–18)
IMMATURE GRANULOCYTES #: 0 K/UL
LDL CHOLESTEROL CALCULATED: 78 MG/DL
LYMPHOCYTES ABSOLUTE: 1.8 K/UL (ref 1.1–4.5)
LYMPHOCYTES RELATIVE PERCENT: 38.9 % (ref 20–40)
MCH RBC QN AUTO: 28.4 PG (ref 27–31)
MCHC RBC AUTO-ENTMCNC: 31.7 G/DL (ref 33–37)
MCV RBC AUTO: 89.6 FL (ref 80–94)
MICROALBUMIN UR-MCNC: <1.2 MG/DL (ref 0–19)
MICROALBUMIN/CREAT UR-RTO: NORMAL MG/G
MONOCYTES ABSOLUTE: 0.6 K/UL (ref 0–0.9)
MONOCYTES RELATIVE PERCENT: 12.3 % (ref 0–10)
NEUTROPHILS ABSOLUTE: 2.1 K/UL (ref 1.5–7.5)
NEUTROPHILS RELATIVE PERCENT: 45.6 % (ref 50–65)
PDW BLD-RTO: 14.1 % (ref 11.5–14.5)
PLATELET # BLD: 143 K/UL (ref 130–400)
PMV BLD AUTO: 11.9 FL (ref 9.4–12.4)
POTASSIUM SERPL-SCNC: 4.5 MMOL/L (ref 3.5–5)
PROSTATE SPECIFIC ANTIGEN: 2.62 NG/ML (ref 0–4)
RBC # BLD: 5 M/UL (ref 4.7–6.1)
SODIUM BLD-SCNC: 141 MMOL/L (ref 136–145)
T4 FREE: 1.1 NG/DL (ref 0.93–1.7)
TOTAL PROTEIN: 7.2 G/DL (ref 6.6–8.7)
TRIGL SERPL-MCNC: 94 MG/DL (ref 0–149)
TSH SERPL DL<=0.05 MIU/L-ACNC: 3.24 UIU/ML (ref 0.27–4.2)
WBC # BLD: 4.7 K/UL (ref 4.8–10.8)

## 2020-09-16 RX ORDER — OXYCODONE HCL 20 MG/1
20 TABLET, FILM COATED, EXTENDED RELEASE ORAL 2 TIMES DAILY
Qty: 60 TABLET | Refills: 0 | Status: SHIPPED | OUTPATIENT
Start: 2020-09-19 | End: 2020-10-16 | Stop reason: SDUPTHER

## 2020-09-16 RX ORDER — OXYCODONE AND ACETAMINOPHEN 10; 325 MG/1; MG/1
1 TABLET ORAL EVERY 6 HOURS PRN
Qty: 120 TABLET | Refills: 0 | Status: SHIPPED | OUTPATIENT
Start: 2020-09-19 | End: 2020-10-16 | Stop reason: SDUPTHER

## 2020-09-16 NOTE — TELEPHONE ENCOUNTER
Raymundo Nagel has requested a refill on his medication. Last office visit : 6/29/2020   Next office visit : 12/21/2020   Last medication refill :8-21-20  Suzie Lyme : 8-31-20      Requested Prescriptions     Pending Prescriptions Disp Refills    oxyCODONE-acetaminophen (PERCOCET)  MG per tablet 120 tablet 0     Sig: Take 1 tablet by mouth every 6 hours as needed for Pain (Pain) for up to 30 days.  oxyCODONE (OXYCONTIN) 20 MG extended release tablet 60 tablet 0     Sig: Take 1 tablet by mouth 2 times daily for 30 days.

## 2020-10-16 NOTE — TELEPHONE ENCOUNTER
Ian Mendez has requested a refill on his medication. Last office visit : 6/29/2020   Next office visit : 12/21/2020   Last medication refill :9-19-20  Clark Mcallister : 8-31-20      Requested Prescriptions     Pending Prescriptions Disp Refills    oxyCODONE-acetaminophen (PERCOCET)  MG per tablet 120 tablet 0     Sig: Take 1 tablet by mouth every 6 hours as needed for Pain (Pain) for up to 30 days.  oxyCODONE (OXYCONTIN) 20 MG extended release tablet 60 tablet 0     Sig: Take 1 tablet by mouth 2 times daily for 30 days.

## 2020-10-17 RX ORDER — OXYCODONE AND ACETAMINOPHEN 10; 325 MG/1; MG/1
1 TABLET ORAL EVERY 6 HOURS PRN
Qty: 120 TABLET | Refills: 0 | Status: SHIPPED | OUTPATIENT
Start: 2020-10-19 | End: 2020-11-13 | Stop reason: SDUPTHER

## 2020-10-17 RX ORDER — OXYCODONE HCL 20 MG/1
20 TABLET, FILM COATED, EXTENDED RELEASE ORAL 2 TIMES DAILY
Qty: 60 TABLET | Refills: 0 | Status: SHIPPED | OUTPATIENT
Start: 2020-10-19 | End: 2020-11-13 | Stop reason: SDUPTHER

## 2020-11-02 RX ORDER — ASPIRIN 81 MG/1
TABLET, CHEWABLE ORAL
Qty: 90 TABLET | Refills: 0 | Status: SHIPPED | OUTPATIENT
Start: 2020-11-02 | End: 2021-01-11

## 2020-11-16 NOTE — TELEPHONE ENCOUNTER
Requested Prescriptions     Pending Prescriptions Disp Refills    oxyCODONE-acetaminophen (PERCOCET)  MG per tablet 120 tablet 0     Sig: Take 1 tablet by mouth every 6 hours as needed for Pain (Pain) for up to 30 days.  oxyCODONE (OXYCONTIN) 20 MG extended release tablet 60 tablet 0     Sig: Take 1 tablet by mouth 2 times daily for 30 days.        Last Office Visit:  6/29/2020  Next Office Visit:  12/21/2020  Last Medication Refill:  Both filled on 10/19/2020  Vero Mejia up to date:  8/31/2020    *RX updated to reflect   Both 11/18/2020  fill date*

## 2020-11-17 RX ORDER — OXYCODONE AND ACETAMINOPHEN 10; 325 MG/1; MG/1
1 TABLET ORAL EVERY 6 HOURS PRN
Qty: 120 TABLET | Refills: 0 | Status: SHIPPED | OUTPATIENT
Start: 2020-11-18 | End: 2020-12-16 | Stop reason: SDUPTHER

## 2020-11-17 RX ORDER — OXYCODONE HCL 20 MG/1
20 TABLET, FILM COATED, EXTENDED RELEASE ORAL 2 TIMES DAILY
Qty: 60 TABLET | Refills: 0 | Status: SHIPPED | OUTPATIENT
Start: 2020-11-18 | End: 2020-12-16 | Stop reason: SDUPTHER

## 2020-12-16 ENCOUNTER — VIRTUAL VISIT (OUTPATIENT)
Dept: PRIMARY CARE CLINIC | Age: 65
End: 2020-12-16
Payer: MEDICARE

## 2020-12-16 ENCOUNTER — TELEPHONE (OUTPATIENT)
Dept: PRIMARY CARE CLINIC | Age: 65
End: 2020-12-16

## 2020-12-16 PROBLEM — E66.812 CLASS 2 SEVERE OBESITY WITH SERIOUS COMORBIDITY AND BODY MASS INDEX (BMI) OF 38.0 TO 38.9 IN ADULT: Status: ACTIVE | Noted: 2020-12-16

## 2020-12-16 PROBLEM — E66.01 CLASS 2 SEVERE OBESITY WITH SERIOUS COMORBIDITY AND BODY MASS INDEX (BMI) OF 38.0 TO 38.9 IN ADULT (HCC): Status: ACTIVE | Noted: 2020-12-16

## 2020-12-16 PROCEDURE — 1123F ACP DISCUSS/DSCN MKR DOCD: CPT | Performed by: NURSE PRACTITIONER

## 2020-12-16 PROCEDURE — 4040F PNEUMOC VAC/ADMIN/RCVD: CPT | Performed by: NURSE PRACTITIONER

## 2020-12-16 PROCEDURE — 2022F DILAT RTA XM EVC RTNOPTHY: CPT | Performed by: NURSE PRACTITIONER

## 2020-12-16 PROCEDURE — 99214 OFFICE O/P EST MOD 30 MIN: CPT | Performed by: NURSE PRACTITIONER

## 2020-12-16 PROCEDURE — G8428 CUR MEDS NOT DOCUMENT: HCPCS | Performed by: NURSE PRACTITIONER

## 2020-12-16 PROCEDURE — 3017F COLORECTAL CA SCREEN DOC REV: CPT | Performed by: NURSE PRACTITIONER

## 2020-12-16 PROCEDURE — 3044F HG A1C LEVEL LT 7.0%: CPT | Performed by: NURSE PRACTITIONER

## 2020-12-16 RX ORDER — OXYCODONE AND ACETAMINOPHEN 10; 325 MG/1; MG/1
1 TABLET ORAL EVERY 6 HOURS PRN
Qty: 120 TABLET | Refills: 0 | Status: SHIPPED | OUTPATIENT
Start: 2020-12-18 | End: 2021-01-15 | Stop reason: SDUPTHER

## 2020-12-16 RX ORDER — AZITHROMYCIN 250 MG/1
TABLET, FILM COATED ORAL
Qty: 1 PACKET | Refills: 0 | Status: SHIPPED | OUTPATIENT
Start: 2020-12-16 | End: 2021-04-05

## 2020-12-16 RX ORDER — OXYCODONE HCL 20 MG/1
20 TABLET, FILM COATED, EXTENDED RELEASE ORAL 2 TIMES DAILY
Qty: 60 TABLET | Refills: 0 | Status: SHIPPED | OUTPATIENT
Start: 2020-12-18 | End: 2021-01-15 | Stop reason: SDUPTHER

## 2020-12-16 RX ORDER — ALBUTEROL SULFATE 90 UG/1
2 AEROSOL, METERED RESPIRATORY (INHALATION) 4 TIMES DAILY PRN
Qty: 1 INHALER | Refills: 5 | Status: SHIPPED | OUTPATIENT
Start: 2020-12-16 | End: 2021-01-26

## 2020-12-16 NOTE — TELEPHONE ENCOUNTER
Requested Prescriptions     Pending Prescriptions Disp Refills    oxyCODONE-acetaminophen (PERCOCET)  MG per tablet 120 tablet 0     Sig: Take 1 tablet by mouth every 6 hours as needed for Pain (Pain) for up to 30 days.  oxyCODONE (OXYCONTIN) 20 MG extended release tablet 60 tablet 0     Sig: Take 1 tablet by mouth 2 times daily for 30 days.        Last Office Visit:  6/29/2020  Next Office Visit:  12/21/2020  Last Medication Refill:  Both 11/18/2020  Jaun Melendrez up to date:  12/16/2020    *RX updated to reflect  Both 12/18/2020  fill date*

## 2020-12-16 NOTE — PROGRESS NOTES
2020    TELEHEALTH EVALUATION -- Audio/Visual (During ZTNRX-44 public health emergency)    Michele Jesus is a 72 y.o. male who c/o of Concern For COVID-19   medical conditions/complaints as noted below. HPI:    Michele Jesus (:  1955) has requested an audio/video evaluation for the following concern(s):    Covid  He was positive 7 days ago. He is having trouble getting mucus up. Coughing a lot. Thick and yellow. Having wheezing. Some SOB. From one room to the other. Taking mucinex. Wife and son are feeling bad. Also positive with covid. Fever has come and gone. Will have fever for a couple of days and then gone for a couple of days. Review of Systems   Constitutional: Positive for activity change, appetite change, chills, fatigue and fever. Negative for unexpected weight change. HENT: Positive for congestion and rhinorrhea. Negative for ear pain and sore throat. Eyes: Negative for discharge and redness. Respiratory: Positive for cough, shortness of breath and wheezing. Negative for choking. Cardiovascular: Negative for chest pain. Gastrointestinal: Negative for blood in stool, constipation and diarrhea. Genitourinary: Negative for decreased urine volume and dysuria. Skin: Negative for rash. Neurological: Negative for weakness. Hematological: Negative for adenopathy. Psychiatric/Behavioral: Negative for suicidal ideas. Prior to Visit Medications    Medication Sig Taking? Authorizing Provider   Fluticasone furoate-vilanterol (BREO ELLIPTA) 200-25 MCG/INH AEPB inhaler Inhale 1 puff into the lungs daily Yes HEENA Jha   albuterol sulfate HFA (VENTOLIN HFA) 108 (90 Base) MCG/ACT inhaler Inhale 2 puffs into the lungs 4 times daily as needed for Wheezing Yes HEENA Jha   azithromycin (ZITHROMAX) 250 MG tablet Take 2 tabs (500 mg) on Day 1, and take 1 tab (250 mg) on days 2 through 5.  Yes HEENA Rivera azithromycin (ZITHROMAX) 250 MG tablet Take 1 tablet by mouth See Admin Instructions for 5 days 500mg on day 1 followed by 250mg on days 2 - 5  HEENA Jha   oxyCODONE-acetaminophen (PERCOCET)  MG per tablet Take 1 tablet by mouth every 6 hours as needed for Pain (Pain) for up to 30 days. Katiuska Villanueva MD   oxyCODONE (OXYCONTIN) 20 MG extended release tablet Take 1 tablet by mouth 2 times daily for 30 days. Katiuska Villanueva MD   GNP ADULT ASPIRIN LOW STRENGTH 81 MG chewable tablet CHEW AND SWALLOW ONE TABLET BY MOUTH DAILY  HEENA Norton - CNP   ALPRAZolam (XANAX) 1 MG tablet TAKE ONE AND A HALF TABLETS BY MOUTH THREE TIMES DAILY AS NEEDED  Katiuska Villanueva MD   verapamil (VERELAN) 200 MG extended release capsule Take 1 capsule by mouth daily  HEENA Norton CNP   metFORMIN (GLUCOPHAGE) 500 MG tablet Take 1 tablet by mouth 2 times daily (with meals)  HEENA Rivera   hydroCHLOROthiazide (HYDRODIURIL) 25 MG tablet TAKE 1 TABLET BY MOUTH EVERY DAY  HEENA Franklin   lovastatin (MEVACOR) 40 MG tablet TAKE 1 TABLET BY MOUTH EVERY DAY  HEENA Moran CNP   Umeclidinium Bromide (INCRUSE ELLIPTA) 62.5 MCG/INH AEPB Inhale 1 puff into the lungs daily  HEENA Rivera   albuterol sulfate HFA (VENTOLIN HFA) 108 (90 Base) MCG/ACT inhaler Inhale 2 puffs into the lungs every 6 hours as needed for Wheezing  HEENA Rivera   fluticasone (FLONASE) 50 MCG/ACT nasal spray 2 sprays by Nasal route daily  HEENA Chase   triamcinolone (KENALOG) 0.1 % cream Apply topically 2 times daily. HEENA Chase   blood glucose test strips (CONTOUR NEXT TEST) strip 1 each by In Vitro route daily As needed.   HEENA Snell   MICROLET LANCETS MISC 1 each by Does not apply route daily  HEENA Snell   hydroxychloroquine (PLAQUENIL) 200 MG tablet TK 2 TS PO QD WF OR MILK  Historical Provider, MD VOLTAREN 1 % GEL APPLY 2 GRAMS TO HAND QID  Historical Provider, MD   polyethylene glycol (MIRALAX) POWD powder Take 17 g by mouth every other day   Historical Provider, MD       Social History     Tobacco Use    Smoking status: Former Smoker     Packs/day: 0.50     Years: 20.00     Pack years: 10.00     Types: Cigarettes     Start date: 0     Quit date:      Years since quittin.9    Smokeless tobacco: Never Used   Substance Use Topics    Alcohol use: No    Drug use: No        Allergies   Allergen Reactions    Clindamycin/Lincomycin Anaphylaxis    Benadryl [Diphenhydramine] Other (See Comments)     \"makes legs jump around\"    Prednisone    ,   Past Medical History:   Diagnosis Date    Acute sinusitis     Anxiety     Chronic back pain     Chronic bilateral low back pain with bilateral sciatica     Constipation     COPD (chronic obstructive pulmonary disease) ([address]     Coronary artery disease involving native coronary artery of native heart without angina pectoris 2019    CPAP (continuous positive airway pressure) dependence     12cm to 20cm    Depression     Hyperlipidemia     Hypertensive disorder     OA (osteoarthritis of the spine)     Obesity     SUSANA (obstructive sleep apnea)     AHI:  85.3 per HST 2013    Osteoarthritis of spine with radiculopathy, lumbar region     Otitis media     Rheumatoid arthritis ([address]     Spinal stenosis     Type 2 diabetes mellitus ([address] 2016    Unspecified sleep apnea     uses bipap    Vertigo    ,   Past Surgical History:   Procedure Laterality Date    ADENOIDECTOMY      BROW LIFT      due to vision    CARDIAC CATHETERIZATION  10/24/2018         COLONOSCOPY  30 yrs ago    [place], [place], normal per patient    HERNIA REPAIR      KNEE ARTHROSCOPY Left     TX NJX DX/THER SBST EPIDURAL/SUBARACH LUMBAR/SACRAL N/A 10/3/2017    L3-4 EPIDURAL STEROID INJECTION performed by Prabhjot Wheeler at [facility]  TONSILLECTOMY     ,   Social History     Tobacco Use    Smoking status: Former Smoker     Packs/day: 0.50     Years: 20.00     Pack years: 10.00     Types: Cigarettes     Start date: 0     Quit date:      Years since quittin.9    Smokeless tobacco: Never Used   Substance Use Topics    Alcohol use: No    Drug use: No   ,   Family History   Problem Relation Age of Onset    Heart Disease Father 64    Cancer Father         malignant tumor of pharynx    Diabetes Mother     Hypertension Mother     Diabetes Sister         x 2    Diabetes Brother     Colon Cancer Neg Hx     Colon Polyps Neg Hx     Liver Disease Neg Hx     Rectal Cancer Neg Hx     Stomach Cancer Neg Hx    ,   Immunization History   Administered Date(s) Administered    Influenza Vaccine, unspecified formulation 10/18/2018    Influenza Virus Vaccine 2014, 2015    Influenza, Quadv, IM, PF (6 mo and older Fluzone, Flulaval, Fluarix, and 3 yrs and older Afluria) 10/21/2019    Pneumococcal Conjugate 13-valent ([id number]) 10/28/2018    Pneumococcal Polysaccharide ([id number]) 2019   ,   Health Maintenance   Topic Date Due    AAA screen  1955    DTaP/Tdap/Td vaccine (1 - Tdap) 1974    Shingles Vaccine (1 of 2) 2005    Annual Wellness Visit (AWV)  2019    Diabetic foot exam  2020    Diabetic retinal exam  2020    Colon cancer screen colonoscopy  09/10/2020    A1C test (Diabetic or Prediabetic)  2021    Diabetic microalbuminuria test  2021    Lipid screen  2021    Potassium monitoring  2021    Creatinine monitoring  2021    Pneumococcal 65+ years Vaccine (2 of 2 - PPSV23) 2024    Flu vaccine  Completed    Hepatitis C screen  Completed    HIV screen  Completed    Hepatitis A vaccine  Aged Out    Hib vaccine  Aged Out    Meningococcal (ACWY) vaccine  Aged Out       PHYSICAL EXAMINATION: [ INSTRUCTIONS:  \"[x]\" Indicates a positive item  \"[]\" Indicates a negative item  -- DELETE ALL ITEMS NOT EXAMINED]  Vital Signs: (As obtained by patient/caregiver or practitioner observation)    Blood pressure-  Heart rate-    Respiratory rate-    Temperature-  Pulse oximetry- 95    Constitutional: [] Appears well-developed and well-nourished [] No apparent distress      [x] Abnormal-appears sikc not toxic   Mental status  [x] Alert and awake  [x] Oriented to person/place/time []Able to follow commands      Eyes:  EOM    []  Normal  [] Abnormal-  Sclera  []  Normal  [] Abnormal -         Discharge []  None visible  [] Abnormal -    HENT:   [] Normocephalic, atraumatic. [] Abnormal   [x] Mouth/Throat: Mucous membranes are moist.     External Ears [x] Normal  [] Abnormal-     Neck: [] No visualized mass     Pulmonary/Chest: [] Respiratory effort normal.  [] No visualized signs of difficulty breathing or respiratory distress        [x] Abnormal- gets winded with talking   Musculoskeletal:   [] Normal gait with no signs of ataxia         [] Normal range of motion of neck        [] Abnormal-       Neurological:        [] No Facial Asymmetry (Cranial nerve 7 motor function) (limited exam to video visit)          [] No gaze palsy        [] Abnormal-         Skin:        [] No significant exanthematous lesions or discoloration noted on facial skin         [] Abnormal-            Psychiatric:       [x] Normal Affect [] No Hallucinations        [] Abnormal-     Other pertinent observable physical exam findings-     ASSESSMENT/PLAN:    ICD-10-CM    1. COVID-19  U07.1 Fluticasone furoate-vilanterol (BREO ELLIPTA) 200-25 MCG/INH AEPB inhaler     albuterol sulfate HFA (VENTOLIN HFA) 108 (90 Base) MCG/ACT inhaler     azithromycin (ZITHROMAX) 250 MG tablet   2.  Class 2 severe obesity with serious comorbidity and body mass index (BMI) of 38.0 to 38.9 in adult, unspecified obesity type ([facility]  E66.01     Z68.38 3. Type 2 diabetes mellitus without complication, without long-term current use of insulin (HCC)  E11.9    4. Coronary artery disease involving native coronary artery of native heart without angina pectoris  I25.10    5. Paroxysmal SVT (supraventricular tachycardia) (formerly [facility])  I47.1    6. Essential hypertension  I10    7. Rheumatoid arthritis involving both hands with positive rheumatoid factor (formerly [facility])  M05.741     M05.742    8. History of coronary artery stent placement  Z95.5    9. Fever, unspecified fever cause  R50.9      He has several co morbidities that can make his response to covid worse. Will have him come in to the office tomorrow to be checked. Return in about 1 day (around 12/17/2020) for covid. if worse needs to come in. Melanie Craig is a 72 y.o. male being evaluated by a Virtual Visit (video visit) encounter to address concerns as mentioned above. Verbal consent was given to conduct a teleheath visit. A caregiver was present when appropriate. Due to this being a TeleHealth encounter (During CVBPQ-70 public health emergency), evaluation of the following organ systems was limited: Vitals/Constitutional/EENT/Resp/CV/GI//MS/Neuro/Skin/Heme-Lymph-Imm. Pursuant to the emergency declaration under the [address] [place] [address] authority and the [employer] and [employer] Act, this Virtual Visit was conducted with patient's (and/or legal guardian's) consent, to reduce the patient's risk of exposure to COVID-19 and provide necessary medical care. The patient (and/or legal guardian) has also been advised to contact this office for worsening conditions or problems, and seek emergency medical treatment and/or call 911 if deemed necessary. Services were provided through a video synchronous discussion virtually to substitute for in-person clinic visit. Patient was located at their individual home and provider was located at the office of [facility]. Patient identification was verified at the start of the visit: Yes    Total time spent on this encounter: Not billed by time    Due to patients risk for potential exposure of COVID 19 pandemic, a virtual visit was initiated. Provider performed history of present illness and review of systems. Diagnosis and treatment plan was discussed with patient. Pharmacy of choice was reviewed along with past medical history, medication allergies, and current medications. Education provided to patient or patient parents/guardian with current illness diagnosis as well as when to seek additional healthcare due to changing or for worsening symptoms. Patient voiced understanding. --HEENA Raymond on 12/17/2020 at 3:32 PM    An electronic signature was used to authenticate this note.

## 2020-12-16 NOTE — TELEPHONE ENCOUNTER
Patients wife called she is concerned as patient has been COVID positive for 11 days, states that patient has so much chest congestion that he is unable to cough anything up, wife is concerned he may be developing Pneumonia. He is high risk with previous cardiac issues.

## 2020-12-17 ENCOUNTER — TELEPHONE (OUTPATIENT)
Dept: PRIMARY CARE CLINIC | Age: 65
End: 2020-12-17

## 2020-12-17 ENCOUNTER — OFFICE VISIT (OUTPATIENT)
Dept: PRIMARY CARE CLINIC | Age: 65
End: 2020-12-17
Payer: MEDICARE

## 2020-12-17 ENCOUNTER — HOSPITAL ENCOUNTER (OUTPATIENT)
Dept: GENERAL RADIOLOGY | Age: 65
Discharge: HOME | End: 2020-12-17
Payer: MEDICARE

## 2020-12-17 VITALS — WEIGHT: 306.8 LBS | TEMPERATURE: 98 F | HEART RATE: 68 BPM | OXYGEN SATURATION: 97 % | BODY MASS INDEX: 40.48 KG/M2

## 2020-12-17 PROCEDURE — G8484 FLU IMMUNIZE NO ADMIN: HCPCS | Performed by: NURSE PRACTITIONER

## 2020-12-17 PROCEDURE — 71046 X-RAY EXAM CHEST 2 VIEWS: CPT

## 2020-12-17 PROCEDURE — G8427 DOCREV CUR MEDS BY ELIG CLIN: HCPCS | Performed by: NURSE PRACTITIONER

## 2020-12-17 PROCEDURE — 3023F SPIROM DOC REV: CPT | Performed by: NURSE PRACTITIONER

## 2020-12-17 PROCEDURE — 1036F TOBACCO NON-USER: CPT | Performed by: NURSE PRACTITIONER

## 2020-12-17 PROCEDURE — G8417 CALC BMI ABV UP PARAM F/U: HCPCS | Performed by: NURSE PRACTITIONER

## 2020-12-17 PROCEDURE — 4040F PNEUMOC VAC/ADMIN/RCVD: CPT | Performed by: NURSE PRACTITIONER

## 2020-12-17 PROCEDURE — 1123F ACP DISCUSS/DSCN MKR DOCD: CPT | Performed by: NURSE PRACTITIONER

## 2020-12-17 PROCEDURE — 3017F COLORECTAL CA SCREEN DOC REV: CPT | Performed by: NURSE PRACTITIONER

## 2020-12-17 PROCEDURE — G8926 SPIRO NO PERF OR DOC: HCPCS | Performed by: NURSE PRACTITIONER

## 2020-12-17 PROCEDURE — 99214 OFFICE O/P EST MOD 30 MIN: CPT | Performed by: NURSE PRACTITIONER

## 2020-12-17 RX ORDER — AZITHROMYCIN 250 MG/1
250 TABLET, FILM COATED ORAL SEE ADMIN INSTRUCTIONS
Qty: 6 TABLET | Refills: 0 | Status: SHIPPED | OUTPATIENT
Start: 2020-12-17 | End: 2020-12-22

## 2020-12-17 ASSESSMENT — ENCOUNTER SYMPTOMS
COUGH: 1
EYE DISCHARGE: 0
DIARRHEA: 0
RHINORRHEA: 1
EYE REDNESS: 0
DIARRHEA: 0
COUGH: 1
CHOKING: 0
CHOKING: 0
SORE THROAT: 0
EYE DISCHARGE: 0
CONSTIPATION: 0
BLOOD IN STOOL: 0
EYE REDNESS: 0
WHEEZING: 1
BLOOD IN STOOL: 0
SHORTNESS OF BREATH: 1
SORE THROAT: 0
RHINORRHEA: 1
WHEEZING: 1
CONSTIPATION: 0
SHORTNESS OF BREATH: 1

## 2020-12-17 NOTE — TELEPHONE ENCOUNTER
Called patient, spoke with: Patient regarding the results of the patients most recent xray. I advised Patient of Reynaldo Sumner recommendations.    Patient did voice understanding

## 2020-12-17 NOTE — PROGRESS NOTES
[address], [address]  Phone [phone]   Fax [phone]      OFFICE VISIT: 12/17/2020    Adelia Chu is a 72 y.o. male who presents today for his medical conditions/complaints as noted below. Adelia Chu isc/o of Check-Up (per Meg Alba)        :     HPI  Covid  He was positive 8 days ago. He is having trouble getting mucus up. Coughing a lot. Thick and yellow. Having wheezing. Some SOB. From one room to the other. Taking mucinex. Wife and son are feeling bad. Also positive with covid. Fever has come and gone. Will have fever for a couple of days and then gone for a couple of days. Today he was able to cough up mucus. Taking mucinex daily.      Past Medical History:   Diagnosis Date    Acute sinusitis     Anxiety     Chronic back pain     Chronic bilateral low back pain with bilateral sciatica     Constipation     COPD (chronic obstructive pulmonary disease) (HCC)     Coronary artery disease involving native coronary artery of native heart without angina pectoris 8/27/2019    CPAP (continuous positive airway pressure) dependence     12cm to 20cm    Depression     Hyperlipidemia     Hypertensive disorder     OA (osteoarthritis of the spine)     Obesity     SUSANA (obstructive sleep apnea)     AHI:  85.3 per HST 11/2013    Osteoarthritis of spine with radiculopathy, lumbar region     Otitis media     Rheumatoid arthritis ([address]     Spinal stenosis     Type 2 diabetes mellitus ([address] 03/23/2016    Unspecified sleep apnea     uses bipap    Vertigo       Past Surgical History:   Procedure Laterality Date    ADENOIDECTOMY      BROW LIFT      due to vision    CARDIAC CATHETERIZATION  10/24/2018         COLONOSCOPY  27 yrs ago    [place], [place], normal per patient    HERNIA REPAIR      KNEE ARTHROSCOPY Left 2009    AK NJX DX/THER SBST EPIDURAL/SUBARACH LUMBAR/SACRAL N/A 10/3/2017    L3-4 EPIDURAL STEROID INJECTION performed [facility] at [facility]  TONSILLECTOMY         Family History   Problem Relation Age of Onset    Heart Disease Father 64    Cancer Father         malignant tumor of pharynx    Diabetes Mother     Hypertension Mother     Diabetes Sister         x 2    Diabetes Brother     Colon Cancer Neg Hx     Colon Polyps Neg Hx     Liver Disease Neg Hx     Rectal Cancer Neg Hx     Stomach Cancer Neg Hx        Social History     Tobacco Use    Smoking status: Former Smoker     Packs/day: 0.50     Years: 20.00     Pack years: 10.00     Types: Cigarettes     Start date: 0     Quit date:      Years since quittin.9    Smokeless tobacco: Never Used   Substance Use Topics    Alcohol use: No      Current Outpatient Medications   Medication Sig Dispense Refill    azithromycin (ZITHROMAX) 250 MG tablet Take 1 tablet by mouth See Admin Instructions for 5 days 500mg on day 1 followed by 250mg on days 2 - 5 6 tablet 0    Fluticasone furoate-vilanterol (BREO ELLIPTA) 200-25 MCG/INH AEPB inhaler Inhale 1 puff into the lungs daily 1 each 0    albuterol sulfate HFA (VENTOLIN HFA) 108 (90 Base) MCG/ACT inhaler Inhale 2 puffs into the lungs 4 times daily as needed for Wheezing 1 Inhaler 5    azithromycin (ZITHROMAX) 250 MG tablet Take 2 tabs (500 mg) on Day 1, and take 1 tab (250 mg) on days 2 through 5. 1 packet 0    [START ON 2020] oxyCODONE-acetaminophen (PERCOCET)  MG per tablet Take 1 tablet by mouth every 6 hours as needed for Pain (Pain) for up to 30 days. 120 tablet 0    [START ON 2020] oxyCODONE (OXYCONTIN) 20 MG extended release tablet Take 1 tablet by mouth 2 times daily for 30 days.  60 tablet 0    GNP ADULT ASPIRIN LOW STRENGTH 81 MG chewable tablet CHEW AND SWALLOW ONE TABLET BY MOUTH DAILY 90 tablet 0    ALPRAZolam (XANAX) 1 MG tablet TAKE ONE AND A HALF TABLETS BY MOUTH THREE TIMES DAILY AS NEEDED 135 tablet 5  verapamil (VERELAN) 200 MG extended release capsule Take 1 capsule by mouth daily 90 capsule 3    metFORMIN (GLUCOPHAGE) 500 MG tablet Take 1 tablet by mouth 2 times daily (with meals) 180 tablet 3    hydroCHLOROthiazide (HYDRODIURIL) 25 MG tablet TAKE 1 TABLET BY MOUTH EVERY DAY 90 tablet 3    lovastatin (MEVACOR) 40 MG tablet TAKE 1 TABLET BY MOUTH EVERY DAY 90 tablet 3    Umeclidinium Bromide (INCRUSE ELLIPTA) 62.5 MCG/INH AEPB Inhale 1 puff into the lungs daily 1 each 11    albuterol sulfate HFA (VENTOLIN HFA) 108 (90 Base) MCG/ACT inhaler Inhale 2 puffs into the lungs every 6 hours as needed for Wheezing 1 Inhaler 3    fluticasone (FLONASE) 50 MCG/ACT nasal spray 2 sprays by Nasal route daily 1 Bottle 11    triamcinolone (KENALOG) 0.1 % cream Apply topically 2 times daily. 45 g 0    blood glucose test strips (CONTOUR NEXT TEST) strip 1 each by In Vitro route daily As needed. 100 each 3    MICROLET LANCETS MISC 1 each by Does not apply route daily 100 each 3    hydroxychloroquine (PLAQUENIL) 200 MG tablet TK 2 TS PO QD WF OR MILK  2    VOLTAREN 1 % GEL APPLY 2 GRAMS TO HAND QID  5    polyethylene glycol (MIRALAX) POWD powder Take 17 g by mouth every other day        No current facility-administered medications for this visit.       Allergies   Allergen Reactions    Clindamycin/Lincomycin Anaphylaxis    Benadryl [Diphenhydramine] Other (See Comments)     \"makes legs jump around\"    Prednisone        Health Maintenance   Topic Date Due    AAA screen  1955    DTaP/Tdap/Td vaccine (1 - Tdap) 11/19/1974    Shingles Vaccine (1 of 2) 11/19/2005    Annual Wellness Visit (AWV)  11/26/2019    Diabetic foot exam  03/25/2020    Diabetic retinal exam  04/24/2020    Colon cancer screen colonoscopy  09/10/2020    A1C test (Diabetic or Prediabetic)  09/03/2021    Diabetic microalbuminuria test  09/03/2021    Lipid screen  09/03/2021    Potassium monitoring  09/03/2021  Creatinine monitoring  09/03/2021    Pneumococcal 65+ years Vaccine (2 of 2 - PPSV23) 12/05/2024    Flu vaccine  Completed    Hepatitis C screen  Completed    HIV screen  Completed    Hepatitis A vaccine  Aged Out    Hib vaccine  Aged Out    Meningococcal (ACWY) vaccine  Aged Out        :     Review of Systems   Constitutional: Positive for appetite change, chills, fatigue and fever. Negative for unexpected weight change. HENT: Positive for congestion and rhinorrhea. Negative for ear pain and sore throat. Loss of taste and smell   Eyes: Negative for discharge and redness. Respiratory: Positive for cough, shortness of breath and wheezing. Negative for choking. Cardiovascular: Negative for chest pain. Gastrointestinal: Negative for blood in stool, constipation and diarrhea. Genitourinary: Negative for decreased urine volume and dysuria. Skin: Negative for rash. Neurological: Negative for weakness. Hematological: Negative for adenopathy. Psychiatric/Behavioral: Negative for suicidal ideas.       :     Physical Exam  Vitals signs reviewed. Constitutional:       General: He is not in acute distress. Appearance: Normal appearance. He is obese. He is not ill-appearing. HENT:      Head: Normocephalic. Right Ear: Tympanic membrane normal. There is no impacted cerumen. Left Ear: Tympanic membrane normal. There is no impacted cerumen. Nose: No congestion. Mouth/Throat:      Pharynx: No posterior oropharyngeal erythema. Eyes:      General:         Right eye: No discharge. Left eye: No discharge. Neck:      Musculoskeletal: Normal range of motion. No muscular tenderness. Cardiovascular:      Rate and Rhythm: Normal rate and regular rhythm. Pulses: Normal pulses. Heart sounds: No murmur. Pulmonary:      Effort: Pulmonary effort is normal. No respiratory distress. Breath sounds: Decreased breath sounds and wheezing present. No rhonchi. Abdominal:      General: Bowel sounds are normal.   Musculoskeletal: Normal range of motion. Lymphadenopathy:      Cervical: No cervical adenopathy. Skin:     General: Skin is warm. Capillary Refill: Capillary refill takes less than 2 seconds. Findings: No rash. Neurological:      General: No focal deficit present. Mental Status: He is alert and oriented to person, place, and time. Psychiatric:         Mood and Affect: Mood normal.         Behavior: Behavior normal.       Pulse 68   Temp 98 °F (36.7 °C) (Temporal)   Wt (!) 306 lb 12.8 oz (139.2 kg)   SpO2 97%   BMI 40.48 kg/m²     :        ICD-10-CM    1. COVID-19  U07.1 XR CHEST (2 VW)     azithromycin (ZITHROMAX) 250 MG tablet   2. Fever, unspecified fever cause  R50.9    3. Cough  R05    4. Wheezing  R06.2    5. Class 2 severe obesity with serious comorbidity and body mass index (BMI) of 38.0 to 38.9 in adult, unspecified obesity type ([facility]  E66.01     Z68.38    6. COPD exacerbation (HCC)  J44.1 azithromycin (ZITHROMAX) 250 MG tablet       :      Return in about 1 week (around 12/24/2020) for covid. Orders Placed This Encounter   Procedures    XR CHEST (2 VW)     Standing Status:   Future     Number of Occurrences:   1     Standing Expiration Date:   12/17/2021     Orders Placed This Encounter   Medications    azithromycin (ZITHROMAX) 250 MG tablet     Sig: Take 1 tablet by mouth See Admin Instructions for 5 days 500mg on day 1 followed by 250mg on days 2 - 5     Dispense:  6 tablet     Refill:  0         Discussed use, benefit, and side effectsof prescribed medications. All patient questions answered. Pt voiced understanding. Reviewed health maintenance. .  Patient agreed with treatment plan. Follow up asdirected.             Patient Instructions   Advance Care Planning People with COVID-19 may have no symptoms, mild symptoms, such as fever, cough, and shortness of breath or they may have more severe illness, developing severe and fatal pneumonia. As a result, Advance Care Planning with attention to naming a health care decision maker (someone you trust to make healthcare decisions for you if you could not speak for yourself) and sharing other health care preferences is important BEFORE a possible health crisis. Please contact your Primary Care Provider to discuss Advance Care Planning. Preventing the Spread of Coronavirus Disease 2019 in Homes and Residential Communities  For the most recent information go to [email or web address]    Prevention steps for People with confirmed or suspected COVID-19 (including persons under investigation) who do not need to be hospitalized  and   People with confirmed COVID-19 who were hospitalized and determined to be medically stable to go home    Your healthcare provider and public health staff will evaluate whether you can be cared for at home. If it is determined that you do not need to be hospitalized and can be isolated at home, you will be monitored by staff from your local or state health department. You should follow the prevention steps below until a healthcare provider or local or state health department says you can return to your normal activities. Stay home except to get medical care  People who are mildly ill with COVID-19 are able to isolate at home during their illness. You should restrict activities outside your home, except for getting medical care. Do not go to work, school, or public areas. Avoid using public transportation, ride-sharing, or taxis. Separate yourself from other people and animals in your home  People: As much as possible, you should stay in a specific room and away from other people in your home. Also, you should use a separate bathroom, if available. Animals: You should restrict contact with pets and other animals while you are sick with COVID-19, just like you would around other people. Although there have not been reports of pets or other animals becoming sick with COVID-19, it is still recommended that people sick with COVID-19 limit contact with animals until more information is known about the virus. When possible, have another member of your household care for your animals while you are sick. If you are sick with COVID-19, avoid contact with your pet, including petting, snuggling, being kissed or licked, and sharing food. If you must care for your pet or be around animals while you are sick, wash your hands before and after you interact with pets and wear a facemask. Call ahead before visiting your doctor  If you have a medical appointment, call the healthcare provider and tell them that you have or may have COVID-19. This will help the healthcare providers office take steps to keep other people from getting infected or exposed. Wear a facemask  You should wear a facemask when you are around other people (e.g., sharing a room or vehicle) or pets and before you enter a healthcare providers office. If you are not able to wear a facemask (for example, because it causes trouble breathing), then people who live with you should not stay in the same room with you, or they should wear a facemask if they enter your room. Cover your coughs and sneezes  Cover your mouth and nose with a tissue when you cough or sneeze. Throw used tissues in a lined trash can. Immediately wash your hands with soap and water for at least 20 seconds or, if soap and water are not available, clean your hands with an alcohol-based hand  that contains at least 60% alcohol.   Clean your hands often Wash your hands often with soap and water for at least 20 seconds, especially after blowing your nose, coughing, or sneezing; going to the bathroom; and before eating or preparing food. If soap and water are not readily available, use an alcohol-based hand  with at least 60% alcohol, covering all surfaces of your hands and rubbing them together until they feel dry. Soap and water are the best option if hands are visibly dirty. Avoid touching your eyes, nose, and mouth with unwashed hands. Avoid sharing personal household items  You should not share dishes, drinking glasses, cups, eating utensils, towels, or bedding with other people or pets in your home. After using these items, they should be washed thoroughly with soap and water. Clean all high-touch surfaces everyday  High touch surfaces include counters, tabletops, doorknobs, bathroom fixtures, toilets, phones, keyboards, tablets, and bedside tables. Also, clean any surfaces that may have blood, stool, or body fluids on them. Use a household cleaning spray or wipe, according to the label instructions. Labels contain instructions for safe and effective use of the cleaning product including precautions you should take when applying the product, such as wearing gloves and making sure you have good ventilation during use of the product.   Monitor your symptoms Seek prompt medical attention if your illness is worsening (e.g., difficulty breathing). Before seeking care, call your healthcare provider and tell them that you have, or are being evaluated for, COVID-19. Put on a facemask before you enter the facility. These steps will help the healthcare providers office to keep other people in the office or waiting room from getting infected or exposed. Ask your healthcare provider to call the local or state health department. Persons who are placed under active monitoring or facilitated self-monitoring should follow instructions provided by their local health department or occupational health professionals, as appropriate. When working with your local health department check their available hours. If you have a medical emergency and need to call 911, notify the dispatch personnel that you have, or are being evaluated for COVID-19. If possible, put on a facemask before emergency medical services arrive. Discontinuing home isolation  Patients with confirmed COVID-19 should remain under home isolation precautions until the risk of secondary transmission to others is thought to be low. The decision to discontinue home isolation precautions should be made on a case-by-case basis, in consultation with healthcare providers and state and local health departments.               Electronically signed by HEENA Sutton on12/17/2020 at 3:28 PM

## 2020-12-17 NOTE — PATIENT INSTRUCTIONS
Advance Care Planning  People with COVID-19 may have no symptoms, mild symptoms, such as fever, cough, and shortness of breath or they may have more severe illness, developing severe and fatal pneumonia. As a result, Advance Care Planning with attention to naming a health care decision maker (someone you trust to make healthcare decisions for you if you could not speak for yourself) and sharing other health care preferences is important BEFORE a possible health crisis. Please contact your Primary Care Provider to discuss Advance Care Planning. Preventing the Spread of Coronavirus Disease 2019 in Homes and Residential Communities  For the most recent information go to [email or web address]    Prevention steps for People with confirmed or suspected COVID-19 (including persons under investigation) who do not need to be hospitalized  and   People with confirmed COVID-19 who were hospitalized and determined to be medically stable to go home    Your healthcare provider and public health staff will evaluate whether you can be cared for at home. If it is determined that you do not need to be hospitalized and can be isolated at home, you will be monitored by staff from your local or state health department. You should follow the prevention steps below until a healthcare provider or local or state health department says you can return to your normal activities. Stay home except to get medical care  People who are mildly ill with COVID-19 are able to isolate at home during their illness. You should restrict activities outside your home, except for getting medical care. Do not go to work, school, or public areas. Avoid using public transportation, ride-sharing, or taxis.   Separate yourself from other people and animals in your home People: As much as possible, you should stay in a specific room and away from other people in your home. Also, you should use a separate bathroom, if available. Animals: You should restrict contact with pets and other animals while you are sick with COVID-19, just like you would around other people. Although there have not been reports of pets or other animals becoming sick with COVID-19, it is still recommended that people sick with COVID-19 limit contact with animals until more information is known about the virus. When possible, have another member of your household care for your animals while you are sick. If you are sick with COVID-19, avoid contact with your pet, including petting, snuggling, being kissed or licked, and sharing food. If you must care for your pet or be around animals while you are sick, wash your hands before and after you interact with pets and wear a facemask. Call ahead before visiting your doctor  If you have a medical appointment, call the healthcare provider and tell them that you have or may have COVID-19. This will help the healthcare providers office take steps to keep other people from getting infected or exposed. Wear a facemask  You should wear a facemask when you are around other people (e.g., sharing a room or vehicle) or pets and before you enter a healthcare providers office. If you are not able to wear a facemask (for example, because it causes trouble breathing), then people who live with you should not stay in the same room with you, or they should wear a facemask if they enter your room. Cover your coughs and sneezes  Cover your mouth and nose with a tissue when you cough or sneeze. Throw used tissues in a lined trash can. Immediately wash your hands with soap and water for at least 20 seconds or, if soap and water are not available, clean your hands with an alcohol-based hand  that contains at least 60% alcohol.   Clean your hands often Seek prompt medical attention if your illness is worsening (e.g., difficulty breathing). Before seeking care, call your healthcare provider and tell them that you have, or are being evaluated for, COVID-19. Put on a facemask before you enter the facility. These steps will help the healthcare providers office to keep other people in the office or waiting room from getting infected or exposed. Ask your healthcare provider to call the local or state health department. Persons who are placed under active monitoring or facilitated self-monitoring should follow instructions provided by their local health department or occupational health professionals, as appropriate. When working with your local health department check their available hours. If you have a medical emergency and need to call 911, notify the dispatch personnel that you have, or are being evaluated for COVID-19. If possible, put on a facemask before emergency medical services arrive. Discontinuing home isolation  Patients with confirmed COVID-19 should remain under home isolation precautions until the risk of secondary transmission to others is thought to be low. The decision to discontinue home isolation precautions should be made on a case-by-case basis, in consultation with healthcare providers and state and local health departments.

## 2020-12-17 NOTE — TELEPHONE ENCOUNTER
----- Message from HEENA Block sent at 12/17/2020  3:22 PM CST -----  Please inform patient results show  Hyperinflated appearance of the lungs, consistent with  emphysema.  But no pneumonia

## 2020-12-21 ENCOUNTER — TELEMEDICINE (OUTPATIENT)
Dept: NEUROLOGY | Age: 65
End: 2020-12-21
Payer: MEDICARE

## 2020-12-21 PROCEDURE — 1123F ACP DISCUSS/DSCN MKR DOCD: CPT | Performed by: PSYCHIATRY & NEUROLOGY

## 2020-12-21 PROCEDURE — G8427 DOCREV CUR MEDS BY ELIG CLIN: HCPCS | Performed by: PSYCHIATRY & NEUROLOGY

## 2020-12-21 PROCEDURE — 99214 OFFICE O/P EST MOD 30 MIN: CPT | Performed by: PSYCHIATRY & NEUROLOGY

## 2020-12-21 PROCEDURE — 4040F PNEUMOC VAC/ADMIN/RCVD: CPT | Performed by: PSYCHIATRY & NEUROLOGY

## 2020-12-21 PROCEDURE — 3017F COLORECTAL CA SCREEN DOC REV: CPT | Performed by: PSYCHIATRY & NEUROLOGY

## 2020-12-21 NOTE — PROGRESS NOTES
[facility]  [address] [place] [address] Drive, [address] [facility] [address] 150  [facility], [address]  Phone [phone]  Fax [phone]     [facility] Virtual Video Follow Up Encounter  20 1:15 PM CST  The Following was conducted as a Telehealth Evaluation - Audio/Visual (during the [place] [address] public health emergency)    Information:   Patient Name: Cassandra Loza  :   1955  Age:   72 y.o. MRN:   [id number]  Account #:  [de-identified]  Today:  20    Provider: Stan Strange M.D. Patient Location: Home  Provider Location: The provider is located at [facility] in [place], [place]  Also Present:  No one    Chief Complaint:   Chief Complaint   Patient presents with    6 Month Follow-Up       Subjective:   Cassandra Loza is a 72 y.o. man with a history of chronic back pain, RA, and SUSANA who is following up. He caught COVID-19 a few weeks ago. He continues to have cough and have phlegm. He has had arthralgias and myalgias. He cannot stand or walk more than 10 minutes at a time. He uses his BiPAP every night.  His back pain is stable but does require daily analgesics which does help.  He has pain and stiffness in his hands.  Methotrexate in the past did not help that but the Plaquinil has helped. He has been avoiding people and spending all his time at home. He has had no side effects from the opioid analgesics.         Objective:     Past Medical History:  Past Medical History:   Diagnosis Date    Acute sinusitis     Anxiety     Chronic back pain     Chronic bilateral low back pain with bilateral sciatica     Constipation     COPD (chronic obstructive pulmonary disease) (HCC)     Coronary artery disease involving native coronary artery of native heart without angina pectoris 2019    CPAP (continuous positive airway pressure) dependence     12cm to 20cm    Depression     Hyperlipidemia     Hypertensive disorder     OA (osteoarthritis of the spine)     Obesity  SUSANA (obstructive sleep apnea)     AHI:  85.3 per HST 11/2013    Osteoarthritis of spine with radiculopathy, lumbar region     Otitis media     Rheumatoid arthritis ([facility]     Spinal stenosis     Type 2 diabetes mellitus ([facility] 03/23/2016    Unspecified sleep apnea     uses bipap    Vertigo        Past Surgical History:   Procedure Laterality Date    ADENOIDECTOMY      BROW LIFT      due to vision    CARDIAC CATHETERIZATION  10/24/2018         COLONOSCOPY  27 yrs ago    [place], [place], normal per patient   [address] ARTHROSCOPY Left 2009    ME NJX DX/THER SBST EPIDURAL/SUBARACH LUMBAR/SACRAL N/A 10/3/2017    L3-4 EPIDURAL STEROID INJECTION performed by Sven Cruz at [address] [place] [address]  · None    Significant Injuries  · None    Habits  Shireen Hdz reports that he quit smoking about 19 years ago. His smoking use included cigarettes. He started smoking about 39 years ago. He has a 10.00 pack-year smoking history. He has never used smokeless tobacco. He reports that he does not drink alcohol or use drugs. Family History   Problem Relation Age of Onset    Heart Disease Father 64    Cancer Father         malignant tumor of pharynx    Diabetes Mother     Hypertension Mother     Diabetes Sister         x 2    Diabetes Brother     Colon Cancer Neg Hx     Colon Polyps Neg Hx     Liver Disease Neg Hx     Rectal Cancer Neg Hx     Stomach Cancer Neg Hx        Social History  Amado Batres is , lives in [place], [place], and is disabled.     Medications:  Current Outpatient Medications   Medication Sig Dispense Refill    Fluticasone furoate-vilanterol (BREO ELLIPTA) 200-25 MCG/INH AEPB inhaler Inhale 1 puff into the lungs daily 1 each 0    azithromycin (ZITHROMAX) 250 MG tablet Take 2 tabs (500 mg) on Day 1, and take 1 tab (250 mg) on days 2 through 5. 1 packet 0  oxyCODONE-acetaminophen (PERCOCET)  MG per tablet Take 1 tablet by mouth every 6 hours as needed for Pain (Pain) for up to 30 days. 120 tablet 0    oxyCODONE (OXYCONTIN) 20 MG extended release tablet Take 1 tablet by mouth 2 times daily for 30 days. 60 tablet 0    GNP ADULT ASPIRIN LOW STRENGTH 81 MG chewable tablet CHEW AND SWALLOW ONE TABLET BY MOUTH DAILY 90 tablet 0    ALPRAZolam (XANAX) 1 MG tablet TAKE ONE AND A HALF TABLETS BY MOUTH THREE TIMES DAILY AS NEEDED 135 tablet 5    verapamil (VERELAN) 200 MG extended release capsule Take 1 capsule by mouth daily 90 capsule 3    metFORMIN (GLUCOPHAGE) 500 MG tablet Take 1 tablet by mouth 2 times daily (with meals) 180 tablet 3    hydroCHLOROthiazide (HYDRODIURIL) 25 MG tablet TAKE 1 TABLET BY MOUTH EVERY DAY 90 tablet 3    lovastatin (MEVACOR) 40 MG tablet TAKE 1 TABLET BY MOUTH EVERY DAY 90 tablet 3    albuterol sulfate HFA (VENTOLIN HFA) 108 (90 Base) MCG/ACT inhaler Inhale 2 puffs into the lungs every 6 hours as needed for Wheezing 1 Inhaler 3    fluticasone (FLONASE) 50 MCG/ACT nasal spray 2 sprays by Nasal route daily 1 Bottle 11    triamcinolone (KENALOG) 0.1 % cream Apply topically 2 times daily. 45 g 0    blood glucose test strips (CONTOUR NEXT TEST) strip 1 each by In Vitro route daily As needed.  100 each 3    MICROLET LANCETS MISC 1 each by Does not apply route daily 100 each 3    hydroxychloroquine (PLAQUENIL) 200 MG tablet TK 2 TS PO QD WF OR MILK  2    VOLTAREN 1 % GEL APPLY 2 GRAMS TO HAND QID  5    polyethylene glycol (MIRALAX) POWD powder Take 17 g by mouth every other day       azithromycin (ZITHROMAX) 250 MG tablet Take 1 tablet by mouth See Admin Instructions for 5 days 500mg on day 1 followed by 250mg on days 2 - 5 (Patient not taking: Reported on 12/21/2020) 6 tablet 0  albuterol sulfate HFA (VENTOLIN HFA) 108 (90 Base) MCG/ACT inhaler Inhale 2 puffs into the lungs 4 times daily as needed for Wheezing (Patient not taking: Reported on 12/21/2020) 1 Inhaler 5    Umeclidinium Bromide (INCRUSE ELLIPTA) 62.5 MCG/INH AEPB Inhale 1 puff into the lungs daily (Patient not taking: Reported on 12/21/2020) 1 each 11     No current facility-administered medications for this visit. Allergies: Allergies as of 12/21/2020 - Review Complete 12/21/2020   Allergen Reaction Noted    Clindamycin/lincomycin Anaphylaxis 07/15/2015    Benadryl [diphenhydramine] Other (See Comments) 07/15/2015    Prednisone  08/13/2018       Review of Systems:  Constitutional: negative for - chills and fever  Eyes:  negative for - visual disturbance and photophobia  HENMT: negative for - headaches and sinus pain  Respiratory: positive for - cough, and shortness of breath  Cardiovascular: negative for - chest pain and palpitations  Gastrointestinal: negative for - blood in stools, constipation, diarrhea, nausea, and vomiting  Genito-Urinary: negative for - hematuria, urinary frequency, urinary urgency, and urinary retention  Musculoskeletal: positive for - joint pain, joint stiffness, and joint swelling  Hematological and Lymphatic: negative for - bleeding problems, abnormal bruising, and swollen lymph nodes  Endocrine:  negative for - polydipsia and polyphagia  Allergy/Immunology:  negative for - rhinorrhea, sinus congestion, hives  Integument:  negative for - negative for - rash, change in moles, new or changing lesions  Psychological: negative for - anxiety and depression  Neurological: negative for - memory loss, numbness/tingling, and weakness     Physical Examination:  Vitals:  (As obtained by patient/caregiver or practitioner observation). Not taken/available if data not entered.    BP -  P -  R -  T -  PO -    General appearance:  Alert, well developed, well nourished, in no distress HEENT:  normocephalic, atraumatic, sclera appear normal, no observable or audible rhinorrhea, Ears appear normal, oral mucous membranes are moist without erythema, trachea appears midline, no observable anterior neck masses  Cardiovascular:  No observable peripheral edema, No observable cyanosis or clubbing. Pulmonary:  Breathing appears normal, good expansion, normal effort without use of accessory muscles  Musculoskeletal:  Joints - appear arthritic. No splints, slings, or casts. Spine appears normal with normal posture and range of motion. Integument:  No rash, erythema, or pallor on visible skin  Psychiatric:  Mood, affect, and behavior appear normal    Neurologic:  Mental Status:  alert, oriented to person, place, and time. Speech:  Clear without dysarthria or dysphonia  Language:  Fluent without aphasia  Cranial Nerves:   III,IV, VI Extraocular movements are full   VII Facial movements are symmetrical without weakness   VIII Hearing is intact   IX,X Shoulder shrug and head rotation strength are intact   XII No tongue atrophy or fasciculations. Normal tongue protrusion. No tongue weakness  Motor:  No evident muscle atrophy. No gross muscle weakness noted. No tremor noted. Coordination:  Rapid alternating movements are normal in both upper and lower extremities. Extension nose testing is unimpaired bilaterally. Gait:  Normal station and gait. Pertinent Diagnostic Studies:  JAKOB is appropriate    Assessment:       ICD-10-CM    1. Osteoarthritis of spine with radiculopathy, lumbar region  M47.26    2. Chronic bilateral low back pain with bilateral sciatica  M54.42     M54.41     G89.29    3. SUSANA (obstructive sleep apnea)  G47.33    4. Rheumatoid arthritis involving both hands with positive rheumatoid factor (HCC)  M05.741     M05.742    5.  Anxiety  F41.9 He has had more back pain and debilitation. He has not tolerated pain management injections in the past.  He has some residual symptoms from COVID-19 - cough, phlegm, fatigue, arthralgias    Plan:   1. Consider radiofrequency nerve ablation for his back. 2. Continue present medications and care  3. FU in 6 months    Pursuant to the emergency declaration under the [address] [place] [address] authority and the [employer] Act, this Virtual  Visit was conducted, with patient's consent, to reduce the patient's risk of exposure to COVID-19 and provide continuity of care for an established patient. Services were provided through a video synchronous discussion virtually to substitute for in-person clinic visit.      Electronically signed by Horacio Joseph MD on 12/21/20

## 2021-01-11 RX ORDER — ASPIRIN 81 MG/1
TABLET, CHEWABLE ORAL
Qty: 90 TABLET | Refills: 1 | Status: SHIPPED | OUTPATIENT
Start: 2021-01-11 | End: 2022-01-17

## 2021-01-15 DIAGNOSIS — M54.42 CHRONIC BILATERAL LOW BACK PAIN WITH BILATERAL SCIATICA: ICD-10-CM

## 2021-01-15 DIAGNOSIS — M47.26 OSTEOARTHRITIS OF SPINE WITH RADICULOPATHY, LUMBAR REGION: ICD-10-CM

## 2021-01-15 DIAGNOSIS — G89.29 CHRONIC BILATERAL LOW BACK PAIN WITH BILATERAL SCIATICA: ICD-10-CM

## 2021-01-15 DIAGNOSIS — M54.41 CHRONIC BILATERAL LOW BACK PAIN WITH BILATERAL SCIATICA: ICD-10-CM

## 2021-01-18 NOTE — TELEPHONE ENCOUNTER
Requested Prescriptions     Pending Prescriptions Disp Refills    oxyCODONE-acetaminophen (PERCOCET)  MG per tablet 120 tablet 0     Sig: Take 1 tablet by mouth every 6 hours as needed for Pain (Pain) for up to 30 days.  oxyCODONE (OXYCONTIN) 20 MG extended release tablet 60 tablet 0     Sig: Take 1 tablet by mouth 2 times daily for 30 days.        Last Office Visit:  12/21/2020  Next Office Visit:  Visit date not found  Last Medication Refill:   12/18/20 for both  Johnnie Nash up to date:  12/16/20    *RX updated to reflect   1/18/21 for both  fill date*

## 2021-01-19 RX ORDER — OXYCODONE HCL 20 MG/1
20 TABLET, FILM COATED, EXTENDED RELEASE ORAL 2 TIMES DAILY
Qty: 60 TABLET | Refills: 0 | Status: SHIPPED | OUTPATIENT
Start: 2021-01-19 | End: 2021-02-19

## 2021-01-19 RX ORDER — OXYCODONE AND ACETAMINOPHEN 10; 325 MG/1; MG/1
1 TABLET ORAL EVERY 6 HOURS PRN
Qty: 120 TABLET | Refills: 0 | Status: SHIPPED | OUTPATIENT
Start: 2021-01-19 | End: 2021-02-15 | Stop reason: SDUPTHER

## 2021-01-26 ENCOUNTER — OFFICE VISIT (OUTPATIENT)
Dept: CARDIOLOGY CLINIC | Age: 66
End: 2021-01-26
Payer: MEDICARE

## 2021-01-26 VITALS
BODY MASS INDEX: 41.08 KG/M2 | SYSTOLIC BLOOD PRESSURE: 148 MMHG | DIASTOLIC BLOOD PRESSURE: 98 MMHG | HEART RATE: 70 BPM | HEIGHT: 73 IN | WEIGHT: 310 LBS

## 2021-01-26 DIAGNOSIS — I25.10 CORONARY ARTERY DISEASE INVOLVING NATIVE CORONARY ARTERY OF NATIVE HEART WITHOUT ANGINA PECTORIS: ICD-10-CM

## 2021-01-26 DIAGNOSIS — I10 ESSENTIAL HYPERTENSION: Primary | ICD-10-CM

## 2021-01-26 DIAGNOSIS — E78.00 HYPERCHOLESTEREMIA: ICD-10-CM

## 2021-01-26 DIAGNOSIS — I47.1 PAROXYSMAL SVT (SUPRAVENTRICULAR TACHYCARDIA) (HCC): ICD-10-CM

## 2021-01-26 PROCEDURE — G8427 DOCREV CUR MEDS BY ELIG CLIN: HCPCS | Performed by: CLINICAL NURSE SPECIALIST

## 2021-01-26 PROCEDURE — 1036F TOBACCO NON-USER: CPT | Performed by: CLINICAL NURSE SPECIALIST

## 2021-01-26 PROCEDURE — G8484 FLU IMMUNIZE NO ADMIN: HCPCS | Performed by: CLINICAL NURSE SPECIALIST

## 2021-01-26 PROCEDURE — G8417 CALC BMI ABV UP PARAM F/U: HCPCS | Performed by: CLINICAL NURSE SPECIALIST

## 2021-01-26 PROCEDURE — 99214 OFFICE O/P EST MOD 30 MIN: CPT | Performed by: CLINICAL NURSE SPECIALIST

## 2021-01-26 PROCEDURE — 1123F ACP DISCUSS/DSCN MKR DOCD: CPT | Performed by: CLINICAL NURSE SPECIALIST

## 2021-01-26 PROCEDURE — 93000 ELECTROCARDIOGRAM COMPLETE: CPT | Performed by: CLINICAL NURSE SPECIALIST

## 2021-01-26 PROCEDURE — 4040F PNEUMOC VAC/ADMIN/RCVD: CPT | Performed by: CLINICAL NURSE SPECIALIST

## 2021-01-26 PROCEDURE — 3017F COLORECTAL CA SCREEN DOC REV: CPT | Performed by: CLINICAL NURSE SPECIALIST

## 2021-01-26 NOTE — PATIENT INSTRUCTIONS
Let us know if you have worsening fatigue or shortness of breath   Follow up in 6 mos   Call with any questions or concerns  Follow up with HEENA Braxton for non cardiac problems and labs   Report any new problems  Cardiovascular Fitness-Exercise as tolerated. Strive for 30 minutes of exercise most days of the week. Cardiac / Healthy Diet  Continue current medications as directed  Continue plan of treatment  It is always recommended that you bring your medications bottles with you to each visit - this is for your safety!

## 2021-01-26 NOTE — PROGRESS NOTES
[address] [place], [address] [address]  Phone: [phone]  Fax: [phone]    OFFICE VISIT:  2021    Shante Cruzen - : 1955    Reason For Visit:  Claritza Moyer is a 72 y.o. male who is here for 6 Month Follow-Up (no cardiac symptoms  had COVID 20), Coronary Artery Disease, and Tachycardia (SVT)  History of SVT controlled on calcium channel blocker. Was uptitrated fall 2019  10/23/18 cath  80% proximal LAD, 2.5 x 23 SYL, o/w luminals, normal LVFX      He returns today in follow up. BP at home running 120s/80s  He had [address] in Dec.  Still has some RECIO with minimal activity. Still quite active. Did his racing this weekend but just has to stop and recover little more frequently. No significant change in peripheral edema  Sleeps with his BI-pap     Subjective  Claritza Moyer denies exertional chest pain, shortness of breath, orthopnea, paroxysmal nocturnal dyspnea, syncope, presyncope, arrhythmia. The patient denies numbness or weakness to suggest cerebrovascular accident or transient ischemic attack. HEENA Prabhakar is PCP and follows labs including lipids.   Shante Capps has the following history as [facility] in [facility]:    Patient Active Problem List    Diagnosis Date Noted    Abnormal nuclear cardiac imaging test 10/23/2018     Priority: High    Palpitations      Priority: High    Class 2 severe obesity with serious comorbidity and body mass index (BMI) of 38.0 to 38.9 in adult [facility]) 2020    Mixed hyperlipidemia 2019    History of coronary artery stent placement 2019    Coronary artery disease involving native coronary artery of native heart without angina pectoris 2019    Paroxysmal SVT (supraventricular tachycardia) ([facility] 2018    Shortness of breath 2018    Rheumatoid arthritis involving both hands with positive rheumatoid factor ([facility] 2018    Premature atrial contractions 2017  Memory loss 11/16/2017    History of sleep apnea 08/09/2017    Tachycardia 08/09/2017    Intermittent palpitations 08/09/2017    Family history of atrial fibrillation 08/09/2017    Thoracic spine pain 07/03/2017    CPAP (continuous positive airway pressure) dependence     Osteoarthritis of spine with radiculopathy, lumbar region     Chronic bilateral low back pain with bilateral sciatica     Chest discomfort 03/23/2016    Essential hypertension 03/23/2016    Hypercholesteremia 03/23/2016    Type 2 diabetes mellitus ([address] 03/23/2016    SUSAAN (obstructive sleep apnea) 03/23/2016    Obesity 03/23/2016     Past Medical History:   Diagnosis Date    Acute sinusitis     Anxiety     Chronic back pain     Chronic bilateral low back pain with bilateral sciatica     Constipation     COPD (chronic obstructive pulmonary disease) (HCC)     Coronary artery disease involving native coronary artery of native heart without angina pectoris 8/27/2019    CPAP (continuous positive airway pressure) dependence     12cm to 20cm    Depression     Hyperlipidemia     Hypertensive disorder     OA (osteoarthritis of the spine)     Obesity     SUSANA (obstructive sleep apnea)     AHI:  85.3 per HST 11/2013    Osteoarthritis of spine with radiculopathy, lumbar region     Otitis media     Rheumatoid arthritis ([address]     Spinal stenosis     Type 2 diabetes mellitus ([address] 03/23/2016    Unspecified sleep apnea     uses bipap    Vertigo      Past Surgical History:   Procedure Laterality Date    ADENOIDECTOMY      BROW LIFT      due to vision    CARDIAC CATHETERIZATION  10/24/2018         COLONOSCOPY  27 yrs ago    [place], [place], normal per patient    HERNIA REPAIR      KNEE ARTHROSCOPY Left 2009    MD NJX DX/THER SBST EPIDURAL/SUBARACH LUMBAR/SACRAL N/A 10/3/2017    L3-4 EPIDURAL STEROID INJECTION performed by Cortney Pendleton at [address] [place] [address] TONSILLECTOMY       Family History Problem Relation Age of Onset    Heart Disease Father 64    Cancer Father         malignant tumor of pharynx    Diabetes Mother     Hypertension Mother     Diabetes Sister         x 2    Diabetes Brother     Colon Cancer Neg Hx     Colon Polyps Neg Hx     Liver Disease Neg Hx     Rectal Cancer Neg Hx     Stomach Cancer Neg Hx      Social History     Tobacco Use    Smoking status: Former Smoker     Packs/day: 0.50     Years: 20.00     Pack years: 10.00     Types: Cigarettes     Start date: 0     Quit date:      Years since quittin.0    Smokeless tobacco: Never Used   Substance Use Topics    Alcohol use: No      Current Outpatient Medications   Medication Sig Dispense Refill    oxyCODONE-acetaminophen (PERCOCET)  MG per tablet Take 1 tablet by mouth every 6 hours as needed for Pain (Pain) for up to 30 days. 120 tablet 0    oxyCODONE (OXYCONTIN) 20 MG extended release tablet Take 1 tablet by mouth 2 times daily for 30 days.  60 tablet 0    GNP ADULT ASPIRIN LOW STRENGTH 81 MG chewable tablet CHEW AND SWALLOW ONE TABLET BY MOUTH DAILY 90 tablet 1    ALPRAZolam (XANAX) 1 MG tablet TAKE ONE AND A HALF TABLETS BY MOUTH THREE TIMES DAILY AS NEEDED 135 tablet 5    verapamil (VERELAN) 200 MG extended release capsule Take 1 capsule by mouth daily 90 capsule 3    metFORMIN (GLUCOPHAGE) 500 MG tablet Take 1 tablet by mouth 2 times daily (with meals) 180 tablet 3    hydroCHLOROthiazide (HYDRODIURIL) 25 MG tablet TAKE 1 TABLET BY MOUTH EVERY DAY 90 tablet 3    lovastatin (MEVACOR) 40 MG tablet TAKE 1 TABLET BY MOUTH EVERY DAY 90 tablet 3    Umeclidinium Bromide (INCRUSE ELLIPTA) 62.5 MCG/INH AEPB Inhale 1 puff into the lungs daily 1 each 11    albuterol sulfate HFA (VENTOLIN HFA) 108 (90 Base) MCG/ACT inhaler Inhale 2 puffs into the lungs every 6 hours as needed for Wheezing 1 Inhaler 3    fluticasone (FLONASE) 50 MCG/ACT nasal spray 2 sprays by Nasal route daily 1 Bottle 11  triamcinolone (KENALOG) 0.1 % cream Apply topically 2 times daily. 45 g 0    blood glucose test strips (CONTOUR NEXT TEST) strip 1 each by In Vitro route daily As needed. 100 each 3    MICROLET LANCETS MISC 1 each by Does not apply route daily 100 each 3    hydroxychloroquine (PLAQUENIL) 200 MG tablet TK 2 TS PO QD WF OR MILK  2    VOLTAREN 1 % GEL APPLY 2 GRAMS TO HAND QID  5    polyethylene glycol (MIRALAX) POWD powder Take 17 g by mouth every other day       Fluticasone furoate-vilanterol (BREO ELLIPTA) 200-25 MCG/INH AEPB inhaler Inhale 1 puff into the lungs daily (Patient not taking: Reported on 1/26/2021) 1 each 0    azithromycin (ZITHROMAX) 250 MG tablet Take 2 tabs (500 mg) on Day 1, and take 1 tab (250 mg) on days 2 through 5. (Patient not taking: Reported on 1/26/2021) 1 packet 0     No current facility-administered medications for this visit. Allergies: Clindamycin/lincomycin, Benadryl [diphenhydramine], and Prednisone    Review of Systems  Constitutional  no significant activity change, appetite change, or unexpected weight change. No fever, chills or diaphoresis. No fatigue. HEENT  no significant rhinorrhea or epistaxis. No tinnitus or significant hearing loss. Eyes  no sudden vision change or amaurosis. Respiratory  no significant wheezing, stridor, apnea or cough. + dyspnea on exertion no shortness of breath. Cardiovascular  no exertional chest pain, orthopnea or PND. No sensation of arrhythmia or slow heart rate. No claudication +leg edema. Gastrointestinal  no abdominal swelling or pain. No blood in stool. No severe constipation, diarrhea, nausea, or vomiting. Genitourinary  no difficulty urinating, dysuria, frequency, or urgency. No flank pain or hematuria. Musculoskeletal  + back pain  With gait disturbance  No myalgia. Skin  no color change or rash. No pallor. No new surgical incision. Neurologic  no speech difficulty, facial asymmetry or lateralizing weakness. No seizures, presyncope, syncope, or significant dizziness. Hematologic  no easy bruising or excessive bleeding. Psychiatric  no severe anxiety or insomnia. No confusion. All other review of systems are negative. Objective  Vital Signs - BP (!) 148/98   Pulse 70   Ht 6' 1\" (1.854 m)   Wt (!) 310 lb (140.6 kg)   BMI 40.90 kg/m²   General - Shlomo Montgomery is alert, cooperative, and pleasant. Well groomed. No acute distress. Body habitus is obese. HEENT  The head is normocephalic. No circumoral cyanosis. Dentition is normal.   EYES -  No Xanthelasma, no arcus senilis, no conjunctival hemorrhages or discharge. Neck - Supple, without increased jugular venous pressures. No carotid bruits. No mass. Respiratory - Lungs are clear bilaterally. No wheezes or rales. Normal effort without use of accessory muscles. Cardiovascular  Heart has regular rhythm and rate. No murmurs, rubs or gallops. + pedal pulses and no varicosities. Abdominal -  Soft, nontender, nondistended. Bowel sounds are intact. Extremities - No clubbing, cyanosis. 1+ pitting BLE edema. Musculoskeletal -  No clubbing . No Osler's nodes. Gait normal .  No kyphosis or scoliosis. Skin -  no statis ulcers or dermatitis. Neurological - No focal signs are identified. Oriented to person, place and time. Psychiatric -  Appropriate affect and mood. Assessment:     Diagnosis Orders   1. Essential hypertension  EKG 12 lead   2. Coronary artery disease involving native coronary artery of native heart without angina pectoris     3. Paroxysmal SVT (supraventricular tachycardia) ([address]     4.  Hypercholesteremia       Data:  BP Readings from Last 3 Encounters:   01/26/21 (!) 148/98   06/24/20 138/88   02/07/20 122/70    Pulse Readings from Last 3 Encounters:   01/26/21 70   12/17/20 68   06/24/20 76        Wt Readings from Last 3 Encounters: 01/26/21 (!) 310 lb (140.6 kg)   12/17/20 (!) 306 lb 12.8 oz (139.2 kg)   06/24/20 (!) 306 lb (138.8 kg)     EKG today shows normal sinus rhythm with a rate of 70. Low voltage in precordial leads. Premature ventricular beats noted, appear to be unifocal.  No significant change from previous EKG    No pneumonia noted on his x-ray with Covid infection    Blood pressure elevated today. Previously has been controlled and has been normal at home. Does report having some whitecoat syndrome at times  Currently on calcium channel blocker and diuretic. No recurrent arrhythmia  On statin and aspirin  PCP is managing labs  He does continue to have some ongoing fatigue and RECIO that is slightly worse after his Covid infection. He still within a couple months of infection and states he was not hospitalized but was quite sick. Encouraged him to continue being active and let us know if he has any worsening symptoms. States taking medications as prescribed  Stable cardiovascular status. No evidence of overt heart failure, angina or dysrhythmia. Plan  Let us know if you have worsening fatigue or shortness of breath   Follow up in 6 mos   Call with any questions or concerns  Follow up with HEENA Dewitt for non cardiac problems and labs   Report any new problems  Cardiovascular Fitness-Exercise as tolerated. Strive for 30 minutes of exercise most days of the week. Cardiac / Healthy Diet  Continue current medications as directed  Continue plan of treatment  It is always recommended that you bring your medications bottles with you to each visit - this is for your safety!        EHENA Navarro JULISSA dragon/transcription disclaimer: Much of this encounter note is electronic transcription/translation of spoken language to printed tach. Electronic translation of spoken language may be erroneous, or at times, nonsensical words or phrases may be inadvertently transcribed.  Although, I have reviewed the note for such errors, some may still exist.

## 2021-01-28 ENCOUNTER — TELEPHONE (OUTPATIENT)
Dept: PRIMARY CARE CLINIC | Age: 66
End: 2021-01-28

## 2021-01-28 ENCOUNTER — OFFICE VISIT (OUTPATIENT)
Dept: PRIMARY CARE CLINIC | Age: 66
End: 2021-01-28
Payer: MEDICARE

## 2021-01-28 VITALS
HEART RATE: 89 BPM | SYSTOLIC BLOOD PRESSURE: 128 MMHG | WEIGHT: 309.6 LBS | BODY MASS INDEX: 40.85 KG/M2 | TEMPERATURE: 96.4 F | OXYGEN SATURATION: 95 % | DIASTOLIC BLOOD PRESSURE: 86 MMHG

## 2021-01-28 DIAGNOSIS — B34.9 VIRAL ILLNESS: ICD-10-CM

## 2021-01-28 DIAGNOSIS — R50.9 FEVER, UNSPECIFIED FEVER CAUSE: Primary | ICD-10-CM

## 2021-01-28 LAB
APPEARANCE FLUID: CLEAR
BILIRUBIN, POC: NORMAL
BLOOD URINE, POC: NORMAL
CLARITY, POC: NORMAL
COLOR, POC: YELLOW
GLUCOSE URINE, POC: NORMAL
KETONES, POC: NORMAL
LEUKOCYTE EST, POC: NORMAL
NITRITE, POC: NORMAL
PH, POC: 6.5
PROTEIN, POC: NORMAL
SPECIFIC GRAVITY, POC: 1.02
UROBILINOGEN, POC: 0.2

## 2021-01-28 PROCEDURE — G8417 CALC BMI ABV UP PARAM F/U: HCPCS | Performed by: NURSE PRACTITIONER

## 2021-01-28 PROCEDURE — 3017F COLORECTAL CA SCREEN DOC REV: CPT | Performed by: NURSE PRACTITIONER

## 2021-01-28 PROCEDURE — 99214 OFFICE O/P EST MOD 30 MIN: CPT | Performed by: NURSE PRACTITIONER

## 2021-01-28 PROCEDURE — G8427 DOCREV CUR MEDS BY ELIG CLIN: HCPCS | Performed by: NURSE PRACTITIONER

## 2021-01-28 PROCEDURE — 4040F PNEUMOC VAC/ADMIN/RCVD: CPT | Performed by: NURSE PRACTITIONER

## 2021-01-28 PROCEDURE — 81002 URINALYSIS NONAUTO W/O SCOPE: CPT | Performed by: NURSE PRACTITIONER

## 2021-01-28 PROCEDURE — G8484 FLU IMMUNIZE NO ADMIN: HCPCS | Performed by: NURSE PRACTITIONER

## 2021-01-28 PROCEDURE — 1036F TOBACCO NON-USER: CPT | Performed by: NURSE PRACTITIONER

## 2021-01-28 PROCEDURE — 1123F ACP DISCUSS/DSCN MKR DOCD: CPT | Performed by: NURSE PRACTITIONER

## 2021-01-28 ASSESSMENT — ENCOUNTER SYMPTOMS
EYE DISCHARGE: 0
COUGH: 0
RHINORRHEA: 0
BLOOD IN STOOL: 0
SORE THROAT: 0
CONSTIPATION: 0
DIARRHEA: 0
EYE REDNESS: 0
CHOKING: 0
WHEEZING: 0

## 2021-01-28 NOTE — PROGRESS NOTES
Ana Ordoñez (:  1955) is a 72 y.o. male,Established patient, here for evaluation of the following chief complaint(s):  Shortness of Breath (saw his cardiologist 2 days ago and his lungs were checked and given the all cleare- but he is still experiencing sob, no chest pain or tightness but very winded), Fever (pt just having some concern since he was covid positive back in December), Chills (can't get warm and then will break out in a sweat and vice versa), and Nausea      ASSESSMENT/PLAN:  1. Fever, unspecified fever cause  -     POCT Urinalysis no Micro  -     COVID-19  2. Viral illness    Monitor symptoms. Return if symptoms worsen or fail to improve. SUBJECTIVE/OBJECTIVE:  HPI  Shortness of Breath (saw his cardiologist 2 days ago and his lungs were checked and given the all cleare- but he is still experiencing sob, no chest pain or tightness but very winded),since covid it is improving but not back to normal. No cough. Fever (pt just having some concern since he was covid positive back in December), Chills (can't get warm and then will break out in a sweat and vice versa), and Nausea    Ran fever this morning and last night but none this afternoon. Review of Systems   Constitutional: Positive for fever. Negative for appetite change and unexpected weight change. HENT: Negative for congestion, ear pain, rhinorrhea and sore throat. Eyes: Negative for discharge and redness. Respiratory: Negative for cough, choking and wheezing. Cardiovascular: Negative for chest pain. Gastrointestinal: Negative for blood in stool, constipation and diarrhea. Genitourinary: Negative for decreased urine volume and dysuria. Skin: Negative for rash. Neurological: Negative for weakness. Hematological: Negative for adenopathy. Psychiatric/Behavioral: Negative for suicidal ideas. Physical Exam  Vitals signs reviewed. Constitutional:       General: He is not in acute distress. Appearance: Normal appearance. He is well-developed. He is obese. He is not ill-appearing. HENT:      Head: Normocephalic. Right Ear: Tympanic membrane normal. There is no impacted cerumen. Left Ear: Tympanic membrane normal. There is no impacted cerumen. Nose: No congestion. Mouth/Throat:      Pharynx: No posterior oropharyngeal erythema. Eyes:      General:         Right eye: No discharge. Left eye: No discharge. Conjunctiva/sclera: Conjunctivae normal.   Neck:      Musculoskeletal: Normal range of motion and neck supple. No muscular tenderness. Cardiovascular:      Rate and Rhythm: Normal rate and regular rhythm. Pulses: Normal pulses. Heart sounds: Normal heart sounds. No murmur. Pulmonary:      Effort: Pulmonary effort is normal. No respiratory distress. Breath sounds: Normal breath sounds. No rhonchi. Abdominal:      General: Bowel sounds are normal.      Palpations: Abdomen is soft. Tenderness: There is no abdominal tenderness. Musculoskeletal: Normal range of motion. Lymphadenopathy:      Cervical: No cervical adenopathy. Skin:     General: Skin is warm and dry. Capillary Refill: Capillary refill takes less than 2 seconds. Findings: No rash. Neurological:      General: No focal deficit present. Mental Status: He is alert and oriented to person, place, and time. Psychiatric:         Mood and Affect: Mood normal.         Behavior: Behavior normal.                 An electronic signature was used to authenticate this note.     --HEENA Diane

## 2021-01-28 NOTE — TELEPHONE ENCOUNTER
pts wife called. He had COVID before Janee. He went heart dr on Tuesday and then yest started LG temp, freezing, coughing, vomiting during the night and temp of 102 last night. He is high risk with heart dz and DM. She wants to know if she should bring him in to be retested.       Looks like he was positive around Dec 9/10

## 2021-01-30 LAB — SARS-COV-2, NAA: NOT DETECTED

## 2021-02-01 ENCOUNTER — TELEPHONE (OUTPATIENT)
Dept: PRIMARY CARE CLINIC | Age: 66
End: 2021-02-01

## 2021-02-01 NOTE — TELEPHONE ENCOUNTER
----- Message from HEENA Lopez sent at 2/1/2021 10:31 AM CST -----  Please inform patient results show  covid is now neg

## 2021-02-14 DIAGNOSIS — G89.29 CHRONIC BILATERAL LOW BACK PAIN WITH BILATERAL SCIATICA: ICD-10-CM

## 2021-02-14 DIAGNOSIS — M54.42 CHRONIC BILATERAL LOW BACK PAIN WITH BILATERAL SCIATICA: ICD-10-CM

## 2021-02-14 DIAGNOSIS — M47.26 OSTEOARTHRITIS OF SPINE WITH RADICULOPATHY, LUMBAR REGION: ICD-10-CM

## 2021-02-14 DIAGNOSIS — M54.41 CHRONIC BILATERAL LOW BACK PAIN WITH BILATERAL SCIATICA: ICD-10-CM

## 2021-02-15 DIAGNOSIS — M54.41 CHRONIC BILATERAL LOW BACK PAIN WITH BILATERAL SCIATICA: ICD-10-CM

## 2021-02-15 DIAGNOSIS — M54.42 CHRONIC BILATERAL LOW BACK PAIN WITH BILATERAL SCIATICA: ICD-10-CM

## 2021-02-15 DIAGNOSIS — G89.29 CHRONIC BILATERAL LOW BACK PAIN WITH BILATERAL SCIATICA: ICD-10-CM

## 2021-02-15 DIAGNOSIS — M47.26 OSTEOARTHRITIS OF SPINE WITH RADICULOPATHY, LUMBAR REGION: ICD-10-CM

## 2021-02-15 RX ORDER — OXYCODONE HCL 20 MG/1
20 TABLET, FILM COATED, EXTENDED RELEASE ORAL 2 TIMES DAILY
Qty: 60 TABLET | Refills: 0 | OUTPATIENT
Start: 2021-02-15 | End: 2021-03-17

## 2021-02-15 RX ORDER — OXYCODONE AND ACETAMINOPHEN 10; 325 MG/1; MG/1
1 TABLET ORAL EVERY 6 HOURS PRN
Qty: 120 TABLET | Refills: 0 | OUTPATIENT
Start: 2021-02-15 | End: 2021-03-17

## 2021-02-16 NOTE — TELEPHONE ENCOUNTER
Bebe Shaver has requested a refill on his medication. Last office visit : 12/21/20  Next office visit :   Last medication refill : 1/19/21  Bianca Gusman : up to date      Requested Prescriptions     Pending Prescriptions Disp Refills    oxyCODONE-acetaminophen (PERCOCET)  MG per tablet 120 tablet 0     Sig: Take 1 tablet by mouth every 6 hours as needed for Pain (Pain) for up to 30 days.

## 2021-02-17 RX ORDER — OXYCODONE AND ACETAMINOPHEN 10; 325 MG/1; MG/1
1 TABLET ORAL EVERY 6 HOURS PRN
Qty: 120 TABLET | Refills: 0 | Status: SHIPPED | OUTPATIENT
Start: 2021-02-18 | End: 2021-03-16 | Stop reason: SDUPTHER

## 2021-02-19 ENCOUNTER — TELEPHONE (OUTPATIENT)
Dept: NEUROLOGY | Age: 66
End: 2021-02-19

## 2021-02-19 DIAGNOSIS — G89.29 CHRONIC BILATERAL LOW BACK PAIN WITH BILATERAL SCIATICA: ICD-10-CM

## 2021-02-19 DIAGNOSIS — M54.42 CHRONIC BILATERAL LOW BACK PAIN WITH BILATERAL SCIATICA: ICD-10-CM

## 2021-02-19 DIAGNOSIS — M47.26 OSTEOARTHRITIS OF SPINE WITH RADICULOPATHY, LUMBAR REGION: ICD-10-CM

## 2021-02-19 DIAGNOSIS — M54.41 CHRONIC BILATERAL LOW BACK PAIN WITH BILATERAL SCIATICA: ICD-10-CM

## 2021-02-19 DIAGNOSIS — F41.9 ANXIETY: ICD-10-CM

## 2021-02-19 RX ORDER — ALPRAZOLAM 1 MG/1
TABLET ORAL
Qty: 135 TABLET | Refills: 5 | Status: SHIPPED | OUTPATIENT
Start: 2021-02-19 | End: 2021-03-16 | Stop reason: SDUPTHER

## 2021-02-19 NOTE — TELEPHONE ENCOUNTER
The request has been sent to Dr. Geremias Gagnon. It will be signed off at some point today. I sent the patient a [employer] message letting him know.

## 2021-02-19 NOTE — TELEPHONE ENCOUNTER
Maria G Lipscombsuyapawa has requested a refill on his medication.       Last office visit : 12/21/20  Next office visit :   Last medication refill : 1/19/21  Jez Kim : up to date       Requested Prescriptions     Pending Prescriptions Disp Refills    OXYCONTIN 20 MG extended release tablet [Pharmacy Med Name: OXYCONTIN 20 MG T12A 20 Tablet] 60 tablet 0     Sig: TAKE ONE TABLET BY MOUTH TWICE A DAY ** MAY CAUSE DROWSINESS **    ALPRAZolam (XANAX) 1 MG tablet [Pharmacy Med Name: ALPRAZOLAM 1 MG TABS 1 Tablet] 135 tablet 5     Sig: TAKE ONE AND A HALF TABLETS BY MOUTH THREE TIMES DAILY AS NEEDED

## 2021-02-19 NOTE — TELEPHONE ENCOUNTER
Bonita Brochure called stating that their oxycontin medication was mot filed by pharmacy. Per wife he gets oxycontin and oxycodone( percocet). He only received the oxycodone. Please call him to advise.     Last Appt:  Visit date not found  Next Appt:   2/19/2021  [address]: [address]

## 2021-03-16 DIAGNOSIS — M54.41 CHRONIC BILATERAL LOW BACK PAIN WITH BILATERAL SCIATICA: ICD-10-CM

## 2021-03-16 DIAGNOSIS — M54.42 CHRONIC BILATERAL LOW BACK PAIN WITH BILATERAL SCIATICA: ICD-10-CM

## 2021-03-16 DIAGNOSIS — M47.26 OSTEOARTHRITIS OF SPINE WITH RADICULOPATHY, LUMBAR REGION: ICD-10-CM

## 2021-03-16 DIAGNOSIS — G89.29 CHRONIC BILATERAL LOW BACK PAIN WITH BILATERAL SCIATICA: ICD-10-CM

## 2021-03-16 DIAGNOSIS — F41.9 ANXIETY: ICD-10-CM

## 2021-03-16 RX ORDER — ALPRAZOLAM 1 MG/1
TABLET ORAL
Qty: 135 TABLET | Refills: 5 | Status: SHIPPED | OUTPATIENT
Start: 2021-03-21 | End: 2021-03-18 | Stop reason: SDUPTHER

## 2021-03-16 RX ORDER — OXYCODONE AND ACETAMINOPHEN 10; 325 MG/1; MG/1
1 TABLET ORAL EVERY 6 HOURS PRN
Qty: 120 TABLET | Refills: 0 | Status: SHIPPED | OUTPATIENT
Start: 2021-03-20 | End: 2021-03-18 | Stop reason: SDUPTHER

## 2021-03-16 NOTE — TELEPHONE ENCOUNTER
Requested Prescriptions     Pending Prescriptions Disp Refills    oxyCODONE-acetaminophen (PERCOCET)  MG per tablet 120 tablet 0     Sig: Take 1 tablet by mouth every 6 hours as needed for Pain (Pain) for up to 30 days.  OXYCONTIN 20 MG extended release tablet 60 tablet 0    ALPRAZolam (XANAX) 1 MG tablet 135 tablet 5     Sig: One and a half tablets by mouth three times daily, must last 30 days       Last Office Visit:  12/21/2020  Next Office Visit:  Visit date not found  Last Medication Refill:  Percocet 2/18/2021, Oxycontin and Alprazolam 2/19/2021.    [facility] up to date:  2/17/2021    *RX updated to reflect   Percocet 3/20/2021 and Oxycontin Alprazolam 3/21/2021  fill date*

## 2021-03-17 ENCOUNTER — E-VISIT (OUTPATIENT)
Dept: PRIMARY CARE CLINIC | Age: 66
End: 2021-03-17
Payer: MEDICARE

## 2021-03-17 DIAGNOSIS — S81.801A OPEN WOUND OF RIGHT LOWER LEG, INITIAL ENCOUNTER: ICD-10-CM

## 2021-03-17 DIAGNOSIS — E11.628 TYPE 2 DIABETES MELLITUS WITH OTHER SKIN COMPLICATION, WITHOUT LONG-TERM CURRENT USE OF INSULIN (HCC): ICD-10-CM

## 2021-03-17 DIAGNOSIS — L03.115 CELLULITIS OF RIGHT LOWER EXTREMITY: Primary | ICD-10-CM

## 2021-03-17 PROCEDURE — 99422 OL DIG E/M SVC 11-20 MIN: CPT | Performed by: NURSE PRACTITIONER

## 2021-03-17 RX ORDER — DOXYCYCLINE HYCLATE 100 MG
100 TABLET ORAL 2 TIMES DAILY
Qty: 20 TABLET | Refills: 0 | Status: SHIPPED | OUTPATIENT
Start: 2021-03-17 | End: 2021-03-27

## 2021-03-18 ENCOUNTER — PATIENT MESSAGE (OUTPATIENT)
Dept: NEUROLOGY | Age: 66
End: 2021-03-18

## 2021-03-18 DIAGNOSIS — G89.29 CHRONIC BILATERAL LOW BACK PAIN WITH BILATERAL SCIATICA: ICD-10-CM

## 2021-03-18 DIAGNOSIS — M54.42 CHRONIC BILATERAL LOW BACK PAIN WITH BILATERAL SCIATICA: ICD-10-CM

## 2021-03-18 DIAGNOSIS — F41.9 ANXIETY: ICD-10-CM

## 2021-03-18 DIAGNOSIS — M54.41 CHRONIC BILATERAL LOW BACK PAIN WITH BILATERAL SCIATICA: ICD-10-CM

## 2021-03-18 DIAGNOSIS — M47.26 OSTEOARTHRITIS OF SPINE WITH RADICULOPATHY, LUMBAR REGION: ICD-10-CM

## 2021-03-18 NOTE — TELEPHONE ENCOUNTER
Spoke to patient and he wants to fill his medication at [facility]. Will need new scripts sent and old ones canceled at [place] [address]. OK per Dr Salazar Salas.

## 2021-03-18 NOTE — TELEPHONE ENCOUNTER
From: Johanna Marroquin  To:  Celsa Bond MD  Sent: 3/18/2021 9:30 AM CDT  Subject: Prescription Question    I will be out of meds that refilled on the 2-19-21   I called Ilya they say you sent electronic refill but not until the 21st.  oxycontin 20er filled 2-19-21  Percocet 10 325 filled 2-18-21  alprazolam 2-19-21  They are telling me to call my dr. Miguel Ángel Evans you   Huy Leanna

## 2021-03-18 NOTE — TELEPHONE ENCOUNTER
Patient needs medication sent to [facility]. OK per Dr Bill [place]. Will cancel scripts at [place] [address].

## 2021-03-18 NOTE — TELEPHONE ENCOUNTER
From: Angélica Mcmullen  To: Jojo Booker MD  Sent: 3/18/2021 9:49 AM CDT  Subject: Prescription Question    Follow up to Sd Jaimes i forgot to tell you the Pharmacy is not open on the fill dates for the 21st refills i did not know this until just now. I will be changing back to lorena. Thanks for some help please.   Iveth Piña  [phone]

## 2021-03-19 ENCOUNTER — OFFICE VISIT (OUTPATIENT)
Dept: CARDIOLOGY CLINIC | Age: 66
End: 2021-03-19
Payer: MEDICARE

## 2021-03-19 VITALS
DIASTOLIC BLOOD PRESSURE: 88 MMHG | SYSTOLIC BLOOD PRESSURE: 138 MMHG | BODY MASS INDEX: 41.08 KG/M2 | HEIGHT: 73 IN | WEIGHT: 310 LBS | HEART RATE: 83 BPM

## 2021-03-19 DIAGNOSIS — I25.10 CORONARY ARTERY DISEASE INVOLVING NATIVE CORONARY ARTERY OF NATIVE HEART WITHOUT ANGINA PECTORIS: ICD-10-CM

## 2021-03-19 DIAGNOSIS — R06.09 DOE (DYSPNEA ON EXERTION): ICD-10-CM

## 2021-03-19 DIAGNOSIS — I10 ESSENTIAL HYPERTENSION: ICD-10-CM

## 2021-03-19 DIAGNOSIS — I25.10 CORONARY ARTERY DISEASE INVOLVING NATIVE CORONARY ARTERY OF NATIVE HEART WITHOUT ANGINA PECTORIS: Primary | ICD-10-CM

## 2021-03-19 LAB
ALBUMIN SERPL-MCNC: 4.7 G/DL (ref 3.5–5.2)
ALP BLD-CCNC: 75 U/L (ref 40–130)
ALT SERPL-CCNC: 25 U/L (ref 5–41)
ANION GAP SERPL CALCULATED.3IONS-SCNC: 11 MMOL/L (ref 7–19)
AST SERPL-CCNC: 18 U/L (ref 5–40)
BASOPHILS ABSOLUTE: 0.1 K/UL (ref 0–0.2)
BASOPHILS RELATIVE PERCENT: 0.8 % (ref 0–1)
BILIRUB SERPL-MCNC: 0.3 MG/DL (ref 0.2–1.2)
BUN BLDV-MCNC: 15 MG/DL (ref 8–23)
CALCIUM SERPL-MCNC: 10.3 MG/DL (ref 8.8–10.2)
CHLORIDE BLD-SCNC: 97 MMOL/L (ref 98–111)
CO2: 30 MMOL/L (ref 22–29)
CREAT SERPL-MCNC: 0.9 MG/DL (ref 0.5–1.2)
EOSINOPHILS ABSOLUTE: 0.2 K/UL (ref 0–0.6)
EOSINOPHILS RELATIVE PERCENT: 2.4 % (ref 0–5)
GFR AFRICAN AMERICAN: >59
GFR NON-AFRICAN AMERICAN: >60
GLUCOSE BLD-MCNC: 141 MG/DL (ref 74–109)
HCT VFR BLD CALC: 46.4 % (ref 42–52)
HEMOGLOBIN: 14.6 G/DL (ref 14–18)
IMMATURE GRANULOCYTES #: 0 K/UL
LYMPHOCYTES ABSOLUTE: 1.3 K/UL (ref 1.1–4.5)
LYMPHOCYTES RELATIVE PERCENT: 20.2 % (ref 20–40)
MCH RBC QN AUTO: 28 PG (ref 27–31)
MCHC RBC AUTO-ENTMCNC: 31.5 G/DL (ref 33–37)
MCV RBC AUTO: 89.1 FL (ref 80–94)
MONOCYTES ABSOLUTE: 0.7 K/UL (ref 0–0.9)
MONOCYTES RELATIVE PERCENT: 11.2 % (ref 0–10)
NEUTROPHILS ABSOLUTE: 4.1 K/UL (ref 1.5–7.5)
NEUTROPHILS RELATIVE PERCENT: 65.1 % (ref 50–65)
PDW BLD-RTO: 14 % (ref 11.5–14.5)
PLATELET # BLD: 177 K/UL (ref 130–400)
PMV BLD AUTO: 11.2 FL (ref 9.4–12.4)
POTASSIUM SERPL-SCNC: 4.6 MMOL/L (ref 3.5–5)
PRO-BNP: 23 PG/ML (ref 0–900)
RBC # BLD: 5.21 M/UL (ref 4.7–6.1)
SODIUM BLD-SCNC: 138 MMOL/L (ref 136–145)
TOTAL PROTEIN: 7.9 G/DL (ref 6.6–8.7)
WBC # BLD: 6.2 K/UL (ref 4.8–10.8)

## 2021-03-19 PROCEDURE — 99214 OFFICE O/P EST MOD 30 MIN: CPT | Performed by: CLINICAL NURSE SPECIALIST

## 2021-03-19 PROCEDURE — 1036F TOBACCO NON-USER: CPT | Performed by: CLINICAL NURSE SPECIALIST

## 2021-03-19 PROCEDURE — 4040F PNEUMOC VAC/ADMIN/RCVD: CPT | Performed by: CLINICAL NURSE SPECIALIST

## 2021-03-19 PROCEDURE — G8484 FLU IMMUNIZE NO ADMIN: HCPCS | Performed by: CLINICAL NURSE SPECIALIST

## 2021-03-19 PROCEDURE — G8417 CALC BMI ABV UP PARAM F/U: HCPCS | Performed by: CLINICAL NURSE SPECIALIST

## 2021-03-19 PROCEDURE — G8427 DOCREV CUR MEDS BY ELIG CLIN: HCPCS | Performed by: CLINICAL NURSE SPECIALIST

## 2021-03-19 PROCEDURE — 3017F COLORECTAL CA SCREEN DOC REV: CPT | Performed by: CLINICAL NURSE SPECIALIST

## 2021-03-19 PROCEDURE — 1123F ACP DISCUSS/DSCN MKR DOCD: CPT | Performed by: CLINICAL NURSE SPECIALIST

## 2021-03-19 RX ORDER — OXYCODONE AND ACETAMINOPHEN 10; 325 MG/1; MG/1
1 TABLET ORAL EVERY 6 HOURS PRN
Qty: 120 TABLET | Refills: 0 | Status: SHIPPED | OUTPATIENT
Start: 2021-03-20 | End: 2021-04-16 | Stop reason: SDUPTHER

## 2021-03-19 RX ORDER — ALPRAZOLAM 1 MG/1
TABLET ORAL
Qty: 135 TABLET | Refills: 5 | Status: SHIPPED | OUTPATIENT
Start: 2021-03-21 | End: 2021-04-20

## 2021-03-19 RX ORDER — FUROSEMIDE 20 MG/1
20 TABLET ORAL DAILY PRN
Qty: 30 TABLET | Refills: 1 | Status: SHIPPED | OUTPATIENT
Start: 2021-03-19 | End: 2021-06-28

## 2021-03-19 NOTE — PATIENT INSTRUCTIONS
Take Lasix 20mg for the next 2 days  Labs today  ECHO and nuclear stress test soon   Follow up in 2-3 mos   Call with any questions or concerns  Follow up with HEENA Fulton for non cardiac problems  Report any new problems  Cardiovascular Fitness-Exercise as tolerated. Cardiac / Healthy Diet- low salt  Continue current medications as directed  Continue plan of treatment  It is always recommended that you bring your medications bottles with you to each visit - this is for your safety! [place] at the [address] [facility] and [address] [facility] located on the first floor of [place] [address] through hospital main entrance and turn immediately to your left. Patient's contact number:  [phone] (home)      Lexiscan Stress Test      Lexiscan (regadenoson injection) is a prescription drug given through an IV line that increases blood flow through the arteries of the heart during a cardiac nuclear stress test.     There are two parts to a Lexiscan stress test: the rest portion and the exercise portion. For the rest portion, a radioactive tracer is injected into your arm through the IV. After 30 to 60 minutes, the process of imaging will begin. A nuclear camera will be placed on your chest area and images are taken for the next 15 to 20 minutes. For the exercise portion, a nurse will attach EKG electrodes to your chest to monitor your heart rate. The drug Jesus Stefan is administered to simulate stress on the heart. Your heart rhythm will then be monitored for the next few minutes. Your blood pressure will also be monitored throughout the exercise portion. [place] through the exercise portion, a second round of radioactive tracer is injected into your body. Your heart rate and EKG will be monitored for another few minutes after administering the drug. Test Preparation:     Bring a list of your current medications.   Do not take any of your medications the morning of the test, but bring all morning medications with you as you will take them after the stress portion of the test is completed.  Do not eat Bananas 24 hours prior to test.     No caffeine 24 hours prior to the testing. This includes: coffee, pop/soda, chocolate, cold medications, etc.  Any product that might contain caffeine.  No nicotine or alcohol 12 hours prior to your test.    Nothing to eat or drink 6-8 hours prior to appointment time. It is okay to drink small amounts of water during the four hours prior to the test.   Nitroglycerin patches must be taken off 1 hour before testing.  Wear comfortable clothing.  Please refrain from any strenuous exercise or activities the day before your test, or the day of your test.   The Nuclear Lexiscan Stress test takes about 2 ½ to 3 hours to complete. If for any reason you are unable to keep this appointment, please contact Outpatient Scheduling, [phone], as soon as possible to reschedule.

## 2021-03-19 NOTE — PROGRESS NOTES
[facility]  [facility], [address]  [address]  Phone: [phone]  Fax: [phone]    OFFICE VISIT:  3/19/2021    Shante Capps - : 1955    Reason For Visit:  Claritza Moyer is a 72 y.o. male who is here for Follow-up (patient has edema and soa), Hypertension, Coronary Artery Disease, and Irregular Heart Beat (SVT)  History of SVT controlled on calcium channel blocker.  Was uptitrated fall 2019  10/23/18 cath  80% proximal LAD, 2.5 x 23 SYL, o/w luminals, normal LVFX   Has history of SUSANA and wears BiPAP. Had Covid infection in December. Seen in January with ongoing RECIO with minimal activity    Last night had significant BLE pitting edema (pics on his phone)  Elevated and did get it down. Only sleeps 4 hours a night- does wear his Bi-bap  Progressively gotten worse     Quit smoking several years ago but does vape with nicotine  He is concerned with the fluid retention and ongoing shortness of breath. He states he is an active due to his significant back pain      Subjective  Claritza Moyer denies exertional chest pain has RECIO  Usually no resting  shortness of breath, orthopnea, paroxysmal nocturnal dyspnea, syncope, presyncope, arrhythmia, The patient denies numbness or weakness to suggest cerebrovascular accident or transient ischemic attack. HEENA Prabhakar is PCP and follows labs.   [place] Génesis has the following history as recorded in [facility]:    Patient Active Problem List    Diagnosis Date Noted    Abnormal nuclear cardiac imaging test 10/23/2018     Priority: High    Palpitations      Priority: High    Class 2 severe obesity with serious comorbidity and body mass index (BMI) of 38.0 to 38.9 in adult [facility]) 2020    Mixed hyperlipidemia 2019    History of coronary artery stent placement 2019    Coronary artery disease involving native coronary artery of native heart without angina pectoris 2019    Paroxysmal SVT (supraventricular tachycardia) ([facility] 09/24/2018    Shortness of breath 09/24/2018    Rheumatoid arthritis involving both hands with positive rheumatoid factor ([facility] 06/21/2018    Premature atrial contractions 12/13/2017    Memory loss 11/16/2017    History of sleep apnea 08/09/2017    Tachycardia 08/09/2017    Intermittent palpitations 08/09/2017    Family history of atrial fibrillation 08/09/2017    Thoracic spine pain 07/03/2017    CPAP (continuous positive airway pressure) dependence     Osteoarthritis of spine with radiculopathy, lumbar region     Chronic bilateral low back pain with bilateral sciatica     Chest discomfort 03/23/2016    Essential hypertension 03/23/2016    Hypercholesteremia 03/23/2016    Type 2 diabetes mellitus ([facility] 03/23/2016    SUSANA (obstructive sleep apnea) 03/23/2016    Obesity 03/23/2016     Past Medical History:   Diagnosis Date    Acute sinusitis     Anxiety     Chronic back pain     Chronic bilateral low back pain with bilateral sciatica     Constipation     COPD (chronic obstructive pulmonary disease) (HCC)     Coronary artery disease involving native coronary artery of native heart without angina pectoris 8/27/2019    CPAP (continuous positive airway pressure) dependence     12cm to 20cm    Depression     Hyperlipidemia     Hypertensive disorder     OA (osteoarthritis of the spine)     Obesity     SUSANA (obstructive sleep apnea)     AHI:  85.3 per HST 11/2013    Osteoarthritis of spine with radiculopathy, lumbar region     Otitis media     Rheumatoid arthritis ([facility]     Spinal stenosis     Type 2 diabetes mellitus ([facility] 03/23/2016    Unspecified sleep apnea     uses bipap    Vertigo      Past Surgical History:   Procedure Laterality Date    ADENOIDECTOMY      BROW LIFT      due to vision    CARDIAC CATHETERIZATION  10/24/2018         COLONOSCOPY  30 yrs ago    [place] [address], [address] [facility] [address], normal per patient   [address] ARTHROSCOPY Left 2009    [place] Girish Rodrick Radha 84 DX/THER SBST EPIDURAL/SUBARACH LUMBAR/SACRAL N/A 10/3/2017    L3-4 EPIDURAL STEROID INJECTION performed by Abdifatahdemetrio Hemphill at [address] [place] [address]       Family History   Problem Relation Age of Onset    Heart Disease Father 64    Cancer Father         malignant tumor of pharynx    Diabetes Mother     Hypertension Mother     Diabetes Sister         x 2    Diabetes Brother     Colon Cancer Neg Hx     Colon Polyps Neg Hx     Liver Disease Neg Hx     Rectal Cancer Neg Hx     Stomach Cancer Neg Hx      Social History     Tobacco Use    Smoking status: Former Smoker     Packs/day: 0.50     Years: 20.00     Pack years: 10.00     Types: Cigarettes     Start date: 0     Quit date:      Years since quittin.2    Smokeless tobacco: Never Used   Substance Use Topics    Alcohol use: No      Current Outpatient Medications   Medication Sig Dispense Refill    [START ON 3/21/2021] ALPRAZolam (XANAX) 1 MG tablet One and a half tablets by mouth three times daily, must last 30 days 135 tablet 5    [START ON 3/20/2021] oxyCODONE-acetaminophen (PERCOCET)  MG per tablet Take 1 tablet by mouth every 6 hours as needed for Pain (Pain) for up to 30 days. 120 tablet 0    [START ON 3/21/2021] OXYCONTIN 20 MG extended release tablet Take 1 tablet by mouth every 12 hours for 30 days.  60 tablet 0    furosemide (LASIX) 20 MG tablet Take 1 tablet by mouth daily as needed (swelling) 30 tablet 1    doxycycline hyclate (VIBRA-TABS) 100 MG tablet Take 1 tablet by mouth 2 times daily for 10 days 20 tablet 0    GNP ADULT ASPIRIN LOW STRENGTH 81 MG chewable tablet CHEW AND SWALLOW ONE TABLET BY MOUTH DAILY 90 tablet 1    Fluticasone furoate-vilanterol (BREO ELLIPTA) 200-25 MCG/INH AEPB inhaler Inhale 1 puff into the lungs daily 1 each 0    verapamil (VERELAN) 200 MG extended release capsule Take 1 capsule by mouth daily 90 capsule 3    metFORMIN (GLUCOPHAGE) 500 MG tablet Take 1 tablet by mouth 2 times daily (with meals) 180 tablet 3    hydroCHLOROthiazide (HYDRODIURIL) 25 MG tablet TAKE 1 TABLET BY MOUTH EVERY DAY 90 tablet 3    lovastatin (MEVACOR) 40 MG tablet TAKE 1 TABLET BY MOUTH EVERY DAY 90 tablet 3    Umeclidinium Bromide (INCRUSE ELLIPTA) 62.5 MCG/INH AEPB Inhale 1 puff into the lungs daily 1 each 11    albuterol sulfate HFA (VENTOLIN HFA) 108 (90 Base) MCG/ACT inhaler Inhale 2 puffs into the lungs every 6 hours as needed for Wheezing 1 Inhaler 3    fluticasone (FLONASE) 50 MCG/ACT nasal spray 2 sprays by Nasal route daily 1 Bottle 11    triamcinolone (KENALOG) 0.1 % cream Apply topically 2 times daily. 45 g 0    blood glucose test strips (CONTOUR NEXT TEST) strip 1 each by In Vitro route daily As needed. 100 each 3    MICROLET LANCETS MISC 1 each by Does not apply route daily 100 each 3    hydroxychloroquine (PLAQUENIL) 200 MG tablet TK 2 TS PO QD WF OR MILK  2    VOLTAREN 1 % GEL APPLY 2 GRAMS TO HAND QID  5    polyethylene glycol (MIRALAX) POWD powder Take 17 g by mouth every other day       azithromycin (ZITHROMAX) 250 MG tablet Take 2 tabs (500 mg) on Day 1, and take 1 tab (250 mg) on days 2 through 5. (Patient not taking: Reported on 3/19/2021) 1 packet 0     No current facility-administered medications for this visit. Allergies: Clindamycin/lincomycin, Benadryl [diphenhydramine], and Prednisone    Review of Systems  Constitutional  no significant activity change, appetite change, or unexpected weight change. No fever, chills or diaphoresis. +fatigue. HEENT  no significant rhinorrhea or epistaxis. No tinnitus or significant hearing loss. Eyes  no sudden vision change or amaurosis. Respiratory  no significant wheezing, stridor, apnea or cough. + dyspnea on exertion no resting shortness of breath. Cardiovascular  no exertional chest pain, orthopnea or PND. No sensation of arrhythmia or slow heart rate. No claudication  + leg edema.   Gastrointestinal  no abdominal swelling or pain. No blood in stool. No severe constipation, diarrhea, nausea, or vomiting. Genitourinary  no difficulty urinating, dysuria, frequency, or urgency. No flank pain or hematuria. Musculoskeletal +back pain, gait disturbance, or myalgia. Skin  no color change or rash. No pallor. No new surgical incision. Neurologic  no speech difficulty, facial asymmetry or lateralizing weakness. No seizures, presyncope, syncope, or significant dizziness. Hematologic  no easy bruising or excessive bleeding. Psychiatric  no severe anxiety + insomnia. No confusion. All other review of systems are negative. Objective  Vital Signs - /88   Pulse 83   Ht 6' 1\" (1.854 m)   Wt (!) 310 lb (140.6 kg)   BMI 40.90 kg/m²   General - [place] is alert, cooperative, and pleasant. Well groomed. No acute distress. Body habitus is obese. HEENT  The head is normocephalic. No circumoral cyanosis. Dentition is normal.   EYES -  No Xanthelasma, no arcus senilis, no conjunctival hemorrhages or discharge. Neck - Supple, without increased jugular venous pressures. No carotid bruits. No mass. Respiratory - Lungs are clear bilaterally. No wheezes or rales. Normal effort without use of accessory muscles. Cardiovascular  Heart has regular rhythm and rate. No murmurs, rubs or gallops. + pedal pulses and no varicosities. Abdominal -  Soft, nontender, nondistended. Bowel sounds are intact. Extremities - No clubbing, cyanosis,2 + pitting BLE  edema. Musculoskeletal -  No clubbing . No Osler's nodes. Gait normal .  No kyphosis or scoliosis. Skin - no statis ulcers or dermatitis. Neurological - No focal signs are identified. Oriented to person, place and time. Psychiatric -  Appropriate affect and mood. Assessment:     Diagnosis Orders   1.  Coronary artery disease involving native coronary artery of native heart without angina pectoris  NM MYOCARDIAL SPECT REST EXERCISE OR RX    ECHO Complete 2D W Doppler W Color    Brain Natriuretic Peptide    Comprehensive Metabolic Panel    CBC Auto Differential   2. Essential hypertension  NM MYOCARDIAL SPECT REST EXERCISE OR RX    ECHO Complete 2D W Doppler W Color   3. RECIO (dyspnea on exertion)  NM MYOCARDIAL SPECT REST EXERCISE OR RX    ECHO Complete 2D W Doppler W Color    Brain Natriuretic Peptide    Comprehensive Metabolic Panel    CBC Auto Differential     Data:  BP Readings from Last 3 Encounters:   03/19/21 138/88   01/28/21 128/86   01/26/21 (!) 148/98    Pulse Readings from Last 3 Encounters:   03/19/21 83   01/28/21 89   01/26/21 70        Wt Readings from Last 3 Encounters:   03/19/21 (!) 310 lb (140.6 kg)   01/28/21 (!) 309 lb 9.6 oz (140.4 kg)   01/26/21 (!) 310 lb (140.6 kg)     Reviewed his pictures on phone. Did have about 4+ pitting edema last night. Has improved to about 2+ now    No recent labs. We will get them today. We had a long discussion about his heart history. Explained what coronary disease was and that he did in fact have CAD and what ongoing medical management that included. No recent evaluation.   We will get him set up for 2D echo and nuclear stress test to evaluate for any ischemia or change in LV function    Lab Results   Component Value Date    WBC 6.2 03/19/2021    HGB 14.6 03/19/2021    HCT 46.4 03/19/2021    MCV 89.1 03/19/2021     03/19/2021     Lab Results   Component Value Date     03/19/2021    K 4.6 03/19/2021    CL 97 (L) 03/19/2021    CO2 30 (H) 03/19/2021    BUN 15 03/19/2021    CREATININE 0.9 03/19/2021    GLUCOSE 141 (H) 03/19/2021    CALCIUM 10.3 (H) 03/19/2021    PROT 7.9 03/19/2021    LABALBU 4.7 03/19/2021    BILITOT 0.3 03/19/2021    ALKPHOS 75 03/19/2021    AST 18 03/19/2021    ALT 25 03/19/2021    LABGLOM >60 03/19/2021    GFRAA >59 03/19/2021    GLOB 3.1 03/23/2016       BNP normal at 23     States taking medications as prescribed      Plan  Take Lasix 20mg for the next 2 days  Labs today  ECHO and nuclear stress test soon   Follow up in 2-3 mos   Call with any questions or concerns  Follow up with Haroldine Carrel, APRN for non cardiac problems  Report any new problems  Cardiovascular Fitness-Exercise as tolerated. Cardiac / Healthy Diet- low salt  Continue current medications as directed  Continue plan of treatment  It is always recommended that you bring your medications bottles with you to each visit - this is for your safety! HEENA Al    EMR dragon/transcription disclaimer: Much of this encounter note is electronic transcription/translation of spoken language to printed tach. Electronic translation of spoken language may be erroneous, or at times, nonsensical words or phrases may be inadvertently transcribed.  Although, I have reviewed the note for such errors, some may still exist.

## 2021-03-31 ENCOUNTER — HOSPITAL ENCOUNTER (OUTPATIENT)
Dept: NON INVASIVE DIAGNOSTICS | Age: 66
Discharge: HOME | End: 2021-03-31
Payer: MEDICARE

## 2021-03-31 ENCOUNTER — HOSPITAL ENCOUNTER (OUTPATIENT)
Dept: NUCLEAR MEDICINE | Age: 66
Discharge: HOME | End: 2021-04-02
Payer: MEDICARE

## 2021-03-31 DIAGNOSIS — R06.09 DOE (DYSPNEA ON EXERTION): ICD-10-CM

## 2021-03-31 DIAGNOSIS — I25.10 CORONARY ARTERY DISEASE INVOLVING NATIVE CORONARY ARTERY OF NATIVE HEART WITHOUT ANGINA PECTORIS: ICD-10-CM

## 2021-03-31 DIAGNOSIS — I10 ESSENTIAL HYPERTENSION: ICD-10-CM

## 2021-03-31 LAB
LV EF: 65 %
LVEF MODALITY: NORMAL

## 2021-03-31 PROCEDURE — C8929 TTE W OR WO FOL WCON,DOPPLER: HCPCS

## 2021-03-31 PROCEDURE — 3430000000 HC RX DIAGNOSTIC RADIOPHARMACEUTICAL: Performed by: INTERNAL MEDICINE

## 2021-03-31 PROCEDURE — 6360000002 HC RX W HCPCS: Performed by: CLINICAL NURSE SPECIALIST

## 2021-03-31 PROCEDURE — 6360000004 HC RX CONTRAST MEDICATION: Performed by: INTERNAL MEDICINE

## 2021-03-31 PROCEDURE — A9500 TC99M SESTAMIBI: HCPCS | Performed by: INTERNAL MEDICINE

## 2021-03-31 PROCEDURE — 78452 HT MUSCLE IMAGE SPECT MULT: CPT

## 2021-03-31 RX ADMIN — REGADENOSON 0.4 MG: 0.08 INJECTION, SOLUTION INTRAVENOUS at 13:55

## 2021-03-31 RX ADMIN — TETRAKIS(2-METHOXYISOBUTYLISOCYANIDE)COPPER(I) TETRAFLUOROBORATE 30 MILLICURIE: 1 INJECTION, POWDER, LYOPHILIZED, FOR SOLUTION INTRAVENOUS at 14:37

## 2021-03-31 RX ADMIN — TETRAKIS(2-METHOXYISOBUTYLISOCYANIDE)COPPER(I) TETRAFLUOROBORATE 10 MILLICURIE: 1 INJECTION, POWDER, LYOPHILIZED, FOR SOLUTION INTRAVENOUS at 14:37

## 2021-03-31 RX ADMIN — PERFLUTREN 1.65 MG: 6.52 INJECTION, SUSPENSION INTRAVENOUS at 10:30

## 2021-04-01 LAB
LV EF: 52 %
LVEF MODALITY: NORMAL

## 2021-04-05 ENCOUNTER — OFFICE VISIT (OUTPATIENT)
Dept: PRIMARY CARE CLINIC | Age: 66
End: 2021-04-05
Payer: MEDICARE

## 2021-04-05 VITALS
SYSTOLIC BLOOD PRESSURE: 136 MMHG | OXYGEN SATURATION: 97 % | HEIGHT: 73 IN | DIASTOLIC BLOOD PRESSURE: 88 MMHG | TEMPERATURE: 97.5 F | WEIGHT: 312 LBS | BODY MASS INDEX: 41.35 KG/M2 | HEART RATE: 85 BPM

## 2021-04-05 DIAGNOSIS — T14.8XXA ABRASION: Primary | ICD-10-CM

## 2021-04-05 DIAGNOSIS — L03.116 CELLULITIS OF LEFT LOWER EXTREMITY: ICD-10-CM

## 2021-04-05 PROCEDURE — 99213 OFFICE O/P EST LOW 20 MIN: CPT | Performed by: NURSE PRACTITIONER

## 2021-04-05 PROCEDURE — 3017F COLORECTAL CA SCREEN DOC REV: CPT | Performed by: NURSE PRACTITIONER

## 2021-04-05 PROCEDURE — 1036F TOBACCO NON-USER: CPT | Performed by: NURSE PRACTITIONER

## 2021-04-05 PROCEDURE — G8417 CALC BMI ABV UP PARAM F/U: HCPCS | Performed by: NURSE PRACTITIONER

## 2021-04-05 PROCEDURE — G8427 DOCREV CUR MEDS BY ELIG CLIN: HCPCS | Performed by: NURSE PRACTITIONER

## 2021-04-05 PROCEDURE — 4040F PNEUMOC VAC/ADMIN/RCVD: CPT | Performed by: NURSE PRACTITIONER

## 2021-04-05 PROCEDURE — 1123F ACP DISCUSS/DSCN MKR DOCD: CPT | Performed by: NURSE PRACTITIONER

## 2021-04-05 RX ORDER — SULFAMETHOXAZOLE AND TRIMETHOPRIM 800; 160 MG/1; MG/1
1 TABLET ORAL 2 TIMES DAILY
Qty: 20 TABLET | Refills: 0 | Status: SHIPPED | OUTPATIENT
Start: 2021-04-05 | End: 2021-04-15

## 2021-04-05 ASSESSMENT — PATIENT HEALTH QUESTIONNAIRE - PHQ9
SUM OF ALL RESPONSES TO PHQ QUESTIONS 1-9: 0
1. LITTLE INTEREST OR PLEASURE IN DOING THINGS: 0
SUM OF ALL RESPONSES TO PHQ9 QUESTIONS 1 & 2: 0
SUM OF ALL RESPONSES TO PHQ QUESTIONS 1-9: 0

## 2021-04-05 ASSESSMENT — ENCOUNTER SYMPTOMS
RHINORRHEA: 0
CHOKING: 0
EYE REDNESS: 0
BLOOD IN STOOL: 0
SORE THROAT: 0
CONSTIPATION: 0
EYE DISCHARGE: 0
DIARRHEA: 0
COUGH: 0
WHEEZING: 0

## 2021-04-05 NOTE — PROGRESS NOTES
Turner Cespedes (:  1955) is a 72 y.o. male,Established patient, here for evaluation of the following chief complaint(s):  Leg Pain (left leg lesion, has had it for 3 weeks. He has been washing it with soap and water per instructed by LASHELL Banegas and it has not gone away.)      ASSESSMENT/PLAN:  1. Abrasion  -     sulfamethoxazole-trimethoprim (BACTRIM DS) 800-160 MG per tablet; Take 1 tablet by mouth 2 times daily for 10 days, Disp-20 tablet, R-0Normal  -     mupirocin (BACTROBAN) 2 % ointment; Apply 3 times daily. , Disp-30 g, R-5, Normal  2. Cellulitis of left lower extremity  -     sulfamethoxazole-trimethoprim (BACTRIM DS) 800-160 MG per tablet; Take 1 tablet by mouth 2 times daily for 10 days, Disp-20 tablet, R-0Normal  -     mupirocin (BACTROBAN) 2 % ointment; Apply 3 times daily. , Disp-30 g, R-5, Normal      Return in about 1 week (around 2021). SUBJECTIVE/OBJECTIVE:  HPI  Leg Pain (left leg lesion, has had it for 3 weeks. He has been washing it with soap and water per instructed by LASHELL Ortega and it has not gone away.)  Took 10 days of doxy. Review of Systems   Constitutional: Negative for appetite change and unexpected weight change. HENT: Negative for congestion, ear pain, rhinorrhea and sore throat. Eyes: Negative for discharge and redness. Respiratory: Negative for cough, choking and wheezing. Cardiovascular: Negative for chest pain. Gastrointestinal: Negative for blood in stool, constipation and diarrhea. Genitourinary: Negative for decreased urine volume and dysuria. Skin: Positive for rash. Neurological: Negative for weakness. Hematological: Negative for adenopathy. Psychiatric/Behavioral: Negative for suicidal ideas. Physical Exam  Vitals signs reviewed. Constitutional:       Appearance: He is well-developed. Eyes:      Conjunctiva/sclera: Conjunctivae normal.   Neck:      Musculoskeletal: Normal range of motion and neck supple. Cardiovascular:      Rate and Rhythm: Normal rate. Pulses: Normal pulses. Pulmonary:      Effort: Pulmonary effort is normal.   Musculoskeletal:      Comments: Abrasion to left lower leg. Erythema surrounding. Skin:     General: Skin is warm and dry. Neurological:      Mental Status: He is oriented to person, place, and time. Psychiatric:         Behavior: Behavior normal.                 An electronic signature was used to authenticate this note.     --HEENA Ely

## 2021-04-12 DIAGNOSIS — I25.10 CORONARY ARTERY DISEASE INVOLVING NATIVE CORONARY ARTERY OF NATIVE HEART WITHOUT ANGINA PECTORIS: ICD-10-CM

## 2021-04-12 DIAGNOSIS — E78.00 HYPERCHOLESTEREMIA: Primary | ICD-10-CM

## 2021-04-12 RX ORDER — LOVASTATIN 40 MG/1
40 TABLET ORAL NIGHTLY
Qty: 90 TABLET | Refills: 3 | Status: SHIPPED | OUTPATIENT
Start: 2021-04-12 | End: 2022-02-02

## 2021-04-14 DIAGNOSIS — J44.9 STAGE 2 MODERATE COPD BY GOLD CLASSIFICATION (HCC): ICD-10-CM

## 2021-04-14 DIAGNOSIS — J44.1 COPD EXACERBATION (HCC): ICD-10-CM

## 2021-04-14 DIAGNOSIS — J42 CHRONIC BRONCHITIS, UNSPECIFIED CHRONIC BRONCHITIS TYPE (HCC): ICD-10-CM

## 2021-04-14 DIAGNOSIS — Z72.0 TOBACCO ABUSE: ICD-10-CM

## 2021-04-15 RX ORDER — UMECLIDINIUM 62.5 UG/1
1 AEROSOL, POWDER ORAL DAILY
Qty: 3 EACH | Refills: 3 | Status: SHIPPED | OUTPATIENT
Start: 2021-04-15 | End: 2022-07-11

## 2021-04-15 RX ORDER — ALBUTEROL SULFATE 90 UG/1
2 AEROSOL, METERED RESPIRATORY (INHALATION) EVERY 6 HOURS PRN
Qty: 3 INHALER | Refills: 3 | Status: SHIPPED | OUTPATIENT
Start: 2021-04-15 | End: 2022-07-08

## 2021-04-16 ENCOUNTER — OFFICE VISIT (OUTPATIENT)
Dept: PRIMARY CARE CLINIC | Age: 66
End: 2021-04-16
Payer: MEDICARE

## 2021-04-16 VITALS
TEMPERATURE: 96.6 F | BODY MASS INDEX: 41.32 KG/M2 | WEIGHT: 311.8 LBS | HEART RATE: 92 BPM | OXYGEN SATURATION: 98 % | HEIGHT: 73 IN

## 2021-04-16 DIAGNOSIS — M47.26 OSTEOARTHRITIS OF SPINE WITH RADICULOPATHY, LUMBAR REGION: ICD-10-CM

## 2021-04-16 DIAGNOSIS — T14.8XXA ABRASION: Primary | ICD-10-CM

## 2021-04-16 DIAGNOSIS — M54.41 CHRONIC BILATERAL LOW BACK PAIN WITH BILATERAL SCIATICA: ICD-10-CM

## 2021-04-16 DIAGNOSIS — M54.42 CHRONIC BILATERAL LOW BACK PAIN WITH BILATERAL SCIATICA: ICD-10-CM

## 2021-04-16 DIAGNOSIS — G89.29 CHRONIC BILATERAL LOW BACK PAIN WITH BILATERAL SCIATICA: ICD-10-CM

## 2021-04-16 PROCEDURE — 99213 OFFICE O/P EST LOW 20 MIN: CPT | Performed by: NURSE PRACTITIONER

## 2021-04-16 PROCEDURE — 1036F TOBACCO NON-USER: CPT | Performed by: NURSE PRACTITIONER

## 2021-04-16 PROCEDURE — 1123F ACP DISCUSS/DSCN MKR DOCD: CPT | Performed by: NURSE PRACTITIONER

## 2021-04-16 PROCEDURE — G8427 DOCREV CUR MEDS BY ELIG CLIN: HCPCS | Performed by: NURSE PRACTITIONER

## 2021-04-16 PROCEDURE — 3017F COLORECTAL CA SCREEN DOC REV: CPT | Performed by: NURSE PRACTITIONER

## 2021-04-16 PROCEDURE — 4040F PNEUMOC VAC/ADMIN/RCVD: CPT | Performed by: NURSE PRACTITIONER

## 2021-04-16 PROCEDURE — G8417 CALC BMI ABV UP PARAM F/U: HCPCS | Performed by: NURSE PRACTITIONER

## 2021-04-19 RX ORDER — OXYCODONE AND ACETAMINOPHEN 10; 325 MG/1; MG/1
1 TABLET ORAL EVERY 6 HOURS PRN
Qty: 120 TABLET | Refills: 0 | Status: SHIPPED | OUTPATIENT
Start: 2021-04-19 | End: 2021-05-17 | Stop reason: SDUPTHER

## 2021-04-19 ASSESSMENT — ENCOUNTER SYMPTOMS
BLOOD IN STOOL: 0
SORE THROAT: 0
RHINORRHEA: 0
CONSTIPATION: 0
EYE REDNESS: 0
EYE DISCHARGE: 0
WHEEZING: 0
CHOKING: 0
COUGH: 0
DIARRHEA: 0

## 2021-05-17 DIAGNOSIS — G89.29 CHRONIC BILATERAL LOW BACK PAIN WITH BILATERAL SCIATICA: ICD-10-CM

## 2021-05-17 DIAGNOSIS — M54.42 CHRONIC BILATERAL LOW BACK PAIN WITH BILATERAL SCIATICA: ICD-10-CM

## 2021-05-17 DIAGNOSIS — M47.26 OSTEOARTHRITIS OF SPINE WITH RADICULOPATHY, LUMBAR REGION: ICD-10-CM

## 2021-05-17 DIAGNOSIS — M54.41 CHRONIC BILATERAL LOW BACK PAIN WITH BILATERAL SCIATICA: ICD-10-CM

## 2021-05-17 RX ORDER — OXYCODONE AND ACETAMINOPHEN 10; 325 MG/1; MG/1
1 TABLET ORAL EVERY 6 HOURS PRN
Qty: 120 TABLET | Refills: 0 | Status: SHIPPED | OUTPATIENT
Start: 2021-05-19 | End: 2021-06-16 | Stop reason: SDUPTHER

## 2021-05-17 NOTE — TELEPHONE ENCOUNTER
Requested Prescriptions     Pending Prescriptions Disp Refills    oxyCODONE-acetaminophen (PERCOCET)  MG per tablet 120 tablet 0     Sig: Take 1 tablet by mouth every 6 hours as needed for Pain (Pain) for up to 30 days.  OXYCONTIN 20 MG extended release tablet 60 tablet 0     Sig: Take 1 tablet by mouth every 12 hours for 30 days.        Last Office Visit: 12/21/20  Next Office Visit:  6/28/2021  Last Medication Refill:  Percocet - 4/19/21  Oxycontin- 4/20/21  Anjel up to date:  4/14/21    *RX updated to reflect  Percocet-  5/19/21  Oxycontin- 5/20/21 fill date*

## 2021-05-20 DIAGNOSIS — M47.26 OSTEOARTHRITIS OF SPINE WITH RADICULOPATHY, LUMBAR REGION: Primary | ICD-10-CM

## 2021-05-20 RX ORDER — OXYCODONE HCL 20 MG/1
20 TABLET, FILM COATED, EXTENDED RELEASE ORAL EVERY 12 HOURS
Qty: 60 TABLET | Refills: 0 | Status: SHIPPED | OUTPATIENT
Start: 2021-05-20 | End: 2021-06-16 | Stop reason: SDUPTHER

## 2021-06-16 DIAGNOSIS — G89.29 CHRONIC BILATERAL LOW BACK PAIN WITH BILATERAL SCIATICA: ICD-10-CM

## 2021-06-16 DIAGNOSIS — M54.41 CHRONIC BILATERAL LOW BACK PAIN WITH BILATERAL SCIATICA: ICD-10-CM

## 2021-06-16 DIAGNOSIS — E11.628 TYPE 2 DIABETES MELLITUS WITH OTHER SKIN COMPLICATION, WITHOUT LONG-TERM CURRENT USE OF INSULIN (HCC): Primary | ICD-10-CM

## 2021-06-16 DIAGNOSIS — M47.26 OSTEOARTHRITIS OF SPINE WITH RADICULOPATHY, LUMBAR REGION: ICD-10-CM

## 2021-06-16 DIAGNOSIS — J30.9 ALLERGIC RHINITIS, UNSPECIFIED SEASONALITY, UNSPECIFIED TRIGGER: ICD-10-CM

## 2021-06-16 DIAGNOSIS — M54.42 CHRONIC BILATERAL LOW BACK PAIN WITH BILATERAL SCIATICA: ICD-10-CM

## 2021-06-16 RX ORDER — OXYCODONE AND ACETAMINOPHEN 10; 325 MG/1; MG/1
1 TABLET ORAL EVERY 6 HOURS PRN
Qty: 120 TABLET | Refills: 0 | Status: SHIPPED | OUTPATIENT
Start: 2021-06-18 | End: 2021-07-16 | Stop reason: SDUPTHER

## 2021-06-16 RX ORDER — OXYCODONE HCL 20 MG/1
20 TABLET, FILM COATED, EXTENDED RELEASE ORAL EVERY 12 HOURS
Qty: 60 TABLET | Refills: 0 | Status: SHIPPED | OUTPATIENT
Start: 2021-06-18 | End: 2021-07-16 | Stop reason: SDUPTHER

## 2021-06-16 NOTE — TELEPHONE ENCOUNTER
Requested Prescriptions     Pending Prescriptions Disp Refills    oxyCODONE-acetaminophen (PERCOCET)  MG per tablet 120 tablet 0     Sig: Take 1 tablet by mouth every 6 hours as needed for Pain (Pain) for up to 30 days.  oxyCODONE (OXYCONTIN) 20 MG extended release tablet 60 tablet 0     Sig: Take 1 tablet by mouth every 12 hours for 30 days.  Intended supply: 30 days       Last Office Visit:  12/21/2020  Next Office Visit:  6/28/2021  Last Medication Refill:  Percocet 5/19/2021, Oxycontin 5/20/2021  Anjel up to date:  4/14/2021    *RX updated to reflect   Percocet 6/18/2021, Ocycontin 6/18/2021  fill date*

## 2021-06-17 RX ORDER — HYDROCHLOROTHIAZIDE 25 MG/1
25 TABLET ORAL DAILY
Qty: 90 TABLET | Refills: 3 | Status: SHIPPED | OUTPATIENT
Start: 2021-06-17 | End: 2021-07-06

## 2021-06-17 RX ORDER — FLUTICASONE PROPIONATE 50 MCG
2 SPRAY, SUSPENSION (ML) NASAL DAILY
Qty: 3 BOTTLE | Refills: 3 | Status: SHIPPED | OUTPATIENT
Start: 2021-06-17

## 2021-06-17 NOTE — TELEPHONE ENCOUNTER
Received fax from pharmacy requesting refill on pts medication(s). Pt was last seen in office on 2021  and has a follow up scheduled for Visit date not found. Will send request to  Doretha Shrestha  for patient.      Requested Prescriptions     Pending Prescriptions Disp Refills    fluticasone (FLONASE) 50 MCG/ACT nasal spray 1 Bottle 11     Si sprays by Nasal route daily    metFORMIN (GLUCOPHAGE) 500 MG tablet 180 tablet 3     Sig: Take 1 tablet by mouth 2 times daily (with meals)

## 2021-06-17 NOTE — TELEPHONE ENCOUNTER
Received fax from pharmacy requesting refill on pts medication(s). Pt was last seen in office on 4/16/2021  and has a follow up scheduled for 6/16/2021. Will send request to  Bess Peña  for patient.      Requested Prescriptions     Pending Prescriptions Disp Refills    hydroCHLOROthiazide (HYDRODIURIL) 25 MG tablet 90 tablet 3

## 2021-06-18 ENCOUNTER — TELEPHONE (OUTPATIENT)
Dept: CARDIOLOGY CLINIC | Age: 66
End: 2021-06-18

## 2021-06-18 NOTE — TELEPHONE ENCOUNTER
----- Message from [place], [place] sent at 6/18/2021 12:23 PM CDT -----  Please reschedule this PTs appt from 7/29/21 due to the PT being in a NP appt frame and the Pt is a NTP for Dr. Rashid Pollack.      Thanks,   Claudio Flores Deferred to Pediatrician's Office

## 2021-06-18 NOTE — TELEPHONE ENCOUNTER
----- Message from Megha Soares sent at 6/18/2021 12:23 PM CDT -----  Please reschedule this PTs appt from 7/29/21 due to the PT being in a NP appt frame and the Pt is a NTP for Dr. Evie Willard.      Thanks,   [employer]

## 2021-06-28 ENCOUNTER — OFFICE VISIT (OUTPATIENT)
Dept: NEUROLOGY | Age: 66
End: 2021-06-28
Payer: MEDICARE

## 2021-06-28 VITALS
HEIGHT: 73 IN | HEART RATE: 80 BPM | BODY MASS INDEX: 41.08 KG/M2 | WEIGHT: 310 LBS | RESPIRATION RATE: 16 BRPM | DIASTOLIC BLOOD PRESSURE: 88 MMHG | SYSTOLIC BLOOD PRESSURE: 138 MMHG

## 2021-06-28 DIAGNOSIS — M05.742 RHEUMATOID ARTHRITIS INVOLVING BOTH HANDS WITH POSITIVE RHEUMATOID FACTOR (HCC): ICD-10-CM

## 2021-06-28 DIAGNOSIS — M54.41 CHRONIC BILATERAL LOW BACK PAIN WITH BILATERAL SCIATICA: ICD-10-CM

## 2021-06-28 DIAGNOSIS — M54.42 CHRONIC BILATERAL LOW BACK PAIN WITH BILATERAL SCIATICA: ICD-10-CM

## 2021-06-28 DIAGNOSIS — F41.9 ANXIETY: ICD-10-CM

## 2021-06-28 DIAGNOSIS — M05.741 RHEUMATOID ARTHRITIS INVOLVING BOTH HANDS WITH POSITIVE RHEUMATOID FACTOR (HCC): ICD-10-CM

## 2021-06-28 DIAGNOSIS — G47.33 OSA (OBSTRUCTIVE SLEEP APNEA): ICD-10-CM

## 2021-06-28 DIAGNOSIS — M47.26 OSTEOARTHRITIS OF SPINE WITH RADICULOPATHY, LUMBAR REGION: Primary | ICD-10-CM

## 2021-06-28 DIAGNOSIS — G89.29 CHRONIC BILATERAL LOW BACK PAIN WITH BILATERAL SCIATICA: ICD-10-CM

## 2021-06-28 PROCEDURE — 3017F COLORECTAL CA SCREEN DOC REV: CPT | Performed by: PSYCHIATRY & NEUROLOGY

## 2021-06-28 PROCEDURE — 1036F TOBACCO NON-USER: CPT | Performed by: PSYCHIATRY & NEUROLOGY

## 2021-06-28 PROCEDURE — G8417 CALC BMI ABV UP PARAM F/U: HCPCS | Performed by: PSYCHIATRY & NEUROLOGY

## 2021-06-28 PROCEDURE — 1123F ACP DISCUSS/DSCN MKR DOCD: CPT | Performed by: PSYCHIATRY & NEUROLOGY

## 2021-06-28 PROCEDURE — 99214 OFFICE O/P EST MOD 30 MIN: CPT | Performed by: PSYCHIATRY & NEUROLOGY

## 2021-06-28 PROCEDURE — G8427 DOCREV CUR MEDS BY ELIG CLIN: HCPCS | Performed by: PSYCHIATRY & NEUROLOGY

## 2021-06-28 PROCEDURE — 4040F PNEUMOC VAC/ADMIN/RCVD: CPT | Performed by: PSYCHIATRY & NEUROLOGY

## 2021-06-28 RX ORDER — ALPRAZOLAM 1 MG/1
TABLET ORAL NIGHTLY PRN
COMMUNITY
Start: 2021-06-20 | End: 2021-09-20

## 2021-06-28 NOTE — PROGRESS NOTES
urgency, and urinary retention  Musculoskeletal: positive for - joint pain, joint stiffness, and joint swelling  Hematological and Lymphatic: negative for - bleeding problems, abnormal bruising, and swollen lymph nodes  Endocrine:  negative for - polydipsia and polyphagia  Allergy/Immunology:  negative for - rhinorrhea, sinus congestion, hives  Integument:  negative for - negative for - rash, change in moles, new or changing lesions  Psychological: negative for - anxiety and depression  Neurological: negative for - memory loss, numbness/tingling, and weakness     PHYSICAL EXAMINATION:  Vitals:  /88   Pulse 80   Resp 16   Ht 6' 1\" (1.854 m)   Wt (!) 310 lb (140.6 kg)   BMI 40.90 kg/m²   General appearance:  Alert, well developed, well nourished, in no distress  HEENT:  normocephalic, atraumatic, sclera appear normal, no nasal abnormalities, no rhinorrhea, Ears appear normal, oral mucous membranes are moist without erythema, trachea midline, thyroid is normal, no lymphadenopathy or neck mass. Cardiovascular:  Regular rate and rhythm without murmer. No peripheral edema, No cyanosis or clubbing. No carotid bruits. Pulmonary:  Lungs are clear to auscultation. Breathing appears normal, good expansion, normal effort without use of accessory muscles  Musculoskeletal:  Joints are arthritic. Integument:  No rash, erythema, or pallor  Psychiatric:  Mood, affect, and behavior appear normal      NEUROLOGIC EXAMINATION:  Mental Status:  alert, oriented to person, place, and time. Speech:  Clear without dysarthria or dysphonia  Language:  Fluent without aphasia  Cranial Nerves:   II Visual fields are full to confrontation   III,IV, VI Extraocular movements are full   VII Facial movements are symmetrical without weakness   VIII Hearing is intact   IX,X Shoulder shrug and head rotation strength are intact   XII No tongue atrophy or fasciculations. Normal tongue protrusion.   No tongue weakness  Motor:  Normal strength in both upper and lower extremities. Normal muscle tone and bulk. Deep tendon reflexes are intact and symmetrical in both upper and lower extremities. Keita's signs are absent bilaterally. There is no ankle clonus on either side. Sensation:  Sensation is intact to light touch, temperature, and vibration in all extremities. Coordination:  Rapid alternating movements are normal in both upper and lower extremities. Finger to nose testing is unimpaired bilaterally. Gait:  Normal station and gait. Pertinent Diagnostic Studies:  JAKOB is appropriate  HST from 2013 showed severe SUSANA  Auto BiPAP download from 2019 showed compliance at 8cm to 16cm    Assessment:       ICD-10-CM    1. Osteoarthritis of spine with radiculopathy, lumbar region  M47.26    2. Chronic bilateral low back pain with bilateral sciatica  M54.42     M54.41     G89.29    3. SUSANA (obstructive sleep apnea)  G47.33    4. Rheumatoid arthritis involving both hands with positive rheumatoid factor (HCC)  M05.741     M05.742    5. Anxiety  F41.9    I discussed the above with including side effects of medications like dependence and withdrawal.    Plan:   1. Continue present medications including oxycodone, alpazolam, Plaquenil  2. New BiPAP. He has an old Respironics machine that would have to be replaced due to the recall anyway.   3. Sleep study if necessary  4. FU in 6 months    Electronically signed by Rios Espinal MD on 6/28/21

## 2021-07-06 RX ORDER — HYDROCHLOROTHIAZIDE 25 MG/1
TABLET ORAL
Qty: 90 TABLET | Refills: 3 | Status: SHIPPED | OUTPATIENT
Start: 2021-07-06 | End: 2022-09-26

## 2021-07-16 DIAGNOSIS — G89.29 CHRONIC BILATERAL LOW BACK PAIN WITH BILATERAL SCIATICA: ICD-10-CM

## 2021-07-16 DIAGNOSIS — M47.26 OSTEOARTHRITIS OF SPINE WITH RADICULOPATHY, LUMBAR REGION: ICD-10-CM

## 2021-07-16 DIAGNOSIS — M54.42 CHRONIC BILATERAL LOW BACK PAIN WITH BILATERAL SCIATICA: ICD-10-CM

## 2021-07-16 DIAGNOSIS — M54.41 CHRONIC BILATERAL LOW BACK PAIN WITH BILATERAL SCIATICA: ICD-10-CM

## 2021-07-16 RX ORDER — OXYCODONE AND ACETAMINOPHEN 10; 325 MG/1; MG/1
1 TABLET ORAL EVERY 6 HOURS PRN
Qty: 120 TABLET | Refills: 0 | Status: SHIPPED | OUTPATIENT
Start: 2021-07-17 | End: 2021-08-17 | Stop reason: SDUPTHER

## 2021-07-16 RX ORDER — OXYCODONE HCL 20 MG/1
20 TABLET, FILM COATED, EXTENDED RELEASE ORAL EVERY 12 HOURS
Qty: 60 TABLET | Refills: 0 | Status: SHIPPED | OUTPATIENT
Start: 2021-07-17 | End: 2021-08-17 | Stop reason: SDUPTHER

## 2021-07-16 NOTE — TELEPHONE ENCOUNTER
Requested Prescriptions     Pending Prescriptions Disp Refills    oxyCODONE-acetaminophen (PERCOCET)  MG per tablet 120 tablet 0     Sig: Take 1 tablet by mouth every 6 hours as needed for Pain (Pain) for up to 30 days.  oxyCODONE (OXYCONTIN) 20 MG extended release tablet 60 tablet 0     Sig: Take 1 tablet by mouth every 12 hours for 30 days.  Intended supply: 30 days       Last Office Visit:  6/28/2021  Next Office Visit:  12/30/2021  Last Medication Refill:  Both 6/18/2021  Bean Arenas up to date:  7/16/2021    *RX updated to reflect   Both 7/17/2021  fill date*    *Dr. Robert Foreman patient

## 2021-08-04 DIAGNOSIS — I10 ESSENTIAL HYPERTENSION: ICD-10-CM

## 2021-08-04 DIAGNOSIS — I25.10 CORONARY ARTERY DISEASE INVOLVING NATIVE CORONARY ARTERY OF NATIVE HEART WITHOUT ANGINA PECTORIS: Primary | ICD-10-CM

## 2021-08-04 RX ORDER — VERAPAMIL HYDROCHLORIDE 200 MG/1
200 CAPSULE, EXTENDED RELEASE ORAL DAILY
Qty: 90 CAPSULE | Refills: 3 | Status: SHIPPED | OUTPATIENT
Start: 2021-08-04 | End: 2022-07-11 | Stop reason: SDUPTHER

## 2021-08-06 ENCOUNTER — PATIENT MESSAGE (OUTPATIENT)
Dept: PRIMARY CARE CLINIC | Age: 66
End: 2021-08-06

## 2021-08-06 DIAGNOSIS — L30.9 ECZEMA, UNSPECIFIED TYPE: ICD-10-CM

## 2021-08-06 DIAGNOSIS — E66.01 CLASS 2 SEVERE OBESITY WITH SERIOUS COMORBIDITY AND BODY MASS INDEX (BMI) OF 38.0 TO 38.9 IN ADULT, UNSPECIFIED OBESITY TYPE (HCC): ICD-10-CM

## 2021-08-06 DIAGNOSIS — E78.00 HYPERCHOLESTEREMIA: ICD-10-CM

## 2021-08-06 DIAGNOSIS — E11.628 TYPE 2 DIABETES MELLITUS WITH OTHER SKIN COMPLICATION, WITHOUT LONG-TERM CURRENT USE OF INSULIN (HCC): Primary | ICD-10-CM

## 2021-08-06 DIAGNOSIS — E78.2 MIXED HYPERLIPIDEMIA: ICD-10-CM

## 2021-08-06 NOTE — TELEPHONE ENCOUNTER
From: Ness Castañeda  To: HEENA Bailey  Sent: 8/6/2021 10:43 AM CDT  Subject: Prescription Question    Earnest Hides,  I need a refill on the Triamcinolone Acetonide cream for the eczema please. That will go to [facility] on [address]. And please send an order for my bloodwork to the lab. I have stayed away from it because of the Covid-19 and now the Delta variant. Thank you!

## 2021-08-09 RX ORDER — TRIAMCINOLONE ACETONIDE 1 MG/G
CREAM TOPICAL
Qty: 45 G | Refills: 0 | Status: SHIPPED | OUTPATIENT
Start: 2021-08-09 | End: 2022-02-11

## 2021-08-17 ENCOUNTER — PATIENT MESSAGE (OUTPATIENT)
Dept: NEUROLOGY | Age: 66
End: 2021-08-17

## 2021-08-17 DIAGNOSIS — M54.41 CHRONIC BILATERAL LOW BACK PAIN WITH BILATERAL SCIATICA: ICD-10-CM

## 2021-08-17 DIAGNOSIS — M47.26 OSTEOARTHRITIS OF SPINE WITH RADICULOPATHY, LUMBAR REGION: ICD-10-CM

## 2021-08-17 DIAGNOSIS — G89.29 CHRONIC BILATERAL LOW BACK PAIN WITH BILATERAL SCIATICA: ICD-10-CM

## 2021-08-17 DIAGNOSIS — M54.42 CHRONIC BILATERAL LOW BACK PAIN WITH BILATERAL SCIATICA: ICD-10-CM

## 2021-08-17 RX ORDER — OXYCODONE HCL 20 MG/1
20 TABLET, FILM COATED, EXTENDED RELEASE ORAL EVERY 12 HOURS
Qty: 60 TABLET | Refills: 0 | Status: SHIPPED | OUTPATIENT
Start: 2021-08-17 | End: 2021-09-16 | Stop reason: SDUPTHER

## 2021-08-17 RX ORDER — OXYCODONE AND ACETAMINOPHEN 10; 325 MG/1; MG/1
1 TABLET ORAL EVERY 6 HOURS PRN
Qty: 120 TABLET | Refills: 0 | Status: SHIPPED | OUTPATIENT
Start: 2021-08-17 | End: 2021-09-16 | Stop reason: SDUPTHER

## 2021-08-17 NOTE — TELEPHONE ENCOUNTER
Ramo Gant has requested a refill on his medication. Last office visit : 6/28/2021   Next office visit : 12/30/2021   Last medication refill :7-17-21  Vero Roberto :7-       Requested Prescriptions     Pending Prescriptions Disp Refills    oxyCODONE (OXYCONTIN) 20 MG extended release tablet 60 tablet 0     Sig: Take 1 tablet by mouth every 12 hours for 30 days. Intended supply: 30 days    oxyCODONE-acetaminophen (PERCOCET)  MG per tablet 120 tablet 0     Sig: Take 1 tablet by mouth every 6 hours as needed for Pain (Pain) for up to 30 days.

## 2021-08-17 NOTE — TELEPHONE ENCOUNTER
From: Dago Nettles  To: Blaze Medeiros MD  Sent: 8/17/2021 6:56 AM CDT  Subject: Prescription Question    Hello, I received a message to request my meds 3 days before due. There is no option to refill this medication. It is to fill 8-20-21 Percocet  oxycontin 20mg controlled release. Some how it has been removed from my med list. I have NOT requested to remove from my medications. I have no way to request them to fill. Please let me know what is going on with my medication.   Thank You  Cisco

## 2021-08-31 ENCOUNTER — OFFICE VISIT (OUTPATIENT)
Dept: CARDIOLOGY CLINIC | Age: 66
End: 2021-08-31
Payer: MEDICARE

## 2021-08-31 VITALS
OXYGEN SATURATION: 97 % | BODY MASS INDEX: 41.35 KG/M2 | WEIGHT: 312 LBS | HEART RATE: 96 BPM | SYSTOLIC BLOOD PRESSURE: 138 MMHG | DIASTOLIC BLOOD PRESSURE: 76 MMHG | HEIGHT: 73 IN

## 2021-08-31 DIAGNOSIS — E11.9 TYPE 2 DIABETES MELLITUS WITHOUT COMPLICATION, WITHOUT LONG-TERM CURRENT USE OF INSULIN (HCC): ICD-10-CM

## 2021-08-31 DIAGNOSIS — E66.01 CLASS 3 SEVERE OBESITY DUE TO EXCESS CALORIES WITHOUT SERIOUS COMORBIDITY WITH BODY MASS INDEX (BMI) OF 40.0 TO 44.9 IN ADULT (HCC): ICD-10-CM

## 2021-08-31 DIAGNOSIS — I10 ESSENTIAL HYPERTENSION: ICD-10-CM

## 2021-08-31 DIAGNOSIS — E78.2 MIXED HYPERLIPIDEMIA: ICD-10-CM

## 2021-08-31 DIAGNOSIS — I25.10 CORONARY ARTERY DISEASE INVOLVING NATIVE CORONARY ARTERY OF NATIVE HEART WITHOUT ANGINA PECTORIS: Primary | ICD-10-CM

## 2021-08-31 PROCEDURE — 3046F HEMOGLOBIN A1C LEVEL >9.0%: CPT | Performed by: INTERNAL MEDICINE

## 2021-08-31 PROCEDURE — 2022F DILAT RTA XM EVC RTNOPTHY: CPT | Performed by: INTERNAL MEDICINE

## 2021-08-31 PROCEDURE — G8427 DOCREV CUR MEDS BY ELIG CLIN: HCPCS | Performed by: INTERNAL MEDICINE

## 2021-08-31 PROCEDURE — 1036F TOBACCO NON-USER: CPT | Performed by: INTERNAL MEDICINE

## 2021-08-31 PROCEDURE — 99214 OFFICE O/P EST MOD 30 MIN: CPT | Performed by: INTERNAL MEDICINE

## 2021-08-31 PROCEDURE — 1123F ACP DISCUSS/DSCN MKR DOCD: CPT | Performed by: INTERNAL MEDICINE

## 2021-08-31 PROCEDURE — 3017F COLORECTAL CA SCREEN DOC REV: CPT | Performed by: INTERNAL MEDICINE

## 2021-08-31 PROCEDURE — G8417 CALC BMI ABV UP PARAM F/U: HCPCS | Performed by: INTERNAL MEDICINE

## 2021-08-31 PROCEDURE — 4040F PNEUMOC VAC/ADMIN/RCVD: CPT | Performed by: INTERNAL MEDICINE

## 2021-08-31 ASSESSMENT — ENCOUNTER SYMPTOMS
BACK PAIN: 1
COUGH: 0
BLOOD IN STOOL: 0
ANAL BLEEDING: 0
SHORTNESS OF BREATH: 1

## 2021-08-31 NOTE — PROGRESS NOTES
Office Visit  Elizabeth Villanueva is a 72 y.o. male; who present today for Establish Cardiologist and 6 Month Follow-Up      HPI  I am seeing this 70-year-old white male in routine follow-up but it is the first time I am seeing him as a new cardiologist.  He has a history of coronary artery disease, SVT and hypertension. He actually is doing quite well but at the time of the last visit he was having some issues with dyspnea and he had proBNP which was normal, pharmacologic nuclear stress test which was negative for ischemia or scar and an echocardiogram that demonstrated normal LV systolic function, no definite wall motion abnormalities with some suboptimal views. He does not experience chest discomfort at all. He remains reasonably active but is limited in heavier activities because of chronic back pain. He tends to get some shortness of breath with exertion but not very easily. He has had some lower extremity edema off and on, no presyncope or syncope. He has a history of SVT and will have some episodes that feel similar, perhaps 1 or 2 times per month that last for 15 minutes or so. He checks his blood pressure at home and it has been normal.  Current Outpatient Medications   Medication Sig Dispense Refill    oxyCODONE (OXYCONTIN) 20 MG extended release tablet Take 1 tablet by mouth every 12 hours for 30 days. Intended supply: 30 days 60 tablet 0    oxyCODONE-acetaminophen (PERCOCET)  MG per tablet Take 1 tablet by mouth every 6 hours as needed for Pain (Pain) for up to 30 days. 120 tablet 0    triamcinolone (KENALOG) 0.1 % cream Apply topically 2 times daily. 45 g 0    verapamil (VERELAN) 200 MG extended release capsule Take 1 capsule by mouth daily 90 capsule 3    hydroCHLOROthiazide (HYDRODIURIL) 25 MG tablet TAKE 1 TABLET BY MOUTH EVERY DAY 90 tablet 3    ALPRAZolam (XANAX) 1 MG tablet Take by mouth nightly as needed.        fluticasone (FLONASE) 50 MCG/ACT nasal spray 2 sprays by Nasal route daily 3 Bottle 3    metFORMIN (GLUCOPHAGE) 500 MG tablet Take 1 tablet by mouth 2 times daily (with meals) 180 tablet 3    Umeclidinium Bromide (INCRUSE ELLIPTA) 62.5 MCG/INH AEPB Inhale 1 puff into the lungs daily 3 each 3    albuterol sulfate HFA (VENTOLIN HFA) 108 (90 Base) MCG/ACT inhaler Inhale 2 puffs into the lungs every 6 hours as needed for Wheezing 3 Inhaler 3    lovastatin (MEVACOR) 40 MG tablet Take 1 tablet by mouth nightly 90 tablet 3    GNP ADULT ASPIRIN LOW STRENGTH 81 MG chewable tablet CHEW AND SWALLOW ONE TABLET BY MOUTH DAILY 90 tablet 1    blood glucose test strips (CONTOUR NEXT TEST) strip 1 each by In Vitro route daily As needed. 100 each 3    MICROLET LANCETS MISC 1 each by Does not apply route daily 100 each 3    hydroxychloroquine (PLAQUENIL) 200 MG tablet TK 2 TS PO QD WF OR MILK  2    VOLTAREN 1 % GEL APPLY 2 GRAMS TO HAND QID  5    polyethylene glycol (MIRALAX) POWD powder Take 17 g by mouth every other day        No current facility-administered medications for this visit. There are no discontinued medications.      Allergies   Allergen Reactions    Clindamycin/Lincomycin Anaphylaxis    Benadryl [Diphenhydramine] Other (See Comments)     \"makes legs jump around\"    Prednisone        Past Medical History:   Diagnosis Date    Acute sinusitis     Anxiety     Chronic back pain     Chronic bilateral low back pain with bilateral sciatica     Constipation     COPD (chronic obstructive pulmonary disease) (HCC)     Coronary artery disease involving native coronary artery of native heart without angina pectoris 8/27/2019    CPAP (continuous positive airway pressure) dependence     12cm to 20cm    Depression     Hyperlipidemia     Hypertensive disorder     OA (osteoarthritis of the spine)     Obesity     SUSANA (obstructive sleep apnea)     AHI:  85.3 per HST 11/2013    Osteoarthritis of spine with radiculopathy, lumbar region     Otitis media     Rheumatoid TONSILLECTOMY       Social History     Occupational History    Not on file   Tobacco Use    Smoking status: Former Smoker     Packs/day: 0.50     Years: 20.00     Pack years: 10.00     Types: Cigarettes     Start date: 0     Quit date:      Years since quittin.6    Smokeless tobacco: Never Used   Vaping Use    Vaping Use: Some days    Substances: Occasionally    Devices: Disposable   Substance and Sexual Activity    Alcohol use: No    Drug use: No    Sexual activity: Not on file        Family History   Problem Relation Age of Onset    Heart Disease Father 64    Cancer Father         malignant tumor of pharynx    Diabetes Mother     Hypertension Mother     Diabetes Sister         x 2    Diabetes Brother     Colon Cancer Neg Hx     Colon Polyps Neg Hx     Liver Disease Neg Hx     Rectal Cancer Neg Hx     Stomach Cancer Neg Hx        Review of Systems  Review of Systems   Constitutional: Negative for fatigue and fever. Respiratory: Positive for shortness of breath. Negative for cough. Cardiovascular: Positive for palpitations and leg swelling. Negative for chest pain. Gastrointestinal: Negative for anal bleeding and blood in stool. Musculoskeletal: Positive for arthralgias and back pain. Neurological: Negative for syncope, facial asymmetry and speech difficulty. Physical Exam  /76   Pulse 96   Ht 6' 1\" (1.854 m)   Wt (!) 312 lb (141.5 kg)   SpO2 97%   BMI 41.16 kg/m²    Physical Exam  Constitutional:       Appearance: Normal appearance. He is morbidly obese. Cardiovascular:      Rate and Rhythm: Normal rate and regular rhythm. Heart sounds: Normal heart sounds. No gallop. Pulmonary:      Effort: Pulmonary effort is normal.      Breath sounds: Normal breath sounds. Abdominal:      General: Abdomen is flat. Bowel sounds are normal.      Palpations: Abdomen is soft.    Musculoskeletal:         General: Swelling (Trace pretibial edema, hyperpigmentation consistent with venous insufficiency) present. Skin:     General: Skin is warm and dry. Neurological:      Mental Status: He is alert. Assessment/Plan    EKG Findings:  Not performed today    Problem List Items Addressed This Visit        Cardiology Problems    Essential hypertension    Mixed hyperlipidemia    Coronary artery disease involving native coronary artery of native heart without angina pectoris - Primary       Other    Type 2 diabetes mellitus ([facility] [address]    Obesity           Diagnosis Orders   1. Coronary artery disease involving native coronary artery of native heart without angina pectoris      Stent, LAD, October 2018. Negative pharmacologic nuclear stress test, March 2021   2. Type 2 diabetes mellitus without complication, without long-term current use of insulin ([facility] [address]     3. Essential hypertension     4. Mixed hyperlipidemia     5. Class 3 severe obesity due to excess calories without serious comorbidity with body mass index (BMI) of 40.0 to 44.9 in adult [facility])         Recommendations:   Diet: ADA, low-sodium, low-fat, calorie reduced diet to lose weight  Activity: Normal activities  Medication Changes: Continue same medications    Weight loss was discussed. The patient is limited in activity. Medications will remain the same and he will monitor his blood pressure. No orders of the defined types were placed in this encounter. No orders of the defined types were placed in this encounter. Return in about 6 months (around 2/28/2022) for me, routine.

## 2021-09-01 RX ORDER — FUROSEMIDE 20 MG/1
TABLET ORAL
Qty: 30 TABLET | Refills: 1 | OUTPATIENT
Start: 2021-09-01

## 2021-09-16 DIAGNOSIS — G89.29 CHRONIC BILATERAL LOW BACK PAIN WITH BILATERAL SCIATICA: ICD-10-CM

## 2021-09-16 DIAGNOSIS — M47.26 OSTEOARTHRITIS OF SPINE WITH RADICULOPATHY, LUMBAR REGION: ICD-10-CM

## 2021-09-16 DIAGNOSIS — M54.42 CHRONIC BILATERAL LOW BACK PAIN WITH BILATERAL SCIATICA: ICD-10-CM

## 2021-09-16 DIAGNOSIS — M54.41 CHRONIC BILATERAL LOW BACK PAIN WITH BILATERAL SCIATICA: ICD-10-CM

## 2021-09-16 RX ORDER — OXYCODONE AND ACETAMINOPHEN 10; 325 MG/1; MG/1
1 TABLET ORAL EVERY 6 HOURS PRN
Qty: 120 TABLET | Refills: 0 | Status: SHIPPED | OUTPATIENT
Start: 2021-09-16 | End: 2021-10-15 | Stop reason: SDUPTHER

## 2021-09-16 RX ORDER — OXYCODONE HCL 20 MG/1
20 TABLET, FILM COATED, EXTENDED RELEASE ORAL EVERY 12 HOURS
Qty: 60 TABLET | Refills: 0 | Status: SHIPPED | OUTPATIENT
Start: 2021-09-16 | End: 2021-10-15 | Stop reason: SDUPTHER

## 2021-09-16 NOTE — TELEPHONE ENCOUNTER
Requested Prescriptions     Pending Prescriptions Disp Refills    oxyCODONE (OXYCONTIN) 20 MG extended release tablet 60 tablet 0     Sig: Take 1 tablet by mouth every 12 hours for 30 days. Intended supply: 30 days    oxyCODONE-acetaminophen (PERCOCET)  MG per tablet 120 tablet 0     Sig: Take 1 tablet by mouth every 6 hours as needed for Pain (Pain) for up to 30 days.        Last Office Visit:  6/28/2021  Next Office Visit:  12/30/2021  Last Medication Refill:  8/17/21 for both  Jaun Melendrez up to date:  7/16/21    *RX updated to reflect   9/16/21 for both   fill date*

## 2021-09-19 DIAGNOSIS — F41.9 ANXIETY: Primary | ICD-10-CM

## 2021-09-20 RX ORDER — ALPRAZOLAM 1 MG/1
TABLET ORAL
Qty: 135 TABLET | Refills: 2 | Status: SHIPPED | OUTPATIENT
Start: 2021-09-20 | End: 2021-12-15 | Stop reason: SDUPTHER

## 2021-09-20 NOTE — TELEPHONE ENCOUNTER
Requested Prescriptions     Pending Prescriptions Disp Refills    ALPRAZolam (XANAX) 1 MG tablet [Pharmacy Med Name: ALPRAZOLAM 1MG TABLETS] 135 tablet      Sig: TAKE 1 AND 1/2 TABLETS BY MOUTH THREE TIMES DAILY*MUST LAST 30 DAYS PER MD*       Last Office Visit: 6/28/2021  Next Office Visit: 12/30/2021  Last Medication Refill: 6/20/21

## 2021-10-15 DIAGNOSIS — G89.29 CHRONIC BILATERAL LOW BACK PAIN WITH BILATERAL SCIATICA: ICD-10-CM

## 2021-10-15 DIAGNOSIS — M54.42 CHRONIC BILATERAL LOW BACK PAIN WITH BILATERAL SCIATICA: ICD-10-CM

## 2021-10-15 DIAGNOSIS — M47.26 OSTEOARTHRITIS OF SPINE WITH RADICULOPATHY, LUMBAR REGION: ICD-10-CM

## 2021-10-15 DIAGNOSIS — M54.41 CHRONIC BILATERAL LOW BACK PAIN WITH BILATERAL SCIATICA: ICD-10-CM

## 2021-10-15 DIAGNOSIS — F41.9 ANXIETY: ICD-10-CM

## 2021-10-15 RX ORDER — OXYCODONE AND ACETAMINOPHEN 10; 325 MG/1; MG/1
1 TABLET ORAL EVERY 6 HOURS PRN
Qty: 120 TABLET | Refills: 0 | Status: SHIPPED | OUTPATIENT
Start: 2021-10-15 | End: 2021-11-16 | Stop reason: SDUPTHER

## 2021-10-15 RX ORDER — OXYCODONE HCL 20 MG/1
20 TABLET, FILM COATED, EXTENDED RELEASE ORAL EVERY 12 HOURS
Qty: 60 TABLET | Refills: 0 | Status: SHIPPED | OUTPATIENT
Start: 2021-10-15 | End: 2021-11-16 | Stop reason: SDUPTHER

## 2021-10-15 RX ORDER — ALPRAZOLAM 1 MG/1
TABLET ORAL
Qty: 135 TABLET | Refills: 2 | Status: CANCELLED | OUTPATIENT
Start: 2021-10-15 | End: 2021-11-14

## 2021-10-15 NOTE — TELEPHONE ENCOUNTER
Patient requests refill of oxycodone and Percocet which were both filled last of 9-16-21. [facility] 10-14-21. Dr Alexandra Burrell out of town.

## 2021-11-02 ENCOUNTER — TELEPHONE (OUTPATIENT)
Dept: PRIMARY CARE CLINIC | Age: 66
End: 2021-11-02

## 2021-11-02 DIAGNOSIS — E66.01 CLASS 2 SEVERE OBESITY WITH SERIOUS COMORBIDITY AND BODY MASS INDEX (BMI) OF 38.0 TO 38.9 IN ADULT, UNSPECIFIED OBESITY TYPE (HCC): ICD-10-CM

## 2021-11-02 DIAGNOSIS — E11.628 TYPE 2 DIABETES MELLITUS WITH OTHER SKIN COMPLICATION, WITHOUT LONG-TERM CURRENT USE OF INSULIN (HCC): ICD-10-CM

## 2021-11-02 DIAGNOSIS — E78.2 MIXED HYPERLIPIDEMIA: ICD-10-CM

## 2021-11-02 DIAGNOSIS — L30.9 ECZEMA, UNSPECIFIED TYPE: ICD-10-CM

## 2021-11-02 DIAGNOSIS — E78.00 HYPERCHOLESTEREMIA: ICD-10-CM

## 2021-11-02 LAB
ALBUMIN SERPL-MCNC: 4.2 G/DL (ref 3.5–5.2)
ALP BLD-CCNC: 74 U/L (ref 40–130)
ALT SERPL-CCNC: 21 U/L (ref 5–41)
ANION GAP SERPL CALCULATED.3IONS-SCNC: 12 MMOL/L (ref 7–19)
AST SERPL-CCNC: 16 U/L (ref 5–40)
BASOPHILS ABSOLUTE: 0 K/UL (ref 0–0.2)
BASOPHILS RELATIVE PERCENT: 0.8 % (ref 0–1)
BILIRUB SERPL-MCNC: 0.3 MG/DL (ref 0.2–1.2)
BUN BLDV-MCNC: 11 MG/DL (ref 8–23)
CALCIUM SERPL-MCNC: 9.9 MG/DL (ref 8.8–10.2)
CHLORIDE BLD-SCNC: 100 MMOL/L (ref 98–111)
CHOLESTEROL, TOTAL: 166 MG/DL (ref 160–199)
CO2: 28 MMOL/L (ref 22–29)
CREAT SERPL-MCNC: 1 MG/DL (ref 0.5–1.2)
CREATININE URINE: 75.2 MG/DL (ref 4.2–622)
EOSINOPHILS ABSOLUTE: 0.1 K/UL (ref 0–0.6)
EOSINOPHILS RELATIVE PERCENT: 2.7 % (ref 0–5)
GFR AFRICAN AMERICAN: >59
GFR NON-AFRICAN AMERICAN: >60
GLUCOSE BLD-MCNC: 170 MG/DL (ref 74–109)
HBA1C MFR BLD: 7.8 % (ref 4–6)
HCT VFR BLD CALC: 44.2 % (ref 42–52)
HDLC SERPL-MCNC: 51 MG/DL (ref 55–121)
HEMOGLOBIN: 14.1 G/DL (ref 14–18)
IMMATURE GRANULOCYTES #: 0 K/UL
LDL CHOLESTEROL CALCULATED: 91 MG/DL
LYMPHOCYTES ABSOLUTE: 1.8 K/UL (ref 1.1–4.5)
LYMPHOCYTES RELATIVE PERCENT: 34.7 % (ref 20–40)
MCH RBC QN AUTO: 28.7 PG (ref 27–31)
MCHC RBC AUTO-ENTMCNC: 31.9 G/DL (ref 33–37)
MCV RBC AUTO: 90 FL (ref 80–94)
MICROALBUMIN UR-MCNC: <1.2 MG/DL (ref 0–19)
MICROALBUMIN/CREAT UR-RTO: NORMAL MG/G
MONOCYTES ABSOLUTE: 0.7 K/UL (ref 0–0.9)
MONOCYTES RELATIVE PERCENT: 12.7 % (ref 0–10)
NEUTROPHILS ABSOLUTE: 2.5 K/UL (ref 1.5–7.5)
NEUTROPHILS RELATIVE PERCENT: 48.7 % (ref 50–65)
PDW BLD-RTO: 13.8 % (ref 11.5–14.5)
PLATELET # BLD: 160 K/UL (ref 130–400)
PMV BLD AUTO: 11.9 FL (ref 9.4–12.4)
POTASSIUM SERPL-SCNC: 4.5 MMOL/L (ref 3.5–5)
PROSTATE SPECIFIC ANTIGEN: 2.2 NG/ML (ref 0–4)
RBC # BLD: 4.91 M/UL (ref 4.7–6.1)
SODIUM BLD-SCNC: 140 MMOL/L (ref 136–145)
T4 FREE: 0.99 NG/DL (ref 0.93–1.7)
TOTAL PROTEIN: 7 G/DL (ref 6.6–8.7)
TRIGL SERPL-MCNC: 122 MG/DL (ref 0–149)
TSH SERPL DL<=0.05 MIU/L-ACNC: 3.05 UIU/ML (ref 0.27–4.2)
WBC # BLD: 5.1 K/UL (ref 4.8–10.8)

## 2021-11-12 ENCOUNTER — OFFICE VISIT (OUTPATIENT)
Dept: PRIMARY CARE CLINIC | Age: 66
End: 2021-11-12
Payer: MEDICARE

## 2021-11-12 VITALS
OXYGEN SATURATION: 99 % | DIASTOLIC BLOOD PRESSURE: 92 MMHG | HEIGHT: 73 IN | TEMPERATURE: 97.9 F | SYSTOLIC BLOOD PRESSURE: 160 MMHG | WEIGHT: 311.75 LBS | HEART RATE: 89 BPM | BODY MASS INDEX: 41.32 KG/M2

## 2021-11-12 DIAGNOSIS — I10 ESSENTIAL HYPERTENSION: ICD-10-CM

## 2021-11-12 DIAGNOSIS — E78.00 HYPERCHOLESTEREMIA: ICD-10-CM

## 2021-11-12 DIAGNOSIS — M47.26 OSTEOARTHRITIS OF SPINE WITH RADICULOPATHY, LUMBAR REGION: ICD-10-CM

## 2021-11-12 DIAGNOSIS — E11.628 TYPE 2 DIABETES MELLITUS WITH OTHER SKIN COMPLICATION, WITHOUT LONG-TERM CURRENT USE OF INSULIN (HCC): Primary | ICD-10-CM

## 2021-11-12 PROCEDURE — G8417 CALC BMI ABV UP PARAM F/U: HCPCS | Performed by: NURSE PRACTITIONER

## 2021-11-12 PROCEDURE — 1123F ACP DISCUSS/DSCN MKR DOCD: CPT | Performed by: NURSE PRACTITIONER

## 2021-11-12 PROCEDURE — G8427 DOCREV CUR MEDS BY ELIG CLIN: HCPCS | Performed by: NURSE PRACTITIONER

## 2021-11-12 PROCEDURE — 4040F PNEUMOC VAC/ADMIN/RCVD: CPT | Performed by: NURSE PRACTITIONER

## 2021-11-12 PROCEDURE — G8484 FLU IMMUNIZE NO ADMIN: HCPCS | Performed by: NURSE PRACTITIONER

## 2021-11-12 PROCEDURE — 3017F COLORECTAL CA SCREEN DOC REV: CPT | Performed by: NURSE PRACTITIONER

## 2021-11-12 PROCEDURE — 3051F HG A1C>EQUAL 7.0%<8.0%: CPT | Performed by: NURSE PRACTITIONER

## 2021-11-12 PROCEDURE — 99214 OFFICE O/P EST MOD 30 MIN: CPT | Performed by: NURSE PRACTITIONER

## 2021-11-12 PROCEDURE — 2022F DILAT RTA XM EVC RTNOPTHY: CPT | Performed by: NURSE PRACTITIONER

## 2021-11-12 PROCEDURE — 1036F TOBACCO NON-USER: CPT | Performed by: NURSE PRACTITIONER

## 2021-11-12 ASSESSMENT — ENCOUNTER SYMPTOMS
SORE THROAT: 0
DIARRHEA: 0
CONSTIPATION: 0
BLOOD IN STOOL: 0
BACK PAIN: 1
EYE REDNESS: 0
COUGH: 0
WHEEZING: 0
EYE DISCHARGE: 0
RHINORRHEA: 0
CHOKING: 0

## 2021-11-12 NOTE — PROGRESS NOTES
Andrew Waterman (:  1955) is a 72 y.o. male,Established patient, here for evaluation of the following chief complaint(s):  Discuss Labs      ASSESSMENT/PLAN:    ICD-10-CM    1. Type 2 diabetes mellitus with other skin complication, without long-term current use of insulin (HCC)  E11.628 metFORMIN (GLUCOPHAGE) 1000 MG tablet     Hemoglobin A1C   2. Essential hypertension  I10    3. Osteoarthritis of spine with radiculopathy, lumbar region  M47.26    4. Hypercholesteremia  E78.00      Increase metformin to 1000mg bid. And work on decreasing carbs    Return in about 3 months (around 2022) for diabetes 30 min check a1c. SUBJECTIVE/OBJECTIVE:  HPI  Diabetes  BS is normally 130 fasting but he has been eating things that he shouldn't. a1c was up to 7.8. He is taking metformin. htn  bp is elevated today. Has chronic pain. And this has him feeling down a bit that he cant do the physical activity that he used too. Review of Systems   Constitutional: Negative for appetite change and unexpected weight change. HENT: Negative for congestion, ear pain, rhinorrhea and sore throat. Eyes: Negative for discharge and redness. Respiratory: Negative for cough, choking and wheezing. Cardiovascular: Negative for chest pain. Gastrointestinal: Negative for blood in stool, constipation and diarrhea. Genitourinary: Negative for decreased urine volume and dysuria. Musculoskeletal: Positive for arthralgias, back pain, gait problem, joint swelling, myalgias, neck pain and neck stiffness. Skin: Negative for rash. Neurological: Negative for weakness. Hematological: Negative for adenopathy. Psychiatric/Behavioral: Negative for suicidal ideas. BP (!) 160/92 (Site: Left Upper Arm, Position: Sitting, Cuff Size: Large Adult)   Pulse 89   Temp 97.9 °F (36.6 °C) (Temporal)   Ht 6' 1\" (1.854 m)   Wt (!) 311 lb 12 oz (141.4 kg)   SpO2 99%   BMI 41.13 kg/m²    Physical Exam  Vitals reviewed. Constitutional:       General: He is not in acute distress. Appearance: Normal appearance. He is well-developed. He is obese. He is not ill-appearing. HENT:      Head: Normocephalic. Nose: No congestion. Mouth/Throat:      Pharynx: No posterior oropharyngeal erythema. Eyes:      General:         Right eye: No discharge. Left eye: No discharge. Conjunctiva/sclera: Conjunctivae normal.   Cardiovascular:      Rate and Rhythm: Normal rate and regular rhythm. Pulses: Normal pulses. Heart sounds: Normal heart sounds. No murmur heard. Pulmonary:      Effort: Pulmonary effort is normal. No respiratory distress. Breath sounds: Normal breath sounds. No rhonchi. Abdominal:      General: Bowel sounds are normal.      Palpations: Abdomen is soft. Tenderness: There is no abdominal tenderness. Musculoskeletal:         General: Normal range of motion. Cervical back: Normal range of motion and neck supple. No muscular tenderness. Lymphadenopathy:      Cervical: No cervical adenopathy. Skin:     General: Skin is warm and dry. Capillary Refill: Capillary refill takes less than 2 seconds. Findings: No rash. Neurological:      General: No focal deficit present. Mental Status: He is alert and oriented to person, place, and time. Psychiatric:         Mood and Affect: Mood normal.         Behavior: Behavior normal.                 An electronic signature was used to authenticate this note.     --HEENA Gay

## 2021-11-15 DIAGNOSIS — M54.41 CHRONIC BILATERAL LOW BACK PAIN WITH BILATERAL SCIATICA: ICD-10-CM

## 2021-11-15 DIAGNOSIS — M54.42 CHRONIC BILATERAL LOW BACK PAIN WITH BILATERAL SCIATICA: ICD-10-CM

## 2021-11-15 DIAGNOSIS — M47.26 OSTEOARTHRITIS OF SPINE WITH RADICULOPATHY, LUMBAR REGION: ICD-10-CM

## 2021-11-15 DIAGNOSIS — G89.29 CHRONIC BILATERAL LOW BACK PAIN WITH BILATERAL SCIATICA: ICD-10-CM

## 2021-11-15 NOTE — TELEPHONE ENCOUNTER
Odilia Ryan has requested a refill on his medication. Last office visit : 6/28/2021   Next office visit : 12/30/2021   Last medication refill : 10/15/2021  Дмитрий Pickup : 10/15/2021      Requested Prescriptions     Pending Prescriptions Disp Refills    oxyCODONE (OXYCONTIN) 20 MG extended release tablet 60 tablet 0     Sig: Take 1 tablet by mouth every 12 hours for 30 days. Intended supply: 30 days    oxyCODONE-acetaminophen (PERCOCET)  MG per tablet 120 tablet 0     Sig: Take 1 tablet by mouth every 6 hours as needed for Pain (Pain) for up to 30 days.

## 2021-11-16 RX ORDER — OXYCODONE HCL 20 MG/1
20 TABLET, FILM COATED, EXTENDED RELEASE ORAL EVERY 12 HOURS
Qty: 60 TABLET | Refills: 0 | Status: SHIPPED | OUTPATIENT
Start: 2021-11-16 | End: 2021-12-15 | Stop reason: SDUPTHER

## 2021-11-16 RX ORDER — OXYCODONE AND ACETAMINOPHEN 10; 325 MG/1; MG/1
1 TABLET ORAL EVERY 6 HOURS PRN
Qty: 120 TABLET | Refills: 0 | Status: SHIPPED | OUTPATIENT
Start: 2021-11-16 | End: 2021-12-15 | Stop reason: SDUPTHER

## 2021-12-09 ENCOUNTER — TELEPHONE (OUTPATIENT)
Dept: CARDIOLOGY CLINIC | Age: 66
End: 2021-12-09

## 2021-12-09 NOTE — TELEPHONE ENCOUNTER
Pt's Spouse dropped off [employer] paperwork in regards to Greg, the PT's son, needing to be off to help provide care for this Pt. I LVM for Rubio Parrish, the Pt's Spouse to call our office back for more information.

## 2021-12-15 DIAGNOSIS — M47.26 OSTEOARTHRITIS OF SPINE WITH RADICULOPATHY, LUMBAR REGION: ICD-10-CM

## 2021-12-15 DIAGNOSIS — F41.9 ANXIETY: ICD-10-CM

## 2021-12-15 DIAGNOSIS — M54.41 CHRONIC BILATERAL LOW BACK PAIN WITH BILATERAL SCIATICA: ICD-10-CM

## 2021-12-15 DIAGNOSIS — M54.42 CHRONIC BILATERAL LOW BACK PAIN WITH BILATERAL SCIATICA: ICD-10-CM

## 2021-12-15 DIAGNOSIS — G89.29 CHRONIC BILATERAL LOW BACK PAIN WITH BILATERAL SCIATICA: ICD-10-CM

## 2021-12-15 RX ORDER — OXYCODONE HCL 20 MG/1
20 TABLET, FILM COATED, EXTENDED RELEASE ORAL EVERY 12 HOURS
Qty: 60 TABLET | Refills: 0 | Status: SHIPPED | OUTPATIENT
Start: 2021-12-16 | End: 2022-01-13 | Stop reason: SDUPTHER

## 2021-12-15 RX ORDER — ALPRAZOLAM 1 MG/1
TABLET ORAL
Qty: 135 TABLET | Refills: 2 | Status: SHIPPED | OUTPATIENT
Start: 2021-12-19 | End: 2022-01-18

## 2021-12-15 RX ORDER — OXYCODONE AND ACETAMINOPHEN 10; 325 MG/1; MG/1
1 TABLET ORAL EVERY 6 HOURS PRN
Qty: 120 TABLET | Refills: 0 | Status: SHIPPED | OUTPATIENT
Start: 2021-12-16 | End: 2022-01-13 | Stop reason: SDUPTHER

## 2021-12-15 NOTE — TELEPHONE ENCOUNTER
Per Dr. Yasmayn Russo, he did not feel comfortable signing FMLA papers for Renato Guzman, Pt's Son, for any cardiac issues to arise. He stated that this type of paperwork should likely go to the PT's PCP since all of his latest cardiac testing has came back negative. Discussed this with the Pt's wife, Valentino Cadena asked that I leave up at the  and she will pick it up either today or tomorrow.

## 2021-12-15 NOTE — TELEPHONE ENCOUNTER
Sky Sellers has requested a refill on his medication. Last office visit : 6/28/2021   Next office visit : 12/30/2021   Last medication refill : 11/16/21 and 9/20/21 with 2 refills   Yael Grant : up to date       Requested Prescriptions     Pending Prescriptions Disp Refills    oxyCODONE (OXYCONTIN) 20 MG extended release tablet 60 tablet 0     Sig: Take 1 tablet by mouth every 12 hours for 30 days. Intended supply: 30 days    oxyCODONE-acetaminophen (PERCOCET)  MG per tablet 120 tablet 0     Sig: Take 1 tablet by mouth every 6 hours as needed for Pain (Pain) for up to 30 days.     ALPRAZolam (XANAX) 1 MG tablet 135 tablet 2     Sig: TAKE 1 AND 1/2 TABLETS BY MOUTH THREE TIMES DAILY*MUST LAST 30 DAYS PER MD*

## 2021-12-30 ENCOUNTER — OFFICE VISIT (OUTPATIENT)
Dept: NEUROLOGY | Age: 66
End: 2021-12-30
Payer: MEDICARE

## 2021-12-30 VITALS
HEIGHT: 73 IN | BODY MASS INDEX: 39.89 KG/M2 | HEART RATE: 68 BPM | WEIGHT: 301 LBS | DIASTOLIC BLOOD PRESSURE: 85 MMHG | RESPIRATION RATE: 18 BRPM | SYSTOLIC BLOOD PRESSURE: 138 MMHG

## 2021-12-30 DIAGNOSIS — M05.741 RHEUMATOID ARTHRITIS INVOLVING BOTH HANDS WITH POSITIVE RHEUMATOID FACTOR (HCC): ICD-10-CM

## 2021-12-30 DIAGNOSIS — M47.26 OSTEOARTHRITIS OF SPINE WITH RADICULOPATHY, LUMBAR REGION: Primary | ICD-10-CM

## 2021-12-30 DIAGNOSIS — M05.742 RHEUMATOID ARTHRITIS INVOLVING BOTH HANDS WITH POSITIVE RHEUMATOID FACTOR (HCC): ICD-10-CM

## 2021-12-30 DIAGNOSIS — F41.9 ANXIETY: ICD-10-CM

## 2021-12-30 DIAGNOSIS — G89.29 CHRONIC BILATERAL LOW BACK PAIN WITH BILATERAL SCIATICA: ICD-10-CM

## 2021-12-30 DIAGNOSIS — M54.42 CHRONIC BILATERAL LOW BACK PAIN WITH BILATERAL SCIATICA: ICD-10-CM

## 2021-12-30 DIAGNOSIS — G47.33 OSA (OBSTRUCTIVE SLEEP APNEA): ICD-10-CM

## 2021-12-30 DIAGNOSIS — M54.41 CHRONIC BILATERAL LOW BACK PAIN WITH BILATERAL SCIATICA: ICD-10-CM

## 2021-12-30 PROCEDURE — 1036F TOBACCO NON-USER: CPT | Performed by: PSYCHIATRY & NEUROLOGY

## 2021-12-30 PROCEDURE — 4040F PNEUMOC VAC/ADMIN/RCVD: CPT | Performed by: PSYCHIATRY & NEUROLOGY

## 2021-12-30 PROCEDURE — 3017F COLORECTAL CA SCREEN DOC REV: CPT | Performed by: PSYCHIATRY & NEUROLOGY

## 2021-12-30 PROCEDURE — G8417 CALC BMI ABV UP PARAM F/U: HCPCS | Performed by: PSYCHIATRY & NEUROLOGY

## 2021-12-30 PROCEDURE — G8427 DOCREV CUR MEDS BY ELIG CLIN: HCPCS | Performed by: PSYCHIATRY & NEUROLOGY

## 2021-12-30 PROCEDURE — G8484 FLU IMMUNIZE NO ADMIN: HCPCS | Performed by: PSYCHIATRY & NEUROLOGY

## 2021-12-30 PROCEDURE — 1123F ACP DISCUSS/DSCN MKR DOCD: CPT | Performed by: PSYCHIATRY & NEUROLOGY

## 2021-12-30 PROCEDURE — 99214 OFFICE O/P EST MOD 30 MIN: CPT | Performed by: PSYCHIATRY & NEUROLOGY

## 2021-12-30 NOTE — PROGRESS NOTES
[facility]  [address] [place] [address] [address], [address]  [place], [place]  Phone [phone]  Fax [phone]     [facility] Follow Up Encounter  21 2:11 PM CST    Information:   Patient Name: Sandra Acevedo  :   1955  Age:   77 y.o. MRN:   [id number]  Account #:  [de-identified]  Today:  21    Provider: Rosalinda Trejo M.D. Chief Complaint:   Chief Complaint   Patient presents with    6 Month Follow-Up       Subjective:   Sandra Acevedo is a 77 y.o. man with a history of lumbar spondylosis with chronic back pain and sciatica, rheumatoid arthritis, obstructive sleep apnea, and anxiety who is following up. He has chronic back pain with lower extremity pain. He cannot stand or walk fore than 10 minutes. He still is active. He paces himself and only sits for as long as he has to. His back pain is stable but does require daily analgesics which does help.  He has pain and stiffness in his hands.  Methotrexate in the past did not help that but the Plaquinil has helped. [facility] has had no side effects from the opioid analgesics.  He uses his BiPAP every night.  He did get a new BiPAP and is doing well with it.         Objective:     Past Medical History:  Past Medical History:   Diagnosis Date    Acute sinusitis     Anxiety     Chronic back pain     Chronic bilateral low back pain with bilateral sciatica     Constipation     COPD (chronic obstructive pulmonary disease) (HCC)     Coronary artery disease involving native coronary artery of native heart without angina pectoris 2019    CPAP (continuous positive airway pressure) dependence     12cm to 20cm    Depression     Hyperlipidemia     Hypertensive disorder     OA (osteoarthritis of the spine)     Obesity     SUSANA (obstructive sleep apnea)     AHI:  85.3 per HST 2013    Osteoarthritis of spine with radiculopathy, lumbar region     Otitis media     Rheumatoid arthritis ([facility] [address]     Spinal stenosis     Type 2 diabetes mellitus ([facility] [address] 03/23/2016    Unspecified sleep apnea     uses bipap    Vertigo        Past Surgical History:   Procedure Laterality Date    ADENOIDECTOMY      BROW LIFT      due to vision    CARDIAC CATHETERIZATION  10/24/2018         COLONOSCOPY  27 yrs ago    Salud Aceves, normal per patient   [address] ARTHROSCOPY Left 2009    AK NJX DX/THER SBST EPIDURAL/SUBARACH LUMBAR/SACRAL N/A 10/3/2017    L3-4 EPIDURAL STEROID INJECTION performed by Carlos Patricia at [address] [place] [address]  · None    Significant Injuries  · None    Habits  Marisel Curran reports that he quit smoking about 21 years ago. His smoking use included cigarettes. He started smoking about 41 years ago. He has a 10.00 pack-year smoking history. He has never used smokeless tobacco. He reports that he does not drink alcohol and does not use drugs. Family History   Problem Relation Age of Onset    Heart Disease Father 64    Cancer Father         malignant tumor of pharynx    Diabetes Mother     Hypertension Mother     Diabetes Sister         x 2    Diabetes Brother     Colon Cancer Neg Hx     Colon Polyps Neg Hx     Liver Disease Neg Hx     Rectal Cancer Neg Hx     Stomach Cancer Neg Hx        Social History  Tessa Batres is , lives in [facility], and is disabled. Medications:  Current Outpatient Medications   Medication Sig Dispense Refill    oxyCODONE (OXYCONTIN) 20 MG extended release tablet Take 1 tablet by mouth every 12 hours for 30 days. Intended supply: 30 days 60 tablet 0    oxyCODONE-acetaminophen (PERCOCET)  MG per tablet Take 1 tablet by mouth every 6 hours as needed for Pain (Pain) for up to 30 days.  120 tablet 0    ALPRAZolam (XANAX) 1 MG tablet TAKE 1 AND 1/2 TABLETS BY MOUTH THREE TIMES DAILY*MUST LAST 30 DAYS PER MD* 135 tablet 2    Misc Natural Products (OSTEO BI-FLEX ADV DOUBLE ST) TABS Take by mouth daily      metFORMIN (GLUCOPHAGE) 1000 MG tablet Take 1 tablet by mouth 2 times daily (with meals) 60 tablet 5    triamcinolone (KENALOG) 0.1 % cream Apply topically 2 times daily. 45 g 0    verapamil (VERELAN) 200 MG extended release capsule Take 1 capsule by mouth daily 90 capsule 3    hydroCHLOROthiazide (HYDRODIURIL) 25 MG tablet TAKE 1 TABLET BY MOUTH EVERY DAY 90 tablet 3    fluticasone (FLONASE) 50 MCG/ACT nasal spray 2 sprays by Nasal route daily 3 Bottle 3    Umeclidinium Bromide (INCRUSE ELLIPTA) 62.5 MCG/INH AEPB Inhale 1 puff into the lungs daily 3 each 3    albuterol sulfate HFA (VENTOLIN HFA) 108 (90 Base) MCG/ACT inhaler Inhale 2 puffs into the lungs every 6 hours as needed for Wheezing 3 Inhaler 3    lovastatin (MEVACOR) 40 MG tablet Take 1 tablet by mouth nightly 90 tablet 3    GNP ADULT ASPIRIN LOW STRENGTH 81 MG chewable tablet CHEW AND SWALLOW ONE TABLET BY MOUTH DAILY 90 tablet 1    blood glucose test strips (CONTOUR NEXT TEST) strip 1 each by In Vitro route daily As needed. 100 each 3    MICROLET LANCETS MISC 1 each by Does not apply route daily 100 each 3    hydroxychloroquine (PLAQUENIL) 200 MG tablet TK 2 TS PO QD WF OR MILK  2    VOLTAREN 1 % GEL APPLY 2 GRAMS TO HAND QID  5    polyethylene glycol (MIRALAX) POWD powder Take 17 g by mouth every other day        No current facility-administered medications for this visit. Allergies:   Allergies as of 12/30/2021 - Fully Reviewed 12/30/2021   Allergen Reaction Noted    Clindamycin/lincomycin Anaphylaxis 07/15/2015    Benadryl [diphenhydramine] Other (See Comments) 07/15/2015    Prednisone  08/13/2018       Review of Systems:  Constitutional: negative for - chills and fever  Eyes:  negative for - visual disturbance and photophobia  HENMT: negative for - headaches and sinus pain  Respiratory: negative for - cough, hemoptysis, and shortness of breath  Cardiovascular: negative for - chest pain and palpitations  Gastrointestinal: negative for - blood in stools, constipation, diarrhea, nausea, and vomiting  Genito-Urinary: negative for - hematuria, urinary frequency, urinary urgency, and urinary retention  Musculoskeletal: positive for - joint pain, joint stiffness, and joint swelling  Hematological and Lymphatic: negative for - bleeding problems, abnormal bruising, and swollen lymph nodes  Endocrine:  negative for - polydipsia and polyphagia  Allergy/Immunology:  negative for - rhinorrhea, sinus congestion, hives  Integument:  negative for - negative for - rash, change in moles, new or changing lesions  Psychological: negative for - anxiety and depression  Neurological: negative for - memory loss, numbness/tingling, and weakness     PHYSICAL EXAMINATION:  Vitals:  /85   Pulse 68   Resp 18   Ht 6' 1\" (1.854 m)   Wt (!) 301 lb (136.5 kg)   BMI 39.71 kg/m²   General appearance:  Alert, well developed, well nourished, in no distress  HEENT:  normocephalic, atraumatic, sclera appear normal, no nasal abnormalities, no rhinorrhea, Ears appear normal, oral mucous membranes are moist without erythema, trachea midline, thyroid is normal, no lymphadenopathy or neck mass. Cardiovascular:  Regular rate and rhythm without murmer. No peripheral edema, No cyanosis or clubbing. No carotid bruits. Pulmonary:  Lungs are clear to auscultation. Breathing appears normal, good expansion, normal effort without use of accessory muscles  Musculoskeletal:  Joints are arthritic. Integument:  No rash, erythema, or pallor  Psychiatric:  Mood, affect, and behavior appear normal      NEUROLOGIC EXAMINATION:  Mental Status:  alert, oriented to person, place, and time.   Speech:  Clear without dysarthria or dysphonia  Language:  Fluent without aphasia  Cranial Nerves:   II Visual fields are full to confrontation   III,IV, VI Extraocular movements are full   VII Facial movements are symmetrical without weakness   VIII Hearing is intact   IX,X Shoulder shrug and head rotation strength are intact   XII No tongue atrophy or fasciculations. Normal tongue protrusion. No tongue weakness  Motor:  Normal strength in both upper and lower extremities. Normal muscle tone and bulk. Deep tendon reflexes are intact and symmetrical in both upper and lower extremities. Keita's signs are absent bilaterally. There is no ankle clonus on either side. Sensation:  Sensation is intact to light touch, temperature, and vibration in all extremities. Coordination:  Rapid alternating movements are normal in both upper and lower extremities. Finger to nose testing is unimpaired bilaterally. Gait:  Normal station and gait. Pertinent Diagnostic Studies:  JAKOB is appropriate    Assessment:       ICD-10-CM    1. Osteoarthritis of spine with radiculopathy, lumbar region  M47.26    2. Chronic bilateral low back pain with bilateral sciatica  M54.42     M54.41     G89.29    3. SUSANA (obstructive sleep apnea)  G47.33    4. Rheumatoid arthritis involving both hands with positive rheumatoid factor (HCC)  M05.741     M05.742    5. Anxiety  F41.9    I discussed the above with including side effects of medications like dependence and withdrawal.    Plan:   1.  Continue present medications including oxycodone, alpazolam, Plaquenil  2. FU in 6 months    Electronically signed by Sujit Ramirez MD on 12/30/21

## 2022-01-13 DIAGNOSIS — M54.41 CHRONIC BILATERAL LOW BACK PAIN WITH BILATERAL SCIATICA: ICD-10-CM

## 2022-01-13 DIAGNOSIS — G89.29 CHRONIC BILATERAL LOW BACK PAIN WITH BILATERAL SCIATICA: ICD-10-CM

## 2022-01-13 DIAGNOSIS — M54.42 CHRONIC BILATERAL LOW BACK PAIN WITH BILATERAL SCIATICA: ICD-10-CM

## 2022-01-13 DIAGNOSIS — M47.26 OSTEOARTHRITIS OF SPINE WITH RADICULOPATHY, LUMBAR REGION: ICD-10-CM

## 2022-01-13 RX ORDER — OXYCODONE AND ACETAMINOPHEN 10; 325 MG/1; MG/1
1 TABLET ORAL EVERY 6 HOURS PRN
Qty: 120 TABLET | Refills: 0 | Status: SHIPPED | OUTPATIENT
Start: 2022-01-15 | End: 2022-02-11 | Stop reason: SDUPTHER

## 2022-01-13 RX ORDER — OXYCODONE HCL 20 MG/1
20 TABLET, FILM COATED, EXTENDED RELEASE ORAL EVERY 12 HOURS
Qty: 60 TABLET | Refills: 0 | Status: SHIPPED | OUTPATIENT
Start: 2022-01-15 | End: 2022-02-11 | Stop reason: SDUPTHER

## 2022-01-13 NOTE — TELEPHONE ENCOUNTER
Sharyn Jackman has requested a refill on his medication. Last office visit : 12/30/2021   Next office visit : 6/30/2022   Last medication refill :12/16/2021  Liliana Montalvo : up to date       Requested Prescriptions     Pending Prescriptions Disp Refills    oxyCODONE (OXYCONTIN) 20 MG extended release tablet 60 tablet 0     Sig: Take 1 tablet by mouth every 12 hours for 30 days. Intended supply: 30 days    oxyCODONE-acetaminophen (PERCOCET)  MG per tablet 120 tablet 0     Sig: Take 1 tablet by mouth every 6 hours as needed for Pain (Pain) for up to 30 days.        *Scripts updated to reflect fill date of 1/15/2022

## 2022-01-17 ENCOUNTER — HOSPITAL ENCOUNTER (EMERGENCY)
Age: 67
Discharge: HOME | End: 2022-01-17
Payer: MEDICARE

## 2022-01-17 ENCOUNTER — APPOINTMENT (OUTPATIENT)
Dept: GENERAL RADIOLOGY | Age: 67
End: 2022-01-17
Payer: MEDICARE

## 2022-01-17 VITALS
DIASTOLIC BLOOD PRESSURE: 60 MMHG | HEART RATE: 108 BPM | WEIGHT: 300 LBS | OXYGEN SATURATION: 99 % | HEIGHT: 73 IN | BODY MASS INDEX: 39.76 KG/M2 | SYSTOLIC BLOOD PRESSURE: 132 MMHG | RESPIRATION RATE: 20 BRPM | TEMPERATURE: 98 F

## 2022-01-17 DIAGNOSIS — S41.112A LACERATION OF LEFT UPPER EXTREMITY, INITIAL ENCOUNTER: Primary | ICD-10-CM

## 2022-01-17 PROCEDURE — 73090 X-RAY EXAM OF FOREARM: CPT

## 2022-01-17 PROCEDURE — 99281 EMR DPT VST MAYX REQ PHY/QHP: CPT

## 2022-01-17 PROCEDURE — 12004 RPR S/N/AX/GEN/TRK7.6-12.5CM: CPT

## 2022-01-17 RX ORDER — LIDOCAINE HYDROCHLORIDE 10 MG/ML
15 INJECTION, SOLUTION EPIDURAL; INFILTRATION; INTRACAUDAL; PERINEURAL ONCE
Status: DISCONTINUED | OUTPATIENT
Start: 2022-01-17 | End: 2022-01-17 | Stop reason: HOSPADM

## 2022-01-17 RX ORDER — ASPIRIN 81 MG/1
TABLET, CHEWABLE ORAL
Qty: 90 TABLET | Refills: 1 | Status: SHIPPED | OUTPATIENT
Start: 2022-01-17 | End: 2022-02-11 | Stop reason: SDUPTHER

## 2022-01-17 RX ORDER — CEPHALEXIN 500 MG/1
500 CAPSULE ORAL 2 TIMES DAILY
Qty: 14 CAPSULE | Refills: 0 | Status: SHIPPED | OUTPATIENT
Start: 2022-01-17 | End: 2022-01-24

## 2022-01-17 ASSESSMENT — PAIN SCALES - GENERAL: PAINLEVEL_OUTOF10: 8

## 2022-01-17 NOTE — ED PROVIDER NOTES
[facility]  eMERGENCY dEPARTMENT eNCOUnter      Pt Name: Travon Haque  MRN: [id number]  [place] 1955  Date of evaluation: 1/17/2022  Provider: Nikky Tamez, [address]       Chief Complaint   Patient presents with    Extremity Laceration     L arm         HISTORY OF PRESENT ILLNESS   (Location/Symptom, Timing/Onset,Context/Setting, Quality, Duration, Modifying Factors, Severity)  Note limiting factors. Travon Haque is a 77 y.o. male who presents to the emergency department with a laceration to his left foream from a radio controlled plane propellar. No other injuries    The history is provided by the patient. NursingNotes were reviewed. REVIEW OF SYSTEMS    (2-9 systems for level 4, 10 or more for level 5)     Review of Systems   Skin: Positive for wound. Except as noted above the remainder of the review of systems was reviewed and negative.        PAST MEDICAL HISTORY     Past Medical History:   Diagnosis Date    Acute sinusitis     Anxiety     Chronic back pain     Chronic bilateral low back pain with bilateral sciatica     Constipation     COPD (chronic obstructive pulmonary disease) (HCC)     Coronary artery disease involving native coronary artery of native heart without angina pectoris 8/27/2019    CPAP (continuous positive airway pressure) dependence     12cm to 20cm    Depression     Hyperlipidemia     Hypertensive disorder     OA (osteoarthritis of the spine)     Obesity     SUSANA (obstructive sleep apnea)     AHI:  85.3 per HST 11/2013    Osteoarthritis of spine with radiculopathy, lumbar region     Otitis media     Rheumatoid arthritis ([address]     Spinal stenosis     Type 2 diabetes mellitus ([address] 03/23/2016    Unspecified sleep apnea     uses bipap    Vertigo          SURGICALHISTORY       Past Surgical History:   Procedure Laterality Date    ADENOIDECTOMY      BROW LIFT      due to vision    CARDIAC CATHETERIZATION  10/24/2018      COLONOSCOPY  30 yrs ago    [place], normal per patient    HERNIA REPAIR      KNEE ARTHROSCOPY Left 2009    AK NJX DX/THER SBST EPIDURAL/SUBARACH LUMBAR/SACRAL N/A 10/3/2017    L3-4 EPIDURAL STEROID INJECTION performed by Libby Woodard at [address]       Discharge Medication List as of 1/17/2022  4:29 PM      CONTINUE these medications which have NOT CHANGED    Details   aspirin 81 MG chewable tablet CHEW AND SEALLOW 1 TABLET BY MOUTH EVERY DY A, Disp-90 tablet, R-1Normal      oxyCODONE (OXYCONTIN) 20 MG extended release tablet Take 1 tablet by mouth every 12 hours for 30 days. Intended supply: 30 days, Disp-60 tablet, R-0Normal      oxyCODONE-acetaminophen (PERCOCET)  MG per tablet Take 1 tablet by mouth every 6 hours as needed for Pain (Pain) for up to 30 days. , Disp-120 tablet, R-0Normal      ALPRAZolam (XANAX) 1 MG tablet TAKE 1 AND 1/2 TABLETS BY MOUTH THREE TIMES DAILY*MUST LAST 30 DAYS PER MD*, Disp-135 tablet, R-2Normal      Misc Natural Products (OSTEO BI-FLEX ADV DOUBLE ST) TABS DAILY, Historical Med      metFORMIN (GLUCOPHAGE) 1000 MG tablet Take 1 tablet by mouth 2 times daily (with meals), Disp-60 tablet, R-5Normal      triamcinolone (KENALOG) 0.1 % cream Apply topically 2 times daily. , Disp-45 g, R-0, Normal      verapamil (VERELAN) 200 MG extended release capsule Take 1 capsule by mouth daily, Disp-90 capsule, R-3Normal      hydroCHLOROthiazide (HYDRODIURIL) 25 MG tablet TAKE 1 TABLET BY MOUTH EVERY DAY, Disp-90 tablet, R-3Normal      fluticasone (FLONASE) 50 MCG/ACT nasal spray 2 sprays by Nasal route daily, Disp-3 Bottle, R-3Normal      Umeclidinium Bromide (INCRUSE ELLIPTA) 62.5 MCG/INH AEPB Inhale 1 puff into the lungs daily, Disp-3 each, R-3Normal      albuterol sulfate HFA (VENTOLIN HFA) 108 (90 Base) MCG/ACT inhaler Inhale 2 puffs into the lungs every 6 hours as needed for Wheezing, Disp-3 Inhaler, R-3Normal lovastatin (MEVACOR) 40 MG tablet Take 1 tablet by mouth nightly, Disp-90 tablet, R-3Normal      blood glucose test strips (CONTOUR NEXT TEST) strip DAILY Starting 2019, Disp-100 each, R-3, NormalAs needed.       MICROLET LANCETS MISC DAILY Starting 2019, Disp-100 each, R-3, Normal      hydroxychloroquine (PLAQUENIL) 200 MG tablet TK 2 TS PO QD WF OR MILK, R-2Historical Med      VOLTAREN 1 % GEL APPLY 2 GRAMS TO HAND QID, R-5, EJ, Historical Med      polyethylene glycol (MIRALAX) POWD powder Take 17 g by mouth every other day Historical Med             ALLERGIES     Clindamycin/lincomycin, Benadryl [diphenhydramine], and Prednisone    FAMILY HISTORY       Family History   Problem Relation Age of Onset    Heart Disease Father 64    Cancer Father         malignant tumor of pharynx    Diabetes Mother     Hypertension Mother     Diabetes Sister         x 2    Diabetes Brother     Colon Cancer Neg Hx     Colon Polyps Neg Hx     Liver Disease Neg Hx     Rectal Cancer Neg Hx     Stomach Cancer Neg Hx           SOCIAL HISTORY       Social History     Socioeconomic History    Marital status:      Spouse name: Not on file    Number of children: Not on file    Years of education: Not on file    Highest education level: Not on file   Occupational History    Not on file   Tobacco Use    Smoking status: Former Smoker     Packs/day: 0.50     Years: 20.00     Pack years: 10.00     Types: Cigarettes     Start date: 0     Quit date:      Years since quittin.0    Smokeless tobacco: Never Used   Vaping Use    Vaping Use: Some days    Substances: Occasionally    Devices: Disposable   Substance and Sexual Activity    Alcohol use: No    Drug use: No    Sexual activity: Not on file   Other Topics Concern    Not on file   Social History Narrative    Not on file     Social Determinants of Health     Financial Resource Strain:     Difficulty of Paying Living Expenses: Not on file   Food Insecurity:     Worried About Running Out of Food in the Last Year: Not on file    Sheela of Food in the Last Year: Not on file   Transportation Needs:     Lack of Transportation (Medical): Not on file    Lack of Transportation (Non-Medical): Not on file   Physical Activity:     Days of Exercise per Week: Not on file    Minutes of Exercise per Session: Not on file   Stress:     Feeling of Stress : Not on file   Social Connections:     Frequency of Communication with Friends and Family: Not on file    Frequency of Social Gatherings with Friends and Family: Not on file    Attends Druze Services: Not on file    Active Member of [address] [place] [address] [employer] or Organizations: Not on file    Attends Club or Organization Meetings: Not on file    Marital Status: Not on file   Intimate Partner Violence:     Fear of Current or Ex-Partner: Not on file    Emotionally Abused: Not on file    Physically Abused: Not on file    Sexually Abused: Not on file   Housing Stability:     Unable to Pay for Housing in the Last Year: Not on file    Number of [place] in the Last Year: Not on file    Unstable Housing in the Last Year: Not on file       SCREENINGS      @FLOW([id number])@      PHYSICAL EXAM    (up to 7 for level 4, 8 or more for level 5)     ED Triage Vitals [01/17/22 1332]   BP Temp Temp src Pulse Resp SpO2 Height Weight   132/60 98 °F (36.7 °C) -- 108 20 99 % 6' 1\" (1.854 m) 300 lb (136.1 kg)       Physical Exam  Vitals and nursing note reviewed. Constitutional:       Appearance: He is well-developed. HENT:      Head: Normocephalic and atraumatic. Eyes:      General: No scleral icterus. Right eye: No discharge. Left eye: No discharge. Cardiovascular:      Rate and Rhythm: Normal rate. Pulmonary:      Effort: No respiratory distress. Musculoskeletal:      Cervical back: Normal range of motion and neck supple. Skin:            Comments: 8cm lac with mild oozing of blood.   No visible Wound exploration: wound explored through full range of motion and entire depth of wound probed and visualized      Wound extent: no nerve damage noted and no tendon damage noted      Contaminated: no    Treatment:     Area cleansed with:  Betadine and saline    Amount of cleaning:  Extensive    Irrigation solution:  Sterile saline    Irrigation volume:  200    Irrigation method:  Syringe    Visualized foreign bodies/material removed: no    Subcutaneous repair:     Suture size:  4-0    Suture material:  Fast-absorbing gut    Suture technique:  Simple interrupted    Number of sutures:  2  Skin repair:     Repair method:  Sutures    Suture size:  3-0    Suture material:  Nylon    Suture technique:  Simple interrupted    Number of sutures:  13  Approximation:     Approximation:  Close  Post-procedure details:     Dressing:  Antibiotic ointment and sterile dressing    Patient tolerance of procedure: Tolerated well, no immediate complications    pt cautioned to monitor for s/s of infection. Understands wound care    FINAL IMPRESSION      1.  Laceration of left upper extremity, initial encounter        DISPOSITION/PLAN   DISPOSITION Decision To Discharge 01/17/2022 03:36:46 PM      PATIENT REFERRED TO:  [facility] EMERGENCY DEPT  [facility]  [phone]  In 2 weeks  For suture removal      DISCHARGE MEDICATIONS:  Discharge Medication List as of 1/17/2022  4:29 PM      START taking these medications    Details   cephALEXin (KEFLEX) 500 MG capsule Take 1 capsule by mouth 2 times daily for 7 days, Disp-14 capsule, R-0Normal                (Please note that portions of this note were completed with a voice recognitionprogram.  Efforts were made to edit the dictations but occasionally words are mis-transcribed.)    HEENA Blackmon (electronically signed)          HEENA Blackmon  01/17/22 3233

## 2022-01-31 ENCOUNTER — HOSPITAL ENCOUNTER (EMERGENCY)
Age: 67
Discharge: HOME | End: 2022-01-31
Attending: EMERGENCY MEDICINE
Payer: MEDICARE

## 2022-01-31 VITALS
BODY MASS INDEX: 38.43 KG/M2 | HEART RATE: 79 BPM | TEMPERATURE: 98.4 F | RESPIRATION RATE: 20 BRPM | DIASTOLIC BLOOD PRESSURE: 83 MMHG | SYSTOLIC BLOOD PRESSURE: 180 MMHG | HEIGHT: 73 IN | OXYGEN SATURATION: 94 % | WEIGHT: 290 LBS

## 2022-01-31 DIAGNOSIS — Z51.89 ENCOUNTER FOR POST-TRAUMATIC WOUND CHECK: Primary | ICD-10-CM

## 2022-01-31 PROCEDURE — 99282 EMERGENCY DEPT VISIT SF MDM: CPT

## 2022-01-31 RX ORDER — SULFAMETHOXAZOLE AND TRIMETHOPRIM 800; 160 MG/1; MG/1
1 TABLET ORAL 2 TIMES DAILY
Qty: 14 TABLET | Refills: 0 | Status: SHIPPED | OUTPATIENT
Start: 2022-01-31 | End: 2022-02-07

## 2022-01-31 ASSESSMENT — ENCOUNTER SYMPTOMS: COLOR CHANGE: 1

## 2022-01-31 NOTE — ED PROVIDER NOTES
[facility]  eMERGENCY dEPARTMENT eNCOUnter      Pt Name: Travon Haque  MRN: [id number]  [place] 1955  Date of evaluation: 1/31/2022  Provider: Brady Sainz MD    CHIEF COMPLAINT       Chief Complaint   Patient presents with    Wound Check     Seen independently of PA. Pt with some mild erythema around suture site. No active drainage. Looks like it's not quite sealed. Will give it a few more days with the sutures. PHYSICAL EXAM    (up to 7 for level 4, 8 or more for level 5)     ED Triage Vitals [01/31/22 1338]   BP Temp Temp src Pulse Resp SpO2 Height Weight   (!) 180/83 98.4 °F (36.9 °C) -- 79 20 94 % 6' 1\" (1.854 m) 290 lb (131.5 kg)       Physical Exam    DIAGNOSTIC RESULTS     EKG: All [facility]'s [facility] who either signs or Co-signs this chart in the absence of a cardiologist.        RADIOLOGY:   Non-plain film images such as CT, Ultrasound and MRI are read by the radiologist. Plain radiographic images are visualized and preliminarily interpreted by the emergencyphysician with the below findings:        Interpretation per the Radiologist below, if available at the time of thisnote:    No orders to display         ED BEDSIDE ULTRASOUND:   Performed by ED Physician - none    LABS:  Labs Reviewed - No data to display    All other labs were within normal range or not returned as of this dictation. EMERGENCY DEPARTMENT COURSE and DIFFERENTIAL DIAGNOSIS/MDM:   Vitals:    Vitals:    01/31/22 1338   BP: (!) 180/83   Pulse: 79   Resp: 20   Temp: 98.4 °F (36.9 °C)   SpO2: 94%   Weight: 290 lb (131.5 kg)   Height: 6' 1\" (1.854 m)       MDM      CONSULTS:  None    PROCEDURES:  Unless otherwise noted below, none      Procedures    FINAL IMPRESSION      1. Encounter for post-traumatic wound check          DISPOSITION/PLAN   DISPOSITION Decision To Discharge    PATIENT REFERRED TO:  No follow-up provider specified.     DISCHARGE MEDICATIONS:  New Prescriptions No medications on file          (Please note that portions of this note were completed with a voice recognition program.  Efforts were made to edit the dictations but occasionally words aremis-transcribed.)    Mohit Holguin MD (electronically signed)  Attending Emergency Physician            Mohit Holguin MD  01/31/22 [phone]

## 2022-01-31 NOTE — ED PROVIDER NOTES
[facility]  eMERGENCY dEPARTMENT eNCOUnter      Pt Name: Gilmer Bowser  MRN: [id number]  [place] 1955  Date of evaluation: 1/31/2022  Provider: Bakari Hay Dr       Chief Complaint   Patient presents with    Wound Check         HISTORY OF PRESENT ILLNESS   (Location/Symptom, Timing/Onset,Context/Setting, Quality, Duration, Modifying Factors, Severity)  Note limiting factors. Gilmer Bowser is a 77 y.o. male with history of hypertension, type 2 diabetes, obesity, etc. who presents to the emergency department for a wound check. 2 weeks ago, the patient was cut by a propeller of an airplane on the left forearm. The patient states that he proceeded to the ER where he had fixation of the laceration. He had negative x-rays at that time. The patient states that he has been keeping the wound clean with a wound cleanser at home as well as keeping Vaseline on the wound. He states that he has had a small amount of thin yellow discharge from the wound. He denies any associated thick pus drainage. He denies any fever or chills. He did have Keflex at the time of the injury he took for 1 week. After he finished Keflex, he notes that there has been worsening redness. He presents for wound check to see if he is ready to have sutures removed. HPI    NursingNotes were reviewed. REVIEW OF SYSTEMS    (2-9 systems for level 4, 10 or more for level 5)     Review of Systems   Skin: Positive for color change and wound. All other systems reviewed and are negative.            PAST MEDICALHISTORY     Past Medical History:   Diagnosis Date    Acute sinusitis     Anxiety     Chronic back pain     Chronic bilateral low back pain with bilateral sciatica     Constipation     COPD (chronic obstructive pulmonary disease) (HCC)     Coronary artery disease involving native coronary artery of native heart without angina pectoris 8/27/2019    CPAP (continuous positive airway pressure) dependence     12cm to 20cm    Depression     Hyperlipidemia     Hypertensive disorder     OA (osteoarthritis of the spine)     Obesity     SUSANA (obstructive sleep apnea)     AHI:  85.3 per HST 11/2013    Osteoarthritis of spine with radiculopathy, lumbar region     Otitis media     Rheumatoid arthritis ([facility]     Spinal stenosis     Type 2 diabetes mellitus ([facility] [address] 03/23/2016    Unspecified sleep apnea     uses bipap    Vertigo          SURGICAL HISTORY       Past Surgical History:   Procedure Laterality Date    ADENOIDECTOMY      BROW LIFT      due to vision    CARDIAC CATHETERIZATION  10/24/2018         COLONOSCOPY  30 yrs ago    [place], [place], normal per patient    HERNIA REPAIR      KNEE ARTHROSCOPY Left 2009    DE NJX DX/THER SBST EPIDURAL/SUBARACH LUMBAR/SACRAL N/A 10/3/2017    L3-4 EPIDURAL STEROID INJECTION performed by Samir Alejo at [address]     Previous Medications    ALBUTEROL SULFATE HFA (VENTOLIN HFA) 108 (90 BASE) MCG/ACT INHALER    Inhale 2 puffs into the lungs every 6 hours as needed for Wheezing    ASPIRIN 81 MG CHEWABLE TABLET    CHEW AND SEALLOW 1 TABLET BY MOUTH EVERY DY A    BLOOD GLUCOSE TEST STRIPS (CONTOUR NEXT TEST) STRIP    1 each by In Vitro route daily As needed.     FLUTICASONE (FLONASE) 50 MCG/ACT NASAL SPRAY    2 sprays by Nasal route daily    HYDROCHLOROTHIAZIDE (HYDRODIURIL) 25 MG TABLET    TAKE 1 TABLET BY MOUTH EVERY DAY    HYDROXYCHLOROQUINE (PLAQUENIL) 200 MG TABLET    TK 2 TS PO QD WF OR MILK    LOVASTATIN (MEVACOR) 40 MG TABLET    Take 1 tablet by mouth nightly    METFORMIN (GLUCOPHAGE) 1000 MG TABLET    Take 1 tablet by mouth 2 times daily (with meals)    MICROLET LANCETS MISC    1 each by Does not apply route daily    MISC NATURAL PRODUCTS (OSTEO BI-FLEX ADV DOUBLE ST) TABS    Take by mouth daily    OXYCODONE (OXYCONTIN) 20 MG EXTENDED RELEASE TABLET    Take 1 tablet by mouth every 12 hours for 30 days. Intended supply: 30 days    OXYCODONE-ACETAMINOPHEN (PERCOCET)  MG PER TABLET    Take 1 tablet by mouth every 6 hours as needed for Pain (Pain) for up to 30 days. POLYETHYLENE GLYCOL (MIRALAX) POWD POWDER    Take 17 g by mouth every other day     TRIAMCINOLONE (KENALOG) 0.1 % CREAM    Apply topically 2 times daily.     UMECLIDINIUM BROMIDE (INCRUSE ELLIPTA) 62.5 MCG/INH AEPB    Inhale 1 puff into the lungs daily    VERAPAMIL (VERELAN) 200 MG EXTENDED RELEASE CAPSULE    Take 1 capsule by mouth daily    VOLTAREN 1 % GEL    APPLY 2 GRAMS TO HAND QID       ALLERGIES     Clindamycin/lincomycin, Benadryl [diphenhydramine], and Prednisone    FAMILY HISTORY       Family History   Problem Relation Age of Onset    Heart Disease Father 64    Cancer Father         malignant tumor of pharynx    Diabetes Mother     Hypertension Mother     Diabetes Sister         x 2    Diabetes Brother     Colon Cancer Neg Hx     Colon Polyps Neg Hx     Liver Disease Neg Hx     Rectal Cancer Neg Hx     Stomach Cancer Neg Hx           SOCIAL HISTORY       Social History     Socioeconomic History    Marital status:      Spouse name: None    Number of children: None    Years of education: None    Highest education level: None   Occupational History    None   Tobacco Use    Smoking status: Former Smoker     Packs/day: 0.50     Years: 20.00     Pack years: 10.00     Types: Cigarettes     Start date: 0     Quit date:      Years since quittin.0    Smokeless tobacco: Never Used   Vaping Use    Vaping Use: Some days    Substances: Occasionally    Devices: Disposable   Substance and Sexual Activity    Alcohol use: No    Drug use: No    Sexual activity: None   Other Topics Concern    None   Social History Narrative    None     Social Determinants of Health     Financial Resource Strain:     Difficulty of Paying Living Expenses: Not on file   Food Insecurity:     Worried About Running Out of Apprion wound. No active drainage. There is no fluctuance or signs of abscess formation. He has no proximal erythematous streaking   Neurological:      General: No focal deficit present. Mental Status: He is alert and oriented to person, place, and time. DIAGNOSTIC RESULTS     LABS:  Labs Reviewed - No data to display    All other labs were within normal range or not returned as of this dictation. EMERGENCY DEPARTMENT COURSE and DIFFERENTIAL DIAGNOSIS/MDM:   Vitals:    Vitals:    01/31/22 1338   BP: (!) 180/83   Pulse: 79   Resp: 20   Temp: 98.4 °F (36.9 °C)   SpO2: 94%   Weight: 290 lb (131.5 kg)   Height: 6' 1\" (1.854 m)       MDM  Patient is a 66-year-old male presents for wound check. On physical exam, he does still have a large laceration of the left forearm. There is no obvious drainage at this time or abscess formation. He does not have any proximal streaking that is concerning for a deeper infection that would warrant further work-up. I plan to give the patient a second course of antibiotics due to his poor healing and possible signs of early infection. I have encouraged that he follow-up either here at the ER or with his primary care in the next 3 days for likely suture removal.  I have discussed with him keeping the wound clean and dry. We discussed wound management including that he can decrease Vaseline use so that its not softening the skin too much. He is agreeable to this treatment plan. The wound was redressed in the ER by nurse prior to discharge. Return precautions were given to him he verbalized understanding. All questions were answered. FINAL IMPRESSION      1.  Encounter for post-traumatic wound check          DISPOSITION/PLAN   DISPOSITION Decision To Discharge 01/31/2022 02:19:40 PM      PATIENT REFERRED TO:  Roberto Sebastian, [address] [facility]  [phone]            DISCHARGE MEDICATIONS:  New Prescriptions    SULFAMETHOXAZOLE-TRIMETHOPRIM (BACTRIM DS) 800-160 MG PER TABLET    Take 1 tablet by mouth 2 times daily for 7 days          (Please note that portions of this note were completed with a voice recognition program.  Efforts were made to edit thedictations but occasionally words are mis-transcribed.)    DAVEY Jon (electronically signed)     Zulay Ndiaye [place]  01/31/22 [id number]

## 2022-02-02 ENCOUNTER — HOSPITAL ENCOUNTER (EMERGENCY)
Age: 67
Discharge: HOME | End: 2022-02-02
Payer: MEDICARE

## 2022-02-02 VITALS
SYSTOLIC BLOOD PRESSURE: 137 MMHG | TEMPERATURE: 98.2 F | OXYGEN SATURATION: 95 % | RESPIRATION RATE: 18 BRPM | HEART RATE: 71 BPM | DIASTOLIC BLOOD PRESSURE: 68 MMHG

## 2022-02-02 DIAGNOSIS — Z48.02 VISIT FOR SUTURE REMOVAL: Primary | ICD-10-CM

## 2022-02-02 DIAGNOSIS — E78.00 HYPERCHOLESTEREMIA: ICD-10-CM

## 2022-02-02 DIAGNOSIS — I25.10 CORONARY ARTERY DISEASE INVOLVING NATIVE CORONARY ARTERY OF NATIVE HEART WITHOUT ANGINA PECTORIS: ICD-10-CM

## 2022-02-02 PROCEDURE — 99282 EMERGENCY DEPT VISIT SF MDM: CPT

## 2022-02-02 RX ORDER — LOVASTATIN 40 MG/1
TABLET ORAL
Qty: 90 TABLET | Refills: 3 | Status: SHIPPED | OUTPATIENT
Start: 2022-02-02

## 2022-02-02 ASSESSMENT — ENCOUNTER SYMPTOMS
BACK PAIN: 0
SHORTNESS OF BREATH: 0
EYE DISCHARGE: 0
PHOTOPHOBIA: 0
COLOR CHANGE: 0
COUGH: 0
APNEA: 0
EYE ITCHING: 0

## 2022-02-02 ASSESSMENT — PAIN SCALES - GENERAL: PAINLEVEL_OUTOF10: 3

## 2022-02-02 NOTE — ED PROVIDER NOTES
[facility] EMERGENCY DEPT  eMERGENCYdEPARTMENT eNCOUnter      Pt Name: Stalin Ramos  MRN: [id number]  [place] 1955  Date of evaluation: 2/2/2022  DAVEY Rodas    CHIEF COMPLAINT       Chief Complaint   Patient presents with    Suture / Staple Removal         HISTORY OF PRESENT ILLNESS  (Location/Symptom, Timing/Onset, Context/Setting, Quality, Duration, Modifying Factors, Severity.)   Stalin Ramos is a 77 y.o. male who presents to the emergency department with complaints of suture removal was evaluated for laceration 1/17 sutures placed with keflex came 1/31 concern for infection bactrim started with much improvement told to return today with plan for suture removal. No active bleeding has some scabbing. Resolving ruddiness tells me he attempted to apply topical bacitracin and broke out around the area will need to add to allergy list. No complaints today. HPI    Nursing Notes were reviewed and I agree. REVIEW OF SYSTEMS    (2-9 systems for level 4, 10 or more for level 5)     Review of Systems   Constitutional: Negative for activity change, appetite change, chills and fever. HENT: Negative for congestion and dental problem. Eyes: Negative for photophobia, discharge and itching. Respiratory: Negative for apnea, cough and shortness of breath. Cardiovascular: Negative for chest pain. Musculoskeletal: Negative for arthralgias, back pain, gait problem, myalgias and neck pain. Skin: Positive for wound. Negative for color change, pallor and rash. Neurological: Negative for dizziness, seizures and syncope. Psychiatric/Behavioral: Negative for agitation. The patient is not nervous/anxious. Except as noted above the remainder of the review of systems was reviewed and negative.        PAST MEDICAL HISTORY     Past Medical History:   Diagnosis Date    Acute sinusitis     Anxiety     Chronic back pain     Chronic bilateral low back pain with bilateral sciatica     MISC    1 each by Does not apply route daily    MISC NATURAL PRODUCTS (OSTEO BI-FLEX ADV DOUBLE ST) TABS    Take by mouth daily    OXYCODONE (OXYCONTIN) 20 MG EXTENDED RELEASE TABLET    Take 1 tablet by mouth every 12 hours for 30 days. Intended supply: 30 days    OXYCODONE-ACETAMINOPHEN (PERCOCET)  MG PER TABLET    Take 1 tablet by mouth every 6 hours as needed for Pain (Pain) for up to 30 days. POLYETHYLENE GLYCOL (MIRALAX) POWD POWDER    Take 17 g by mouth every other day     SULFAMETHOXAZOLE-TRIMETHOPRIM (BACTRIM DS) 800-160 MG PER TABLET    Take 1 tablet by mouth 2 times daily for 7 days    TRIAMCINOLONE (KENALOG) 0.1 % CREAM    Apply topically 2 times daily.     UMECLIDINIUM BROMIDE (INCRUSE ELLIPTA) 62.5 MCG/INH AEPB    Inhale 1 puff into the lungs daily    VERAPAMIL (VERELAN) 200 MG EXTENDED RELEASE CAPSULE    Take 1 capsule by mouth daily    VOLTAREN 1 % GEL    APPLY 2 GRAMS TO HAND QID       ALLERGIES     Clindamycin/lincomycin, Benadryl [diphenhydramine], and Prednisone    FAMILY HISTORY       Family History   Problem Relation Age of Onset    Heart Disease Father 64    Cancer Father         malignant tumor of pharynx    Diabetes Mother     Hypertension Mother     Diabetes Sister         x 2    Diabetes Brother     Colon Cancer Neg Hx     Colon Polyps Neg Hx     Liver Disease Neg Hx     Rectal Cancer Neg Hx     Stomach Cancer Neg Hx           SOCIAL HISTORY       Social History     Socioeconomic History    Marital status:      Spouse name: Not on file    Number of children: Not on file    Years of education: Not on file    Highest education level: Not on file   Occupational History    Not on file   Tobacco Use    Smoking status: Former Smoker     Packs/day: 0.50     Years: 20.00     Pack years: 10.00     Types: Cigarettes     Start date: 0     Quit date:      Years since quittin.1    Smokeless tobacco: Never Used   Vaping Use    Vaping Use: Some days    Substances: Occasionally    Devices: Disposable   Substance and Sexual Activity    Alcohol use: No    Drug use: No    Sexual activity: Not on file   Other Topics Concern    Not on file   Social History Narrative    Not on file     Social Determinants of Health     Financial Resource Strain:     Difficulty of Paying Living Expenses: Not on file   Food Insecurity:     Worried About Running Out of Food in the Last Year: Not on file    Sheela of Food in the Last Year: Not on file   Transportation Needs:     Lack of Transportation (Medical): Not on file    Lack of Transportation (Non-Medical): Not on file   Physical Activity:     Days of Exercise per Week: Not on file    Minutes of Exercise per Session: Not on file   Stress:     Feeling of Stress : Not on file   Social Connections:     Frequency of Communication with Friends and Family: Not on file    Frequency of Social Gatherings with Friends and Family: Not on file    Attends Temple Services: Not on file    Active Member of [address] [place] [address] or Organizations: Not on file    Attends Club or Organization Meetings: Not on file    Marital Status: Not on file   Intimate Partner Violence:     Fear of Current or Ex-Partner: Not on file    Emotionally Abused: Not on file    Physically Abused: Not on file    Sexually Abused: Not on file   Housing Stability:     Unable to Pay for Housing in the Last Year: Not on file    Number of [place] in the Last Year: Not on file    Unstable Housing in the Last Year: Not on file       SCREENINGS           PHYSICAL EXAM    (up to 7 forlevel 4, 8 or more for level 5)     ED Triage Vitals   BP Temp Temp src Pulse Resp SpO2 Height Weight   02/02/22 1351 02/02/22 1350 -- 02/02/22 1350 02/02/22 1350 02/02/22 1350 -- --   137/68 98.2 °F (36.8 °C)  71 18 95 %         Physical Exam  Vitals and nursing note reviewed. Constitutional:       General: He is not in acute distress. Appearance: Normal appearance.  He is well-developed. He is not diaphoretic. HENT:      Head: Normocephalic and atraumatic. Right Ear: Tympanic membrane, ear canal and external ear normal.      Left Ear: Tympanic membrane, ear canal and external ear normal.      Nose: Nose normal.      Mouth/Throat:      Mouth: Mucous membranes are moist.   Eyes:      Pupils: Pupils are equal, round, and reactive to light. Neck:      Trachea: No tracheal deviation. Cardiovascular:      Rate and Rhythm: Normal rate and regular rhythm. Pulses: Normal pulses. Heart sounds: Normal heart sounds. No murmur heard. Pulmonary:      Effort: Pulmonary effort is normal.      Breath sounds: Normal breath sounds. No stridor. No wheezing. Chest:      Chest wall: No tenderness. Abdominal:      General: Abdomen is flat. Bowel sounds are normal. There is no distension. Palpations: Abdomen is soft. Tenderness: There is no abdominal tenderness. Musculoskeletal:         General: Tenderness present. Normal range of motion. Cervical back: Normal range of motion and neck supple. Skin:     General: Skin is warm and dry. Capillary Refill: Capillary refill takes less than 2 seconds. Neurological:      General: No focal deficit present. Mental Status: He is alert and oriented to person, place, and time. Psychiatric:         Mood and Affect: Mood normal.         Behavior: Behavior normal.         Thought Content:  Thought content normal.         Judgment: Judgment normal.           DIAGNOSTIC RESULTS     RADIOLOGY:   Non-plain film images such as CT, Ultrasound and MRI are read by the radiologist. Plain radiographic images are visualized and preliminarilyinterpreted by No att. providers found with the below findings:      Interpretation per the Radiologist below, if available at the time of this note:    No orders to display       LABS:  Labs Reviewed - No data to display    All other labs were within normal range or notreturned as of this dictation. RE-ASSESSMENT        EMERGENCY DEPARTMENT COURSE and DIFFERENTIAL DIAGNOSIS/MDM:   Vitals:    Vitals:    02/02/22 1350 02/02/22 1351   BP:  137/68   Pulse: 71    Resp: 18    Temp: 98.2 °F (36.8 °C)    SpO2: 95%        MDM  Sutures are removed wound is holding together no signs of infection with improving symptoms plan for discharge vitamin E ointment for scar prevention he understands signs and symptoms of infection to continue watching for. Plan for discharge. PROCEDURES:    Suture Removal    Date/Time: 2/2/2022 2:07 PM  Performed by: DAVEY Taylor  Authorized by: DAVEY Taylor     Consent:     Consent obtained:  Verbal    Consent given by:  Patient    Risks discussed:  Wound separation  Location:     Location:  Upper extremity    Upper extremity location:  Arm    Arm location:  L lower arm  Procedure details:     Wound appearance:  No signs of infection, good wound healing and clean    Number of sutures removed:  12  Post-procedure details:     Post-removal:  Dressing applied    Patient tolerance of procedure: Tolerated well, no immediate complications          FINAL IMPRESSION      1.  Visit for suture removal          DISPOSITION/PLAN   DISPOSITION Decision To Discharge 02/02/2022 02:04:14 PM      PATIENT REFERRED TO:  [facility] EMERGENCY DEPT  Niles Reyez  [phone]    If symptoms worsen      DISCHARGE MEDICATIONS:  New Prescriptions    No medications on file       (Please note that portions of this note were completed with a voice recognition program.  Efforts were made to edit the dictations but occasionallywords are mis-transcribed.)    Claudy Taylor [address] [place] [address]  02/02/22 1988

## 2022-02-09 DIAGNOSIS — E11.628 TYPE 2 DIABETES MELLITUS WITH OTHER SKIN COMPLICATION, WITHOUT LONG-TERM CURRENT USE OF INSULIN (HCC): ICD-10-CM

## 2022-02-09 LAB — HBA1C MFR BLD: 6.4 % (ref 4–6)

## 2022-02-10 ENCOUNTER — TELEPHONE (OUTPATIENT)
Dept: PRIMARY CARE CLINIC | Age: 67
End: 2022-02-10

## 2022-02-10 NOTE — TELEPHONE ENCOUNTER
----- Message from HEENA Blackwell sent at 2/9/2022  4:28 PM CST -----  A1c is improved at 6.4.   Continue Metformin 1000 mg BID  Recommend ADA diet  Exercise as tolerated  Keep up the good work

## 2022-02-11 ENCOUNTER — OFFICE VISIT (OUTPATIENT)
Dept: PRIMARY CARE CLINIC | Age: 67
End: 2022-02-11
Payer: MEDICARE

## 2022-02-11 VITALS
BODY MASS INDEX: 42.42 KG/M2 | TEMPERATURE: 97.5 F | SYSTOLIC BLOOD PRESSURE: 138 MMHG | OXYGEN SATURATION: 94 % | WEIGHT: 303 LBS | HEART RATE: 77 BPM | DIASTOLIC BLOOD PRESSURE: 80 MMHG | HEIGHT: 71 IN

## 2022-02-11 DIAGNOSIS — M54.41 CHRONIC BILATERAL LOW BACK PAIN WITH BILATERAL SCIATICA: ICD-10-CM

## 2022-02-11 DIAGNOSIS — M54.42 CHRONIC BILATERAL LOW BACK PAIN WITH BILATERAL SCIATICA: ICD-10-CM

## 2022-02-11 DIAGNOSIS — E11.628 TYPE 2 DIABETES MELLITUS WITH OTHER SKIN COMPLICATION, WITHOUT LONG-TERM CURRENT USE OF INSULIN (HCC): ICD-10-CM

## 2022-02-11 DIAGNOSIS — M47.26 OSTEOARTHRITIS OF SPINE WITH RADICULOPATHY, LUMBAR REGION: ICD-10-CM

## 2022-02-11 DIAGNOSIS — G89.29 CHRONIC BILATERAL LOW BACK PAIN WITH BILATERAL SCIATICA: ICD-10-CM

## 2022-02-11 DIAGNOSIS — E66.01 OBESITY, CLASS III, BMI 40-49.9 (MORBID OBESITY) (HCC): ICD-10-CM

## 2022-02-11 DIAGNOSIS — I47.1 PAROXYSMAL SVT (SUPRAVENTRICULAR TACHYCARDIA) (HCC): ICD-10-CM

## 2022-02-11 DIAGNOSIS — Z00.00 ROUTINE GENERAL MEDICAL EXAMINATION AT A HEALTH CARE FACILITY: Primary | ICD-10-CM

## 2022-02-11 DIAGNOSIS — M05.742 RHEUMATOID ARTHRITIS INVOLVING BOTH HANDS WITH POSITIVE RHEUMATOID FACTOR (HCC): ICD-10-CM

## 2022-02-11 DIAGNOSIS — M05.741 RHEUMATOID ARTHRITIS INVOLVING BOTH HANDS WITH POSITIVE RHEUMATOID FACTOR (HCC): ICD-10-CM

## 2022-02-11 PROCEDURE — G8417 CALC BMI ABV UP PARAM F/U: HCPCS | Performed by: NURSE PRACTITIONER

## 2022-02-11 PROCEDURE — 4040F PNEUMOC VAC/ADMIN/RCVD: CPT | Performed by: NURSE PRACTITIONER

## 2022-02-11 PROCEDURE — 3044F HG A1C LEVEL LT 7.0%: CPT | Performed by: NURSE PRACTITIONER

## 2022-02-11 PROCEDURE — 99214 OFFICE O/P EST MOD 30 MIN: CPT | Performed by: NURSE PRACTITIONER

## 2022-02-11 PROCEDURE — 3017F COLORECTAL CA SCREEN DOC REV: CPT | Performed by: NURSE PRACTITIONER

## 2022-02-11 PROCEDURE — 1036F TOBACCO NON-USER: CPT | Performed by: NURSE PRACTITIONER

## 2022-02-11 PROCEDURE — G8427 DOCREV CUR MEDS BY ELIG CLIN: HCPCS | Performed by: NURSE PRACTITIONER

## 2022-02-11 PROCEDURE — G0439 PPPS, SUBSEQ VISIT: HCPCS | Performed by: NURSE PRACTITIONER

## 2022-02-11 PROCEDURE — 2022F DILAT RTA XM EVC RTNOPTHY: CPT | Performed by: NURSE PRACTITIONER

## 2022-02-11 PROCEDURE — 1123F ACP DISCUSS/DSCN MKR DOCD: CPT | Performed by: NURSE PRACTITIONER

## 2022-02-11 PROCEDURE — G8484 FLU IMMUNIZE NO ADMIN: HCPCS | Performed by: NURSE PRACTITIONER

## 2022-02-11 SDOH — ECONOMIC STABILITY: FOOD INSECURITY: WITHIN THE PAST 12 MONTHS, THE FOOD YOU BOUGHT JUST DIDN'T LAST AND YOU DIDN'T HAVE MONEY TO GET MORE.: NEVER TRUE

## 2022-02-11 SDOH — ECONOMIC STABILITY: FOOD INSECURITY: WITHIN THE PAST 12 MONTHS, YOU WORRIED THAT YOUR FOOD WOULD RUN OUT BEFORE YOU GOT MONEY TO BUY MORE.: NEVER TRUE

## 2022-02-11 ASSESSMENT — PATIENT HEALTH QUESTIONNAIRE - PHQ9
5. POOR APPETITE OR OVEREATING: 0
SUM OF ALL RESPONSES TO PHQ QUESTIONS 1-9: 0
8. MOVING OR SPEAKING SO SLOWLY THAT OTHER PEOPLE COULD HAVE NOTICED. OR THE OPPOSITE, BEING SO FIGETY OR RESTLESS THAT YOU HAVE BEEN MOVING AROUND A LOT MORE THAN USUAL: 0
7. TROUBLE CONCENTRATING ON THINGS, SUCH AS READING THE NEWSPAPER OR WATCHING TELEVISION: 0
4. FEELING TIRED OR HAVING LITTLE ENERGY: 0
2. FEELING DOWN, DEPRESSED OR HOPELESS: 0
3. TROUBLE FALLING OR STAYING ASLEEP: 0
10. IF YOU CHECKED OFF ANY PROBLEMS, HOW DIFFICULT HAVE THESE PROBLEMS MADE IT FOR YOU TO DO YOUR WORK, TAKE CARE OF THINGS AT HOME, OR GET ALONG WITH OTHER PEOPLE: 0
SUM OF ALL RESPONSES TO PHQ QUESTIONS 1-9: 0
1. LITTLE INTEREST OR PLEASURE IN DOING THINGS: 0
SUM OF ALL RESPONSES TO PHQ9 QUESTIONS 1 & 2: 0
SUM OF ALL RESPONSES TO PHQ QUESTIONS 1-9: 0
SUM OF ALL RESPONSES TO PHQ QUESTIONS 1-9: 0
6. FEELING BAD ABOUT YOURSELF - OR THAT YOU ARE A FAILURE OR HAVE LET YOURSELF OR YOUR FAMILY DOWN: 0
9. THOUGHTS THAT YOU WOULD BE BETTER OFF DEAD, OR OF HURTING YOURSELF: 0

## 2022-02-11 ASSESSMENT — ENCOUNTER SYMPTOMS
DIARRHEA: 0
CONSTIPATION: 0
EYE DISCHARGE: 0
WHEEZING: 0
EYE REDNESS: 0
RHINORRHEA: 0
BACK PAIN: 1
SORE THROAT: 0
BLOOD IN STOOL: 0
CHOKING: 0
COUGH: 0

## 2022-02-11 ASSESSMENT — LIFESTYLE VARIABLES: HOW OFTEN DO YOU HAVE A DRINK CONTAINING ALCOHOL: 0

## 2022-02-11 ASSESSMENT — SOCIAL DETERMINANTS OF HEALTH (SDOH): HOW HARD IS IT FOR YOU TO PAY FOR THE VERY BASICS LIKE FOOD, HOUSING, MEDICAL CARE, AND HEATING?: NOT HARD AT ALL

## 2022-02-11 NOTE — PROGRESS NOTES
Elvie Santoyo (:  1955) is a 77 y.o. male,Established patient, here for evaluation of the following chief complaint(s):  Medicare AWV (Patient presents today for his Medicare Wellness Visit and diabetic follow up. States he had his labs drawn yesterday. Three weeks ago he recieved a laceration on his left lower arm from an airplaine. Had to repair the muscle and sutured back together. Seems to healing decent.)      ASSESSMENT/PLAN:    ICD-10-CM    1. Routine general medical examination at a health care facility  Z00.00    2. Osteoarthritis of spine with radiculopathy, lumbar region  M47.26    3. Chronic bilateral low back pain with bilateral sciatica  M54.42     M54.41     G89.29    4. Rheumatoid arthritis involving both hands with positive rheumatoid factor ([facility])  M05.741 Stable sees specialist    M05.742    5. Paroxysmal SVT (supraventricular tachycardia) ([facility])  I47.1 Stable asymptomatic. 6. Type 2 diabetes mellitus with other skin complication, without long-term current use of insulin ([facility])  E11.628 Stable. Improved a1c at goal   7. Obesity, Class III, BMI 40-49.9 (morbid obesity) ([facility])  E66.01 Stable . Weight is decreasing       Return in 6 months (on 2022) for 30 min diabetes. SUBJECTIVE/OBJECTIVE:  Diabetes  He presents for his follow-up diabetic visit. He has type 2 diabetes mellitus. His disease course has been stable. Pertinent negatives for diabetes include no chest pain, no foot ulcerations and no weakness. Symptoms are stable. Risk factors for coronary artery disease include dyslipidemia, male sex, obesity, stress and diabetes mellitus. Current diabetic treatment includes oral agent (monotherapy). Meal planning includes ADA exchanges. An ACE inhibitor/angiotensin II receptor blocker is being taken. Hypertension  This is a chronic problem. The current episode started more than 1 year ago. The problem is controlled. Associated symptoms include neck pain.  Pertinent negatives include no chest pain. There are no compliance problems. There is no history of angina. Review of Systems   Constitutional: Negative for appetite change and unexpected weight change. HENT: Negative for congestion, ear pain, rhinorrhea and sore throat. Eyes: Negative for discharge and redness. Respiratory: Negative for cough, choking and wheezing. Cardiovascular: Negative for chest pain. Gastrointestinal: Negative for blood in stool, constipation and diarrhea. Genitourinary: Negative for decreased urine volume and dysuria. Musculoskeletal: Positive for arthralgias, back pain, gait problem, joint swelling, myalgias, neck pain and neck stiffness. Skin: Negative for rash. Neurological: Negative for weakness. Hematological: Negative for adenopathy. Psychiatric/Behavioral: Negative for suicidal ideas. /80 (Site: Left Upper Arm, Position: Sitting, Cuff Size: Large Adult)   Pulse 77   Temp 97.5 °F (36.4 °C) (Temporal)   Ht 5' 10.5\" (1.791 m)   Wt (!) 303 lb (137.4 kg)   SpO2 94%   BMI 42.86 kg/m²    Physical Exam  Vitals reviewed. Constitutional:       General: He is not in acute distress. Appearance: Normal appearance. He is well-developed. He is obese. He is not ill-appearing. HENT:      Head: Normocephalic. Nose: No congestion. Mouth/Throat:      Pharynx: No posterior oropharyngeal erythema. Eyes:      General:         Right eye: No discharge. Left eye: No discharge. Conjunctiva/sclera: Conjunctivae normal.   Cardiovascular:      Rate and Rhythm: Normal rate and regular rhythm. Pulses: Normal pulses. Heart sounds: Normal heart sounds. No murmur heard. Pulmonary:      Effort: Pulmonary effort is normal. No respiratory distress. Breath sounds: Normal breath sounds. No rhonchi. Abdominal:      General: Bowel sounds are normal.      Palpations: Abdomen is soft. Tenderness: There is no abdominal tenderness. Musculoskeletal:      Cervical back: Neck supple. Tenderness present. No muscular tenderness. Decreased range of motion. Thoracic back: Tenderness present. Lumbar back: Tenderness present. Decreased range of motion. Lymphadenopathy:      Cervical: No cervical adenopathy. Skin:     General: Skin is warm and dry. Capillary Refill: Capillary refill takes less than 2 seconds. Findings: No rash. Neurological:      General: No focal deficit present. Mental Status: He is alert and oriented to person, place, and time. Psychiatric:         Mood and Affect: Mood normal.         Behavior: Behavior normal.                 An electronic signature was used to authenticate this note.     --HEENA Bragg

## 2022-02-11 NOTE — PROGRESS NOTES
Medicare Annual Wellness Visit  Name: Sirisha Wright Date: 2022   MRN: [id number] Sex: Male   Age: 77 y.o. Ethnicity: Non- / Non    : 1955 Race: White (non-)      Anders Aguilera is here for Medicare AWV (Patient presents today for his Medicare Wellness Visit and diabetic follow up. States he had his labs drawn yesterday. Three weeks ago he recieved a laceration on his left lower arm from an airplaine. Had to repair the muscle and sutured back together. Seems to healing decent.)    Screenings for behavioral, psychosocial and functional/safety risks, and cognitive dysfunction are all negative except as indicated below. These results, as well as other patient data from the [address] form, are documented in Flowsheets linked to this Encounter. Allergies   Allergen Reactions    Clindamycin/Lincomycin Anaphylaxis    Benadryl [Diphenhydramine] Other (See Comments)     \"makes legs jump around\"    Neosporin Plus Max St     Prednisone          Prior to Visit Medications    Medication Sig Taking? Authorizing Provider   lovastatin (MEVACOR) 40 MG tablet TAKE 1 TABLET BY MOUTH EVERYNIGHT Yes HEENA Rosas   aspirin 81 MG chewable tablet CHEW AND SEALLOW 1 TABLET BY MOUTH EVERY DY A Yes HEENA Pino - NP   oxyCODONE (OXYCONTIN) 20 MG extended release tablet Take 1 tablet by mouth every 12 hours for 30 days. Intended supply: 30 days Yes Lien Carrera MD   oxyCODONE-acetaminophen (PERCOCET)  MG per tablet Take 1 tablet by mouth every 6 hours as needed for Pain (Pain) for up to 30 days.  Yes Lien Carrera MD   [facility] Natural Products (OSTEO BI-FLEX ADV DOUBLE ST) TABS Take by mouth daily Yes Historical Provider, MD   verapamil (VERELAN) 200 MG extended release capsule Take 1 capsule by mouth daily Yes HEENA Brumfield - IGOR   hydroCHLOROthiazide (HYDRODIURIL) 25 MG tablet TAKE 1 TABLET BY MOUTH EVERY DAY Yes HEENA Rosas fluticasone (FLONASE) 50 MCG/ACT nasal spray 2 sprays by Nasal route daily Yes HEENA Jha   Umeclidinium Bromide (INCRUSE ELLIPTA) 62.5 MCG/INH AEPB Inhale 1 puff into the lungs daily Yes HEENA Snell   albuterol sulfate HFA (VENTOLIN HFA) 108 (90 Base) MCG/ACT inhaler Inhale 2 puffs into the lungs every 6 hours as needed for Wheezing Yes HEENA Snell   blood glucose test strips (CONTOUR NEXT TEST) strip 1 each by In Vitro route daily As needed.  Yes HEENA Snell   MICROLET LANCETS MISC 1 each by Does not apply route daily Yes HEENA Snell   hydroxychloroquine (PLAQUENIL) 200 MG tablet TK 2 TS PO QD WF OR MILK Yes Historical Provider, MD   VOLTAREN 1 % GEL APPLY 2 GRAMS TO HAND QID Yes Historical Provider, MD   polyethylene glycol (MIRALAX) POWD powder Take 17 g by mouth every other day  Yes Historical Provider, MD   metFORMIN (GLUCOPHAGE) 1000 MG tablet Take 1 tablet by mouth 2 times daily (with meals)  HEENA Sumner         Past Medical History:   Diagnosis Date    Acute sinusitis     Anxiety     Chronic back pain     Chronic bilateral low back pain with bilateral sciatica     Constipation     COPD (chronic obstructive pulmonary disease) ([address]     Coronary artery disease involving native coronary artery of native heart without angina pectoris 8/27/2019    CPAP (continuous positive airway pressure) dependence     12cm to 20cm    Depression     Hyperlipidemia     Hypertensive disorder     OA (osteoarthritis of the spine)     Obesity     SUSANA (obstructive sleep apnea)     AHI:  85.3 per HST 11/2013    Osteoarthritis of spine with radiculopathy, lumbar region     Otitis media     Rheumatoid arthritis ([address]     Spinal stenosis     Type 2 diabetes mellitus ([address] 03/23/2016    Unspecified sleep apnea     uses bipap    Vertigo        Past Surgical History:   Procedure Laterality Date    ADENOIDECTOMY      BROW LIFT      due to vision    CARDIAC CATHETERIZATION  10/24/2018         COLONOSCOPY  30 yrs ago    [place], normal per patient    HERNIA REPAIR      KNEE ARTHROSCOPY Left 2009    [place] NJX DX/THER SBST EPIDURAL/SUBARACH LUMBAR/SACRAL N/A 10/3/2017    L3-4 EPIDURAL STEROID INJECTION performed by Richelle Velasquez at [address] [place] [address] TONSILLECTOMY           Family History   Problem Relation Age of Onset    Heart Disease Father 64    Cancer Father         malignant tumor of pharynx    Diabetes Mother     Hypertension Mother     Diabetes Sister         x 2    Diabetes Brother     Colon Cancer Neg Hx     Colon Polyps Neg Hx     Liver Disease Neg Hx     Rectal Cancer Neg Hx     Stomach Cancer Neg Hx        CareTeam (Including outside providers/suppliers regularly involved in providing care):   Patient Care Team:  HEENA Williamson as PCP - General (Family Nurse Practitioner)  HEENA Williamson [facility] P[facility] - [facility]  Zackary Snow MD as Consulting Physician (Neurology)  Shahid Potter MD as Consulting Physician (Internal Medicine)  HEENA Cheung as Nurse Practitioner (Certified Nurse Specialist)    Wt Readings from Last 3 Encounters:   02/11/22 (!) 303 lb (137.4 kg)   01/31/22 290 lb (131.5 kg)   01/17/22 300 lb (136.1 kg)     Vitals:    02/11/22 1306 02/11/22 1318   BP: (!) 142/80 138/80   Site: Left Upper Arm Left Upper Arm   Position: Sitting Sitting   Cuff Size: Large Adult Large Adult   Pulse: 77    Temp: 97.5 °F (36.4 °C)    TempSrc: Temporal    SpO2: 94%    Weight: (!) 303 lb (137.4 kg)    Height: 5' 10.5\" (1.791 m)      Body mass index is 42.86 kg/m². Based upon direct observation of the patient, evaluation of cognition reveals recent and remote memory intact. Patient's complete Health Risk Assessment and screening values have been reviewed and are found in Flowsheets.  The following problems were reviewed today and where indicated follow up appointments were made and/or referrals ordered. Positive Risk Factor Screenings with Interventions:             Opioid Risk: (Low risk score <55)  Opioid risk score: 39    Patient is low risk for opioid use disorder or overdose. Click here to Document Controlled Substance Monitoring. Last PDMP Mark Torres as Reviewed:  Review User Review Instant Review Result          Last Controlled Substance Monitoring Documentation      Office Visit from 3/12/2019 in [address] The Prescription Monitoring Report for this patient was reviewed today. filed at 03/12/2019 1418   Periodic Controlled Substance Monitoring Possible medication side effects, risk of tolerance/dependence & alternative treatments discussed., Obtaining appropriate analgesic effect of treatment. filed at 03/12/2019 1418   Chronic Pain > 80 MEDD Looked for signs of prescription misuse., Obtained or confirmed a written medication contract was on file.  filed at 03/12/2019 [address] and ACP:     [address] Will ACP-Advance Directive ACP-Power of     Not on File [place] on 10/06/17 Filed Not on File      General Health Risk Interventions:  · No Living Will: ACP documents already completed- patient asked to provide copy to the office    Health Habits/Nutrition:     Body mass index: (!) 42.86  Health Habits/Nutrition Interventions:  · Inadequate physical activity:  patient agrees to exercise for at least 150 minutes/week  · Nutritional issues:  educational materials for healthy, well-balanced diet provided         Personalized Preventive Plan   Current Health Maintenance Status  Immunization History   Administered Date(s) Administered    COVID-19, J&J, PF, 0.5 mL 03/11/2021    Influenza Vaccine, unspecified formulation 10/18/2018    Influenza Virus Vaccine 11/01/2014, 11/25/2015    Influenza, Quadv, IM, PF (6 mo and older Fluzone, Flulaval, Fluarix, and 3 yrs and older Afluria) 10/21/2019    Pneumococcal Conjugate 13-valent ([id number]) 10/28/2018    Pneumococcal Polysaccharide ([id number]) 12/05/2019        Health Maintenance   Topic Date Due    AAA screen  Never done    DTaP/Tdap/Td vaccine (1 - Tdap) Never done    Shingles Vaccine (1 of 2) Never done    Diabetic foot exam  03/25/2020    Colon cancer screen colonoscopy  09/10/2020    Annual Wellness Visit (AWV)  12/05/2020    COVID-19 Vaccine (2 - Booster for [place] series) 05/06/2021    Flu vaccine (1) 09/01/2021    Depression Screen  04/05/2022    Diabetic microalbuminuria test  11/02/2022    Lipid screen  11/02/2022    Potassium monitoring  11/02/2022    Creatinine monitoring  11/02/2022    Diabetic retinal exam  11/04/2022    A1C test (Diabetic or Prediabetic)  02/09/2023    Pneumococcal 65+ years Vaccine (2 of 2 - PPSV23) 12/05/2024    Hepatitis C screen  Completed    Hepatitis A vaccine  Aged Out    Hib vaccine  Aged Out    Meningococcal (ACWY) vaccine  Aged Out     Recommendations for [place] Due: see orders and patient instructions/AVS.  . Recommended screening schedule for the next 5-10 years is provided to the patient in written form: see Patient Instructions/AVS.    Aditya Co was seen today for medicare awv.     Diagnoses and all orders for this visit:    Routine general medical examination at a health care facility    Osteoarthritis of spine with radiculopathy, lumbar region    Chronic bilateral low back pain with bilateral sciatica    Rheumatoid arthritis involving both hands with positive rheumatoid factor (HCC)    Paroxysmal SVT (supraventricular tachycardia) (HCC)    Type 2 diabetes mellitus with other skin complication, without long-term current use of insulin (HCC)    Obesity, Class III, BMI 40-49.9 (morbid obesity) ([facility]

## 2022-02-14 RX ORDER — ASPIRIN 81 MG/1
81 TABLET, CHEWABLE ORAL DAILY
Qty: 90 TABLET | Refills: 3 | Status: SHIPPED | OUTPATIENT
Start: 2022-02-14

## 2022-02-14 RX ORDER — OXYCODONE AND ACETAMINOPHEN 10; 325 MG/1; MG/1
1 TABLET ORAL EVERY 6 HOURS PRN
Qty: 120 TABLET | Refills: 0 | Status: SHIPPED | OUTPATIENT
Start: 2022-02-14 | End: 2022-03-12 | Stop reason: SDUPTHER

## 2022-02-14 RX ORDER — OXYCODONE HCL 20 MG/1
20 TABLET, FILM COATED, EXTENDED RELEASE ORAL EVERY 12 HOURS
Qty: 60 TABLET | Refills: 0 | Status: SHIPPED | OUTPATIENT
Start: 2022-02-14 | End: 2022-03-12 | Stop reason: SDUPTHER

## 2022-02-14 NOTE — TELEPHONE ENCOUNTER
Requested Prescriptions     Pending Prescriptions Disp Refills    oxyCODONE (OXYCONTIN) 20 MG extended release tablet 60 tablet 0     Sig: Take 1 tablet by mouth every 12 hours for 30 days. Intended supply: 30 days    oxyCODONE-acetaminophen (PERCOCET)  MG per tablet 120 tablet 0     Sig: Take 1 tablet by mouth every 6 hours as needed for Pain (Pain) for up to 30 days.        Last Office Visit:  12/30/2021  Next Office Visit:  6/30/2022  Last Medication Refill:  01/15/2022  Shan Gonzalez up to date:  UP TO DATE    *RX updated to reflect 01/14/2022

## 2022-02-28 ENCOUNTER — OFFICE VISIT (OUTPATIENT)
Dept: CARDIOLOGY CLINIC | Age: 67
End: 2022-02-28
Payer: MEDICARE

## 2022-02-28 VITALS
BODY MASS INDEX: 42 KG/M2 | HEART RATE: 71 BPM | DIASTOLIC BLOOD PRESSURE: 82 MMHG | OXYGEN SATURATION: 97 % | HEIGHT: 71 IN | WEIGHT: 300 LBS | SYSTOLIC BLOOD PRESSURE: 136 MMHG

## 2022-02-28 DIAGNOSIS — I25.10 CORONARY ARTERY DISEASE INVOLVING NATIVE CORONARY ARTERY OF NATIVE HEART WITHOUT ANGINA PECTORIS: Primary | ICD-10-CM

## 2022-02-28 DIAGNOSIS — E66.01 CLASS 3 SEVERE OBESITY DUE TO EXCESS CALORIES WITHOUT SERIOUS COMORBIDITY WITH BODY MASS INDEX (BMI) OF 40.0 TO 44.9 IN ADULT (HCC): ICD-10-CM

## 2022-02-28 DIAGNOSIS — E11.9 TYPE 2 DIABETES MELLITUS WITHOUT COMPLICATION, WITHOUT LONG-TERM CURRENT USE OF INSULIN (HCC): ICD-10-CM

## 2022-02-28 DIAGNOSIS — E78.2 MIXED HYPERLIPIDEMIA: ICD-10-CM

## 2022-02-28 DIAGNOSIS — I10 ESSENTIAL HYPERTENSION: ICD-10-CM

## 2022-02-28 DIAGNOSIS — I47.1 PAROXYSMAL SVT (SUPRAVENTRICULAR TACHYCARDIA) (HCC): ICD-10-CM

## 2022-02-28 PROCEDURE — 4040F PNEUMOC VAC/ADMIN/RCVD: CPT | Performed by: INTERNAL MEDICINE

## 2022-02-28 PROCEDURE — G8484 FLU IMMUNIZE NO ADMIN: HCPCS | Performed by: INTERNAL MEDICINE

## 2022-02-28 PROCEDURE — 99214 OFFICE O/P EST MOD 30 MIN: CPT | Performed by: INTERNAL MEDICINE

## 2022-02-28 PROCEDURE — 1123F ACP DISCUSS/DSCN MKR DOCD: CPT | Performed by: INTERNAL MEDICINE

## 2022-02-28 PROCEDURE — 2022F DILAT RTA XM EVC RTNOPTHY: CPT | Performed by: INTERNAL MEDICINE

## 2022-02-28 PROCEDURE — 93000 ELECTROCARDIOGRAM COMPLETE: CPT | Performed by: INTERNAL MEDICINE

## 2022-02-28 PROCEDURE — G8427 DOCREV CUR MEDS BY ELIG CLIN: HCPCS | Performed by: INTERNAL MEDICINE

## 2022-02-28 PROCEDURE — 3017F COLORECTAL CA SCREEN DOC REV: CPT | Performed by: INTERNAL MEDICINE

## 2022-02-28 PROCEDURE — 1036F TOBACCO NON-USER: CPT | Performed by: INTERNAL MEDICINE

## 2022-02-28 PROCEDURE — G8417 CALC BMI ABV UP PARAM F/U: HCPCS | Performed by: INTERNAL MEDICINE

## 2022-02-28 PROCEDURE — 3044F HG A1C LEVEL LT 7.0%: CPT | Performed by: INTERNAL MEDICINE

## 2022-02-28 ASSESSMENT — ENCOUNTER SYMPTOMS
BLOOD IN STOOL: 0
ANAL BLEEDING: 0
COUGH: 0
SHORTNESS OF BREATH: 1

## 2022-02-28 NOTE — PROGRESS NOTES
Office Visit  James Carpio is a 77 y.o. male; who present today for 6 Month Follow-Up (No Cardiac Sx)      HPI  I am seeing this 79-year-old white male in routine follow-up who was seen for coronary artery disease, SVT and hypertension. He is remained well, staying reasonably active with no chest discomfort at all. He gets some shortness of breath with activity but this has been stable. He has had rare episodes of palpitations but several that have lasted about 10 minutes with no associated symptoms. He states his blood pressure has been normal.  He has not been experiencing any presyncope or syncope. Current Outpatient Medications   Medication Sig Dispense Refill    oxyCODONE (OXYCONTIN) 20 MG extended release tablet Take 1 tablet by mouth every 12 hours for 30 days. Intended supply: 30 days 60 tablet 0    oxyCODONE-acetaminophen (PERCOCET)  MG per tablet Take 1 tablet by mouth every 6 hours as needed for Pain (Pain) for up to 30 days.  120 tablet 0    aspirin 81 MG chewable tablet Take 1 tablet by mouth daily 90 tablet 3    lovastatin (MEVACOR) 40 MG tablet TAKE 1 TABLET BY MOUTH EVERYNIGHT (Patient taking differently: Take 40 mg by mouth nightly ) 90 tablet 3    Misc Natural Products (OSTEO BI-FLEX ADV DOUBLE ST) TABS Take by mouth daily      metFORMIN (GLUCOPHAGE) 1000 MG tablet Take 1 tablet by mouth 2 times daily (with meals) 60 tablet 5    verapamil (VERELAN) 200 MG extended release capsule Take 1 capsule by mouth daily 90 capsule 3    hydroCHLOROthiazide (HYDRODIURIL) 25 MG tablet TAKE 1 TABLET BY MOUTH EVERY DAY 90 tablet 3    fluticasone (FLONASE) 50 MCG/ACT nasal spray 2 sprays by Nasal route daily 3 Bottle 3    Umeclidinium Bromide (INCRUSE ELLIPTA) 62.5 MCG/INH AEPB Inhale 1 puff into the lungs daily 3 each 3    albuterol sulfate HFA (VENTOLIN HFA) 108 (90 Base) MCG/ACT inhaler Inhale 2 puffs into the lungs every 6 hours as needed for Wheezing 3 Inhaler 3    blood glucose test strips (CONTOUR NEXT TEST) strip 1 each by In Vitro route daily As needed. 100 each 3    MICROLET LANCETS MISC 1 each by Does not apply route daily 100 each 3    hydroxychloroquine (PLAQUENIL) 200 MG tablet Take 400 mg by mouth daily   2    VOLTAREN 1 % GEL APPLY 2 GRAMS TO HAND QID  5    polyethylene glycol (MIRALAX) POWD powder Take 17 g by mouth every other day        No current facility-administered medications for this visit. There are no discontinued medications.      Allergies   Allergen Reactions    Clindamycin/Lincomycin Anaphylaxis    Benadryl [Diphenhydramine] Other (See Comments)     \"makes legs jump around\"    Neosporin Plus Max St     Prednisone        Past Medical History:   Diagnosis Date    Acute sinusitis     Anxiety     Chronic back pain     Chronic bilateral low back pain with bilateral sciatica     Constipation     COPD [facility]) [facility]     Coronary artery disease involving native coronary artery of native heart without angina pectoris 8/27/2019    CPAP (continuous positive airway pressure) dependence     12cm to 20cm    Depression     Hyperlipidemia     Hypertensive disorder     OA (osteoarthritis of the spine)     Obesity     SUSANA (obstructive sleep apnea)     AHI:  85.3 per HST 11/2013    Osteoarthritis of spine with radiculopathy, lumbar region     Otitis media     Rheumatoid arthritis ([facility]     Spinal stenosis     Type 2 diabetes mellitus ([facility] 03/23/2016    Unspecified sleep apnea     uses bipap    Vertigo      Negative - Past Medical History for  Past Medical History Pertinent Negatives:   Diagnosis Date Noted    ADHD (attention deficit hyperactivity disorder) 06/28/2016    Allergic rhinitis 06/28/2016    Anticoagulant long-term use 06/28/2016    Asthma 06/28/2016    Atrial fibrillation ([address] 06/28/2016    Bipolar disorder ([address] 06/28/2016    Cancer ([facility] 07/16/2015    Carotid artery stenosis 06/28/2016    Cerebral artery occlusion with cerebral infarction ([facility] 10/23/2018    Cerebrovascular disease 2016    CHF (congestive heart failure) ([facility] 2016    Chronic kidney disease 2015    Congenital heart disease 2015    [facility] 2016    Erectile dysfunction 2016    Fibromyalgia 2016    GERD (gastroesophageal reflux disease) 2016    Headache 2016    Hemodialysis patient ([facility] 10/23/2018    History of blood transfusion 10/23/2018    Hx of blood clots 10/23/2018    Hyperthyroidism 2015    Hypothyroidism 2015    Irritable bowel syndrome 2016    Liver disease 2015    MDRO (multiple drug resistant organisms) resistance 10/23/2018    Peripheral vascular disease ([facility] 2016    Restless legs syndrome 2016    Seizures ([facility] 2016    Substance abuse ([facility] 2016    Thyroid disease 10/23/2018    Urinary incontinence 2016       Past Surgical History:   Procedure Laterality Date    ADENOIDECTOMY      BROW LIFT      due to vision    CARDIAC CATHETERIZATION  10/24/2018         COLONOSCOPY  30 yrs ago    [place], [place], normal per patient    HERNIA REPAIR      KNEE ARTHROSCOPY Left     [place] NJX DX/THER SBST EPIDURAL/SUBARACH LUMBAR/SACRAL N/A 10/3/2017    L3-4 EPIDURAL STEROID INJECTION performed by Medhi Simental at [facility] History     Occupational History    Not on file   Tobacco Use    Smoking status: Former Smoker     Packs/day: 0.50     Years: 20.00     Pack years: 10.00     Types: Cigarettes     Start date: 0     Quit date:      Years since quittin.1    Smokeless tobacco: Never Used   Vaping Use    Vaping Use: Some days    Substances: Occasionally    Devices: Disposable   Substance and Sexual Activity    Alcohol use: No    Drug use: No    Sexual activity: Not on file        Family History   Problem Relation Age of Onset    Heart Disease Father 64    Cancer Father         malignant tumor of pharynx    Diabetes Mother     Hypertension Mother     Diabetes Sister         x 2    Diabetes Brother     Colon Cancer Neg Hx     Colon Polyps Neg Hx     Liver Disease Neg Hx     Rectal Cancer Neg Hx     Stomach Cancer Neg Hx        Review of Systems  Review of Systems   Constitutional: Negative for fatigue and fever. Respiratory: Positive for shortness of breath. Negative for cough. Cardiovascular: Positive for palpitations. Negative for chest pain and leg swelling. Gastrointestinal: Negative for anal bleeding and blood in stool. Neurological: Negative for syncope, facial asymmetry and speech difficulty. Physical Exam  /82   Pulse 71   Ht 5' 10.5\" (1.791 m)   Wt 300 lb (136.1 kg)   SpO2 97%   BMI 42.44 kg/m²    Physical Exam  Constitutional:       Appearance: Normal appearance. He is morbidly obese. Cardiovascular:      Rate and Rhythm: Normal rate and regular rhythm. Heart sounds: Normal heart sounds. No gallop. Pulmonary:      Effort: Pulmonary effort is normal.      Breath sounds: Normal breath sounds. Abdominal:      General: Abdomen is flat. Bowel sounds are normal.      Palpations: Abdomen is soft. Musculoskeletal:         General: No swelling. Skin:     General: Skin is warm and dry. Neurological:      Mental Status: He is alert. Assessment/Plan    EKG Findings:  Sinus rhythm, 71 bpm, 1 PVC    Problem List Items Addressed This Visit        Cardiology Problems    Essential hypertension    Paroxysmal SVT (supraventricular tachycardia) (HCC)    Relevant Orders    EKG 12 lead (Completed)    Mixed hyperlipidemia    Coronary artery disease involving native coronary artery of native heart without angina pectoris - Primary       Other    Type 2 diabetes mellitus without complication, without long-term current use of insulin ([facility]    Obesity           Diagnosis Orders   1.  Coronary artery disease involving native coronary artery of native heart without angina pectoris      LAD stent, October 2018. Negative pharmacologic nuclear stress stress study, March 2021   2. Paroxysmal SVT (supraventricular tachycardia) (HCC)  EKG 12 lead   3. Type 2 diabetes mellitus without complication, without long-term current use of insulin ([address]     4. Essential hypertension     5. Mixed hyperlipidemia     6. Class 3 severe obesity due to excess calories without serious comorbidity with body mass index (BMI) of 40.0 to 44.9 in adult [facility])         Recommendations:   Diet: A, low-sodium, low-fat, calorie reduced diet to lose weight  Activity: Normal activities  Medication Changes: Same medications    No orders of the defined types were placed in this encounter. Orders Placed This Encounter   Procedures    EKG 12 lead     Order Specific Question:   Reason for Exam?     Answer:   Irregular heart rate       Return in about 6 months (around 8/28/2022) for me, routine.

## 2022-03-12 DIAGNOSIS — G89.29 CHRONIC BILATERAL LOW BACK PAIN WITH BILATERAL SCIATICA: ICD-10-CM

## 2022-03-12 DIAGNOSIS — M47.26 OSTEOARTHRITIS OF SPINE WITH RADICULOPATHY, LUMBAR REGION: ICD-10-CM

## 2022-03-12 DIAGNOSIS — M54.42 CHRONIC BILATERAL LOW BACK PAIN WITH BILATERAL SCIATICA: ICD-10-CM

## 2022-03-12 DIAGNOSIS — M54.41 CHRONIC BILATERAL LOW BACK PAIN WITH BILATERAL SCIATICA: ICD-10-CM

## 2022-03-14 RX ORDER — PERPHENAZINE 16 MG/1
1 TABLET, FILM COATED ORAL DAILY
Qty: 100 EACH | Refills: 3 | Status: SHIPPED | OUTPATIENT
Start: 2022-03-14 | End: 2022-06-12 | Stop reason: SDUPTHER

## 2022-03-14 NOTE — TELEPHONE ENCOUNTER
Requested Prescriptions     Pending Prescriptions Disp Refills    oxyCODONE (OXYCONTIN) 20 MG extended release tablet 60 tablet 0     Sig: Take 1 tablet by mouth every 12 hours for 30 days. Intended supply: 30 days    oxyCODONE-acetaminophen (PERCOCET)  MG per tablet 120 tablet 0     Sig: Take 1 tablet by mouth every 6 hours as needed for Pain (Pain) for up to 30 days.        Last Office Visit:  12/30/2021  Next Office Visit:  6/30/2022  Last Medication Refill: 2/14/22 for both   Lamona Glaze up to date:  3/14/22    *RX updated to reflect   3/16/22  fill date*

## 2022-03-14 NOTE — TELEPHONE ENCOUNTER
Requested Prescriptions     Pending Prescriptions Disp Refills    blood glucose test strips (CONTOUR NEXT TEST) strip 100 each 3     Si each by In Vitro route daily As needed.

## 2022-03-15 DIAGNOSIS — F41.9 ANXIETY: ICD-10-CM

## 2022-03-15 RX ORDER — OXYCODONE AND ACETAMINOPHEN 10; 325 MG/1; MG/1
1 TABLET ORAL EVERY 6 HOURS PRN
Qty: 120 TABLET | Refills: 0 | Status: SHIPPED | OUTPATIENT
Start: 2022-03-16 | End: 2022-04-12 | Stop reason: SDUPTHER

## 2022-03-15 RX ORDER — OXYCODONE HCL 20 MG/1
20 TABLET, FILM COATED, EXTENDED RELEASE ORAL EVERY 12 HOURS
Qty: 60 TABLET | Refills: 0 | Status: SHIPPED | OUTPATIENT
Start: 2022-03-16 | End: 2022-04-12 | Stop reason: SDUPTHER

## 2022-03-15 NOTE — TELEPHONE ENCOUNTER
Requested Prescriptions     Pending Prescriptions Disp Refills    ALPRAZolam (XANAX) 1 MG tablet 135 tablet 2     Sig: TAKE 1 AND 1/2 TABLETS BY MOUTH THREE TIMES DAILY*MUST LAST 30 DAYS PER MD*       Last Office Visit:  12/30/2021  Next Office Visit:  6/30/2022  Last Medication Refill:  02/15/2022  Zaynab Dawkins up to date:  Up todate    *RX updated to reflect   03/16/2022

## 2022-03-16 DIAGNOSIS — F41.9 ANXIETY: Primary | ICD-10-CM

## 2022-03-16 RX ORDER — ALPRAZOLAM 1 MG/1
TABLET ORAL
Qty: 135 TABLET | Refills: 2 | OUTPATIENT
Start: 2022-03-16 | End: 2022-04-14

## 2022-03-16 RX ORDER — ALPRAZOLAM 1 MG/1
TABLET ORAL
Qty: 135 TABLET | Refills: 5 | Status: SHIPPED | OUTPATIENT
Start: 2022-03-17 | End: 2022-04-16

## 2022-03-16 NOTE — TELEPHONE ENCOUNTER
Requested Prescriptions     Pending Prescriptions Disp Refills    ALPRAZolam (XANAX) 1 MG tablet [Pharmacy Med Name: ALPRAZOLAM 1 MG TABLET] 135 tablet      Sig: TAKE 1 AND A 1/2 TABLETS BY MOUTH 3 TIMES A DAY       Last Office Visit:  12/30/2021  Next Office Visit:  6/30/2022  Last Medication Refill:   12/19/21 with 2 refills   Koko Maki up to date: 3/16/22     *RX updated to reflect   3/17/22  fill date*

## 2022-03-30 ENCOUNTER — TELEPHONE (OUTPATIENT)
Dept: PRIMARY CARE CLINIC | Age: 67
End: 2022-03-30

## 2022-04-05 DIAGNOSIS — E11.628 TYPE 2 DIABETES MELLITUS WITH OTHER SKIN COMPLICATION, WITHOUT LONG-TERM CURRENT USE OF INSULIN (HCC): ICD-10-CM

## 2022-04-12 ENCOUNTER — PATIENT MESSAGE (OUTPATIENT)
Dept: PRIMARY CARE CLINIC | Age: 67
End: 2022-04-12

## 2022-04-12 DIAGNOSIS — G89.29 CHRONIC BILATERAL LOW BACK PAIN WITH BILATERAL SCIATICA: ICD-10-CM

## 2022-04-12 DIAGNOSIS — M54.42 CHRONIC BILATERAL LOW BACK PAIN WITH BILATERAL SCIATICA: ICD-10-CM

## 2022-04-12 DIAGNOSIS — M47.26 OSTEOARTHRITIS OF SPINE WITH RADICULOPATHY, LUMBAR REGION: ICD-10-CM

## 2022-04-12 DIAGNOSIS — J42 CHRONIC BRONCHITIS, UNSPECIFIED CHRONIC BRONCHITIS TYPE (HCC): Primary | ICD-10-CM

## 2022-04-12 DIAGNOSIS — M54.41 CHRONIC BILATERAL LOW BACK PAIN WITH BILATERAL SCIATICA: ICD-10-CM

## 2022-04-12 RX ORDER — OXYCODONE HCL 20 MG/1
20 TABLET, FILM COATED, EXTENDED RELEASE ORAL EVERY 12 HOURS
Qty: 60 TABLET | Refills: 0 | Status: SHIPPED | OUTPATIENT
Start: 2022-04-15 | End: 2022-05-11 | Stop reason: SDUPTHER

## 2022-04-12 RX ORDER — METHYLPREDNISOLONE 4 MG/1
TABLET ORAL
Qty: 1 KIT | Refills: 0 | Status: SHIPPED | OUTPATIENT
Start: 2022-04-12 | End: 2022-04-18

## 2022-04-12 RX ORDER — AZITHROMYCIN 500 MG/1
500 TABLET, FILM COATED ORAL DAILY
Qty: 3 TABLET | Refills: 0 | Status: SHIPPED | OUTPATIENT
Start: 2022-04-12 | End: 2022-04-15

## 2022-04-12 RX ORDER — OXYCODONE AND ACETAMINOPHEN 10; 325 MG/1; MG/1
1 TABLET ORAL EVERY 6 HOURS PRN
Qty: 120 TABLET | Refills: 0 | Status: SHIPPED | OUTPATIENT
Start: 2022-04-15 | End: 2022-05-11 | Stop reason: SDUPTHER

## 2022-04-12 NOTE — TELEPHONE ENCOUNTER
Requested Prescriptions     Pending Prescriptions Disp Refills    oxyCODONE (OXYCONTIN) 20 MG extended release tablet 60 tablet 0     Sig: Take 1 tablet by mouth every 12 hours for 30 days. Intended supply: 30 days    oxyCODONE-acetaminophen (PERCOCET)  MG per tablet 120 tablet 0     Sig: Take 1 tablet by mouth every 6 hours as needed for Pain (Pain) for up to 30 days.          Last Office Visit:  12/30/2021  Next Office Visit:  6/30/2022  Last Medication Refill:  3/16/2022 with 0 refills   Dellis Riding up to date:  3/16/2022    *RX updated to reflect   4/15/2022  fill date*

## 2022-04-12 NOTE — TELEPHONE ENCOUNTER
From: Eduin Vanegas  To: Eliud Logan  Sent: 4/12/2022 6:33 AM CDT  Subject: Bronchitis    Dr Rachelle Terry I have Bronchitis I get every spring. Will you please call me Azithromycin Pac prednisone. to [employer]. Thank you.   ELISABET MORA

## 2022-05-11 DIAGNOSIS — G89.29 CHRONIC BILATERAL LOW BACK PAIN WITH BILATERAL SCIATICA: ICD-10-CM

## 2022-05-11 DIAGNOSIS — M47.26 OSTEOARTHRITIS OF SPINE WITH RADICULOPATHY, LUMBAR REGION: ICD-10-CM

## 2022-05-11 DIAGNOSIS — M54.41 CHRONIC BILATERAL LOW BACK PAIN WITH BILATERAL SCIATICA: ICD-10-CM

## 2022-05-11 DIAGNOSIS — M54.42 CHRONIC BILATERAL LOW BACK PAIN WITH BILATERAL SCIATICA: ICD-10-CM

## 2022-05-11 RX ORDER — OXYCODONE AND ACETAMINOPHEN 10; 325 MG/1; MG/1
1 TABLET ORAL EVERY 6 HOURS PRN
Qty: 120 TABLET | Refills: 0 | Status: SHIPPED | OUTPATIENT
Start: 2022-05-15 | End: 2022-06-12 | Stop reason: SDUPTHER

## 2022-05-11 RX ORDER — OXYCODONE HCL 20 MG/1
20 TABLET, FILM COATED, EXTENDED RELEASE ORAL EVERY 12 HOURS
Qty: 60 TABLET | Refills: 0 | Status: SHIPPED | OUTPATIENT
Start: 2022-05-15 | End: 2022-06-12 | Stop reason: SDUPTHER

## 2022-05-11 NOTE — TELEPHONE ENCOUNTER
Requested Prescriptions     Pending Prescriptions Disp Refills    oxyCODONE (OXYCONTIN) 20 MG extended release tablet 60 tablet 0     Sig: Take 1 tablet by mouth every 12 hours for 30 days. Intended supply: 30 days    oxyCODONE-acetaminophen (PERCOCET)  MG per tablet 120 tablet 0     Sig: Take 1 tablet by mouth every 6 hours as needed for Pain (Pain) for up to 30 days.        Last Office Visit:  12/30/2021  Next Office Visit:  6/30/2022  Last Medication Refill:  4/15/2022 for both      Margarito Frankel up to date:  3/16/2022    *RX updated to reflect   5/15/2022   fill date*

## 2022-06-10 ENCOUNTER — TELEPHONE (OUTPATIENT)
Dept: CARDIOLOGY CLINIC | Age: 67
End: 2022-06-10

## 2022-06-12 DIAGNOSIS — M54.41 CHRONIC BILATERAL LOW BACK PAIN WITH BILATERAL SCIATICA: ICD-10-CM

## 2022-06-12 DIAGNOSIS — M47.26 OSTEOARTHRITIS OF SPINE WITH RADICULOPATHY, LUMBAR REGION: ICD-10-CM

## 2022-06-12 DIAGNOSIS — M54.42 CHRONIC BILATERAL LOW BACK PAIN WITH BILATERAL SCIATICA: ICD-10-CM

## 2022-06-12 DIAGNOSIS — G89.29 CHRONIC BILATERAL LOW BACK PAIN WITH BILATERAL SCIATICA: ICD-10-CM

## 2022-06-13 ENCOUNTER — OFFICE VISIT (OUTPATIENT)
Dept: CARDIOLOGY CLINIC | Age: 67
End: 2022-06-13
Payer: MEDICARE

## 2022-06-13 VITALS
WEIGHT: 300 LBS | SYSTOLIC BLOOD PRESSURE: 136 MMHG | BODY MASS INDEX: 39.76 KG/M2 | HEART RATE: 70 BPM | DIASTOLIC BLOOD PRESSURE: 70 MMHG | HEIGHT: 73 IN

## 2022-06-13 DIAGNOSIS — I47.1 PAROXYSMAL SVT (SUPRAVENTRICULAR TACHYCARDIA) (HCC): ICD-10-CM

## 2022-06-13 DIAGNOSIS — I10 ESSENTIAL HYPERTENSION: ICD-10-CM

## 2022-06-13 DIAGNOSIS — E78.2 MIXED HYPERLIPIDEMIA: ICD-10-CM

## 2022-06-13 DIAGNOSIS — I25.10 CORONARY ARTERY DISEASE INVOLVING NATIVE CORONARY ARTERY OF NATIVE HEART WITHOUT ANGINA PECTORIS: Primary | ICD-10-CM

## 2022-06-13 DIAGNOSIS — R06.09 DOE (DYSPNEA ON EXERTION): ICD-10-CM

## 2022-06-13 PROCEDURE — 99214 OFFICE O/P EST MOD 30 MIN: CPT | Performed by: INTERNAL MEDICINE

## 2022-06-13 PROCEDURE — 1036F TOBACCO NON-USER: CPT | Performed by: INTERNAL MEDICINE

## 2022-06-13 PROCEDURE — 3017F COLORECTAL CA SCREEN DOC REV: CPT | Performed by: INTERNAL MEDICINE

## 2022-06-13 PROCEDURE — G8417 CALC BMI ABV UP PARAM F/U: HCPCS | Performed by: INTERNAL MEDICINE

## 2022-06-13 PROCEDURE — G8427 DOCREV CUR MEDS BY ELIG CLIN: HCPCS | Performed by: INTERNAL MEDICINE

## 2022-06-13 PROCEDURE — 1123F ACP DISCUSS/DSCN MKR DOCD: CPT | Performed by: INTERNAL MEDICINE

## 2022-06-13 RX ORDER — PERPHENAZINE 16 MG/1
1 TABLET, FILM COATED ORAL DAILY
Qty: 100 EACH | Refills: 3 | Status: SHIPPED | OUTPATIENT
Start: 2022-06-13 | End: 2022-08-11 | Stop reason: SDUPTHER

## 2022-06-13 RX ORDER — OXYCODONE AND ACETAMINOPHEN 10; 325 MG/1; MG/1
1 TABLET ORAL EVERY 6 HOURS PRN
Qty: 120 TABLET | Refills: 0 | Status: SHIPPED | OUTPATIENT
Start: 2022-06-14 | End: 2022-07-11 | Stop reason: SDUPTHER

## 2022-06-13 RX ORDER — ALPRAZOLAM 1 MG/1
1 TABLET ORAL 3 TIMES DAILY PRN
COMMUNITY

## 2022-06-13 RX ORDER — OXYCODONE HCL 20 MG/1
20 TABLET, FILM COATED, EXTENDED RELEASE ORAL EVERY 12 HOURS
Qty: 60 TABLET | Refills: 0 | Status: SHIPPED | OUTPATIENT
Start: 2022-06-14 | End: 2022-07-11 | Stop reason: SDUPTHER

## 2022-06-13 ASSESSMENT — ENCOUNTER SYMPTOMS
NAUSEA: 0
GASTROINTESTINAL NEGATIVE: 1
VOMITING: 0
EYES NEGATIVE: 1
RESPIRATORY NEGATIVE: 1
SHORTNESS OF BREATH: 0
DIARRHEA: 0

## 2022-06-13 NOTE — PROGRESS NOTES
[facility]                            Date:  6/13/2022  Patient: Annemarie Mishra  Age:  77 y.o., 1955      Reason for evaluation:         SUBJECTIVE:    Returns today follow-up assessment he had seen Raymond 2/28/2022 who now left the practice. He is here to get established. History of stent about 2 years ago also hyperlipidemia. Remains active overall feels well denies anginal chest pain or limiting dyspnea denies claudication no other complaints or issues reported. Does not smoke. Blood pressure 136/70 heart 70. Review of Systems   Constitutional: Negative. Negative for chills, fever and unexpected weight change. HENT: Negative. Eyes: Negative. Respiratory: Negative. Negative for shortness of breath. Cardiovascular: Negative. Negative for chest pain. Gastrointestinal: Negative. Negative for diarrhea, nausea and vomiting. Endocrine: Negative. Genitourinary: Negative. Musculoskeletal: Negative. Skin: Negative. Neurological: Negative. All other systems reviewed and are negative. OBJECTIVE:     /70   Pulse 70   Ht 6' 1\" (1.854 m)   Wt 300 lb (136.1 kg)   BMI 39.58 kg/m²     Labs:   CBC: No results for input(s): WBC, HGB, HCT, PLT in the last 72 hours. BMP:No results for input(s): NA, K, CO2, BUN, CREATININE, LABGLOM, GLUCOSE in the last 72 hours. BNP: No results for input(s): BNP in the last 72 hours. PT/INR: No results for input(s): PROTIME, INR in the last 72 hours. APTT:No results for input(s): APTT in the last 72 hours. CARDIAC ENZYMES:No results for input(s): CKTOTAL, CKMB, CKMBINDEX, TROPONINI in the last 72 hours. FASTING LIPID PANEL:  Lab Results   Component Value Date    HDL 51 11/02/2021    LDLCALC 91 11/02/2021    TRIG 122 11/02/2021     LIVER PROFILE:No results for input(s): AST, ALT, LABALBU in the last 72 hours.         Past Medical History:   Diagnosis Date    Acute sinusitis     Anxiety     Chronic back pain     Chronic bilateral low back pain with bilateral sciatica     Constipation     COPD (chronic obstructive pulmonary disease) (HCC)     Coronary artery disease involving native coronary artery of native heart without angina pectoris 8/27/2019    CPAP (continuous positive airway pressure) dependence     12cm to 20cm    Depression     Hyperlipidemia     Hypertensive disorder     OA (osteoarthritis of the spine)     Obesity     SUSANA (obstructive sleep apnea)     AHI:  85.3 per HST 11/2013    Osteoarthritis of spine with radiculopathy, lumbar region     Otitis media     [facility] [address]     Spinal stenosis     Type 2 diabetes mellitus ([facility] [address] 03/23/2016    Unspecified sleep apnea     uses bipap    Vertigo      Past Surgical History:   Procedure Laterality Date    ADENOIDECTOMY      BROW LIFT      due to vision    CARDIAC CATHETERIZATION  10/24/2018         COLONOSCOPY  27 yrs ago    [place], normal per patient    HERNIA REPAIR      KNEE ARTHROSCOPY Left 2009    [place] NJX DX/THER SBST EPIDURAL/SUBARACH LUMBAR/SACRAL N/A 10/3/2017    L3-4 EPIDURAL STEROID INJECTION performed by Vik[facility]jo at [facility]    TONSILLECTOMY       Family History   Problem Relation Age of Onset    Heart Disease Father 64    Cancer Father         malignant tumor of pharynx    Diabetes Mother     Hypertension Mother     Diabetes Sister         x 2    Diabetes Brother     Colon Cancer Neg Hx     Colon Polyps Neg Hx     Liver Disease Neg Hx     Rectal Cancer Neg Hx     Stomach Cancer Neg Hx      Allergies   Allergen Reactions    Clindamycin/Lincomycin Anaphylaxis    Benadryl [Diphenhydramine] Other (See Comments)     \"makes legs jump around\"    Neosporin Plus Max St     Prednisone      Current Outpatient Medications   Medication Sig Dispense Refill    blood glucose test strips (CONTOUR NEXT TEST) strip 1 each by In Vitro route daily As needed.  100 each 3    ALPRAZolam Yany Art) 1 MG tablet Take 1 mg by mouth nightly as needed for Sleep. 1 1/2 tablet daily      oxyCODONE (OXYCONTIN) 20 MG extended release tablet Take 1 tablet by mouth every 12 hours for 30 days. Intended supply: 30 days 60 tablet 0    oxyCODONE-acetaminophen (PERCOCET)  MG per tablet Take 1 tablet by mouth every 6 hours as needed for Pain (Pain) for up to 30 days. 120 tablet 0    aspirin 81 MG chewable tablet Take 1 tablet by mouth daily 90 tablet 3    lovastatin (MEVACOR) 40 MG tablet TAKE 1 TABLET BY MOUTH EVERYNIGHT (Patient taking differently: Take 40 mg by mouth nightly ) 90 tablet 3    Misc Natural Products (OSTEO BI-FLEX ADV DOUBLE ST) TABS Take by mouth daily      verapamil (VERELAN) 200 MG extended release capsule Take 1 capsule by mouth daily 90 capsule 3    hydroCHLOROthiazide (HYDRODIURIL) 25 MG tablet TAKE 1 TABLET BY MOUTH EVERY DAY 90 tablet 3    fluticasone (FLONASE) 50 MCG/ACT nasal spray 2 sprays by Nasal route daily 3 Bottle 3    Umeclidinium Bromide (INCRUSE ELLIPTA) 62.5 MCG/INH AEPB Inhale 1 puff into the lungs daily 3 each 3    albuterol sulfate HFA (VENTOLIN HFA) 108 (90 Base) MCG/ACT inhaler Inhale 2 puffs into the lungs every 6 hours as needed for Wheezing 3 Inhaler 3    MICROLET LANCETS MISC 1 each by Does not apply route daily 100 each 3    hydroxychloroquine (PLAQUENIL) 200 MG tablet Take 400 mg by mouth daily   2    VOLTAREN 1 % GEL APPLY 2 GRAMS TO HAND QID  5    polyethylene glycol (MIRALAX) POWD powder Take 17 g by mouth every other day       metFORMIN (GLUCOPHAGE) 1000 MG tablet Take 1 tablet by mouth 2 times daily (with meals) 180 tablet 3     No current facility-administered medications for this visit.      Social History     Socioeconomic History    Marital status:      Spouse name: Not on file    Number of children: Not on file    Years of education: Not on file    Highest education level: Not on file   Occupational History    Not on file Tobacco Use    Smoking status: Former Smoker     Packs/day: 0.50     Years: 20.00     Pack years: 10.00     Types: Cigarettes     Start date: 0     Quit date:      Years since quittin.4    Smokeless tobacco: Never Used   Vaping Use    Vaping Use: Some days    Substances: Occasionally    Devices: Disposable   Substance and Sexual Activity    Alcohol use: No    Drug use: No    Sexual activity: Not on file   Other Topics Concern    Not on file   Social History Narrative    Not on file     Social Determinants of Health     Financial Resource Strain: Low Risk     Difficulty of Paying Living Expenses: Not hard at all   Food Insecurity: No Food Insecurity    Worried About [address] in the Last Year: Never true    [address]  [employer] in the Last Year: Never true   Transportation Needs:     Lack of Transportation (Medical): Not on file    Lack of Transportation (Non-Medical):  Not on file   Physical Activity:     Days of Exercise per Week: Not on file    Minutes of Exercise per Session: Not on file   Stress:     Feeling of Stress : Not on file   Social Connections:     Frequency of Communication with Friends and Family: Not on file    Frequency of Social Gatherings with Friends and Family: Not on file    Attends [employer] Services: Not on file    Active Member of [address] [place] [address] or Organizations: Not on file    Attends Club or Organization Meetings: Not on file    Marital Status: Not on file   Intimate Partner Violence:     Fear of Current or Ex-Partner: Not on file    Emotionally Abused: Not on file    Physically Abused: Not on file    Sexually Abused: Not on file   Housing Stability:     Unable to Pay for Housing in the Last Year: Not on file    Number of [place] in the Last Year: Not on file    Unstable Housing in the Last Year: Not on file       Physical Examination:  /70   Pulse 70   Ht 6' 1\" (1.854 m)   Wt 300 lb (136.1 kg)   BMI 39.58 kg/m²   Physical Exam  Vitals reviewed. Constitutional:       Appearance: He is well-developed. Neck:      Vascular: No carotid bruit or JVD. Cardiovascular:      Rate and Rhythm: Normal rate and regular rhythm. Heart sounds: Normal heart sounds. No murmur heard. No friction rub. No gallop. Pulmonary:      Effort: Pulmonary effort is normal. No respiratory distress. Breath sounds: Normal breath sounds. No wheezing or rales. Abdominal:      General: There is no distension. Tenderness: There is no abdominal tenderness. Lymphadenopathy:      Cervical: No cervical adenopathy. Skin:     General: Skin is warm and dry. ASSESSMENT:     Diagnosis Orders   1. Coronary artery disease involving native coronary artery of native heart without angina pectoris     2. Paroxysmal SVT (supraventricular tachycardia) ([address]     3. Essential hypertension     4. Mixed hyperlipidemia     5. RECIO (dyspnea on exertion)         PLAN:  No orders of the defined types were placed in this encounter. No orders of the defined types were placed in this encounter. 1. Continue present medications  2. Recommend follow-up assessment in 6 months    Return in about 6 months (around 12/13/2022) for return to Dr. Michaeline Kawasaki only. Jessa Contreras MD 6/13/2022 2:11 PM CDT    [id number] [facility]      Thisdictation was generated by voice recognition computer software. Although all attempts are made to edit the dictation for accuracy, there may be errors in the transcription that are not intended.

## 2022-06-13 NOTE — TELEPHONE ENCOUNTER
Received fax from pharmacy requesting refill on pts medication(s). Pt was last seen in office on 2022  and has a follow up scheduled for Visit date not found. Will send request to  Paul Perez  for authorization. Requested Prescriptions     Pending Prescriptions Disp Refills    blood glucose test strips (CONTOUR NEXT TEST) strip 100 each 3     Si each by In Vitro route daily As needed.

## 2022-06-13 NOTE — TELEPHONE ENCOUNTER
Requested Prescriptions     Pending Prescriptions Disp Refills    oxyCODONE (OXYCONTIN) 20 MG extended release tablet 60 tablet 0     Sig: Take 1 tablet by mouth every 12 hours for 30 days. Intended supply: 30 days    oxyCODONE-acetaminophen (PERCOCET)  MG per tablet 120 tablet 0     Sig: Take 1 tablet by mouth every 6 hours as needed for Pain (Pain) for up to 30 days.        Last Office Visit:  12/30/2021  Next Office Visit:  6/30/2022  Last Medication Refill:    Oxycontin 5/15/22   Percocet 5/15/22    Margarito Frankel up to date:  6/13/22    *RX updated to reflect   6/14/22  fill date*

## 2022-06-23 ENCOUNTER — PATIENT MESSAGE (OUTPATIENT)
Dept: PRIMARY CARE CLINIC | Age: 67
End: 2022-06-23

## 2022-06-23 DIAGNOSIS — R00.2 PALPITATIONS: Primary | ICD-10-CM

## 2022-06-23 DIAGNOSIS — E11.9 TYPE 2 DIABETES MELLITUS WITHOUT COMPLICATION, WITHOUT LONG-TERM CURRENT USE OF INSULIN (HCC): ICD-10-CM

## 2022-06-23 DIAGNOSIS — E78.00 HYPERCHOLESTEREMIA: ICD-10-CM

## 2022-06-23 DIAGNOSIS — I10 ESSENTIAL HYPERTENSION: ICD-10-CM

## 2022-06-23 NOTE — TELEPHONE ENCOUNTER
From: Lori Ly  To: Paola Anna  Sent: 6/23/2022 11:55 AM CDT  Subject: Blood work    Sharri Lilly do you have labs ordered for me full panel work up my cardiologist is requesting them and the results sent to him. Please let me know.   Thank Aby Samano

## 2022-06-29 ENCOUNTER — TELEPHONE (OUTPATIENT)
Dept: PRIMARY CARE CLINIC | Age: 67
End: 2022-06-29

## 2022-06-29 DIAGNOSIS — I10 ESSENTIAL HYPERTENSION: ICD-10-CM

## 2022-06-29 DIAGNOSIS — E78.00 HYPERCHOLESTEREMIA: ICD-10-CM

## 2022-06-29 DIAGNOSIS — E11.9 TYPE 2 DIABETES MELLITUS WITHOUT COMPLICATION, WITHOUT LONG-TERM CURRENT USE OF INSULIN (HCC): ICD-10-CM

## 2022-06-29 DIAGNOSIS — R00.2 PALPITATIONS: ICD-10-CM

## 2022-06-29 LAB
ALBUMIN SERPL-MCNC: 4 G/DL (ref 3.5–5.2)
ALP BLD-CCNC: 68 U/L (ref 40–130)
ALT SERPL-CCNC: 18 U/L (ref 5–41)
ANION GAP SERPL CALCULATED.3IONS-SCNC: 10 MMOL/L (ref 7–19)
AST SERPL-CCNC: 16 U/L (ref 5–40)
BASOPHILS ABSOLUTE: 0.1 K/UL (ref 0–0.2)
BASOPHILS RELATIVE PERCENT: 1.3 % (ref 0–1)
BILIRUB SERPL-MCNC: 0.3 MG/DL (ref 0.2–1.2)
BUN BLDV-MCNC: 16 MG/DL (ref 8–23)
CALCIUM SERPL-MCNC: 9.7 MG/DL (ref 8.8–10.2)
CHLORIDE BLD-SCNC: 103 MMOL/L (ref 98–111)
CHOLESTEROL, TOTAL: 142 MG/DL (ref 160–199)
CO2: 29 MMOL/L (ref 22–29)
CREAT SERPL-MCNC: 0.8 MG/DL (ref 0.5–1.2)
CREATININE URINE: 94.4 MG/DL (ref 4.2–622)
EOSINOPHILS ABSOLUTE: 0.1 K/UL (ref 0–0.6)
EOSINOPHILS RELATIVE PERCENT: 2.3 % (ref 0–5)
GFR AFRICAN AMERICAN: >59
GFR NON-AFRICAN AMERICAN: >60
GLUCOSE BLD-MCNC: 112 MG/DL (ref 74–109)
HBA1C MFR BLD: 6.4 % (ref 4–6)
HCT VFR BLD CALC: 44.1 % (ref 42–52)
HDLC SERPL-MCNC: 49 MG/DL (ref 55–121)
HEMOGLOBIN: 13.7 G/DL (ref 14–18)
IMMATURE GRANULOCYTES #: 0 K/UL
LDL CHOLESTEROL CALCULATED: 75 MG/DL
LYMPHOCYTES ABSOLUTE: 1.8 K/UL (ref 1.1–4.5)
LYMPHOCYTES RELATIVE PERCENT: 39.1 % (ref 20–40)
MCH RBC QN AUTO: 28.7 PG (ref 27–31)
MCHC RBC AUTO-ENTMCNC: 31.1 G/DL (ref 33–37)
MCV RBC AUTO: 92.3 FL (ref 80–94)
MICROALBUMIN UR-MCNC: <1.2 MG/DL (ref 0–19)
MICROALBUMIN/CREAT UR-RTO: NORMAL MG/G
MONOCYTES ABSOLUTE: 0.5 K/UL (ref 0–0.9)
MONOCYTES RELATIVE PERCENT: 10.9 % (ref 0–10)
NEUTROPHILS ABSOLUTE: 2.2 K/UL (ref 1.5–7.5)
NEUTROPHILS RELATIVE PERCENT: 46.2 % (ref 50–65)
PDW BLD-RTO: 14.5 % (ref 11.5–14.5)
PLATELET # BLD: 166 K/UL (ref 130–400)
PMV BLD AUTO: 11.9 FL (ref 9.4–12.4)
POTASSIUM SERPL-SCNC: 4.7 MMOL/L (ref 3.5–5)
RBC # BLD: 4.78 M/UL (ref 4.7–6.1)
SODIUM BLD-SCNC: 142 MMOL/L (ref 136–145)
T4 FREE: 1.06 NG/DL (ref 0.93–1.7)
TOTAL PROTEIN: 6.8 G/DL (ref 6.6–8.7)
TRIGL SERPL-MCNC: 92 MG/DL (ref 0–149)
TSH SERPL DL<=0.05 MIU/L-ACNC: 3.54 UIU/ML (ref 0.27–4.2)
WBC # BLD: 4.7 K/UL (ref 4.8–10.8)

## 2022-06-29 NOTE — TELEPHONE ENCOUNTER
----- Message from HEENA Watkins sent at 6/29/2022  1:06 PM CDT -----  A1c: 6.4, shows good control of blood sugars over the last 3 months. Continue current regimen  NO significant microalbumin in urine  Thyroid is WNL  Cholesterol is well controlled.  Continue Mevacor  CMP: WNL, this includes normal electrolytes, kidney and liver fxn  CBC: WNL

## 2022-06-30 ENCOUNTER — OFFICE VISIT (OUTPATIENT)
Dept: NEUROLOGY | Age: 67
End: 2022-06-30
Payer: MEDICARE

## 2022-06-30 VITALS
BODY MASS INDEX: 39.71 KG/M2 | SYSTOLIC BLOOD PRESSURE: 138 MMHG | DIASTOLIC BLOOD PRESSURE: 88 MMHG | HEART RATE: 78 BPM | WEIGHT: 299.6 LBS | RESPIRATION RATE: 20 BRPM | HEIGHT: 73 IN

## 2022-06-30 DIAGNOSIS — G89.29 CHRONIC BILATERAL LOW BACK PAIN WITH BILATERAL SCIATICA: ICD-10-CM

## 2022-06-30 DIAGNOSIS — M05.742 RHEUMATOID ARTHRITIS INVOLVING BOTH HANDS WITH POSITIVE RHEUMATOID FACTOR (HCC): ICD-10-CM

## 2022-06-30 DIAGNOSIS — F41.9 ANXIETY: ICD-10-CM

## 2022-06-30 DIAGNOSIS — M54.42 CHRONIC BILATERAL LOW BACK PAIN WITH BILATERAL SCIATICA: ICD-10-CM

## 2022-06-30 DIAGNOSIS — M47.26 OSTEOARTHRITIS OF SPINE WITH RADICULOPATHY, LUMBAR REGION: Primary | ICD-10-CM

## 2022-06-30 DIAGNOSIS — M54.41 CHRONIC BILATERAL LOW BACK PAIN WITH BILATERAL SCIATICA: ICD-10-CM

## 2022-06-30 DIAGNOSIS — M05.741 RHEUMATOID ARTHRITIS INVOLVING BOTH HANDS WITH POSITIVE RHEUMATOID FACTOR (HCC): ICD-10-CM

## 2022-06-30 DIAGNOSIS — G47.33 OSA (OBSTRUCTIVE SLEEP APNEA): ICD-10-CM

## 2022-06-30 PROCEDURE — 99213 OFFICE O/P EST LOW 20 MIN: CPT | Performed by: PSYCHIATRY & NEUROLOGY

## 2022-06-30 PROCEDURE — 3017F COLORECTAL CA SCREEN DOC REV: CPT | Performed by: PSYCHIATRY & NEUROLOGY

## 2022-06-30 PROCEDURE — G8427 DOCREV CUR MEDS BY ELIG CLIN: HCPCS | Performed by: PSYCHIATRY & NEUROLOGY

## 2022-06-30 PROCEDURE — 1123F ACP DISCUSS/DSCN MKR DOCD: CPT | Performed by: PSYCHIATRY & NEUROLOGY

## 2022-06-30 PROCEDURE — 1036F TOBACCO NON-USER: CPT | Performed by: PSYCHIATRY & NEUROLOGY

## 2022-06-30 PROCEDURE — G8417 CALC BMI ABV UP PARAM F/U: HCPCS | Performed by: PSYCHIATRY & NEUROLOGY

## 2022-06-30 RX ORDER — CYCLOBENZAPRINE HCL 10 MG
10 TABLET ORAL 3 TIMES DAILY PRN
COMMUNITY
End: 2022-07-11 | Stop reason: SDUPTHER

## 2022-06-30 RX ORDER — ZOLPIDEM TARTRATE 10 MG/1
TABLET ORAL NIGHTLY PRN
COMMUNITY

## 2022-06-30 NOTE — PROGRESS NOTES
[facility]  [address] [place] [address] [address], [address]  [place], [place]  Phone [phone]  Fax [phone]     [facility] Follow Up Encounter  22 1:15 PM CDT    Information:   Patient Name: Vance Thomas  :   1955  Age:   77 y.o. MRN:   [id number]  Account #:  [de-identified]  Today:  22    Provider: Jaz Vallejo M.D. Chief Complaint:   Chief Complaint   Patient presents with    Follow-up       Subjective:   Vance Thomas is a 77 y.o. man with a history of chronic back pain, RA, SUSANA, and anxiety who is following up. He has chronic back pain with lower extremity pain.  He cannot stand or walk fore than 10 minutes.  He still is active.  He paces himself and only sits for as long as he has to.  His back pain is stable but does require daily analgesics which does help. [facility] has previously tried Cymbalta and gabapentin and they did not help. He has pain and stiffness in his hands.  Methotrexate in the past did not help that but the Plaquinil has helped. [facility] has had no side effects from the opioid analgesics.  He uses his BiPAP every night.  He sometimes will push himself and develop SVT.         Objective:     Past Medical History:  Past Medical History:   Diagnosis Date    Acute sinusitis     Anxiety     Chronic back pain     Chronic bilateral low back pain with bilateral sciatica     Constipation     COPD (chronic obstructive pulmonary disease) (HCC)     Coronary artery disease involving native coronary artery of native heart without angina pectoris 2019    CPAP (continuous positive airway pressure) dependence     12cm to 20cm    Depression     Hyperlipidemia     Hypertensive disorder     OA (osteoarthritis of the spine)     Obesity     SUSANA (obstructive sleep apnea)     AHI:  85.3 per HST 2013    Osteoarthritis of spine with radiculopathy, lumbar region     Otitis media     [facility]     Spinal stenosis     Type 2 diabetes mellitus ([facility] [address] 03/23/2016    Unspecified sleep apnea     uses bipap    Vertigo        Past Surgical History:   Procedure Laterality Date    ADENOIDECTOMY      BROW LIFT      due to vision    CARDIAC CATHETERIZATION  10/24/2018         COLONOSCOPY  27 yrs ago    [place], normal per patient    HERNIA REPAIR      KNEE ARTHROSCOPY Left 2009    [place] NJX DX/THER SBST EPIDURAL/SUBARACH LUMBAR/SACRAL N/A 10/3/2017    L3-4 EPIDURAL STEROID INJECTION performed by Marisa Khan at [address] [place] [address]  · None    Significant Injuries  · None    Habits  Mario Levy reports that he quit smoking about 21 years ago. His smoking use included cigarettes. He started smoking about 41 years ago. He has a 10.00 pack-year smoking history. He has never used smokeless tobacco. He reports that he does not drink alcohol and does not use drugs. Family History   Problem Relation Age of Onset    Heart Disease Father 64    Cancer Father         malignant tumor of pharynx    Diabetes Mother     Hypertension Mother     Diabetes Sister         x 2    Diabetes Brother     Colon Cancer Neg Hx     Colon Polyps Neg Hx     Liver Disease Neg Hx     Rectal Cancer Neg Hx     Stomach Cancer Neg Hx        Social History  Verla Record Medardo is , lives in [place], [place], and is disabled. Medications:  Current Outpatient Medications   Medication Sig Dispense Refill    zolpidem (AMBIEN) 10 MG tablet Take by mouth nightly as needed for Sleep.  cyclobenzaprine (FLEXERIL) 10 MG tablet Take 10 mg by mouth 3 times daily as needed for Muscle spasms      oxyCODONE (OXYCONTIN) 20 MG extended release tablet Take 1 tablet by mouth every 12 hours for 30 days. Intended supply: 30 days 60 tablet 0    oxyCODONE-acetaminophen (PERCOCET)  MG per tablet Take 1 tablet by mouth every 6 hours as needed for Pain (Pain) for up to 30 days.  120 tablet 0    blood glucose test strips (CONTOUR NEXT TEST) strip 1 each by In Vitro route daily As needed. 100 each 3    ALPRAZolam (XANAX) 1 MG tablet Take 1 mg by mouth 3 times daily as needed for Sleep. 1 1/2 tablet TID      metFORMIN (GLUCOPHAGE) 1000 MG tablet Take 1 tablet by mouth 2 times daily (with meals) 180 tablet 3    aspirin 81 MG chewable tablet Take 1 tablet by mouth daily 90 tablet 3    lovastatin (MEVACOR) 40 MG tablet TAKE 1 TABLET BY MOUTH EVERYNIGHT (Patient taking differently: Take 40 mg by mouth nightly ) 90 tablet 3    Misc Natural Products (OSTEO BI-FLEX ADV DOUBLE ST) TABS Take by mouth daily      verapamil (VERELAN) 200 MG extended release capsule Take 1 capsule by mouth daily 90 capsule 3    hydroCHLOROthiazide (HYDRODIURIL) 25 MG tablet TAKE 1 TABLET BY MOUTH EVERY DAY 90 tablet 3    fluticasone (FLONASE) 50 MCG/ACT nasal spray 2 sprays by Nasal route daily 3 Bottle 3    Umeclidinium Bromide (INCRUSE ELLIPTA) 62.5 MCG/INH AEPB Inhale 1 puff into the lungs daily 3 each 3    albuterol sulfate HFA (VENTOLIN HFA) 108 (90 Base) MCG/ACT inhaler Inhale 2 puffs into the lungs every 6 hours as needed for Wheezing 3 Inhaler 3    MICROLET LANCETS MISC 1 each by Does not apply route daily 100 each 3    hydroxychloroquine (PLAQUENIL) 200 MG tablet Take 400 mg by mouth daily   2    VOLTAREN 1 % GEL APPLY 2 GRAMS TO HAND QID  5    polyethylene glycol (MIRALAX) POWD powder Take 17 g by mouth every other day        No current facility-administered medications for this visit. Allergies:   Allergies as of 06/30/2022 - Fully Reviewed 06/30/2022   Allergen Reaction Noted    Clindamycin/lincomycin Anaphylaxis 07/15/2015    Benadryl [diphenhydramine] Other (See Comments) 07/15/2015    Neosporin plus max st  02/11/2022    Prednisone  08/13/2018       Review of Systems:  Constitutional: negative for - chills and fever  Eyes:  negative for - visual disturbance and photophobia  HENMT: negative for - headaches and sinus pain  Respiratory: negative for - cough, hemoptysis, and shortness of breath  Cardiovascular: negative for - chest pain and palpitations  Gastrointestinal: negative for - blood in stools, constipation, diarrhea, nausea, and vomiting  Genito-Urinary: negative for - hematuria, urinary frequency, urinary urgency, and urinary retention  Musculoskeletal: positive for - joint pain, joint stiffness, and joint swelling  Hematological and Lymphatic: negative for - bleeding problems, abnormal bruising, and swollen lymph nodes  Endocrine:  negative for - polydipsia and polyphagia  Allergy/Immunology:  negative for - rhinorrhea, sinus congestion, hives  Integument:  negative for - negative for - rash, change in moles, new or changing lesions  Psychological: negative for - anxiety and depression  Neurological: negative for - memory loss, numbness/tingling, and weakness      PHYSICAL EXAMINATION:  Vitals:  /85   Pulse 68   Resp 18   Ht 6' 1\" (1.854 m)   Wt (!) 301 lb (136.5 kg)   BMI 39.71 kg/m²   General appearance:  Alert, well developed, well nourished, in no distress  HEENT:  normocephalic, atraumatic, sclera appear normal, no nasal abnormalities, no rhinorrhea, Ears appear normal, oral mucous membranes are moist without erythema, trachea midline, thyroid is normal, no lymphadenopathy or neck mass. Cardiovascular:  Regular rate and rhythm without murmer. No peripheral edema, No cyanosis or clubbing. No carotid bruits. Pulmonary:  Lungs are clear to auscultation.   Breathing appears normal, good expansion, normal effort without use of accessory muscles  Musculoskeletal:  Joints are arthritic.    Integument:  No rash, erythema, or pallor  Psychiatric:  Mood, affect, and behavior appear normal      PHYSICAL EXAMINATION:  Vitals:  /88   Pulse 78   Resp 20   Ht 6' 1\" (1.854 m)   Wt 299 lb 9.6 oz (135.9 kg)   BMI 39.53 kg/m²   General appearance:  Alert, well developed, well nourished, in no distress  HEENT:  normocephalic, atraumatic, sclera appear normal, no nasal abnormalities, no rhinorrhea, Ears appear normal, oral mucous membranes are moist without erythema, trachea midline, thyroid is normal, no lymphadenopathy or neck mass. Cardiovascular:  Regular rate and rhythm without murmer. No peripheral edema, No cyanosis or clubbing. No carotid bruits. Pulmonary:  Lungs are clear to auscultation. Breathing appears normal, good expansion, normal effort without use of accessory muscles  Musculoskeletal:  Joints are arthritic  Integument:  No rash, erythema, or pallor  Psychiatric:  Mood, affect, and behavior appear normal      NEUROLOGIC EXAMINATION:  Mental Status:  alert, oriented to person, place, and time. Speech:  Clear without dysarthria or dysphonia  Language:  Fluent without aphasia  Cranial Nerves:   II Visual fields are full to confrontation   III,IV, VI Extraocular movements are full   VII Facial movements are symmetrical without weakness   VIII Hearing is intact   IX,X Shoulder shrug and head rotation strength are intact   XII No tongue atrophy or fasciculations. Normal tongue protrusion. No tongue weakness  Motor:  Normal strength in both upper and lower extremities. Normal muscle tone and bulk. Deep tendon reflexes are trace present in both upper and absent in both lower extremities. Keita's signs are absent bilaterally. There is no ankle clonus on either side. Sensation:  Sensation is intact to light touch, temperature, and vibration in all extremities. Coordination:  Rapid alternating movements are normal in both upper and lower extremities. Finger to nose testing is unimpaired bilaterally. Gait:  Normal station and gait. Pertinent Diagnostic Studies:  JAKOB is appropriate    Assessment:       ICD-10-CM    1. Osteoarthritis of spine with radiculopathy, lumbar region  M47.26    2. Chronic bilateral low back pain with bilateral sciatica  M54.42     M54.41     G89.29    3.  SUSANA (obstructive sleep apnea)  G47.33 4. Rheumatoid arthritis involving both hands with positive rheumatoid factor (HCC)  M05.741     M05.742    5. Anxiety  F41.9    He has chronic pain, primarily low back pain. I went over other treatment options. He says he had side effects of epidural injections in the past.  I discussed radiofrequency nerve ablation, dorsal column stimulation, and intrathecal pain pump. He does not want to consider other treatment options. Plan:   1.          Continue present medications including oxycodone, alpazolam, Plaquenil  2.         FU in 6 months    Electronically signed by Alondra Mercer MD on 6/30/22

## 2022-07-01 ENCOUNTER — OFFICE VISIT (OUTPATIENT)
Dept: PRIMARY CARE CLINIC | Age: 67
End: 2022-07-01
Payer: MEDICARE

## 2022-07-01 VITALS
HEART RATE: 92 BPM | OXYGEN SATURATION: 98 % | SYSTOLIC BLOOD PRESSURE: 138 MMHG | BODY MASS INDEX: 39.63 KG/M2 | TEMPERATURE: 98.1 F | HEIGHT: 73 IN | WEIGHT: 299 LBS | DIASTOLIC BLOOD PRESSURE: 80 MMHG

## 2022-07-01 DIAGNOSIS — Z12.11 ENCOUNTER FOR SCREENING COLONOSCOPY: ICD-10-CM

## 2022-07-01 DIAGNOSIS — E11.9 TYPE 2 DIABETES MELLITUS WITHOUT COMPLICATION, WITHOUT LONG-TERM CURRENT USE OF INSULIN (HCC): Primary | ICD-10-CM

## 2022-07-01 DIAGNOSIS — E78.00 HYPERCHOLESTEREMIA: ICD-10-CM

## 2022-07-01 DIAGNOSIS — I10 ESSENTIAL HYPERTENSION: ICD-10-CM

## 2022-07-01 DIAGNOSIS — I25.10 CORONARY ARTERY DISEASE INVOLVING NATIVE CORONARY ARTERY OF NATIVE HEART WITHOUT ANGINA PECTORIS: ICD-10-CM

## 2022-07-01 PROCEDURE — G8417 CALC BMI ABV UP PARAM F/U: HCPCS | Performed by: NURSE PRACTITIONER

## 2022-07-01 PROCEDURE — 1123F ACP DISCUSS/DSCN MKR DOCD: CPT | Performed by: NURSE PRACTITIONER

## 2022-07-01 PROCEDURE — 3017F COLORECTAL CA SCREEN DOC REV: CPT | Performed by: NURSE PRACTITIONER

## 2022-07-01 PROCEDURE — 2022F DILAT RTA XM EVC RTNOPTHY: CPT | Performed by: NURSE PRACTITIONER

## 2022-07-01 PROCEDURE — 3044F HG A1C LEVEL LT 7.0%: CPT | Performed by: NURSE PRACTITIONER

## 2022-07-01 PROCEDURE — 1036F TOBACCO NON-USER: CPT | Performed by: NURSE PRACTITIONER

## 2022-07-01 PROCEDURE — 99214 OFFICE O/P EST MOD 30 MIN: CPT | Performed by: NURSE PRACTITIONER

## 2022-07-01 PROCEDURE — G8427 DOCREV CUR MEDS BY ELIG CLIN: HCPCS | Performed by: NURSE PRACTITIONER

## 2022-07-01 ASSESSMENT — ENCOUNTER SYMPTOMS
COUGH: 0
SORE THROAT: 0
EYE DISCHARGE: 0
WHEEZING: 0
EYE REDNESS: 0
RHINORRHEA: 0
BLOOD IN STOOL: 0
BACK PAIN: 1
CONSTIPATION: 0
DIARRHEA: 0
CHOKING: 0

## 2022-07-01 NOTE — PROGRESS NOTES
Jimbo Perez (:  1955) is a 77 y.o. male,Established patient, here for evaluation of the following chief complaint(s):  Follow-up      ASSESSMENT/PLAN:    ICD-10-CM    1. Type 2 diabetes mellitus without complication, without long-term current use of insulin (HCC)  E11.9  DIABETES FOOT EXAM   2. Encounter for screening colonoscopy  Z12.11 Dawit Marshall MD, Gastroenterology, [place]   3. Essential hypertension  I10    4. Hypercholesteremia  E78.00    5. Coronary artery disease involving native coronary artery of native heart without angina pectoris  I25.10      Reviewed recent lab work. The current medical regimen is effective;  continue present plan and medications. Return in about 6 months (around 2023) for diabetes. SUBJECTIVE/OBJECTIVE:  HPI  Treatment Adherence:   Medication compliance:  compliant all of the time  Diet compliance:  compliant most of the time  Weight trend: decreasing      Diabetes Mellitus Type 2: Current symptoms/problems include none. Home blood sugar records: patient does not test  Any episodes of hypoglycemia? no  Eye exam current (within one year): no  Tobacco history: He  reports that he quit smoking about 21 years ago. His smoking use included cigarettes. He started smoking about 41 years ago. He has a 10.00 pack-year smoking history. He has never used smokeless tobacco.   Daily Aspirin? Yes      Hypertension:  Home blood pressure monitoring: No.  He is adherent to a low sodium diet. Patient denies chest pain. Antihypertensive medication side effects: no medication side effects noted. Use of agents associated with hypertension: none. Hyperlipidemia:  No new myalgias or GI upset on lovastatin (Mevacor).        Lab Results   Component Value Date    LABA1C 6.4 (H) 2022    LABA1C 6.4 (H) 2022    LABA1C 7.8 (H) 2021     Lab Results   Component Value Date    LABMICR <1.20 2022    CREATININE 0.8 2022     Lab Results   Component Value Date    ALT 18 06/29/2022    AST 16 06/29/2022     Lab Results   Component Value Date/Time    TRIG 92 06/29/2022 07:13 AM    HDL 49 06/29/2022 07:13 AM    LDLCALC 75 06/29/2022 07:13 AM                 Review of Systems   Constitutional: Negative for appetite change and unexpected weight change. HENT: Negative for congestion, ear pain, rhinorrhea and sore throat. Eyes: Negative for discharge and redness. Respiratory: Negative for cough, choking and wheezing. Cardiovascular: Negative for chest pain. Gastrointestinal: Negative for blood in stool, constipation and diarrhea. Genitourinary: Negative for decreased urine volume and dysuria. Musculoskeletal: Positive for arthralgias and back pain. Skin: Negative for rash. Neurological: Negative for weakness. Hematological: Negative for adenopathy. Psychiatric/Behavioral: Negative for suicidal ideas. /80 (Site: Left Upper Arm, Position: Sitting, Cuff Size: Large Adult)   Pulse 92   Temp 98.1 °F (36.7 °C) (Temporal)   Ht 6' 1\" (1.854 m)   Wt 299 lb (135.6 kg)   SpO2 98%   BMI 39.45 kg/m²    Physical Exam  Vitals reviewed. Constitutional:       General: He is not in acute distress. Appearance: Normal appearance. He is well-developed. He is obese. He is not ill-appearing. HENT:      Head: Normocephalic. Nose: No congestion. Mouth/Throat:      Pharynx: No posterior oropharyngeal erythema. Eyes:      General:         Right eye: No discharge. Left eye: No discharge. Conjunctiva/sclera: Conjunctivae normal.   Cardiovascular:      Rate and Rhythm: Normal rate and regular rhythm. Pulses: Normal pulses. Heart sounds: Normal heart sounds. No murmur heard. Pulmonary:      Effort: Pulmonary effort is normal. No respiratory distress. Breath sounds: Normal breath sounds. No rhonchi. Abdominal:      General: Bowel sounds are normal.      Palpations: Abdomen is soft. Tenderness: There is no abdominal tenderness. Musculoskeletal:      Cervical back: Neck supple. Tenderness present. No muscular tenderness. Decreased range of motion. Thoracic back: Tenderness present. Lumbar back: Tenderness present. Decreased range of motion. Lymphadenopathy:      Cervical: No cervical adenopathy. Skin:     General: Skin is warm and dry. Capillary Refill: Capillary refill takes less than 2 seconds. Findings: No rash. Neurological:      General: No focal deficit present. Mental Status: He is alert and oriented to person, place, and time. Psychiatric:         Mood and Affect: Mood normal.         Behavior: Behavior normal.         Monofilament Exam Reveals:  Pulses: normal  Edema:absent  Skin Lesions:normal  Right Foot:    Left Foot:  decreased sensation at 1-10   Decreased sensation at 1-10                 An electronic signature was used to authenticate this note.     --HEENA Wilkes

## 2022-07-08 DIAGNOSIS — J44.1 COPD EXACERBATION (HCC): ICD-10-CM

## 2022-07-08 RX ORDER — ALBUTEROL SULFATE 90 UG/1
2 AEROSOL, METERED RESPIRATORY (INHALATION) EVERY 6 HOURS PRN
Qty: 25.5 EACH | Refills: 3 | Status: SHIPPED | OUTPATIENT
Start: 2022-07-08

## 2022-07-10 DIAGNOSIS — J44.9 STAGE 2 MODERATE COPD BY GOLD CLASSIFICATION (HCC): ICD-10-CM

## 2022-07-10 DIAGNOSIS — Z72.0 TOBACCO ABUSE: ICD-10-CM

## 2022-07-10 DIAGNOSIS — J42 CHRONIC BRONCHITIS, UNSPECIFIED CHRONIC BRONCHITIS TYPE (HCC): ICD-10-CM

## 2022-07-11 ENCOUNTER — PATIENT MESSAGE (OUTPATIENT)
Dept: NEUROLOGY | Age: 67
End: 2022-07-11

## 2022-07-11 DIAGNOSIS — G89.29 CHRONIC BILATERAL LOW BACK PAIN WITH BILATERAL SCIATICA: ICD-10-CM

## 2022-07-11 DIAGNOSIS — M47.26 OSTEOARTHRITIS OF SPINE WITH RADICULOPATHY, LUMBAR REGION: ICD-10-CM

## 2022-07-11 DIAGNOSIS — G47.00 INSOMNIA, UNSPECIFIED TYPE: ICD-10-CM

## 2022-07-11 DIAGNOSIS — M54.41 CHRONIC BILATERAL LOW BACK PAIN WITH BILATERAL SCIATICA: ICD-10-CM

## 2022-07-11 DIAGNOSIS — I25.10 CORONARY ARTERY DISEASE INVOLVING NATIVE CORONARY ARTERY OF NATIVE HEART WITHOUT ANGINA PECTORIS: ICD-10-CM

## 2022-07-11 DIAGNOSIS — I10 ESSENTIAL HYPERTENSION: ICD-10-CM

## 2022-07-11 DIAGNOSIS — M54.42 CHRONIC BILATERAL LOW BACK PAIN WITH BILATERAL SCIATICA: ICD-10-CM

## 2022-07-11 RX ORDER — UMECLIDINIUM 62.5 UG/1
AEROSOL, POWDER ORAL
Qty: 90 EACH | Refills: 3 | Status: SHIPPED | OUTPATIENT
Start: 2022-07-11

## 2022-07-11 RX ORDER — OXYCODONE HCL 20 MG/1
20 TABLET, FILM COATED, EXTENDED RELEASE ORAL EVERY 12 HOURS
Qty: 60 TABLET | Refills: 0 | Status: SHIPPED | OUTPATIENT
Start: 2022-07-14 | End: 2022-08-11 | Stop reason: SDUPTHER

## 2022-07-11 RX ORDER — VERAPAMIL HYDROCHLORIDE 200 MG/1
200 CAPSULE, EXTENDED RELEASE ORAL DAILY
Qty: 90 CAPSULE | Refills: 3 | Status: SHIPPED | OUTPATIENT
Start: 2022-07-11

## 2022-07-11 RX ORDER — CYCLOBENZAPRINE HCL 10 MG
10 TABLET ORAL 3 TIMES DAILY PRN
Qty: 90 TABLET | Refills: 0 | Status: SHIPPED | OUTPATIENT
Start: 2022-07-11 | End: 2022-08-08

## 2022-07-11 RX ORDER — OXYCODONE AND ACETAMINOPHEN 10; 325 MG/1; MG/1
1 TABLET ORAL EVERY 6 HOURS PRN
Qty: 120 TABLET | Refills: 0 | Status: SHIPPED | OUTPATIENT
Start: 2022-07-14 | End: 2022-08-11 | Stop reason: SDUPTHER

## 2022-07-11 RX ORDER — ZOLPIDEM TARTRATE 10 MG/1
10 TABLET ORAL PRN
Qty: 30 TABLET | Refills: 0 | Status: SHIPPED | OUTPATIENT
Start: 2022-07-11 | End: 2022-10-09

## 2022-07-11 NOTE — TELEPHONE ENCOUNTER
Requested Prescriptions     Pending Prescriptions Disp Refills    oxyCODONE (OXYCONTIN) 20 MG extended release tablet 60 tablet 0     Sig: Take 1 tablet by mouth every 12 hours for 30 days. Intended supply: 30 days    oxyCODONE-acetaminophen (PERCOCET)  MG per tablet 120 tablet 0     Sig: Take 1 tablet by mouth every 6 hours as needed for Pain (Pain) for up to 30 days.  zolpidem (AMBIEN) 10 MG tablet 30 tablet 2     Sig: Take 1 tablet by mouth as needed for Sleep for up to 90 days.     cyclobenzaprine (FLEXERIL) 10 MG tablet 90 tablet 0     Sig: Take 1 tablet by mouth 3 times daily as needed for Muscle spasms       Last Office Visit:  6/30/2022  Next Office Visit:  12/29/22  Last Medication Refill:    Oxycontin 6/14/22  Percocet 6/14/22  Ambien 12/5/19  Flexeril *pharmacy states it was never filled    Larisa Canales up to date:  6/13/22    *RX updated to reflect  fill date*  Percocet and Oxycontin 7/14/22  Ambien and Flexeril 7/11/22    [facility] pt

## 2022-07-11 NOTE — TELEPHONE ENCOUNTER
From: Ramin Leyva  To: Dr. Rukhsana Stewart: 7/11/2022 2:59 PM CDT  Subject: refill    would you please refill cyclobenzaprine 10 MG tablet and Zolpidem 10 MG Tablet  Thank you  BIG ABBY  [birth year]

## 2022-07-11 NOTE — TELEPHONE ENCOUNTER
Received fax from pharmacy requesting refill on pts medication(s). Pt was last seen in office on 7/1/2022  and has a follow up scheduled for 1/3/2023. Will send request to  Celio Godinez  for authorization.      Requested Prescriptions     Pending Prescriptions Disp Refills    INCRUSE ELLIPTA 62.5 MCG/INH AEPB [Pharmacy Med Name: INCRUSE ELLIPTA 62.5 MCG INH] 90 each 3     Sig: INHALE 1 PUFF INTO LUNGS EVERYD AY

## 2022-08-08 RX ORDER — CYCLOBENZAPRINE HCL 10 MG
TABLET ORAL
Qty: 90 TABLET | Refills: 5 | Status: SHIPPED | OUTPATIENT
Start: 2022-08-08

## 2022-08-08 NOTE — TELEPHONE ENCOUNTER
Requested Prescriptions     Pending Prescriptions Disp Refills    cyclobenzaprine (FLEXERIL) 10 MG tablet [Pharmacy Med Name: CYCLOBENZAPRINE 10 MG TABLET] 90 tablet 0     Sig: TAKE 1 TABLET BY MOUTH THREE TIMES A DAY AS NEEDED FOR MUSCLE SPASMS       Last Office Visit: 6/30/2022  Next Office Visit: 1/10/2023  Last Medication Refill: 7/11/2022 with 0 RF

## 2022-08-11 DIAGNOSIS — G89.29 CHRONIC BILATERAL LOW BACK PAIN WITH BILATERAL SCIATICA: ICD-10-CM

## 2022-08-11 DIAGNOSIS — M54.42 CHRONIC BILATERAL LOW BACK PAIN WITH BILATERAL SCIATICA: ICD-10-CM

## 2022-08-11 DIAGNOSIS — M54.41 CHRONIC BILATERAL LOW BACK PAIN WITH BILATERAL SCIATICA: ICD-10-CM

## 2022-08-11 DIAGNOSIS — M47.26 OSTEOARTHRITIS OF SPINE WITH RADICULOPATHY, LUMBAR REGION: ICD-10-CM

## 2022-08-11 RX ORDER — PERPHENAZINE 16 MG/1
1 TABLET, FILM COATED ORAL DAILY
Qty: 100 EACH | Refills: 3 | Status: SHIPPED | OUTPATIENT
Start: 2022-08-11

## 2022-08-11 NOTE — TELEPHONE ENCOUNTER
Received fax from pharmacy requesting refill on pts medication(s). Pt was last seen in office on 2022  and has a follow up scheduled for 1/3/2023. Will send request to  Misa Hairston  for authorization. Requested Prescriptions     Pending Prescriptions Disp Refills    blood glucose test strips (CONTOUR NEXT TEST) strip 100 each 3     Si each by In Vitro route daily As needed.

## 2022-08-11 NOTE — TELEPHONE ENCOUNTER
Requested Prescriptions     Pending Prescriptions Disp Refills    oxyCODONE (OXYCONTIN) 20 MG extended release tablet 60 tablet 0     Sig: Take 1 tablet by mouth in the morning and 1 tablet in the evening. Do all this for 30 days. Intended supply: 30 days. oxyCODONE-acetaminophen (PERCOCET)  MG per tablet 120 tablet 0     Sig: Take 1 tablet by mouth every 6 hours as needed for Pain (Pain) for up to 30 days.        Last Office Visit:  6/30/2022  Next Office Visit:  1/10/2023  Last Medication Refill: 7/14/2022 both with 0 RF    Bernabeborn Keepers up to date:  6/2022     *RX updated to reflect   8/13/2022  fill date*

## 2022-08-12 RX ORDER — OXYCODONE HCL 20 MG/1
20 TABLET, FILM COATED, EXTENDED RELEASE ORAL EVERY 12 HOURS
Qty: 60 TABLET | Refills: 0 | Status: SHIPPED | OUTPATIENT
Start: 2022-08-13 | End: 2022-09-11 | Stop reason: SDUPTHER

## 2022-08-12 RX ORDER — OXYCODONE AND ACETAMINOPHEN 10; 325 MG/1; MG/1
1 TABLET ORAL EVERY 6 HOURS PRN
Qty: 120 TABLET | Refills: 0 | Status: SHIPPED | OUTPATIENT
Start: 2022-08-13 | End: 2022-09-11 | Stop reason: SDUPTHER

## 2022-09-11 DIAGNOSIS — M54.41 CHRONIC BILATERAL LOW BACK PAIN WITH BILATERAL SCIATICA: ICD-10-CM

## 2022-09-11 DIAGNOSIS — M47.26 OSTEOARTHRITIS OF SPINE WITH RADICULOPATHY, LUMBAR REGION: ICD-10-CM

## 2022-09-11 DIAGNOSIS — M54.42 CHRONIC BILATERAL LOW BACK PAIN WITH BILATERAL SCIATICA: ICD-10-CM

## 2022-09-11 DIAGNOSIS — G89.29 CHRONIC BILATERAL LOW BACK PAIN WITH BILATERAL SCIATICA: ICD-10-CM

## 2022-09-12 DIAGNOSIS — F41.9 ANXIETY: Primary | ICD-10-CM

## 2022-09-12 DIAGNOSIS — F41.9 ANXIETY: ICD-10-CM

## 2022-09-12 RX ORDER — OXYCODONE HCL 20 MG/1
20 TABLET, FILM COATED, EXTENDED RELEASE ORAL EVERY 12 HOURS
Qty: 60 TABLET | Refills: 0 | Status: SHIPPED | OUTPATIENT
Start: 2022-09-13 | End: 2022-10-11 | Stop reason: SDUPTHER

## 2022-09-12 RX ORDER — ALPRAZOLAM 1 MG/1
TABLET ORAL
Qty: 135 TABLET | Refills: 5 | Status: SHIPPED | OUTPATIENT
Start: 2022-09-12 | End: 2022-09-12

## 2022-09-12 RX ORDER — ALPRAZOLAM 1 MG/1
TABLET ORAL
Qty: 135 TABLET | Refills: 5 | OUTPATIENT
Start: 2022-09-13 | End: 2022-10-13

## 2022-09-12 RX ORDER — OXYCODONE AND ACETAMINOPHEN 10; 325 MG/1; MG/1
1 TABLET ORAL EVERY 6 HOURS PRN
Qty: 120 TABLET | Refills: 0 | Status: SHIPPED | OUTPATIENT
Start: 2022-09-13 | End: 2022-10-11 | Stop reason: SDUPTHER

## 2022-09-12 NOTE — TELEPHONE ENCOUNTER
Erin Jolley has requested a refill on his medication.       Last office visit : 2022   Next office visit : 1/10/2023   Last medication refill :3-17-22 5 CRISTINA Hobbs : 22      Requested Prescriptions     Pending Prescriptions Disp Refills    ALPRAZolam (XANAX) 1 MG tablet 135 tablet 5     Si 1/2 PO TID

## 2022-09-12 NOTE — TELEPHONE ENCOUNTER
Requested Prescriptions     Pending Prescriptions Disp Refills    ALPRAZolam (XANAX) 1 MG tablet 135 tablet 5     Sig: TAKE 1 AND A 1/2 TABLETS BY MOUTH 3 TIMES A DAY       Last Office Visit:  6/30/2022  Next Office Visit:  1/10/2023  Last Medication Refill:  3/17/22 with 5 RF  Fany Ruiz up to date:  9/12/22    *RX updated to reflect   9/13/22  fill date*

## 2022-09-12 NOTE — TELEPHONE ENCOUNTER
Requested Prescriptions     Pending Prescriptions Disp Refills    oxyCODONE (OXYCONTIN) 20 MG extended release tablet 60 tablet 0     Sig: Take 1 tablet by mouth in the morning and 1 tablet in the evening. Do all this for 30 days. Intended supply: 30 days. oxyCODONE-acetaminophen (PERCOCET)  MG per tablet 120 tablet 0     Sig: Take 1 tablet by mouth every 6 hours as needed for Pain (Pain) for up to 30 days.        Last Office Visit:  6/30/2022  Next Office Visit:  1/10/2023  Last Medication Refill:  BOTH 8/13/22  Anjel up to date:  9/12/22    *RX updated to reflect   BOTH 9/13/22  fill date*

## 2022-09-26 RX ORDER — HYDROCHLOROTHIAZIDE 25 MG/1
TABLET ORAL
Qty: 90 TABLET | Refills: 3 | Status: SHIPPED | OUTPATIENT
Start: 2022-09-26

## 2022-10-11 DIAGNOSIS — G89.29 CHRONIC BILATERAL LOW BACK PAIN WITH BILATERAL SCIATICA: ICD-10-CM

## 2022-10-11 DIAGNOSIS — M47.26 OSTEOARTHRITIS OF SPINE WITH RADICULOPATHY, LUMBAR REGION: ICD-10-CM

## 2022-10-11 DIAGNOSIS — M54.42 CHRONIC BILATERAL LOW BACK PAIN WITH BILATERAL SCIATICA: ICD-10-CM

## 2022-10-11 DIAGNOSIS — M54.41 CHRONIC BILATERAL LOW BACK PAIN WITH BILATERAL SCIATICA: ICD-10-CM

## 2022-10-11 RX ORDER — OXYCODONE HCL 20 MG/1
20 TABLET, FILM COATED, EXTENDED RELEASE ORAL EVERY 12 HOURS
Qty: 60 TABLET | Refills: 0 | Status: SHIPPED | OUTPATIENT
Start: 2022-10-13 | End: 2022-11-12

## 2022-10-11 RX ORDER — OXYCODONE AND ACETAMINOPHEN 10; 325 MG/1; MG/1
1 TABLET ORAL EVERY 6 HOURS PRN
Qty: 120 TABLET | Refills: 0 | Status: SHIPPED | OUTPATIENT
Start: 2022-10-13 | End: 2022-11-12

## 2022-10-11 NOTE — TELEPHONE ENCOUNTER
Requested Prescriptions     Pending Prescriptions Disp Refills    oxyCODONE (OXYCONTIN) 20 MG extended release tablet 60 tablet 0     Sig: Take 1 tablet by mouth in the morning and 1 tablet in the evening. Do all this for 30 days. Intended supply: 30 days. oxyCODONE-acetaminophen (PERCOCET)  MG per tablet 120 tablet 0     Sig: Take 1 tablet by mouth every 6 hours as needed for Pain (Pain) for up to 30 days.        Last Office Visit:  6/30/2022  Next Office Visit:  1/10/2023  Last Medication Refill:  BOTH 9/13/22  Aleah Paredes up to date:  9/12/22    *RX updated to reflect   10/13/22 BOTH  fill date*

## 2022-10-17 ENCOUNTER — TELEPHONE (OUTPATIENT)
Dept: PRIMARY CARE CLINIC | Age: 67
End: 2022-10-17

## 2022-10-20 DIAGNOSIS — Z12.11 SCREEN FOR COLON CANCER: Primary | ICD-10-CM

## 2022-11-10 DIAGNOSIS — M54.41 CHRONIC BILATERAL LOW BACK PAIN WITH BILATERAL SCIATICA: ICD-10-CM

## 2022-11-10 DIAGNOSIS — M54.42 CHRONIC BILATERAL LOW BACK PAIN WITH BILATERAL SCIATICA: ICD-10-CM

## 2022-11-10 DIAGNOSIS — M47.26 OSTEOARTHRITIS OF SPINE WITH RADICULOPATHY, LUMBAR REGION: ICD-10-CM

## 2022-11-10 DIAGNOSIS — G89.29 CHRONIC BILATERAL LOW BACK PAIN WITH BILATERAL SCIATICA: ICD-10-CM

## 2022-11-10 RX ORDER — OXYCODONE HCL 20 MG/1
20 TABLET, FILM COATED, EXTENDED RELEASE ORAL EVERY 12 HOURS
Qty: 60 TABLET | Refills: 0 | Status: SHIPPED | OUTPATIENT
Start: 2022-11-12 | End: 2022-12-12

## 2022-11-10 RX ORDER — OXYCODONE AND ACETAMINOPHEN 10; 325 MG/1; MG/1
1 TABLET ORAL EVERY 6 HOURS PRN
Qty: 120 TABLET | Refills: 0 | Status: SHIPPED | OUTPATIENT
Start: 2022-11-12 | End: 2022-12-12

## 2022-11-10 RX ORDER — PERPHENAZINE 16 MG/1
1 TABLET, FILM COATED ORAL DAILY
Qty: 100 EACH | Refills: 3 | Status: SHIPPED | OUTPATIENT
Start: 2022-11-10

## 2022-11-10 NOTE — TELEPHONE ENCOUNTER
Requested Prescriptions     Pending Prescriptions Disp Refills    oxyCODONE (OXYCONTIN) 20 MG extended release tablet 60 tablet 0     Sig: Take 1 tablet by mouth in the morning and 1 tablet in the evening. Do all this for 30 days. Intended supply: 30 days. oxyCODONE-acetaminophen (PERCOCET)  MG per tablet 120 tablet 0     Sig: Take 1 tablet by mouth every 6 hours as needed for Pain (Pain) for up to 30 days.        Last Office Visit:  6/30/2022  Next Office Visit:  1/10/2023  Last Medication Refill:  BOTH 10/13/22  Anjel up to date:  9/12/22    *RX updated to reflect   BOTH 11/12/22  fill date*

## 2022-11-10 NOTE — TELEPHONE ENCOUNTER
Received fax from pharmacy requesting refill on pts medication(s). Pt was last seen in office on 2022  and has a follow up scheduled for 1/3/2023. Will send request to  Santos Vargas  for authorization. Requested Prescriptions     Pending Prescriptions Disp Refills    blood glucose test strips (CONTOUR NEXT TEST) strip 100 each 3     Si each by In Vitro route daily As needed.

## 2022-11-11 ENCOUNTER — PATIENT MESSAGE (OUTPATIENT)
Dept: PRIMARY CARE CLINIC | Age: 67
End: 2022-11-11

## 2022-11-11 DIAGNOSIS — R19.5 POSITIVE COLORECTAL CANCER SCREENING USING COLOGUARD TEST: Primary | ICD-10-CM

## 2022-11-11 DIAGNOSIS — M47.26 OSTEOARTHRITIS OF SPINE WITH RADICULOPATHY, LUMBAR REGION: Primary | ICD-10-CM

## 2022-11-11 RX ORDER — OXYCODONE AND ACETAMINOPHEN 10; 325 MG/1; MG/1
1 TABLET ORAL EVERY 6 HOURS PRN
Qty: 120 TABLET | Refills: 0 | Status: SHIPPED | OUTPATIENT
Start: 2022-11-11 | End: 2022-12-11

## 2022-11-11 NOTE — TELEPHONE ENCOUNTER
From: Sepideh London  To: Adama Anna  Sent: 11/11/2022 1:54 PM CST  Subject: Colonguard    I saw the test result. Please contact me for a scheduled colonoscopy.

## 2022-11-11 NOTE — TELEPHONE ENCOUNTER
Patient called to report that [facility] does not have Percocet to fill his script. Need new script sent to [facility].

## 2022-11-14 NOTE — TELEPHONE ENCOUNTER
HEENA Yin  P [facility] [address] Clinical Staff  Please inform patient results show   Fit test is positive. Needs referral to GI.  For colonoscopy

## 2022-12-10 DIAGNOSIS — M54.42 CHRONIC BILATERAL LOW BACK PAIN WITH BILATERAL SCIATICA: ICD-10-CM

## 2022-12-10 DIAGNOSIS — G89.29 CHRONIC BILATERAL LOW BACK PAIN WITH BILATERAL SCIATICA: ICD-10-CM

## 2022-12-10 DIAGNOSIS — M47.26 OSTEOARTHRITIS OF SPINE WITH RADICULOPATHY, LUMBAR REGION: ICD-10-CM

## 2022-12-10 DIAGNOSIS — M54.41 CHRONIC BILATERAL LOW BACK PAIN WITH BILATERAL SCIATICA: ICD-10-CM

## 2022-12-10 DIAGNOSIS — E11.628 TYPE 2 DIABETES MELLITUS WITH OTHER SKIN COMPLICATION, WITHOUT LONG-TERM CURRENT USE OF INSULIN (HCC): ICD-10-CM

## 2022-12-12 ENCOUNTER — OFFICE VISIT (OUTPATIENT)
Dept: CARDIOLOGY CLINIC | Age: 67
End: 2022-12-12
Payer: MEDICARE

## 2022-12-12 VITALS
WEIGHT: 302 LBS | SYSTOLIC BLOOD PRESSURE: 140 MMHG | DIASTOLIC BLOOD PRESSURE: 84 MMHG | HEART RATE: 80 BPM | BODY MASS INDEX: 40.02 KG/M2 | HEIGHT: 73 IN

## 2022-12-12 DIAGNOSIS — E78.2 MIXED HYPERLIPIDEMIA: ICD-10-CM

## 2022-12-12 DIAGNOSIS — Z95.5 HX OF HEART ARTERY STENT: ICD-10-CM

## 2022-12-12 DIAGNOSIS — R06.09 DOE (DYSPNEA ON EXERTION): ICD-10-CM

## 2022-12-12 DIAGNOSIS — I25.10 CORONARY ARTERY DISEASE INVOLVING NATIVE CORONARY ARTERY OF NATIVE HEART WITHOUT ANGINA PECTORIS: Primary | ICD-10-CM

## 2022-12-12 DIAGNOSIS — I10 ESSENTIAL HYPERTENSION: ICD-10-CM

## 2022-12-12 DIAGNOSIS — I47.1 PAROXYSMAL SVT (SUPRAVENTRICULAR TACHYCARDIA) (HCC): ICD-10-CM

## 2022-12-12 PROCEDURE — G8484 FLU IMMUNIZE NO ADMIN: HCPCS | Performed by: INTERNAL MEDICINE

## 2022-12-12 PROCEDURE — 1036F TOBACCO NON-USER: CPT | Performed by: INTERNAL MEDICINE

## 2022-12-12 PROCEDURE — 3074F SYST BP LT 130 MM HG: CPT | Performed by: INTERNAL MEDICINE

## 2022-12-12 PROCEDURE — 3017F COLORECTAL CA SCREEN DOC REV: CPT | Performed by: INTERNAL MEDICINE

## 2022-12-12 PROCEDURE — G8427 DOCREV CUR MEDS BY ELIG CLIN: HCPCS | Performed by: INTERNAL MEDICINE

## 2022-12-12 PROCEDURE — 3078F DIAST BP <80 MM HG: CPT | Performed by: INTERNAL MEDICINE

## 2022-12-12 PROCEDURE — G8417 CALC BMI ABV UP PARAM F/U: HCPCS | Performed by: INTERNAL MEDICINE

## 2022-12-12 PROCEDURE — 1123F ACP DISCUSS/DSCN MKR DOCD: CPT | Performed by: INTERNAL MEDICINE

## 2022-12-12 PROCEDURE — 99214 OFFICE O/P EST MOD 30 MIN: CPT | Performed by: INTERNAL MEDICINE

## 2022-12-12 RX ORDER — OXYCODONE AND ACETAMINOPHEN 10; 325 MG/1; MG/1
1 TABLET ORAL EVERY 6 HOURS PRN
Qty: 120 TABLET | Refills: 0 | Status: SHIPPED | OUTPATIENT
Start: 2022-12-13 | End: 2023-01-12

## 2022-12-12 RX ORDER — OXYCODONE HCL 20 MG/1
20 TABLET, FILM COATED, EXTENDED RELEASE ORAL EVERY 12 HOURS
Qty: 60 TABLET | Refills: 0 | Status: SHIPPED | OUTPATIENT
Start: 2022-12-13 | End: 2023-01-12

## 2022-12-12 ASSESSMENT — ENCOUNTER SYMPTOMS
RESPIRATORY NEGATIVE: 1
SHORTNESS OF BREATH: 0
NAUSEA: 0
VOMITING: 0
GASTROINTESTINAL NEGATIVE: 1
DIARRHEA: 0
EYES NEGATIVE: 1

## 2022-12-12 NOTE — PROGRESS NOTES
[facility] Progress Note                            Date:  12/12/2022  Patient: Dariusz Lagunas  Age:  79 y. o., 1955      Reason for evaluation:         SUBJECTIVE:    Returns today follow-up assessment coronary artery disease previous stent paroxysmal SVT hypertension hyperlipidemia overall doing reasonably well. Denies exertional dyspnea or chest pain. A few brief instances of his heart racing from time to time infrequent. Most recent LDL cholesterol was 75 back in June. We will repeat. I would note that he does have some muscle aching that he attributes to the statin agent and and lieu of increasing the dose or changing to a more potent statin agent might consider Repatha treatment after review of his next lipid profile. Blood pressure 140/84 heart rate 80. Review of Systems   Constitutional: Negative. Negative for chills, fever and unexpected weight change. HENT: Negative. Eyes: Negative. Respiratory: Negative. Negative for shortness of breath. Cardiovascular: Negative. Negative for chest pain. Gastrointestinal: Negative. Negative for diarrhea, nausea and vomiting. Endocrine: Negative. Genitourinary: Negative. Musculoskeletal: Negative. Skin: Negative. Neurological: Negative. All other systems reviewed and are negative. OBJECTIVE:     BP (!) 140/84   Pulse 80   Ht 6' 1\" (1.854 m)   Wt (!) 302 lb (137 kg)   BMI 39.84 kg/m²     Labs:   CBC: No results for input(s): WBC, HGB, HCT, PLT in the last 72 hours. BMP:No results for input(s): NA, K, CO2, BUN, CREATININE, LABGLOM, GLUCOSE in the last 72 hours. BNP: No results for input(s): BNP in the last 72 hours. PT/INR: No results for input(s): PROTIME, INR in the last 72 hours. APTT:No results for input(s): APTT in the last 72 hours. CARDIAC ENZYMES:No results for input(s): CKTOTAL, CKMB, CKMBINDEX, TROPONINI in the last 72 hours.   FASTING LIPID PANEL:  Lab Results   Component Value Date/Time    HDL 49 06/29/2022 07:13 AM    LDLCALC 75 06/29/2022 07:13 AM    TRIG 92 06/29/2022 07:13 AM     LIVER PROFILE:No results for input(s): AST, ALT, LABALBU in the last 72 hours.         Past Medical History:   Diagnosis Date    Acute sinusitis     Anxiety     Chronic back pain     Chronic bilateral low back pain with bilateral sciatica     Constipation     COPD (chronic obstructive pulmonary disease) (HCC)     Coronary artery disease involving native coronary artery of native heart without angina pectoris 8/27/2019    CPAP (continuous positive airway pressure) dependence     12cm to 20cm    Depression     Hyperlipidemia     Hypertensive disorder     OA (osteoarthritis of the spine)     Obesity     SUSANA (obstructive sleep apnea)     AHI:  85.3 per HST 11/2013    Osteoarthritis of spine with radiculopathy, lumbar region     Otitis media     Rheumatoid arthritis ([facility] [address]     Spinal stenosis     Type 2 diabetes mellitus ([facility] [address] 03/23/2016    Unspecified sleep apnea     uses bipap    Vertigo      Past Surgical History:   Procedure Laterality Date    ADENOIDECTOMY      BROW LIFT      due to vision    CARDIAC CATHETERIZATION  10/24/2018         COLONOSCOPY  30 yrs ago    [place], [address], normal per patient    HERNIA REPAIR      KNEE ARTHROSCOPY Left 2009    SC NJX DX/THER SBST EPIDURAL/SUBARACH LUMBAR/SACRAL N/A 10/3/2017    L3-4 EPIDURAL STEROID INJECTION performed by Laura Alejo at [address] [facility] OR    TONSILLECTOMY       Family History   Problem Relation Age of Onset    Heart Disease Father 64    Cancer Father         malignant tumor of pharynx    Diabetes Mother     Hypertension Mother     Diabetes Sister         x 2    Diabetes Brother     Colon Cancer Neg Hx     Colon Polyps Neg Hx     Liver Disease Neg Hx     Rectal Cancer Neg Hx     Stomach Cancer Neg Hx      Allergies   Allergen Reactions    Clindamycin/Lincomycin Anaphylaxis    Benadryl [Diphenhydramine] Other (See Comments)     \"makes legs jump around\" Neosporin Plus Max St     Prednisone      Current Outpatient Medications   Medication Sig Dispense Refill    metFORMIN (GLUCOPHAGE) 1000 MG tablet Take 1 tablet by mouth 2 times daily (with meals) 180 tablet 3    oxyCODONE (OXYCONTIN) 20 MG extended release tablet Take 1 tablet by mouth in the morning and 1 tablet in the evening. Do all this for 30 days. Intended supply: 30 days. 60 tablet 0    oxyCODONE-acetaminophen (PERCOCET)  MG per tablet Take 1 tablet by mouth every 6 hours as needed for Pain (Pain) for up to 30 days. 120 tablet 0    blood glucose test strips (CONTOUR NEXT TEST) strip 1 each by In Vitro route daily As needed. 100 each 3    Diabetic Shoe MISC by Does not apply route 1 each 0    hydroCHLOROthiazide (HYDRODIURIL) 25 MG tablet TAKE 1 TABLET BY MOUTH EVERY DAY 90 tablet 3    cyclobenzaprine (FLEXERIL) 10 MG tablet TAKE 1 TABLET BY MOUTH THREE TIMES A DAY AS NEEDED FOR MUSCLE SPASMS 90 tablet 5    INCRUSE ELLIPTA 62.5 MCG/INH AEPB INHALE 1 PUFF INTO LUNGS EVERYD AY 90 each 3    verapamil (VERELAN) 200 MG extended release capsule Take 1 capsule by mouth daily 90 capsule 3    albuterol sulfate HFA (PROVENTIL;VENTOLIN;PROAIR) 108 (90 Base) MCG/ACT inhaler Inhale 2 puffs into the lungs every 6 hours as needed for Wheezing 25.5 each 3    zolpidem (AMBIEN) 10 MG tablet Take by mouth nightly as needed for Sleep. ALPRAZolam (XANAX) 1 MG tablet Take 1 mg by mouth 3 times daily as needed for Sleep.  1 1/2 tablet TID      aspirin 81 MG chewable tablet Take 1 tablet by mouth daily 90 tablet 3    lovastatin (MEVACOR) 40 MG tablet TAKE 1 TABLET BY MOUTH EVERYNIGHT (Patient taking differently: Take 40 mg by mouth nightly) 90 tablet 3    Misc Natural Products (OSTEO BI-FLEX ADV DOUBLE ST) TABS Take by mouth daily      fluticasone (FLONASE) 50 MCG/ACT nasal spray 2 sprays by Nasal route daily 3 Bottle 3    MICROLET LANCETS MISC 1 each by Does not apply route daily 100 each 3    hydroxychloroquine (PLAQUENIL) 200 MG tablet Take 400 mg by mouth daily   2    VOLTAREN 1 % GEL APPLY 2 GRAMS TO HAND QID  5    polyethylene glycol (MIRALAX) POWD powder Take 17 g by mouth every other day        No current facility-administered medications for this visit. Social History     Socioeconomic History    Marital status:      Spouse name: Not on file    Number of children: Not on file    Years of education: Not on file    Highest education level: Not on file   Occupational History    Not on file   Tobacco Use    Smoking status: Former     Packs/day: 0.50     Years: 20.00     Pack years: 10.00     Types: Cigarettes     Start date: 0     Quit date:      Years since quittin.9    Smokeless tobacco: Never   Vaping Use    Vaping Use: Some days    Substances: Occasionally    Devices: Disposable   Substance and Sexual Activity    Alcohol use: No    Drug use: No    Sexual activity: Not on file   Other Topics Concern    Not on file   Social History Narrative    Not on file     Social Determinants of Health     Financial Resource Strain: Low Risk     Difficulty of Paying Living Expenses: Not hard at all   Food Insecurity: No Food Insecurity    Worried About Running Out of Food in the Last Year: Never true    Ran Out of Food in the Last Year: Never true   Transportation Needs: Not on file   Physical Activity: Not on file   Stress: Not on file   Social Connections: Not on file   Intimate Partner Violence: Not on file   Housing Stability: Not on file       Physical Examination:  BP (!) 140/84   Pulse 80   Ht 6' 1\" (1.854 m)   Wt (!) 302 lb (137 kg)   BMI 39.84 kg/m²   Physical Exam  Vitals reviewed. Constitutional:       Appearance: He is well-developed. Neck:      Vascular: No carotid bruit or JVD. Cardiovascular:      Rate and Rhythm: Normal rate and regular rhythm. Heart sounds: Normal heart sounds. No murmur heard. No friction rub. No gallop.    Pulmonary:      Effort: Pulmonary effort is normal. No respiratory distress. Breath sounds: Normal breath sounds. No wheezing or rales. Abdominal:      General: There is no distension. Tenderness: There is no abdominal tenderness. Lymphadenopathy:      Cervical: No cervical adenopathy. Skin:     General: Skin is warm and dry. ASSESSMENT:     Diagnosis Orders   1. Coronary artery disease involving native coronary artery of native heart without angina pectoris  Lipid Panel      2. Essential hypertension  Lipid Panel      3. Mixed hyperlipidemia  Lipid Panel      4. Paroxysmal SVT (supraventricular tachycardia) (HCC)  Lipid Panel      5. RECIO (dyspnea on exertion)  Lipid Panel      6. Hx of heart artery stent            PLAN:  Orders Placed This Encounter   Procedures    Lipid Panel       No orders of the defined types were placed in this encounter. Continue present medications  Fasting lipid profile  Follow-up assessment in 6 months    Return in about 6 months (around 6/12/2023) for return to Dr. Janice Matute only. Ike Lin MD 12/12/2022 1:32 PM [facility]      Thisdictation was generated by voice recognition computer software. Although all attempts are made to edit the dictation for accuracy, there may be errors in the transcription that are not intended.

## 2022-12-12 NOTE — TELEPHONE ENCOUNTER
Requested Prescriptions     Pending Prescriptions Disp Refills    oxyCODONE (OXYCONTIN) 20 MG extended release tablet 60 tablet 0     Sig: Take 1 tablet by mouth in the morning and 1 tablet in the evening. Do all this for 30 days. Intended supply: 30 days. oxyCODONE-acetaminophen (PERCOCET)  MG per tablet 120 tablet 0     Sig: Take 1 tablet by mouth every 6 hours as needed for Pain (Pain) for up to 30 days.        Last Office Visit:  6/30/2022  Next Office Visit:  1/10/2023  Last Medication Refill:  11/12/22  Cari Rich up to date:  12/12/22    *RX updated to reflect   12/13/22  fill date*

## 2022-12-12 NOTE — TELEPHONE ENCOUNTER
Received fax from pharmacy requesting refill on pts medication(s). Pt was last seen in office on 7/1/2022  and has a follow up scheduled for 1/3/2023. Will send request to  Garrett Soriano  for authorization.      Requested Prescriptions     Pending Prescriptions Disp Refills    metFORMIN (GLUCOPHAGE) 1000 MG tablet 180 tablet 3     Sig: Take 1 tablet by mouth 2 times daily (with meals)

## 2022-12-15 DIAGNOSIS — I10 ESSENTIAL HYPERTENSION: ICD-10-CM

## 2022-12-15 DIAGNOSIS — R06.09 DOE (DYSPNEA ON EXERTION): ICD-10-CM

## 2022-12-15 DIAGNOSIS — I25.10 CORONARY ARTERY DISEASE INVOLVING NATIVE CORONARY ARTERY OF NATIVE HEART WITHOUT ANGINA PECTORIS: ICD-10-CM

## 2022-12-15 DIAGNOSIS — E78.2 MIXED HYPERLIPIDEMIA: ICD-10-CM

## 2022-12-15 DIAGNOSIS — I47.1 PAROXYSMAL SVT (SUPRAVENTRICULAR TACHYCARDIA) (HCC): ICD-10-CM

## 2022-12-15 LAB
CHOLESTEROL, TOTAL: 150 MG/DL (ref 160–199)
HDLC SERPL-MCNC: 51 MG/DL (ref 55–121)
LDL CHOLESTEROL CALCULATED: 78 MG/DL
TRIGL SERPL-MCNC: 103 MG/DL (ref 0–149)

## 2023-01-03 ENCOUNTER — OFFICE VISIT (OUTPATIENT)
Dept: PRIMARY CARE CLINIC | Age: 68
End: 2023-01-03
Payer: MEDICARE

## 2023-01-03 VITALS
HEIGHT: 73 IN | SYSTOLIC BLOOD PRESSURE: 126 MMHG | OXYGEN SATURATION: 94 % | HEART RATE: 90 BPM | TEMPERATURE: 98.8 F | BODY MASS INDEX: 40.49 KG/M2 | WEIGHT: 305.5 LBS | DIASTOLIC BLOOD PRESSURE: 86 MMHG

## 2023-01-03 DIAGNOSIS — I47.1 PAROXYSMAL SVT (SUPRAVENTRICULAR TACHYCARDIA) (HCC): ICD-10-CM

## 2023-01-03 DIAGNOSIS — Z12.5 SCREENING PSA (PROSTATE SPECIFIC ANTIGEN): ICD-10-CM

## 2023-01-03 DIAGNOSIS — M05.741 RHEUMATOID ARTHRITIS INVOLVING BOTH HANDS WITH POSITIVE RHEUMATOID FACTOR (HCC): ICD-10-CM

## 2023-01-03 DIAGNOSIS — I25.10 CORONARY ARTERY DISEASE INVOLVING NATIVE CORONARY ARTERY OF NATIVE HEART WITHOUT ANGINA PECTORIS: ICD-10-CM

## 2023-01-03 DIAGNOSIS — M05.742 RHEUMATOID ARTHRITIS INVOLVING BOTH HANDS WITH POSITIVE RHEUMATOID FACTOR (HCC): ICD-10-CM

## 2023-01-03 DIAGNOSIS — E11.628 TYPE 2 DIABETES MELLITUS WITH OTHER SKIN COMPLICATION, WITHOUT LONG-TERM CURRENT USE OF INSULIN (HCC): Primary | ICD-10-CM

## 2023-01-03 DIAGNOSIS — E11.628 TYPE 2 DIABETES MELLITUS WITH OTHER SKIN COMPLICATION, WITHOUT LONG-TERM CURRENT USE OF INSULIN (HCC): ICD-10-CM

## 2023-01-03 DIAGNOSIS — E78.2 MIXED HYPERLIPIDEMIA: ICD-10-CM

## 2023-01-03 DIAGNOSIS — E66.01 OBESITY, CLASS III, BMI 40-49.9 (MORBID OBESITY) (HCC): ICD-10-CM

## 2023-01-03 DIAGNOSIS — B35.9 TINEA: ICD-10-CM

## 2023-01-03 LAB
ALBUMIN SERPL-MCNC: 4.7 G/DL (ref 3.5–5.2)
ALP BLD-CCNC: 79 U/L (ref 40–130)
ALT SERPL-CCNC: 16 U/L (ref 5–41)
ANION GAP SERPL CALCULATED.3IONS-SCNC: 14 MMOL/L (ref 7–19)
AST SERPL-CCNC: 14 U/L (ref 5–40)
BASOPHILS ABSOLUTE: 0 K/UL (ref 0–0.2)
BASOPHILS RELATIVE PERCENT: 0.6 % (ref 0–1)
BILIRUB SERPL-MCNC: 0.3 MG/DL (ref 0.2–1.2)
BUN BLDV-MCNC: 12 MG/DL (ref 8–23)
CALCIUM SERPL-MCNC: 10.1 MG/DL (ref 8.8–10.2)
CHLORIDE BLD-SCNC: 99 MMOL/L (ref 98–111)
CO2: 29 MMOL/L (ref 22–29)
CREAT SERPL-MCNC: 0.8 MG/DL (ref 0.5–1.2)
EOSINOPHILS ABSOLUTE: 0.1 K/UL (ref 0–0.6)
EOSINOPHILS RELATIVE PERCENT: 2.5 % (ref 0–5)
GFR SERPL CREATININE-BSD FRML MDRD: >60 ML/MIN/{1.73_M2}
GLUCOSE BLD-MCNC: 140 MG/DL (ref 74–109)
HBA1C MFR BLD: 6.5 % (ref 4–6)
HCT VFR BLD CALC: 48.6 % (ref 42–52)
HEMOGLOBIN: 15.2 G/DL (ref 14–18)
IMMATURE GRANULOCYTES #: 0 K/UL
LYMPHOCYTES ABSOLUTE: 1.5 K/UL (ref 1.1–4.5)
LYMPHOCYTES RELATIVE PERCENT: 31.8 % (ref 20–40)
MCH RBC QN AUTO: 28.5 PG (ref 27–31)
MCHC RBC AUTO-ENTMCNC: 31.3 G/DL (ref 33–37)
MCV RBC AUTO: 91.2 FL (ref 80–94)
MONOCYTES ABSOLUTE: 0.7 K/UL (ref 0–0.9)
MONOCYTES RELATIVE PERCENT: 14.1 % (ref 0–10)
NEUTROPHILS ABSOLUTE: 2.4 K/UL (ref 1.5–7.5)
NEUTROPHILS RELATIVE PERCENT: 50.6 % (ref 50–65)
PDW BLD-RTO: 14.2 % (ref 11.5–14.5)
PLATELET # BLD: 163 K/UL (ref 130–400)
PMV BLD AUTO: 12.3 FL (ref 9.4–12.4)
POTASSIUM SERPL-SCNC: 5.2 MMOL/L (ref 3.5–5)
PROSTATE SPECIFIC ANTIGEN: 2.98 NG/ML (ref 0–4)
RBC # BLD: 5.33 M/UL (ref 4.7–6.1)
SODIUM BLD-SCNC: 142 MMOL/L (ref 136–145)
T4 FREE: 1.14 NG/DL (ref 0.93–1.7)
TOTAL PROTEIN: 7.4 G/DL (ref 6.6–8.7)
TSH SERPL DL<=0.05 MIU/L-ACNC: 1.91 UIU/ML (ref 0.27–4.2)
WBC # BLD: 4.8 K/UL (ref 4.8–10.8)

## 2023-01-03 PROCEDURE — 3078F DIAST BP <80 MM HG: CPT | Performed by: NURSE PRACTITIONER

## 2023-01-03 PROCEDURE — 1036F TOBACCO NON-USER: CPT | Performed by: NURSE PRACTITIONER

## 2023-01-03 PROCEDURE — 3044F HG A1C LEVEL LT 7.0%: CPT | Performed by: NURSE PRACTITIONER

## 2023-01-03 PROCEDURE — G8417 CALC BMI ABV UP PARAM F/U: HCPCS | Performed by: NURSE PRACTITIONER

## 2023-01-03 PROCEDURE — G8484 FLU IMMUNIZE NO ADMIN: HCPCS | Performed by: NURSE PRACTITIONER

## 2023-01-03 PROCEDURE — 3017F COLORECTAL CA SCREEN DOC REV: CPT | Performed by: NURSE PRACTITIONER

## 2023-01-03 PROCEDURE — G8427 DOCREV CUR MEDS BY ELIG CLIN: HCPCS | Performed by: NURSE PRACTITIONER

## 2023-01-03 PROCEDURE — 3074F SYST BP LT 130 MM HG: CPT | Performed by: NURSE PRACTITIONER

## 2023-01-03 PROCEDURE — 99214 OFFICE O/P EST MOD 30 MIN: CPT | Performed by: NURSE PRACTITIONER

## 2023-01-03 PROCEDURE — 2022F DILAT RTA XM EVC RTNOPTHY: CPT | Performed by: NURSE PRACTITIONER

## 2023-01-03 PROCEDURE — 1123F ACP DISCUSS/DSCN MKR DOCD: CPT | Performed by: NURSE PRACTITIONER

## 2023-01-03 RX ORDER — TERBINAFINE HYDROCHLORIDE 250 MG/1
250 TABLET ORAL DAILY
Qty: 30 TABLET | Refills: 2 | Status: SHIPPED | OUTPATIENT
Start: 2023-01-03 | End: 2023-02-02

## 2023-01-03 ASSESSMENT — PATIENT HEALTH QUESTIONNAIRE - PHQ9
SUM OF ALL RESPONSES TO PHQ QUESTIONS 1-9: 0
1. LITTLE INTEREST OR PLEASURE IN DOING THINGS: 0
SUM OF ALL RESPONSES TO PHQ QUESTIONS 1-9: 0
SUM OF ALL RESPONSES TO PHQ QUESTIONS 1-9: 0
SUM OF ALL RESPONSES TO PHQ9 QUESTIONS 1 & 2: 0
2. FEELING DOWN, DEPRESSED OR HOPELESS: 0
SUM OF ALL RESPONSES TO PHQ QUESTIONS 1-9: 0

## 2023-01-03 NOTE — PROGRESS NOTES
Tessa Herrera (:  1955) is a 79 y.o. male,Established patient, here for evaluation of the following chief complaint(s):  6 Month Follow-Up (Diabetic follow up. Patient states he will discuss issues and concerns with Julien Epstein. )      ASSESSMENT/PLAN:    ICD-10-CM    1. Type 2 diabetes mellitus with other skin complication, without long-term current use of insulin ([facility])  E11.628 CBC with Auto Differential     Comprehensive Metabolic Panel     Hemoglobin A1C     TSH     T4, Free     Microalbumin / Creatinine Urine Ratio     Urinalysis with Reflex to Culture      2. Tinea  B35.9 terbinafine (LAMISIL) 250 MG tablet      3. Obesity, Class III, BMI 40-49.9 (morbid obesity) ([facility])  E66.01       4. Rheumatoid arthritis involving both hands with positive rheumatoid factor ([facility])  M05.741     M05.742       5. Paroxysmal SVT (supraventricular tachycardia) ([facility])  I47.1       6. Screening PSA (prostate specific antigen)  Z12.5 PSA Screening      7. Coronary artery disease involving native coronary artery of native heart without angina pectoris  I25.10 Evolocumab 140 MG/ML SOAJ      8. Mixed hyperlipidemia  E78.2           Return in about 6 months (around 7/3/2023) for awv. SUBJECTIVE/OBJECTIVE:  HPI  Treatment Adherence:   Medication compliance:  compliant all of the time  Diet compliance:  compliant most of the time  Weight trend: decreasing        Diabetes Mellitus Type 2: Current symptoms/problems include none. Home blood sugar records: patient does not test  Any episodes of hypoglycemia? no  Eye exam current (within one year): no  Tobacco history: He  reports that he quit smoking about 21 years ago. His smoking use included cigarettes. He started smoking about 41 years ago. He has a 10.00 pack-year smoking history. He has never used smokeless tobacco.   Daily Aspirin? Yes        Hypertension:  Home blood pressure monitoring: No.  He is adherent to a low sodium diet. Patient denies chest pain.   Antihypertensive medication side effects: no medication side effects noted. Use of agents associated with hypertension: none. Hyperlipidemia:  No new myalgias or GI upset on lovastatin (Mevacor). He could not tolerate high intensity statins. Will see if we can get repatha approved for him    Rash  Very red excoriation in groin area  . Has used all of the otc medications creams and powders. Has been going on for 6 months. Review of Systems   Constitutional:  Negative for appetite change and unexpected weight change. HENT:  Negative for congestion, ear pain, rhinorrhea and sore throat. Eyes:  Negative for discharge and redness. Respiratory:  Negative for cough, choking and wheezing. Cardiovascular:  Negative for chest pain. Gastrointestinal:  Negative for blood in stool, constipation and diarrhea. Genitourinary:  Negative for decreased urine volume and dysuria. Musculoskeletal:  Positive for arthralgias and back pain. Skin:  Positive for rash. Neurological:  Negative for weakness. Hematological:  Negative for adenopathy. Psychiatric/Behavioral:  Negative for suicidal ideas. /86 (Site: Left Upper Arm, Position: Sitting, Cuff Size: Large Adult)   Pulse 90   Temp 98.8 °F (37.1 °C) (Temporal)   Ht 6' 1\" (1.854 m)   Wt (!) 305 lb 8 oz (138.6 kg)   SpO2 94%   BMI 40.31 kg/m²    Physical Exam  Vitals reviewed. Constitutional:       General: He is not in acute distress. Appearance: Normal appearance. He is well-developed. He is obese. He is not ill-appearing. HENT:      Head: Normocephalic. Nose: No congestion. Mouth/Throat:      Pharynx: No posterior oropharyngeal erythema. Eyes:      General:         Right eye: No discharge. Left eye: No discharge. Conjunctiva/sclera: Conjunctivae normal.   Cardiovascular:      Rate and Rhythm: Normal rate and regular rhythm. Pulses: Normal pulses. Heart sounds: Normal heart sounds. No murmur heard.   Pulmonary: Effort: Pulmonary effort is normal. No respiratory distress. Breath sounds: Normal breath sounds. No rhonchi. Abdominal:      General: Bowel sounds are normal.      Palpations: Abdomen is soft. Tenderness: There is no abdominal tenderness. Musculoskeletal:      Cervical back: Neck supple. Tenderness present. No muscular tenderness. Decreased range of motion. Thoracic back: Tenderness present. Lumbar back: Tenderness present. Decreased range of motion. Lymphadenopathy:      Cervical: No cervical adenopathy. Skin:     General: Skin is warm and dry. Capillary Refill: Capillary refill takes less than 2 seconds. Findings: Rash (groin) present. Neurological:      General: No focal deficit present. Mental Status: He is alert and oriented to person, place, and time. Psychiatric:         Mood and Affect: Mood normal.         Behavior: Behavior normal.               An electronic signature was used to authenticate this note.     --HEENA Pisano

## 2023-01-04 ENCOUNTER — TELEPHONE (OUTPATIENT)
Dept: PRIMARY CARE CLINIC | Age: 68
End: 2023-01-04

## 2023-01-04 NOTE — TELEPHONE ENCOUNTER
Pt wife called & LVM stating the pharmacy does not have their medication, called pharmacy this is ready will call pt and let them know.  Pt aware

## 2023-01-05 ENCOUNTER — TELEPHONE (OUTPATIENT)
Dept: PRIMARY CARE CLINIC | Age: 68
End: 2023-01-05

## 2023-01-05 DIAGNOSIS — E87.5 SERUM POTASSIUM ELEVATED: Primary | ICD-10-CM

## 2023-01-05 DIAGNOSIS — R73.09 ELEVATED GLUCOSE: ICD-10-CM

## 2023-01-05 NOTE — TELEPHONE ENCOUNTER
----- Message from HEENA Chi sent at 1/4/2023  7:40 AM CST -----  Please call patient and let them know results. Normal thyroid  Normal prostate  Blood sugars well controlled and J9D is 6.5  Metabolic panel is normal except for elevated blood sugar at 140 and mild elevated potassium at 5.2. Recommend decreasing consumption of potassium containing foods including coffee.   Repeat BMP in 1 week  Normal blood counts

## 2023-01-06 ASSESSMENT — ENCOUNTER SYMPTOMS
COUGH: 0
CHOKING: 0
BLOOD IN STOOL: 0
BACK PAIN: 1
DIARRHEA: 0
SORE THROAT: 0
EYE REDNESS: 0
RHINORRHEA: 0
CONSTIPATION: 0
EYE DISCHARGE: 0
WHEEZING: 0

## 2023-01-09 DIAGNOSIS — M54.42 CHRONIC BILATERAL LOW BACK PAIN WITH BILATERAL SCIATICA: ICD-10-CM

## 2023-01-09 DIAGNOSIS — M47.26 OSTEOARTHRITIS OF SPINE WITH RADICULOPATHY, LUMBAR REGION: ICD-10-CM

## 2023-01-09 DIAGNOSIS — M54.41 CHRONIC BILATERAL LOW BACK PAIN WITH BILATERAL SCIATICA: ICD-10-CM

## 2023-01-09 DIAGNOSIS — I25.10 CORONARY ARTERY DISEASE INVOLVING NATIVE CORONARY ARTERY OF NATIVE HEART WITHOUT ANGINA PECTORIS: ICD-10-CM

## 2023-01-09 DIAGNOSIS — G89.29 CHRONIC BILATERAL LOW BACK PAIN WITH BILATERAL SCIATICA: ICD-10-CM

## 2023-01-09 NOTE — TELEPHONE ENCOUNTER
Requested Prescriptions     Pending Prescriptions Disp Refills    oxyCODONE (OXYCONTIN) 20 MG extended release tablet 60 tablet 0     Sig: Take 1 tablet by mouth in the morning and 1 tablet in the evening. Do all this for 30 days. Intended supply: 30 days. oxyCODONE-acetaminophen (PERCOCET)  MG per tablet 120 tablet 0     Sig: Take 1 tablet by mouth every 6 hours as needed for Pain (Pain) for up to 30 days.        Last Office Visit:  6/30/2022  Next Office Visit:  1/10/2023  Last Medication Refill: 12/13/2022 on both scripts     Cassandra Green up to date:  12/12/2022     *RX updated to reflect   1/12/2023   fill date*      Patient needs to be seen

## 2023-01-10 ENCOUNTER — OFFICE VISIT (OUTPATIENT)
Dept: NEUROLOGY | Age: 68
End: 2023-01-10
Payer: MEDICARE

## 2023-01-10 VITALS
SYSTOLIC BLOOD PRESSURE: 124 MMHG | DIASTOLIC BLOOD PRESSURE: 66 MMHG | OXYGEN SATURATION: 96 % | HEART RATE: 94 BPM | WEIGHT: 305 LBS | HEIGHT: 73 IN | BODY MASS INDEX: 40.42 KG/M2

## 2023-01-10 DIAGNOSIS — G89.29 CHRONIC BILATERAL LOW BACK PAIN WITH BILATERAL SCIATICA: ICD-10-CM

## 2023-01-10 DIAGNOSIS — M54.42 CHRONIC BILATERAL LOW BACK PAIN WITH BILATERAL SCIATICA: ICD-10-CM

## 2023-01-10 DIAGNOSIS — G47.33 OSA (OBSTRUCTIVE SLEEP APNEA): ICD-10-CM

## 2023-01-10 DIAGNOSIS — M05.742 RHEUMATOID ARTHRITIS INVOLVING BOTH HANDS WITH POSITIVE RHEUMATOID FACTOR (HCC): ICD-10-CM

## 2023-01-10 DIAGNOSIS — M05.741 RHEUMATOID ARTHRITIS INVOLVING BOTH HANDS WITH POSITIVE RHEUMATOID FACTOR (HCC): ICD-10-CM

## 2023-01-10 DIAGNOSIS — M47.26 OSTEOARTHRITIS OF SPINE WITH RADICULOPATHY, LUMBAR REGION: Primary | ICD-10-CM

## 2023-01-10 DIAGNOSIS — G47.00 INSOMNIA, UNSPECIFIED TYPE: ICD-10-CM

## 2023-01-10 DIAGNOSIS — F41.9 ANXIETY: ICD-10-CM

## 2023-01-10 DIAGNOSIS — M54.41 CHRONIC BILATERAL LOW BACK PAIN WITH BILATERAL SCIATICA: ICD-10-CM

## 2023-01-10 PROCEDURE — 3074F SYST BP LT 130 MM HG: CPT | Performed by: PSYCHIATRY & NEUROLOGY

## 2023-01-10 PROCEDURE — 1123F ACP DISCUSS/DSCN MKR DOCD: CPT | Performed by: PSYCHIATRY & NEUROLOGY

## 2023-01-10 PROCEDURE — G8484 FLU IMMUNIZE NO ADMIN: HCPCS | Performed by: PSYCHIATRY & NEUROLOGY

## 2023-01-10 PROCEDURE — 1036F TOBACCO NON-USER: CPT | Performed by: PSYCHIATRY & NEUROLOGY

## 2023-01-10 PROCEDURE — 99213 OFFICE O/P EST LOW 20 MIN: CPT | Performed by: PSYCHIATRY & NEUROLOGY

## 2023-01-10 PROCEDURE — 3078F DIAST BP <80 MM HG: CPT | Performed by: PSYCHIATRY & NEUROLOGY

## 2023-01-10 PROCEDURE — G8417 CALC BMI ABV UP PARAM F/U: HCPCS | Performed by: PSYCHIATRY & NEUROLOGY

## 2023-01-10 PROCEDURE — 3017F COLORECTAL CA SCREEN DOC REV: CPT | Performed by: PSYCHIATRY & NEUROLOGY

## 2023-01-10 PROCEDURE — G8427 DOCREV CUR MEDS BY ELIG CLIN: HCPCS | Performed by: PSYCHIATRY & NEUROLOGY

## 2023-01-10 RX ORDER — OXYCODONE HCL 20 MG/1
20 TABLET, FILM COATED, EXTENDED RELEASE ORAL EVERY 12 HOURS
Qty: 60 TABLET | Refills: 0 | Status: SHIPPED | OUTPATIENT
Start: 2023-01-12 | End: 2023-02-07 | Stop reason: SDUPTHER

## 2023-01-10 RX ORDER — OXYCODONE AND ACETAMINOPHEN 10; 325 MG/1; MG/1
1 TABLET ORAL EVERY 6 HOURS PRN
Qty: 120 TABLET | Refills: 0 | Status: SHIPPED | OUTPATIENT
Start: 2023-01-12 | End: 2023-02-07 | Stop reason: SDUPTHER

## 2023-01-10 NOTE — PROGRESS NOTES
[facility]  [address] [place] [address] Drive, [address]  [facility], [facility] [address]  Phone [phone]  Fax [phone]     [facility] Follow Up Encounter  1/10/23 3:01 PM CST    Information:   Patient Name: Lenore Abernathy  :   1955  Age:   79 y.o. MRN:   [id number]  Account #:  [de-identified]  Today:  1/10/23    Provider: Steven Alexandre M.D. Chief Complaint:   Chief Complaint   Patient presents with    6 Month Follow-Up       Subjective:   Lenore Abernathy is a 79 y.o. man with a history of chronic back pain, RA, SUSANA, and anxiety who is following up. He has had several health concerns but his back pain is about the same. The medications work well for him. He has developed jock itch and is now using Lamisil. He has had profuse sweating. He complains of insomnia. He uses his BiPAP every night. He sleeps well. He remains physically active.       Objective:     Past Medical History:  Past Medical History:   Diagnosis Date    Acute sinusitis     Anxiety     Chronic back pain     Chronic bilateral low back pain with bilateral sciatica     Constipation     COPD (chronic obstructive pulmonary disease) (HCC)     Coronary artery disease involving native coronary artery of native heart without angina pectoris 2019    CPAP (continuous positive airway pressure) dependence     12cm to 20cm    Depression     Hyperlipidemia     Hypertensive disorder     OA (osteoarthritis of the spine)     Obesity     SUSANA (obstructive sleep apnea)     AHI:  85.3 per HST 2013    Osteoarthritis of spine with radiculopathy, lumbar region     Otitis media     Rheumatoid arthritis ([address]     Spinal stenosis     Type 2 diabetes mellitus ([address] 2016    Unspecified sleep apnea     uses bipap    Vertigo        Past Surgical History:   Procedure Laterality Date    ADENOIDECTOMY      BROW LIFT      due to vision    CARDIAC CATHETERIZATION  10/24/2018         COLONOSCOPY  30 yrs ago    [address], normal per patient HERNIA REPAIR      KNEE ARTHROSCOPY Left 2009    NC NJX DX/THER SBST EPIDURAL/SUBARACH LUMBAR/SACRAL N/A 10/3/2017    L3-4 EPIDURAL STEROID INJECTION performed by Sarthak Alejo at [facility] OR    TONSILLECTOMY         Recent Hospitalizations  None    Significant Injuries  None    Habits  Darrian Batres reports that he quit smoking about 22 years ago. His smoking use included cigarettes. He started smoking about 42 years ago. He has a 10.00 pack-year smoking history. He has never used smokeless tobacco. He reports that he does not drink alcohol and does not use drugs.    Family History   Problem Relation Age of Onset    Heart Disease Father 56    Cancer Father         malignant tumor of pharynx    Diabetes Mother     Hypertension Mother     Diabetes Sister         x 2    Diabetes Brother     Colon Cancer Neg Hx     Colon Polyps Neg Hx     Liver Disease Neg Hx     Rectal Cancer Neg Hx     Stomach Cancer Neg Hx        Social History  Darrian Batres is , lives in [place], [place], and is disabled.    Medications:  Current Outpatient Medications   Medication Sig Dispense Refill    terbinafine (LAMISIL) 250 MG tablet Take 1 tablet by mouth daily 30 tablet 2    Evolocumab 140 MG/ML SOAJ Inject 140 mg into the skin every 14 days 2 Adjustable Dose Pre-filled Pen Syringe 5    metFORMIN (GLUCOPHAGE) 1000 MG tablet Take 1 tablet by mouth 2 times daily (with meals) 180 tablet 3    oxyCODONE (OXYCONTIN) 20 MG extended release tablet Take 1 tablet by mouth in the morning and 1 tablet in the evening. Do all this for 30 days. Intended supply: 30 days. 60 tablet 0    oxyCODONE-acetaminophen (PERCOCET)  MG per tablet Take 1 tablet by mouth every 6 hours as needed for Pain (Pain) for up to 30 days. 120 tablet 0    blood glucose test strips (CONTOUR NEXT TEST) strip 1 each by In Vitro route daily As needed. 100 each 3    Diabetic Shoe MISC by Does not apply route 1 each 0    hydroCHLOROthiazide (HYDRODIURIL) 25 MG tablet TAKE 1  TABLET BY MOUTH EVERY DAY 90 tablet 3    cyclobenzaprine (FLEXERIL) 10 MG tablet TAKE 1 TABLET BY MOUTH THREE TIMES A DAY AS NEEDED FOR MUSCLE SPASMS 90 tablet 5    INCRUSE ELLIPTA 62.5 MCG/INH AEPB INHALE 1 PUFF INTO LUNGS EVERYD AY 90 each 3    verapamil (VERELAN) 200 MG extended release capsule Take 1 capsule by mouth daily 90 capsule 3    albuterol sulfate HFA (PROVENTIL;VENTOLIN;PROAIR) 108 (90 Base) MCG/ACT inhaler Inhale 2 puffs into the lungs every 6 hours as needed for Wheezing 25.5 each 3    zolpidem (AMBIEN) 10 MG tablet Take by mouth nightly as needed for Sleep. ALPRAZolam (XANAX) 1 MG tablet Take 1 mg by mouth 3 times daily as needed for Sleep. 1 1/2 tablet TID      aspirin 81 MG chewable tablet Take 1 tablet by mouth daily 90 tablet 3    lovastatin (MEVACOR) 40 MG tablet TAKE 1 TABLET BY MOUTH EVERYNIGHT (Patient taking differently: Take 40 mg by mouth nightly) 90 tablet 3    Misc Natural Products (OSTEO BI-FLEX ADV DOUBLE ST) TABS Take by mouth daily      fluticasone (FLONASE) 50 MCG/ACT nasal spray 2 sprays by Nasal route daily 3 Bottle 3    MICROLET LANCETS MISC 1 each by Does not apply route daily 100 each 3    hydroxychloroquine (PLAQUENIL) 200 MG tablet Take 400 mg by mouth daily   2    VOLTAREN 1 % GEL APPLY 2 GRAMS TO HAND QID  5    polyethylene glycol (MIRALAX) POWD powder Take 17 g by mouth every other day        No current facility-administered medications for this visit. Allergies:   Allergies as of 01/10/2023 - Fully Reviewed 01/10/2023   Allergen Reaction Noted    Clindamycin/lincomycin Anaphylaxis 07/15/2015    Benadryl [diphenhydramine] Other (See Comments) 07/15/2015    Neosporin plus max st  02/11/2022    Prednisone  08/13/2018       Review of Systems:  Constitutional: negative for - chills and fever  Eyes:  negative for - visual disturbance and photophobia  HENMT: negative for - headaches and sinus pain  Respiratory: negative for - cough, hemoptysis, and shortness of breath  Cardiovascular: negative for - chest pain and palpitations  Gastrointestinal: negative for - blood in stools, constipation, diarrhea, nausea, and vomiting  Genito-Urinary: negative for - hematuria, urinary frequency, urinary urgency, and urinary retention  Musculoskeletal: positive for - joint pain, joint stiffness, and joint swelling  Hematological and Lymphatic: negative for - bleeding problems, abnormal bruising, and swollen lymph nodes  Endocrine:  negative for - polydipsia and polyphagia  Allergy/Immunology:  negative for - rhinorrhea, sinus congestion, hives  Integument:  negative for - negative for - rash, change in moles, new or changing lesions  Psychological: negative for - anxiety and depression  Neurological: negative for - memory loss, numbness/tingling, and weakness     PHYSICAL EXAMINATION:  Vitals:  /66   Pulse 94   Ht 6' 1\" (1.854 m)   Wt (!) 305 lb (138.3 kg)   SpO2 96%   BMI 40.24 kg/m²   General appearance:  Alert, well developed, well nourished, in no distress  HEENT:  normocephalic, atraumatic, sclera appear normal, no nasal abnormalities, no rhinorrhea, Ears appear normal, oral mucous membranes are moist without erythema, trachea midline, thyroid is normal, no lymphadenopathy or neck mass. Cardiovascular:  Regular rate and rhythm without murmer. No peripheral edema, No cyanosis or clubbing. No carotid bruits. Pulmonary:  Lungs are clear to auscultation. Breathing appears normal, good expansion, normal effort without use of accessory muscles  Musculoskeletal:  Joints are osteoarthritic  Integument:  No rash, erythema, or pallor  Psychiatric:  Mood, affect, and behavior appear normal      NEUROLOGIC EXAMINATION:  Mental Status:  alert, oriented to person, place, and time.   Speech:  Clear without dysarthria or dysphonia  Language:  Fluent without aphasia  Cranial Nerves:   II Visual fields are full to confrontation   III,IV, VI Extraocular movements are full   VII Facial movements are symmetrical without weakness   VIII Hearing is intact   IX,X Shoulder shrug and head rotation strength are intact   XII No tongue atrophy or fasciculations. Normal tongue protrusion. No tongue weakness  Motor:  Normal strength in both upper and lower extremities. Normal muscle tone and bulk. Deep tendon reflexes are intact and symmetrical in both upper and lower extremities. Keita's signs are absent bilaterally. There is no ankle clonus on either side. Sensation:  Sensation is intact to light touch, temperature, and vibration in all extremities. Coordination:  Rapid alternating movements are normal in both upper and lower extremities. Finger to nose testing is unimpaired bilaterally. Gait:  Normal station and gait. Pertinent Diagnostic Studies:  JAKOB is appropriate  No BiPAP download is available. He has an auto BiPAP that is a year old. Assessment:       ICD-10-CM    1. Osteoarthritis of spine with radiculopathy, lumbar region  M47.26       2. Chronic bilateral low back pain with bilateral sciatica  M54.42     M54.41     G89.29       3. SUSANA (obstructive sleep apnea)  G47.33       4. Anxiety  F41.9       5. Rheumatoid arthritis involving both hands with positive rheumatoid factor (HCC)  M05.741     M05.742       6. Insomnia, unspecified type  G47.00       He has chronic pain, primarily low back pain. I went over other treatment options. He says he had side effects of epidural injections in the past.  I discussed radiofrequency nerve ablation, dorsal column stimulation, and intrathecal pain pump. He does not want to consider other treatment options. Plan:   1. Continue present medications including oxycodone, alpazolam, Plaquenil  2. Continue BiPAP use during sleep  3.  Follow up in 6 months    Electronically signed by Key Lara MD on 1/10/23

## 2023-01-12 ENCOUNTER — TELEPHONE (OUTPATIENT)
Dept: PRIMARY CARE CLINIC | Age: 68
End: 2023-01-12

## 2023-01-12 DIAGNOSIS — R73.09 ELEVATED GLUCOSE: ICD-10-CM

## 2023-01-12 DIAGNOSIS — E87.5 SERUM POTASSIUM ELEVATED: ICD-10-CM

## 2023-01-12 LAB
ANION GAP SERPL CALCULATED.3IONS-SCNC: 12 MMOL/L (ref 7–19)
BUN BLDV-MCNC: 12 MG/DL (ref 8–23)
CALCIUM SERPL-MCNC: 10 MG/DL (ref 8.8–10.2)
CHLORIDE BLD-SCNC: 101 MMOL/L (ref 98–111)
CO2: 29 MMOL/L (ref 22–29)
CREAT SERPL-MCNC: 0.9 MG/DL (ref 0.5–1.2)
GFR SERPL CREATININE-BSD FRML MDRD: >60 ML/MIN/{1.73_M2}
GLUCOSE BLD-MCNC: 100 MG/DL (ref 74–109)
POTASSIUM SERPL-SCNC: 3.7 MMOL/L (ref 3.5–5)
SODIUM BLD-SCNC: 142 MMOL/L (ref 136–145)

## 2023-01-12 NOTE — TELEPHONE ENCOUNTER
----- Message from HEENA Ortiz sent at 1/12/2023 12:45 PM CST -----  Please call patient and let them know results.    Normal metabolic panel

## 2023-01-25 ENCOUNTER — ANESTHESIA EVENT (OUTPATIENT)
Dept: OPERATING ROOM | Age: 68
End: 2023-01-25

## 2023-01-25 DIAGNOSIS — I25.10 CORONARY ARTERY DISEASE INVOLVING NATIVE CORONARY ARTERY OF NATIVE HEART WITHOUT ANGINA PECTORIS: ICD-10-CM

## 2023-01-25 NOTE — TELEPHONE ENCOUNTER
Following up on medication fill. Spoke with pharmacy and they did not received order. Pt was last seen in office on 1/3/23  and has a follow up scheduled for Visit date not found. Will send request to  [facility]  for authorization.      Requested Prescriptions     Pending Prescriptions Disp Refills    Evolocumab 140 MG/ML SOAJ 2 Adjustable Dose Pre-filled Pen Syringe 5     Sig: Inject 140 mg into the skin every 14 days

## 2023-01-25 NOTE — TELEPHONE ENCOUNTER
He needs to be on the repatha. He has diabetes and hx of heart disease. Please encourage him to take it. We must control all risk factors. His LDL goal is <70 and he is not there.

## 2023-01-26 ENCOUNTER — APPOINTMENT (OUTPATIENT)
Dept: OPERATING ROOM | Age: 68
End: 2023-01-26

## 2023-01-26 ENCOUNTER — HOSPITAL ENCOUNTER (OUTPATIENT)
Age: 68
Setting detail: OUTPATIENT SURGERY
Discharge: HOME | End: 2023-01-26
Attending: INTERNAL MEDICINE | Admitting: INTERNAL MEDICINE

## 2023-01-26 ENCOUNTER — ANESTHESIA (OUTPATIENT)
Dept: OPERATING ROOM | Age: 68
End: 2023-01-26

## 2023-01-26 ENCOUNTER — HOSPITAL ENCOUNTER (OUTPATIENT)
Age: 68
Setting detail: SPECIMEN
Discharge: HOME | End: 2023-01-26
Payer: MEDICARE

## 2023-01-26 VITALS
RESPIRATION RATE: 20 BRPM | WEIGHT: 300 LBS | HEIGHT: 73 IN | OXYGEN SATURATION: 95 % | SYSTOLIC BLOOD PRESSURE: 144 MMHG | HEART RATE: 57 BPM | TEMPERATURE: 98.4 F | BODY MASS INDEX: 39.76 KG/M2 | DIASTOLIC BLOOD PRESSURE: 80 MMHG

## 2023-01-26 PROCEDURE — 88305 TISSUE EXAM BY PATHOLOGIST: CPT

## 2023-01-26 PROCEDURE — 45385 COLONOSCOPY W/LESION REMOVAL: CPT

## 2023-01-26 PROCEDURE — 45380 COLONOSCOPY AND BIOPSY: CPT

## 2023-01-26 RX ORDER — SODIUM CHLORIDE, SODIUM LACTATE, POTASSIUM CHLORIDE, CALCIUM CHLORIDE 600; 310; 30; 20 MG/100ML; MG/100ML; MG/100ML; MG/100ML
INJECTION, SOLUTION INTRAVENOUS CONTINUOUS
Status: DISCONTINUED | OUTPATIENT
Start: 2023-01-26 | End: 2023-01-26 | Stop reason: HOSPADM

## 2023-01-26 RX ORDER — ONDANSETRON 2 MG/ML
4 INJECTION INTRAMUSCULAR; INTRAVENOUS
Status: CANCELLED | OUTPATIENT
Start: 2023-01-26 | End: 2023-01-27

## 2023-01-26 RX ORDER — PROPOFOL 10 MG/ML
INJECTION, EMULSION INTRAVENOUS PRN
Status: DISCONTINUED | OUTPATIENT
Start: 2023-01-26 | End: 2023-01-26 | Stop reason: SDUPTHER

## 2023-01-26 RX ORDER — LIDOCAINE HYDROCHLORIDE 10 MG/ML
INJECTION, SOLUTION EPIDURAL; INFILTRATION; INTRACAUDAL; PERINEURAL PRN
Status: DISCONTINUED | OUTPATIENT
Start: 2023-01-26 | End: 2023-01-26 | Stop reason: SDUPTHER

## 2023-01-26 RX ADMIN — SODIUM CHLORIDE, SODIUM LACTATE, POTASSIUM CHLORIDE, CALCIUM CHLORIDE: 600; 310; 30; 20 INJECTION, SOLUTION INTRAVENOUS at 08:41

## 2023-01-26 RX ADMIN — PROPOFOL 230 MG: 10 INJECTION, EMULSION INTRAVENOUS at 09:04

## 2023-01-26 RX ADMIN — LIDOCAINE HYDROCHLORIDE 30 MG: 10 INJECTION, SOLUTION EPIDURAL; INFILTRATION; INTRACAUDAL; PERINEURAL at 09:04

## 2023-01-26 ASSESSMENT — ENCOUNTER SYMPTOMS: SHORTNESS OF BREATH: 1

## 2023-01-26 NOTE — ANESTHESIA POSTPROCEDURE EVALUATION
Department of Anesthesiology  Postprocedure Note    Patient: Iver Lombard  MRN: [id number]  YOB: 1955  Date of evaluation: 1/26/2023      Procedure Summary     Date: 01/26/23 Room / Location: [facility] ENDO 02 / [address]    Anesthesia Start: 0900 Anesthesia Stop: 9180    Procedure: COLONOSCOPY POLYPECTOMY SNARE/COLD BIOPSY (Abdomen) Diagnosis:       Hx of colonic polyps      (Hx of colonic polyps [Z86.010])    Surgeons: La Nena Medina MD Responsible Provider: HEENA Elder CRNA    Anesthesia Type: General ASA Status: 2          Anesthesia Type: General    Pedro Luis Phase I:      Pedro Luis Phase II:        Anesthesia Post Evaluation    Patient location during evaluation: bedside  Patient participation: complete - patient participated  Level of consciousness: awake  Pain score: 0  Airway patency: patent  Nausea & Vomiting: no nausea and no vomiting  Complications: no  Cardiovascular status: hemodynamically stable  Respiratory status: acceptable  Hydration status: euvolemic

## 2023-01-26 NOTE — OP NOTE
Patient: Gary Roldan: [id number]  [facility] Rec#: [id number] Acc#: [id number]   Primary Care Provider HEENA Bourgeois    Date of Procedure:  1/26/2023    Endoscopist: Betty Hopson MD    Referring Provider: HEENA Bourgeois,     Operation Performed: Colonoscopy with biopsy. Colonoscopy with hot snare polypectomy. Indications: Screening- positive cologuard and history of polyps. Anesthesia:  Sedation was administered by anesthesia who monitored the patient during the procedure. I met with Tyron Robertson prior to procedure. We discussed the procedure itself, and I have discussed the risks of endoscopy (including-- but not limited to-- pain, discomfort, bleeding potentially requiring second endoscopic procedure and/or blood transfusion, organ perforation requiring operative repair, damage to organs near the colon, infection, aspiration, cardiopulmonary/allergic reaction), benefits, indications to endoscopy. Additionally, we discussed options other than colonoscopy. The patient expressed understanding. All questions answered. The patient decided to proceed with the procedure. Signed informed consent was placed on the chart. Blood Loss: minimal    Withdrawal time: >6mins  Bowel Prep: adequate     Complications: no immediate complications    DESCRIPTION OF PROCEDURE:     A time out was performed. After written informed consent was obtained, the patient was placed in the left lateral position. The perianal area was inspected, and a digital rectal exam was performed. A rectal exam was performed: normal tone, no palpable lesions. At this point, a forward viewing Olympus colonoscope was inserted into the anus and carefully advanced to the cecum. The cecum was identified by the ileocecal valve and the appendiceal orifice. The colonoscope was then slowly withdrawn with careful inspection of the mucosa in a linear and circumferential fashion. The scope was retroflexed in the rectum. Suction was utilized during the procedure to remove as much air as possible from the bowel. The colonoscope was removed from the patient, and the procedure was terminated. Findings are listed below. Findings: The mucosa appeared normal throughout the entire examined colon. In the the descending colon, a 3 mm sessile polyp was removed completely with forceps polypectomy. In the the descending colon, a second 3 mm sessile polyp was removed completely with forceps polypectomy. There was evidence of diverticular disease throughout the left colon. In the the rectosigmoid, a 12 mm semi sessile polyp was removed completely with hot snare polypectomy. Retroflexion in the rectum was normal and revealed no further abnormalities. Recommendations:  1. Repeat colonoscopy: pending pathology - 3 years due to size and number of polyps. 2. Await biopsy results-you will receive a letter with your results within 7-10 days. Findings and recommendations were discussed w/ the patient. A copy of the images was provided. Sylvester Franco am scribing for and in the presence of Dr. Naomi López MD.  Electronically signed by Ramos Harden RN on 1/26/2023 at 8:27 AM    I personally performed the services described in this documentation as scribed by Ramos Harden, and it appears accurate and complete.      Naomi López MD  1/26/2023

## 2023-01-26 NOTE — H&P
Patient Name: Nehemias Houston  :   MRN: [id number]  DATE: 23    Allergies: Allergies   Allergen Reactions    Clindamycin/Lincomycin Anaphylaxis    Benadryl [Diphenhydramine] Other (See Comments)     \"makes legs jump around\"    Neosporin Plus Max St     Prednisone         ENDOSCOPY  History and Physical    Procedure:    [] Diagnostic Colonoscopy       [x] Screening Colonoscopy  [] EGD      [] ERCP      [] EUS       [] Other    [x] Previous office notes/History and Physical reviewed from the patients chart. Please see EMR for further details of HPI. I have examined the patient's status immediately prior to the procedure and:      Indications/HPI:    []Abdominal Pain   []Barretts  [x]Screening/Surveillance   []History of Polyps  []Dysphagia            [x] +Cologard/DNA testing  []Abnormal Imaging              []EOE Hx              [] Family Hx of CRC/Polyps  []Anemia                            []Food Impaction       []Recent Poor Prep  []GI Bleed             []Lymphadenopathy  [x]History of Polyps  []Change in bowel habits []Heartburn/Reflux  []Cancer- GI/Lung  []Chest Pain - Non Cardiac []Heme (+) Stool []Ulcers  []Constipation  []Hemoptysis  []Incontinence    []Diarrhea  []Hypoxemia  []Rectal Bleed (BRBPR)  []Nausea/Vomiting   [] Varices  []Crohns/Colitis  []Pancreatic Cyst   [] Cirrhosis   []Pancreatitis    []Abnormal MRCP  []Elevated LFT [] Stent Removal, Previous ERCP  []Other:     Anesthesia:   [x] MAC [] Moderate Sedation   [] General   [] None     ROS: 12 pt Review of Symptoms was negative unless mentioned above    Medications:   Prior to Admission medications    Medication Sig Start Date End Date Taking? Authorizing Provider   oxyCODONE (OXYCONTIN) 20 MG extended release tablet Take 1 tablet by mouth in the morning and 1 tablet in the evening. Do all this for 30 days. Intended supply: 30 days.  23  Henna Whitfield MD   oxyCODONE-acetaminophen (PERCOCET)  MG per tablet Take 1 tablet by mouth every 6 hours as needed for Pain (Pain) for up to 30 days. 1/12/23 2/11/23  Henna Batch, MD   terbinafine (LAMISIL) 250 MG tablet Take 1 tablet by mouth daily 1/3/23 2/2/23  HEENA Montoya   Evolocumab 140 MG/ML SOAJ Inject 140 mg into the skin every 14 days 1/3/23   HEENA Montoya   metFORMIN (GLUCOPHAGE) 1000 MG tablet Take 1 tablet by mouth 2 times daily (with meals) 12/12/22 1/11/23  HEENA Montoya   blood glucose test strips (CONTOUR NEXT TEST) strip 1 each by In Vitro route daily As needed. 11/10/22   HEENA Montoya   Diabetic Shoe MISC by Does not apply route 10/20/22   HEENA Montoya   hydroCHLOROthiazide (HYDRODIURIL) 25 MG tablet TAKE 1 TABLET BY MOUTH EVERY DAY 9/26/22   HEENA Staley   cyclobenzaprine (FLEXERIL) 10 MG tablet TAKE 1 TABLET BY MOUTH THREE TIMES A DAY AS NEEDED FOR MUSCLE SPASMS 8/8/22   Henna Whitfield MD   INCRUSE ELLIPTA 62.5 MCG/INH AEPB INHALE 1 PUFF INTO LUNGS EVERYD AY 7/11/22   HEENA Montoya   verapamil (VERELAN) 200 MG extended release capsule Take 1 capsule by mouth daily 7/11/22   HEENA Staley   albuterol sulfate HFA (PROVENTIL;VENTOLIN;PROAIR) 108 (90 Base) MCG/ACT inhaler Inhale 2 puffs into the lungs every 6 hours as needed for Wheezing 7/8/22   HEENA Reveles   zolpidem (AMBIEN) 10 MG tablet Take by mouth nightly as needed for Sleep. Historical Provider, MD   ALPRAZolam Lynda Retort) 1 MG tablet Take 1 mg by mouth 3 times daily as needed for Sleep.  1 1/2 tablet TID    Historical Provider, MD   aspirin 81 MG chewable tablet Take 1 tablet by mouth daily 2/14/22   HEENA Gonzalez   lovastatin (MEVACOR) 40 MG tablet TAKE 1 TABLET BY MOUTH EVERYNIGHT  Patient taking differently: Take 40 mg by mouth nightly 2/2/22   HEENA Gonzalez   Misc Natural Products (OSTEO BI-FLEX ADV DOUBLE ST) TABS Take by mouth daily    Historical Provider, MD   fluticasone (FLONASE) 50 MCG/ACT nasal spray 2 sprays by Nasal route daily 6/17/21   Brian Leo HEENA Anna   MICROLET LANCETS MISC 1 each by Does not apply route daily 8/14/19   HEENA Snell   hydroxychloroquine (PLAQUENIL) 200 MG tablet Take 400 mg by mouth daily  4/29/19   Historical Provider, MD   VOLTAREKARYN 1 % GEL APPLY 2 GRAMS TO HAND QID 6/7/19   Historical Provider, MD   polyethylene glycol (MIRALAX) POWD powder Take 17 g by mouth every other day     Historical Provider, MD       Past Medical History:  Past Medical History:   Diagnosis Date    Acute sinusitis     Anxiety     Chronic back pain     Chronic bilateral low back pain with bilateral sciatica     Constipation     COPD (chronic obstructive pulmonary disease) ([facility]     Coronary artery disease involving native coronary artery of native heart without angina pectoris 8/27/2019    CPAP (continuous positive airway pressure) dependence     12cm to 20cm    Depression     Hyperlipidemia     Hypertensive disorder     OA (osteoarthritis of the spine)     Obesity     SUSANA (obstructive sleep apnea)     AHI:  85.3 per HST 11/2013    Osteoarthritis of spine with radiculopathy, lumbar region     Otitis media     Rheumatoid arthritis ([facility]     Spinal stenosis     Type 2 diabetes mellitus ([facility] 03/23/2016    Unspecified sleep apnea     uses bipap    Vertigo        Past Surgical History:  Past Surgical History:   Procedure Laterality Date    ADENOIDECTOMY      BROW LIFT      due to vision    CARDIAC CATHETERIZATION  10/24/2018         COLONOSCOPY  30 yrs ago    [place], [place], normal per patient    HERNIA REPAIR      KNEE ARTHROSCOPY Left 2009    SD NJX DX/THER SBST EPIDURAL/SUBARACH LUMBAR/SACRAL N/A 10/3/2017    L3-4 EPIDURAL STEROID INJECTION performed by Rosetta Manuel at [address] History:  Social History     Tobacco Use    Smoking status: Former     Packs/day: 0.50     Years: 20.00     Pack years: 10.00     Types: Cigarettes     Start date: 1981 Quit date:      Years since quittin.0    Smokeless tobacco: Never   Vaping Use    Vaping Use: Some days    Substances: Nicotine    Devices: Disposable   Substance Use Topics    Alcohol use: No    Drug use: No       Vital Signs: There were no vitals filed for this visit. Physical Exam:  Cardiac:  [x]WNL  []Comments:  Pulmonary:  [x]WNL   []Comments:  Neuro/Mental Status:  [x]WNL  []Comments:  Abdominal:  [x]WNL    []Comments:  Other:   []WNL  []Comments:    Informed Consent:  The risks and benefits of the procedure have been discussed with either the patient or if they cannot consent, their representative. Assessment:  Patient examined and appropriate for planned sedation and procedure. Plan:  Proceed with planned sedation and procedure as above. Valentino Castillo am scribing for and in the presence of Dr. Roshni Peraza MD.  Electronically signed by Erica Burnett RN on 2023 at 8:26 AM    I personally performed the services described in this documentation as scribed by Erica Burnett, and it appears accurate and complete.      Roshni Peraza MD  2023

## 2023-01-26 NOTE — TELEPHONE ENCOUNTER
Called patient and informed them that he should be taking hid Repatha, He stated he will be talking to his cardiologist ab out this because he did not agree with that decision,

## 2023-01-26 NOTE — ANESTHESIA PRE PROCEDURE
Department of Anesthesiology  Preprocedure Note       Name:  Liza Diaz   Age:  79 y.o.  :  1955                                          MRN:  [id number]         Date:  2023      Surgeon: Vinod Carpio):  Jeannie Moreno MD    Procedure: Procedure(s):  COLORECTAL CANCER SCREENING, NOT HIGH RISK    Medications prior to admission:   Prior to Admission medications    Medication Sig Start Date End Date Taking? Authorizing Provider   oxyCODONE (OXYCONTIN) 20 MG extended release tablet Take 1 tablet by mouth in the morning and 1 tablet in the evening. Do all this for 30 days. Intended supply: 30 days. 23  Marquis Esparza MD   oxyCODONE-acetaminophen (PERCOCET)  MG per tablet Take 1 tablet by mouth every 6 hours as needed for Pain (Pain) for up to 30 days. 23  Marquis Esparza MD   terbinafine (LAMISIL) 250 MG tablet Take 1 tablet by mouth daily 1/3/23 2/2/23  HEENA Carter   Evolocumab 140 MG/ML SOAJ Inject 140 mg into the skin every 14 days  Patient not taking: Reported on 2023 1/3/23   HEENA Carter   metFORMIN (GLUCOPHAGE) 1000 MG tablet Take 1 tablet by mouth 2 times daily (with meals) 22  HEENA Carter   blood glucose test strips (CONTOUR NEXT TEST) strip 1 each by In Vitro route daily As needed.  11/10/22   HEENA Carter   Diabetic Shoe MISC by Does not apply route 10/20/22   HEENA Carter   hydroCHLOROthiazide (HYDRODIURIL) 25 MG tablet TAKE 1 TABLET BY MOUTH EVERY DAY 22   HEENA Hess   cyclobenzaprine (FLEXERIL) 10 MG tablet TAKE 1 TABLET BY MOUTH THREE TIMES A DAY AS NEEDED FOR MUSCLE SPASMS 22   Marquis Esparza MD   INCRUSE ELLIPTA 62.5 MCG/INH AEPB INHALE 1 PUFF INTO LUNGS EVERYD AY 22   HEENA Carter   verapamil (VERELAN) 200 MG extended release capsule Take 1 capsule by mouth daily 22   HEENA Hess   albuterol sulfate HFA (PROVENTIL;VENTOLIN;PROAIR) 108 (90 Base) MCG/ACT inhaler Inhale 2 puffs into the lungs every 6 hours as needed for Wheezing 7/8/22   HEENA Sorensen   zolpidem (AMBIEN) 10 MG tablet Take by mouth nightly as needed for Sleep. Historical Provider, MD   ALPRAZolam Cypress Searandolph) 1 MG tablet Take 1 mg by mouth 3 times daily as needed for Sleep. 1 1/2 tablet TID    Historical Provider, MD   aspirin 81 MG chewable tablet Take 1 tablet by mouth daily 2/14/22   HEENA Estrada   lovastatin (MEVACOR) 40 MG tablet TAKE 1 TABLET BY MOUTH EVERYNIGHT  Patient taking differently: Take 40 mg by mouth nightly 2/2/22   HEENA Estrada   Misc Natural Products (OSTEO BI-FLEX ADV DOUBLE ST) TABS Take by mouth daily  Patient not taking: Reported on 1/26/2023    Historical Provider, MD   fluticasone [facility]) 50 MCG/ACT nasal spray 2 sprays by Nasal route daily 6/17/21   HEENA Sorensen   MICROLET LANCETS MISC 1 each by Does not apply route daily 8/14/19   HEENA Snell   hydroxychloroquine (PLAQUENIL) 200 MG tablet Take 400 mg by mouth daily  4/29/19   Historical Provider, MD   VOLTAREN 1 % GEL APPLY 2 GRAMS TO HAND QID 6/7/19   Historical Provider, MD   polyethylene glycol (MIRALAX) POWD powder Take 17 g by mouth every other day     Historical Provider, MD       Current medications:    Current Facility-Administered Medications   Medication Dose Route Frequency Provider Last Rate Last Admin    lactated ringers IV soln infusion   IntraVENous Continuous Akua Piña  mL/hr at 01/26/23 0841 New Bag at 01/26/23 0841       Allergies:     Allergies   Allergen Reactions    Clindamycin/Lincomycin Anaphylaxis    Benadryl [Diphenhydramine] Other (See Comments)     \"makes legs jump around\"    Neosporin Plus Max St     Prednisone        Problem List:    Patient Active Problem List   Diagnosis Code    Chest discomfort R07.89    Essential hypertension I10    Hypercholesteremia E78.00    Type 2 diabetes mellitus without complication, without long-term current use of insulin ([facility]) E11.9    SUSANA (obstructive sleep apnea) G47.33    Obesity E66.9    Palpitations R00.2    Osteoarthritis of spine with radiculopathy, lumbar region M47.26    Chronic bilateral low back pain with bilateral sciatica M54.42, M54.41, G89.29    CPAP (continuous positive airway pressure) dependence Z99.89    Thoracic spine pain M54.6    History of sleep apnea Z86.69    Tachycardia R00.0    Intermittent palpitations R00.2    Family history of atrial fibrillation Z82.49    Memory loss R41.3    Premature atrial contractions I49.1    Rheumatoid arthritis involving both hands with positive rheumatoid factor ([facility]) M05.741, M05.742    Paroxysmal SVT (supraventricular tachycardia) ([facility]) I47.1    Shortness of breath R06.02    Abnormal nuclear cardiac imaging test R93.1    Mixed hyperlipidemia E78.2    History of coronary artery stent placement Z95.5    Coronary artery disease involving native coronary artery of native heart without angina pectoris I25.10    Class 2 severe obesity with serious comorbidity and body mass index (BMI) of 38.0 to 38.9 in adult [facility]) E66.01, Z68.38    Type 2 diabetes mellitus with other skin complication, without long-term current use of insulin ([facility]) E11.628       Past Medical History:        Diagnosis Date    Acute sinusitis     Anxiety     Chronic back pain     Chronic bilateral low back pain with bilateral sciatica     Constipation     COPD (chronic obstructive pulmonary disease) ([facility])     Coronary artery disease involving native coronary artery of native heart without angina pectoris 8/27/2019    CPAP (continuous positive airway pressure) dependence     12cm to 20cm    Depression     Hyperlipidemia     Hypertensive disorder     OA (osteoarthritis of the spine)     Obesity     SUSANA (obstructive sleep apnea)     AHI:  85.3 per HST 11/2013    Osteoarthritis of spine with radiculopathy, lumbar region     Otitis media     Rhe[facility] arthritis ([facility]     Spinal stenosis     Type 2 diabetes mellitus ([facility] [address] 2016    Unspecified sleep apnea     uses bipap    Vertigo        Past Surgical History:        Procedure Laterality Date    ADENOIDECTOMY      BROW LIFT      due to vision    CARDIAC CATHETERIZATION  10/24/2018         COLONOSCOPY  30 yrs ago    [place], [place], normal per patient    HERNIA REPAIR      KNEE ARTHROSCOPY Left     [place] NJX DX/THER SBST EPIDURAL/SUBARACH LUMBAR/SACRAL N/A 10/3/2017    L3-4 EPIDURAL STEROID INJECTION performed by Bryson Perrin at [address] [place] [address]         Social History:    Social History     Tobacco Use    Smoking status: Former     Packs/day: 0.50     Years: 20.00     Pack years: 10.00     Types: Cigarettes     Start date: 0     Quit date:      Years since quittin.0    Smokeless tobacco: Never   Substance Use Topics    Alcohol use: No                                Counseling given: Not Answered      Vital Signs (Current):   Vitals:    23 0827   BP: (!) 157/84   Pulse: 64   Resp: 20   Temp: 98.4 °F (36.9 °C)   TempSrc: Temporal   SpO2: 95%   Weight: 300 lb (136.1 kg)   Height: 6' 1\" (1.854 m)                                              BP Readings from Last 3 Encounters:   23 (!) 157/84   01/10/23 124/66   23 126/86       NPO Status: Time of last liquid consumption: 0500                        Time of last solid consumption: 1700                        Date of last liquid consumption: 23                        Date of last solid food consumption: 23    BMI:   Wt Readings from Last 3 Encounters:   23 300 lb (136.1 kg)   01/10/23 (!) 305 lb (138.3 kg)   23 (!) 305 lb 8 oz (138.6 kg)     Body mass index is 39.58 kg/m².     CBC:   Lab Results   Component Value Date/Time    WBC 4.8 2023 02:38 PM    RBC 5.33 2023 02:38 PM    HGB 15.2 2023 02:38 PM    HCT 48.6 2023 02:38 PM    MCV 91.2 01/03/2023 02:38 PM    RDW 14.2 01/03/2023 02:38 PM     01/03/2023 02:38 PM       CMP:   Lab Results   Component Value Date/Time     01/12/2023 07:12 AM    K 3.7 01/12/2023 07:12 AM    K 4.2 10/23/2018 12:43 PM     01/12/2023 07:12 AM    CO2 29 01/12/2023 07:12 AM    BUN 12 01/12/2023 07:12 AM    CREATININE 0.9 01/12/2023 07:12 AM    GFRAA >59 06/29/2022 07:13 AM    LABGLOM >60 01/12/2023 07:12 AM    GLUCOSE 100 01/12/2023 07:12 AM    PROT 7.4 01/03/2023 02:38 PM    CALCIUM 10.0 01/12/2023 07:12 AM    BILITOT 0.3 01/03/2023 02:38 PM    ALKPHOS 79 01/03/2023 02:38 PM    AST 14 01/03/2023 02:38 PM    ALT 16 01/03/2023 02:38 PM       POC Tests: No results for input(s): POCGLU, POCNA, POCK, POCCL, POCBUN, POCHEMO, POCHCT in the last 72 hours.     Coags:   Lab Results   Component Value Date/Time    PROTIME 13.7 09/30/2019 07:00 PM    INR 1.11 09/30/2019 07:00 PM    APTT 30.2 09/30/2019 07:00 PM       HCG (If Applicable): No results found for: PREGTESTUR, PREGSERUM, HCG, HCGQUANT     ABGs: No results found for: PHART, PO2ART, XZJ5HOZ, XXI4WCJ, BEART, Y1NMREJN     Type & Screen (If Applicable):  No results found for: LABABO, LABRH    Drug/Infectious Status (If Applicable):  No results found for: HIV, HEPCAB    COVID-19 Screening (If Applicable):   Lab Results   Component Value Date/Time    COVID19 NOT DETECTED 01/29/2021 09:00 AM           Anesthesia Evaluation  Patient summary reviewed and Nursing notes reviewed  Airway: Mallampati: II  TM distance: >3 FB   Neck ROM: full  Mouth opening: > = 3 FB   Dental:          Pulmonary:Negative Pulmonary ROS   (+) COPD:  shortness of breath:  sleep apnea: on CPAP,  decreased breath sounds: bilateral                            Cardiovascular:Negative CV ROS  Exercise tolerance: poor (<4 METS),   (+) hypertension:, CAD:, dysrhythmias: SVT,         Rhythm: regular  Rate: normal           Beta Blocker:  Not on Beta Blocker         Neuro/Psych:   Negative Neuro/Psych ROS  (+) neuromuscular disease:, psychiatric history:depression/anxiety             GI/Hepatic/Renal: Neg GI/Hepatic/Renal ROS  (+) morbid obesity          Endo/Other: Negative Endo/Other ROS   (+) Diabetes, : arthritis: OA., .                 Abdominal:   (+) obese,     Abdomen: soft. Vascular: negative vascular ROS. Other Findings:           Anesthesia Plan      general and TIVA     ASA 2       Induction: intravenous. Anesthetic plan and risks discussed with patient.                         Joss Holbrook, HEENA - CRNA   1/26/2023

## 2023-01-26 NOTE — DISCHARGE INSTRUCTIONS
Recommendations:  1. Repeat colonoscopy: pending pathology - 3 years due to size and number of polyps. 2. Await biopsy results-you will receive a letter with your results within 7-10 days. POST-OP ORDERS: ENDOSCOPY & COLONOSCOPY:    1. Rest today. 2. DO NOT eat or drink until wide awake; eat your usual diet today in moderate amount only. 3. DO NOT drive today. 4. Call physician if you have severe pain, vomiting, fever, rectal bleeding or black bowel movements. 5.  If a biopsy was taken or a polyp removed, you should expect to hear results in about 21 days. If you have heard nothing from your physician by then, call the office for results. 6.  Discharge home when patient awake, vitals signs stable and tolerating liquids.

## 2023-02-03 ENCOUNTER — OFFICE VISIT (OUTPATIENT)
Dept: FAMILY MEDICINE CLINIC | Age: 68
End: 2023-02-03
Payer: MEDICARE

## 2023-02-03 VITALS
DIASTOLIC BLOOD PRESSURE: 90 MMHG | HEART RATE: 74 BPM | BODY MASS INDEX: 39.23 KG/M2 | SYSTOLIC BLOOD PRESSURE: 146 MMHG | TEMPERATURE: 98.7 F | OXYGEN SATURATION: 98 % | HEIGHT: 73 IN | WEIGHT: 296 LBS

## 2023-02-03 DIAGNOSIS — B86 SCABIES: Primary | ICD-10-CM

## 2023-02-03 DIAGNOSIS — B35.3 TINEA PEDIS OF BOTH FEET: ICD-10-CM

## 2023-02-03 PROCEDURE — G8427 DOCREV CUR MEDS BY ELIG CLIN: HCPCS | Performed by: PEDIATRICS

## 2023-02-03 PROCEDURE — 99203 OFFICE O/P NEW LOW 30 MIN: CPT | Performed by: PEDIATRICS

## 2023-02-03 PROCEDURE — 3080F DIAST BP >= 90 MM HG: CPT | Performed by: PEDIATRICS

## 2023-02-03 PROCEDURE — 3017F COLORECTAL CA SCREEN DOC REV: CPT | Performed by: PEDIATRICS

## 2023-02-03 PROCEDURE — G8417 CALC BMI ABV UP PARAM F/U: HCPCS | Performed by: PEDIATRICS

## 2023-02-03 PROCEDURE — 3077F SYST BP >= 140 MM HG: CPT | Performed by: PEDIATRICS

## 2023-02-03 PROCEDURE — 1036F TOBACCO NON-USER: CPT | Performed by: PEDIATRICS

## 2023-02-03 PROCEDURE — 1123F ACP DISCUSS/DSCN MKR DOCD: CPT | Performed by: PEDIATRICS

## 2023-02-03 PROCEDURE — G8484 FLU IMMUNIZE NO ADMIN: HCPCS | Performed by: PEDIATRICS

## 2023-02-03 RX ORDER — PERMETHRIN 50 MG/G
CREAM TOPICAL
Qty: 60 G | Refills: 1 | Status: SHIPPED | OUTPATIENT
Start: 2023-02-03

## 2023-02-03 RX ORDER — KETOCONAZOLE 20 MG/G
CREAM TOPICAL
Qty: 30 G | Refills: 1 | Status: SHIPPED | OUTPATIENT
Start: 2023-02-03

## 2023-02-03 SDOH — ECONOMIC STABILITY: FOOD INSECURITY: WITHIN THE PAST 12 MONTHS, YOU WORRIED THAT YOUR FOOD WOULD RUN OUT BEFORE YOU GOT MONEY TO BUY MORE.: NEVER TRUE

## 2023-02-03 SDOH — ECONOMIC STABILITY: INCOME INSECURITY: HOW HARD IS IT FOR YOU TO PAY FOR THE VERY BASICS LIKE FOOD, HOUSING, MEDICAL CARE, AND HEATING?: SOMEWHAT HARD

## 2023-02-03 SDOH — ECONOMIC STABILITY: FOOD INSECURITY: WITHIN THE PAST 12 MONTHS, THE FOOD YOU BOUGHT JUST DIDN'T LAST AND YOU DIDN'T HAVE MONEY TO GET MORE.: NEVER TRUE

## 2023-02-03 SDOH — ECONOMIC STABILITY: HOUSING INSECURITY
IN THE LAST 12 MONTHS, WAS THERE A TIME WHEN YOU DID NOT HAVE A STEADY PLACE TO SLEEP OR SLEPT IN A SHELTER (INCLUDING NOW)?: NO

## 2023-02-03 SDOH — ECONOMIC STABILITY: TRANSPORTATION INSECURITY
IN THE PAST 12 MONTHS, HAS LACK OF TRANSPORTATION KEPT YOU FROM MEETINGS, WORK, OR FROM GETTING THINGS NEEDED FOR DAILY LIVING?: NO

## 2023-02-03 ASSESSMENT — PATIENT HEALTH QUESTIONNAIRE - PHQ9
SUM OF ALL RESPONSES TO PHQ9 QUESTIONS 1 & 2: 0
2. FEELING DOWN, DEPRESSED OR HOPELESS: 0
1. LITTLE INTEREST OR PLEASURE IN DOING THINGS: 0
SUM OF ALL RESPONSES TO PHQ QUESTIONS 1-9: 0

## 2023-02-03 ASSESSMENT — ENCOUNTER SYMPTOMS
EYES NEGATIVE: 1
GASTROINTESTINAL NEGATIVE: 1
ALLERGIC/IMMUNOLOGIC NEGATIVE: 1
RESPIRATORY NEGATIVE: 1

## 2023-02-03 NOTE — PROGRESS NOTES
[address] [facility], [address]  Phone [phone]   Fax [phone]      OFFICE VISIT: 2/3/2023    Rudi Batres-: 1955      [facility]  Reason For Visit:  Marley Espinosa is a 79 y.o. Rash (Rash between fingers, and palms of bilateral hands. He has seen [facility] multiples times, started terbinafine with little relief, tried multiple OTC creams with no help. It is worse with heat or hot water. Started around 7 months ago and was also in groin area. Groin area is better but still healing and has had pigment changes in the past few weeks. )    Patient presents with a rash between his fingers and on his palms bilaterally. He has tried multiple otc medications and creams. Nothing has seemed to help. This was initially in his groin area. height is 6' 1\" (1.854 m) and weight is 296 lb (134.3 kg). His temporal temperature is 98.7 °F (37.1 °C). His blood pressure is 146/90 (abnormal) and his pulse is 74. His oxygen saturation is 98%. Body mass index is 39.05 kg/m². I have reviewed the following with the Mr. Batres   Lab Review  Orders Only on 2023   Component Date Value    Sodium 2023 142     Potassium 2023 3.7     Chloride 2023 101     CO2 2023 29     Anion Gap 2023 12     Glucose 2023 100     BUN 2023 12     Creatinine 2023 0.9     Est, Glom Filt Rate 2023 >60     Calcium 2023 10.0    Orders Only on 2023   Component Date Value    PSA 2023 2.98     T4 Free 2023 1.14     TSH 2023 1.910     Hemoglobin A1C 2023 6.5 (A)     Sodium 2023 142     Potassium 2023 5.2 (A)     Chloride 2023 99     CO2 2023 29     Anion Gap 2023 14     Glucose 2023 140 (A)     BUN 2023 12     Creatinine 2023 0.8     Est, Glom Filt Rate 2023 >60     Calcium 2023 10.1     Total Protein 2023 7.4     Albumin 2023 4.7     Total Bilirubin 01/03/2023 0.3     Alkaline Phosphatase 01/03/2023 79     ALT 01/03/2023 16     AST 01/03/2023 14     WBC 01/03/2023 4.8     RBC 01/03/2023 5.33     Hemoglobin 01/03/2023 15.2     Hematocrit 01/03/2023 48.6     MCV 01/03/2023 91.2     MCH 01/03/2023 28.5     MCHC 01/03/2023 31.3 (A)     RDW 01/03/2023 14.2     Platelets 82/83/4158 163     MPV 01/03/2023 12.3     Neutrophils % 01/03/2023 50.6     Lymphocytes % 01/03/2023 31.8     Monocytes % 01/03/2023 14.1 (A)     Eosinophils % 01/03/2023 2.5     Basophils % 01/03/2023 0.6     Neutrophils Absolute 01/03/2023 2.4     Immature Granulocytes # 01/03/2023 0.0     Lymphocytes Absolute 01/03/2023 1.5     Monocytes Absolute 01/03/2023 0.70     Eosinophils Absolute 01/03/2023 0.10     Basophils Absolute 01/03/2023 0.00    Orders Only on 12/15/2022   Component Date Value    Cholesterol, Total 12/15/2022 150 (A)     Triglycerides 12/15/2022 103     HDL 12/15/2022 51 (A)     LDL Calculated 12/15/2022 78    Orders Only on 10/20/2022   Component Date Value    FIT-DNA (Cologuard) 11/03/2022 Positive (A)      Copies of these are in the chart. Current Outpatient Medications   Medication Sig Dispense Refill    permethrin (ELIMITE) 5 % cream Apply to areas daily x 7 days and continue 2x / week until cured. 60 g 1    ketoconazole (NIZORAL) 2 % cream Apply topically daily. 30 g 1    oxyCODONE (OXYCONTIN) 20 MG extended release tablet Take 1 tablet by mouth in the morning and 1 tablet in the evening. Do all this for 30 days. Intended supply: 30 days. 60 tablet 0    oxyCODONE-acetaminophen (PERCOCET)  MG per tablet Take 1 tablet by mouth every 6 hours as needed for Pain (Pain) for up to 30 days. 120 tablet 0    metFORMIN (GLUCOPHAGE) 1000 MG tablet Take 1 tablet by mouth 2 times daily (with meals) 180 tablet 3    blood glucose test strips (CONTOUR NEXT TEST) strip 1 each by In Vitro route daily As needed.  100 each 3    Diabetic Shoe MISC by Does not apply route 1 each 0 hydroCHLOROthiazide (HYDRODIURIL) 25 MG tablet TAKE 1 TABLET BY MOUTH EVERY DAY 90 tablet 3    cyclobenzaprine (FLEXERIL) 10 MG tablet TAKE 1 TABLET BY MOUTH THREE TIMES A DAY AS NEEDED FOR MUSCLE SPASMS 90 tablet 5    INCRUSE ELLIPTA 62.5 MCG/INH AEPB INHALE 1 PUFF INTO LUNGS EVERYD AY 90 each 3    verapamil (VERELAN) 200 MG extended release capsule Take 1 capsule by mouth daily 90 capsule 3    albuterol sulfate HFA (PROVENTIL;VENTOLIN;PROAIR) 108 (90 Base) MCG/ACT inhaler Inhale 2 puffs into the lungs every 6 hours as needed for Wheezing 25.5 each 3    zolpidem (AMBIEN) 10 MG tablet Take by mouth nightly as needed for Sleep. ALPRAZolam (XANAX) 1 MG tablet Take 1 mg by mouth 3 times daily as needed for Sleep. 1 1/2 tablet TID      aspirin 81 MG chewable tablet Take 1 tablet by mouth daily 90 tablet 3    lovastatin (MEVACOR) 40 MG tablet TAKE 1 TABLET BY MOUTH EVERYNIGHT (Patient taking differently: Take 40 mg by mouth nightly) 90 tablet 3    fluticasone (FLONASE) 50 MCG/ACT nasal spray 2 sprays by Nasal route daily 3 Bottle 3    MICROLET LANCETS MISC 1 each by Does not apply route daily 100 each 3    hydroxychloroquine (PLAQUENIL) 200 MG tablet Take 400 mg by mouth daily   2    VOLTAREN 1 % GEL APPLY 2 GRAMS TO HAND QID  5    polyethylene glycol (MIRALAX) POWD powder Take 17 g by mouth every other day        No current facility-administered medications for this visit.        Allergies: Clindamycin/lincomycin, Benadryl [diphenhydramine], Neosporin plus max st, and Prednisone     Past Medical History:   Diagnosis Date    Acute sinusitis     Anxiety     Chronic back pain     Chronic bilateral low back pain with bilateral sciatica     Constipation     COPD (chronic obstructive pulmonary disease) (HCC)     Coronary artery disease involving native coronary artery of native heart without angina pectoris 8/27/2019    CPAP (continuous positive airway pressure) dependence     12cm to 20cm    Depression     Hyperlipidemia Hypertensive disorder     OA (osteoarthritis of the spine)     Obesity     SUSANA (obstructive sleep apnea)     AHI:  85.3 per HST 2013    Osteoarthritis of spine with radiculopathy, lumbar region     Otitis media     Rheumatoid arthritis ([facility] [address]     Spinal stenosis     Type 2 diabetes mellitus ([facility] [address] 2016    Unspecified sleep apnea     uses bipap    Vertigo        Family History   Problem Relation Age of Onset    Heart Disease Father 64    Cancer Father         malignant tumor of pharynx    Diabetes Mother     Hypertension Mother     Diabetes Sister         x 2    Diabetes Brother     Colon Cancer Neg Hx     Colon Polyps Neg Hx     Liver Disease Neg Hx     Rectal Cancer Neg Hx     Stomach Cancer Neg Hx        Past Surgical History:   Procedure Laterality Date    ADENOIDECTOMY      BROW LIFT      due to vision    CARDIAC CATHETERIZATION  10/24/2018         COLONOSCOPY  27 yrs ago    [facility]oun, normal per patient    COLONOSCOPY  09/10/2015    Dr Law Rosas x 1, HP x 5, 5 yr recall    COLONOSCOPY N/A 2023    Dr Ainsley Doyle disease-AP x 3, 3 yr recall    HERNIA REPAIR      KNEE ARTHROSCOPY Left     [place] NJX DX/THER SBST EPIDURAL/SUBARACH LUMBAR/SACRAL N/A 10/03/2017    L3-4 EPIDURAL STEROID INJECTION performed by Sarthak Alejo at [address] History     Tobacco Use    Smoking status: Former     Packs/day: 0.50     Years: 20.00     Pack years: 10.00     Types: Cigarettes     Start date: 0     Quit date:      Years since quittin.1    Smokeless tobacco: Never   Substance Use Topics    Alcohol use: No        Review of Systems   Constitutional: Negative. HENT: Negative. Eyes: Negative. Respiratory: Negative. Cardiovascular: Negative. Gastrointestinal: Negative. Endocrine: Negative. Genitourinary: Negative. Musculoskeletal: Negative. Skin:  Positive for rash. Allergic/Immunologic: Negative.     Neurological: Negative. Hematological: Negative. Psychiatric/Behavioral: Negative. Physical Exam  Vitals reviewed. Constitutional:       Appearance: Normal appearance. HENT:      Head: Normocephalic and atraumatic. Right Ear: External ear normal.      Left Ear: External ear normal.      Nose: Nose normal.      Mouth/Throat:      Mouth: Mucous membranes are moist.      Pharynx: Oropharynx is clear. Eyes:      Extraocular Movements: Extraocular movements intact. Pupils: Pupils are equal, round, and reactive to light. Cardiovascular:      Rate and Rhythm: Normal rate and regular rhythm. Heart sounds: No murmur heard. Pulmonary:      Effort: Pulmonary effort is normal.      Breath sounds: Normal breath sounds. Abdominal:      Palpations: Abdomen is soft. Musculoskeletal:         General: No swelling, tenderness or deformity. Cervical back: Normal range of motion and neck supple. Skin:     Findings: Erythema, lesion (between fingers bilaterally) and rash present. Comments: This is very itchy. He has been dealing with this for several months now. Neurological:      General: No focal deficit present. Mental Status: He is alert and oriented to person, place, and time. Psychiatric:         Mood and Affect: Mood normal.         Behavior: Behavior normal.           ASSESSMENT      ICD-10-CM    1. Scabies  B86 permethrin (ELIMITE) 5 % cream      2. Tinea pedis of both feet  B35.3 ketoconazole (NIZORAL) 2 % cream            PLAN    1. Scabies  Will treat this with permethrin and if not going away, will use different med. - permethrin (ELIMITE) 5 % cream; Apply to areas daily x 7 days and continue 2x / week until cured. Dispense: 60 g; Refill: 1    2. Tinea pedis of both feet  Will treat with ketoconazole. - ketoconazole (NIZORAL) 2 % cream; Apply topically daily. Dispense: 30 g; Refill: 1    No orders of the defined types were placed in this encounter.        Return if symptoms worsen or fail to improve. This was an in-house visit.

## 2023-02-07 DIAGNOSIS — M47.26 OSTEOARTHRITIS OF SPINE WITH RADICULOPATHY, LUMBAR REGION: ICD-10-CM

## 2023-02-07 DIAGNOSIS — M54.42 CHRONIC BILATERAL LOW BACK PAIN WITH BILATERAL SCIATICA: ICD-10-CM

## 2023-02-07 DIAGNOSIS — G89.29 CHRONIC BILATERAL LOW BACK PAIN WITH BILATERAL SCIATICA: ICD-10-CM

## 2023-02-07 DIAGNOSIS — M54.41 CHRONIC BILATERAL LOW BACK PAIN WITH BILATERAL SCIATICA: ICD-10-CM

## 2023-02-08 NOTE — TELEPHONE ENCOUNTER
Requested Prescriptions     Pending Prescriptions Disp Refills    oxyCODONE (OXYCONTIN) 20 MG extended release tablet 60 tablet 0     Sig: Take 1 tablet by mouth in the morning and 1 tablet in the evening. Do all this for 30 days. Intended supply: 30 days. oxyCODONE-acetaminophen (PERCOCET)  MG per tablet 120 tablet 0     Sig: Take 1 tablet by mouth every 6 hours as needed for Pain (Pain) for up to 30 days.        Last Office Visit:  1/10/2023  Next Office Visit:  7/10/2023  Last Medication Refill:  1/12/2023 with 0 RF on both meds   Darcie Link up to date:  12/12/2022     *RX updated to reflect   2/11/2023   fill date*

## 2023-02-09 RX ORDER — OXYCODONE HCL 20 MG/1
20 TABLET, FILM COATED, EXTENDED RELEASE ORAL EVERY 12 HOURS
Qty: 60 TABLET | Refills: 0 | Status: SHIPPED | OUTPATIENT
Start: 2023-02-11 | End: 2023-03-13

## 2023-02-09 RX ORDER — OXYCODONE AND ACETAMINOPHEN 10; 325 MG/1; MG/1
1 TABLET ORAL EVERY 6 HOURS PRN
Qty: 120 TABLET | Refills: 0 | Status: SHIPPED | OUTPATIENT
Start: 2023-02-11 | End: 2023-03-13

## 2023-02-15 ENCOUNTER — OFFICE VISIT (OUTPATIENT)
Dept: FAMILY MEDICINE CLINIC | Age: 68
End: 2023-02-15
Payer: MEDICARE

## 2023-02-15 VITALS
WEIGHT: 297 LBS | HEART RATE: 76 BPM | HEIGHT: 73 IN | TEMPERATURE: 98.6 F | OXYGEN SATURATION: 98 % | SYSTOLIC BLOOD PRESSURE: 146 MMHG | DIASTOLIC BLOOD PRESSURE: 94 MMHG | BODY MASS INDEX: 39.36 KG/M2

## 2023-02-15 DIAGNOSIS — L30.4 INTERTRIGO: ICD-10-CM

## 2023-02-15 DIAGNOSIS — B86 SCABIES: Primary | ICD-10-CM

## 2023-02-15 PROCEDURE — 3077F SYST BP >= 140 MM HG: CPT | Performed by: PEDIATRICS

## 2023-02-15 PROCEDURE — 1036F TOBACCO NON-USER: CPT | Performed by: PEDIATRICS

## 2023-02-15 PROCEDURE — 3017F COLORECTAL CA SCREEN DOC REV: CPT | Performed by: PEDIATRICS

## 2023-02-15 PROCEDURE — G8417 CALC BMI ABV UP PARAM F/U: HCPCS | Performed by: PEDIATRICS

## 2023-02-15 PROCEDURE — 1123F ACP DISCUSS/DSCN MKR DOCD: CPT | Performed by: PEDIATRICS

## 2023-02-15 PROCEDURE — 99213 OFFICE O/P EST LOW 20 MIN: CPT | Performed by: PEDIATRICS

## 2023-02-15 PROCEDURE — G8484 FLU IMMUNIZE NO ADMIN: HCPCS | Performed by: PEDIATRICS

## 2023-02-15 PROCEDURE — G8427 DOCREV CUR MEDS BY ELIG CLIN: HCPCS | Performed by: PEDIATRICS

## 2023-02-15 PROCEDURE — 3080F DIAST BP >= 90 MM HG: CPT | Performed by: PEDIATRICS

## 2023-02-15 RX ORDER — FLUCONAZOLE 150 MG/1
150 TABLET ORAL DAILY
Qty: 7 TABLET | Refills: 0 | Status: SHIPPED | OUTPATIENT
Start: 2023-02-15 | End: 2023-02-22

## 2023-02-15 RX ORDER — PERMETHRIN 50 MG/G
CREAM TOPICAL
Qty: 3 EACH | Refills: 2 | Status: SHIPPED | OUTPATIENT
Start: 2023-02-15

## 2023-02-15 ASSESSMENT — ENCOUNTER SYMPTOMS
EYES NEGATIVE: 1
RESPIRATORY NEGATIVE: 1
GASTROINTESTINAL NEGATIVE: 1
ALLERGIC/IMMUNOLOGIC NEGATIVE: 1

## 2023-02-15 NOTE — PROGRESS NOTES
Between the    [facility] [address], [address]  Phone [phone]   Fax [phone]      OFFICE VISIT: 2/15/2023    Vivienne Batres-: 1955      HPI  Reason For Visit:  Red Span is a 79 y.o. Rash (Rash is worse and feels it has spread. Medication did not help. )    Patient presents with a rash. This was evaluated by me on 2/3/2023. Prior to that he had had multiple visits for this and had been treated primarily for fungal infection. When I saw him the rash was predominantly between his fingers and on his hands and in the genitalia. He also had significant lesions around his wrists. There was the appearance of burrows on the wrists and fingers. He was treated for potential scabies with permethrin cream.  He is now 2 weeks out from that initiation of treatment. We also gave him ketoconazole cream in the event that there was any underlying fungal component. Since I last saw him, he notes that the rash seems to be much worse. He notes that it has spread to other areas. He notes that he has had good results with permethrin. Intertrigo:  He still has issues with any crease   He needs something to help with this. \   height is 6' 1\" (1.854 m) and weight is 297 lb (134.7 kg). His temporal temperature is 98.6 °F (37 °C). His blood pressure is 146/94 (abnormal) and his pulse is 76. His oxygen saturation is 98%. Body mass index is 39.18 kg/m². I have reviewed the following with the Mr. Batres   Lab Review  Orders Only on 2023   Component Date Value    Sodium 2023 142     Potassium 2023 3.7     Chloride 2023 101     CO2 2023 29     Anion Gap 2023 12     Glucose 2023 100     BUN 2023 12     Creatinine 2023 0.9     Est, Glom Filt Rate 2023 >60     Calcium 2023 10.0    Orders Only on 2023   Component Date Value    PSA 2023 2.98     T4 Free 2023 1.14     TSH 2023 1.910     Hemoglobin A1C 01/03/2023 6.5 (A)     Sodium 01/03/2023 142     Potassium 01/03/2023 5.2 (A)     Chloride 01/03/2023 99     CO2 01/03/2023 29     Anion Gap 01/03/2023 14     Glucose 01/03/2023 140 (A)     BUN 01/03/2023 12     Creatinine 01/03/2023 0.8     Est, Glom Filt Rate 01/03/2023 >60     Calcium 01/03/2023 10.1     Total Protein 01/03/2023 7.4     Albumin 01/03/2023 4.7     Total Bilirubin 01/03/2023 0.3     Alkaline Phosphatase 01/03/2023 79     ALT 01/03/2023 16     AST 01/03/2023 14     WBC 01/03/2023 4.8     RBC 01/03/2023 5.33     Hemoglobin 01/03/2023 15.2     Hematocrit 01/03/2023 48.6     MCV 01/03/2023 91.2     MCH 01/03/2023 28.5     MCHC 01/03/2023 31.3 (A)     RDW 01/03/2023 14.2     Platelets 75/49/4482 163     MPV 01/03/2023 12.3     Neutrophils % 01/03/2023 50.6     Lymphocytes % 01/03/2023 31.8     Monocytes % 01/03/2023 14.1 (A)     Eosinophils % 01/03/2023 2.5     Basophils % 01/03/2023 0.6     Neutrophils Absolute 01/03/2023 2.4     Immature Granulocytes # 01/03/2023 0.0     Lymphocytes Absolute 01/03/2023 1.5     Monocytes Absolute 01/03/2023 0.70     Eosinophils Absolute 01/03/2023 0.10     Basophils Absolute 01/03/2023 0.00    Orders Only on 12/15/2022   Component Date Value    Cholesterol, Total 12/15/2022 150 (A)     Triglycerides 12/15/2022 103     HDL 12/15/2022 51 (A)     LDL Calculated 12/15/2022 78    Orders Only on 10/20/2022   Component Date Value    FIT-DNA (Cologuard) 11/03/2022 Positive (A)      Copies of these are in the chart. Current Outpatient Medications   Medication Sig Dispense Refill    permethrin (ELIMITE) 5 % cream Apply topically as directed 3 each 2    fluconazole (DIFLUCAN) 150 MG tablet Take 1 tablet by mouth daily for 7 days 7 tablet 0    oxyCODONE (OXYCONTIN) 20 MG extended release tablet Take 1 tablet by mouth in the morning and 1 tablet in the evening. Do all this for 30 days. Intended supply: 30 days.  60 tablet 0    oxyCODONE-acetaminophen (PERCOCET)  MG per tablet Take 1 tablet by mouth every 6 hours as needed for Pain (Pain) for up to 30 days. 120 tablet 0    permethrin (ELIMITE) 5 % cream Apply to areas daily x 7 days and continue 2x / week until cured. 60 g 1    ketoconazole (NIZORAL) 2 % cream Apply topically daily. 30 g 1    metFORMIN (GLUCOPHAGE) 1000 MG tablet Take 1 tablet by mouth 2 times daily (with meals) 180 tablet 3    blood glucose test strips (CONTOUR NEXT TEST) strip 1 each by In Vitro route daily As needed. 100 each 3    Diabetic Shoe MISC by Does not apply route 1 each 0    hydroCHLOROthiazide (HYDRODIURIL) 25 MG tablet TAKE 1 TABLET BY MOUTH EVERY DAY 90 tablet 3    cyclobenzaprine (FLEXERIL) 10 MG tablet TAKE 1 TABLET BY MOUTH THREE TIMES A DAY AS NEEDED FOR MUSCLE SPASMS 90 tablet 5    INCRUSE ELLIPTA 62.5 MCG/INH AEPB INHALE 1 PUFF INTO LUNGS EVERYD AY 90 each 3    verapamil (VERELAN) 200 MG extended release capsule Take 1 capsule by mouth daily 90 capsule 3    albuterol sulfate HFA (PROVENTIL;VENTOLIN;PROAIR) 108 (90 Base) MCG/ACT inhaler Inhale 2 puffs into the lungs every 6 hours as needed for Wheezing 25.5 each 3    zolpidem (AMBIEN) 10 MG tablet Take by mouth nightly as needed for Sleep. ALPRAZolam (XANAX) 1 MG tablet Take 1 mg by mouth 3 times daily as needed for Sleep.  1 1/2 tablet TID      aspirin 81 MG chewable tablet Take 1 tablet by mouth daily 90 tablet 3    lovastatin (MEVACOR) 40 MG tablet TAKE 1 TABLET BY MOUTH EVERYNIGHT (Patient taking differently: Take 40 mg by mouth nightly) 90 tablet 3    fluticasone (FLONASE) 50 MCG/ACT nasal spray 2 sprays by Nasal route daily 3 Bottle 3    MICROLET LANCETS MISC 1 each by Does not apply route daily 100 each 3    hydroxychloroquine (PLAQUENIL) 200 MG tablet Take 400 mg by mouth daily   2    VOLTAREN 1 % GEL APPLY 2 GRAMS TO HAND QID  5    polyethylene glycol (MIRALAX) POWD powder Take 17 g by mouth every other day        No current facility-administered medications for this visit.        Allergies: Clindamycin/lincomycin, Benadryl [diphenhydramine], Neosporin plus max st, and Prednisone     Past Medical History:   Diagnosis Date    Acute sinusitis     Anxiety     Chronic back pain     Chronic bilateral low back pain with bilateral sciatica     Constipation     COPD (chronic obstructive pulmonary disease) (HCC)     Coronary artery disease involving native coronary artery of native heart without angina pectoris 8/27/2019    CPAP (continuous positive airway pressure) dependence     12cm to 20cm    Depression     Hyperlipidemia     Hypertensive disorder     OA (osteoarthritis of the spine)     Obesity     SUSANA (obstructive sleep apnea)     AHI:  85.3 per HST 11/2013    Osteoarthritis of spine with radiculopathy, lumbar region     Otitis media     Rheumatoid arthritis ([facility] [address]     Spinal stenosis     Type 2 diabetes mellitus ([facility] [address] 03/23/2016    Unspecified sleep apnea     uses bipap    Vertigo        Family History   Problem Relation Age of Onset    Heart Disease Father 64    Cancer Father         malignant tumor of pharynx    Diabetes Mother     Hypertension Mother     Diabetes Sister         x 2    Diabetes Brother     Colon Cancer Neg Hx     Colon Polyps Neg Hx     Liver Disease Neg Hx     Rectal Cancer Neg Hx     Stomach Cancer Neg Hx        Past Surgical History:   Procedure Laterality Date    ADENOIDECTOMY      BROW LIFT      due to vision    CARDIAC CATHETERIZATION  10/24/2018         COLONOSCOPY  27 yrs ago    [place], [place], normal per patient    COLONOSCOPY  09/10/2015    Dr Davey Banegas x 1, HP x 5, 5 yr recall    COLONOSCOPY N/A 01/26/2023    Dr Cade Bailey disease-AP x 3, 3 yr recall    HERNIA REPAIR      KNEE ARTHROSCOPY Left 2009    [place] NJX DX/THER SBST EPIDURAL/SUBARACH LUMBAR/SACRAL N/A 10/03/2017    L3-4 EPIDURAL STEROID INJECTION performed by Kemi Vann at [address] History     Tobacco Use    Smoking status: Former     Packs/day: 0.50     Years: 20.00     Pack years: 10.00     Types: Cigarettes     Start date: 0     Quit date:      Years since quittin.1    Smokeless tobacco: Never   Substance Use Topics    Alcohol use: No        Review of Systems   Constitutional: Negative. HENT: Negative. Eyes: Negative. Respiratory: Negative. Cardiovascular: Negative. Gastrointestinal: Negative. Endocrine: Negative. Genitourinary: Negative. Musculoskeletal: Negative. Skin:  Positive for rash. Allergic/Immunologic: Negative. Neurological: Negative. Hematological: Negative. Psychiatric/Behavioral: Negative. Physical Exam  Vitals reviewed. Constitutional:       Appearance: Normal appearance. HENT:      Head: Normocephalic and atraumatic. Right Ear: Tympanic membrane, ear canal and external ear normal.      Left Ear: Tympanic membrane, ear canal and external ear normal.      Nose: Nose normal.      Mouth/Throat:      Mouth: Mucous membranes are moist.      Pharynx: Oropharynx is clear. Eyes:      Extraocular Movements: Extraocular movements intact. Conjunctiva/sclera: Conjunctivae normal.      Pupils: Pupils are equal, round, and reactive to light. Cardiovascular:      Rate and Rhythm: Normal rate and regular rhythm. Pulses: Normal pulses. Heart sounds: Normal heart sounds. No murmur heard. Pulmonary:      Effort: Pulmonary effort is normal.      Breath sounds: Normal breath sounds. Abdominal:      Palpations: Abdomen is soft. Musculoskeletal:         General: No swelling, tenderness or deformity. Cervical back: Normal range of motion and neck supple. Skin:     Findings: Erythema, lesion (between fingers bilaterally) and rash present. Comments: This is very itchy. He has been dealing with this for several months now. Neurological:      General: No focal deficit present.       Mental Status: He is alert and oriented to person, place, and time. Psychiatric:         Mood and Affect: Mood normal.         Behavior: Behavior normal.         ASSESSMENT      ICD-10-CM    1. Scabies  B86 permethrin (ELIMITE) 5 % cream      2. Intertrigo  L30.4 fluconazole (DIFLUCAN) 150 MG tablet            PLAN    1. Scabies  We are going to treat this more aggressively   - permethrin (ELIMITE) 5 % cream; Apply topically as directed  Dispense: 3 each; Refill: 2    2. Intertrigo  Will treat with diflucan x 7 days  - fluconazole (DIFLUCAN) 150 MG tablet; Take 1 tablet by mouth daily for 7 days  Dispense: 7 tablet; Refill: 0    No orders of the defined types were placed in this encounter. Return if symptoms worsen or fail to improve. This was an in-house visit.

## 2023-02-22 DIAGNOSIS — L30.4 INTERTRIGO: ICD-10-CM

## 2023-02-23 ENCOUNTER — TELEPHONE (OUTPATIENT)
Dept: FAMILY MEDICINE CLINIC | Age: 68
End: 2023-02-23

## 2023-02-23 DIAGNOSIS — L30.4 INTERTRIGO: ICD-10-CM

## 2023-02-23 RX ORDER — FLUCONAZOLE 150 MG/1
150 TABLET ORAL DAILY
Qty: 7 TABLET | Refills: 0 | OUTPATIENT
Start: 2023-02-23 | End: 2023-03-02

## 2023-02-23 RX ORDER — LORATADINE 10 MG
TABLET ORAL
Qty: 90 TABLET | Refills: 3 | Status: SHIPPED | OUTPATIENT
Start: 2023-02-23

## 2023-02-23 RX ORDER — FLUCONAZOLE 150 MG/1
150 TABLET ORAL DAILY
Qty: 7 TABLET | Refills: 1 | Status: SHIPPED | OUTPATIENT
Start: 2023-02-23

## 2023-02-23 RX ORDER — FLUCONAZOLE 150 MG/1
150 TABLET ORAL DAILY
Qty: 7 TABLET | Refills: 0 | Status: SHIPPED | OUTPATIENT
Start: 2023-02-23 | End: 2023-02-23 | Stop reason: SDUPTHER

## 2023-02-23 NOTE — TELEPHONE ENCOUNTER
[facility] form [address] in [facility] called and said the Diflucan that was ordered has severe drug reactions with 3 of the patients current medications and they need Mi Mccoy to call them and verify before the can continue with the medications.

## 2023-02-23 NOTE — TELEPHONE ENCOUNTER
Patient send a message through refill request stating this:   I don't know if i can take another 7 days. I am showing improvment however it is very slow. would it be safe for me to take 7 more days? It seems to be the only thing showing improvement. I am also bathing with the topical cream as well. Please let me know thank you so much for your help.   Nikita Fierro

## 2023-02-23 NOTE — TELEPHONE ENCOUNTER
Called pt with Dr Jordyn Arias recommendations. Sent rx to pharmacy for pt.      Requested Prescriptions     Signed Prescriptions Disp Refills    fluconazole (DIFLUCAN) 150 MG tablet 7 tablet 1     Sig: Take 1 tablet by mouth daily     Authorizing Provider: Harvey Cabrera     Ordering User: Sol Tapia

## 2023-02-23 NOTE — TELEPHONE ENCOUNTER
Received fax from pharmacy requesting refill on pts medication(s). Pt was last seen in office on 2/15/2023  and has a follow up scheduled for 2/22/2023. [facility]  for [facility].      Requested Prescriptions     Pending Prescriptions Disp Refills    fluconazole (DIFLUCAN) 150 MG tablet [Pharmacy Med Name: FLUCONAZOLE 150 MG TABLET] 7 tablet 0     Sig: TAKE 1 TABLET BY MOUTH EVERY DAY FOR 7 DAYS

## 2023-03-02 ENCOUNTER — TELEPHONE (OUTPATIENT)
Dept: FAMILY MEDICINE CLINIC | Age: 68
End: 2023-03-02

## 2023-03-10 DIAGNOSIS — M47.26 OSTEOARTHRITIS OF SPINE WITH RADICULOPATHY, LUMBAR REGION: ICD-10-CM

## 2023-03-10 DIAGNOSIS — G89.29 CHRONIC BILATERAL LOW BACK PAIN WITH BILATERAL SCIATICA: ICD-10-CM

## 2023-03-10 DIAGNOSIS — F41.9 ANXIETY DISORDER, UNSPECIFIED: ICD-10-CM

## 2023-03-10 DIAGNOSIS — M54.41 CHRONIC BILATERAL LOW BACK PAIN WITH BILATERAL SCIATICA: ICD-10-CM

## 2023-03-10 DIAGNOSIS — M54.42 CHRONIC BILATERAL LOW BACK PAIN WITH BILATERAL SCIATICA: ICD-10-CM

## 2023-03-10 NOTE — TELEPHONE ENCOUNTER
Requested Prescriptions     Pending Prescriptions Disp Refills    oxyCODONE (OXYCONTIN) 20 MG extended release tablet 60 tablet 0     Sig: Take 1 tablet by mouth in the morning and 1 tablet in the evening. Do all this for 30 days. Intended supply: 30 days. oxyCODONE-acetaminophen (PERCOCET)  MG per tablet 120 tablet 0     Sig: Take 1 tablet by mouth every 6 hours as needed for Pain (Pain) for up to 30 days.        Last Office Visit:  1/10/2023  Next Office Visit:  7/10/2023  Last Medication Refill: 2/11/2023 with 0 CRISTINA Sepulveda up to date:  up to date     *RX updated to reflect   3/13/2023  fill date*

## 2023-03-10 NOTE — TELEPHONE ENCOUNTER
Requested Prescriptions     Pending Prescriptions Disp Refills    ALPRAZolam (XANAX) 1 MG tablet [Pharmacy Med Name: ALPRAZOLAM 1 MG TABLET] 135 tablet 5     Sig: TAKE 1 AND A 1/2 TABLETS BY MOUTH 3 TIMES A DAY       Last Office Visit:  1/10/2023  Next Office Visit:  7/10/2023  Last Medication Refill:  9/12/22 with 5 RF  Deleta Frees up to date:  3/10/23    *RX updated to reflect   3/12/23  fill date*

## 2023-03-14 RX ORDER — OXYCODONE HCL 20 MG/1
20 TABLET, FILM COATED, EXTENDED RELEASE ORAL EVERY 12 HOURS
Qty: 60 TABLET | Refills: 0 | Status: SHIPPED | OUTPATIENT
Start: 2023-03-14 | End: 2023-04-13

## 2023-03-14 RX ORDER — ALPRAZOLAM 1 MG/1
TABLET ORAL
Qty: 135 TABLET | Refills: 5 | Status: SHIPPED | OUTPATIENT
Start: 2023-03-14 | End: 2023-04-13

## 2023-03-14 RX ORDER — OXYCODONE AND ACETAMINOPHEN 10; 325 MG/1; MG/1
1 TABLET ORAL EVERY 6 HOURS PRN
Qty: 120 TABLET | Refills: 0 | Status: SHIPPED | OUTPATIENT
Start: 2023-03-14 | End: 2023-04-13

## 2023-03-24 DIAGNOSIS — E78.00 HYPERCHOLESTEREMIA: ICD-10-CM

## 2023-03-24 DIAGNOSIS — I25.10 CORONARY ARTERY DISEASE INVOLVING NATIVE CORONARY ARTERY OF NATIVE HEART WITHOUT ANGINA PECTORIS: ICD-10-CM

## 2023-03-24 RX ORDER — LOVASTATIN 40 MG/1
TABLET ORAL
Qty: 90 TABLET | Refills: 3 | Status: SHIPPED | OUTPATIENT
Start: 2023-03-24

## 2023-04-06 RX ORDER — HYDROCHLOROTHIAZIDE 25 MG/1
25 TABLET ORAL DAILY
Qty: 90 TABLET | Refills: 1 | Status: SHIPPED | OUTPATIENT
Start: 2023-04-06

## 2023-05-07 DIAGNOSIS — B86 SCABIES: ICD-10-CM

## 2023-05-08 RX ORDER — PERMETHRIN 50 MG/G
CREAM TOPICAL
Qty: 180 G | Refills: 2 | Status: SHIPPED | OUTPATIENT
Start: 2023-05-08

## 2023-05-08 NOTE — TELEPHONE ENCOUNTER
Received fax from pharmacy requesting refill on pts medication(s). Pt was last seen in office on 2/15/2023  and has a follow up scheduled for Visit date not found. Will send request to  Dr. Ben Case  for authorization.      Requested Prescriptions     Pending Prescriptions Disp Refills    permethrin (ELIMITE) 5 % cream [Pharmacy Med Name: PERMETHRIN 5% CREAM] 180 g 2     Sig: APPLY TOPICALLY AS DIRECTED

## 2023-05-09 DIAGNOSIS — M54.42 CHRONIC BILATERAL LOW BACK PAIN WITH BILATERAL SCIATICA: ICD-10-CM

## 2023-05-09 DIAGNOSIS — M54.41 CHRONIC BILATERAL LOW BACK PAIN WITH BILATERAL SCIATICA: ICD-10-CM

## 2023-05-09 DIAGNOSIS — M47.26 OSTEOARTHRITIS OF SPINE WITH RADICULOPATHY, LUMBAR REGION: ICD-10-CM

## 2023-05-09 DIAGNOSIS — G89.29 CHRONIC BILATERAL LOW BACK PAIN WITH BILATERAL SCIATICA: ICD-10-CM

## 2023-05-09 RX ORDER — OXYCODONE AND ACETAMINOPHEN 10; 325 MG/1; MG/1
1 TABLET ORAL EVERY 6 HOURS PRN
Qty: 120 TABLET | Refills: 0 | Status: SHIPPED | OUTPATIENT
Start: 2023-05-13 | End: 2023-06-12

## 2023-05-09 RX ORDER — OXYCODONE HCL 20 MG/1
20 TABLET, FILM COATED, EXTENDED RELEASE ORAL EVERY 12 HOURS
Qty: 60 TABLET | Refills: 0 | Status: SHIPPED | OUTPATIENT
Start: 2023-05-13 | End: 2023-06-12

## 2023-05-09 NOTE — TELEPHONE ENCOUNTER
Requested Prescriptions     Pending Prescriptions Disp Refills    oxyCODONE (OXYCONTIN) 20 MG extended release tablet 60 tablet 0     Sig: Take 1 tablet by mouth in the morning and 1 tablet in the evening. Do all this for 30 days. Intended supply: 30 days.    oxyCODONE-acetaminophen (PERCOCET)  MG per tablet 120 tablet 0     Sig: Take 1 tablet by mouth every 6 hours as needed for Pain (Pain) for up to 30 days.       Last Office Visit:  1/10/2023  Next Office Visit:  7/10/2023  Last Medication Refill: 4/13/2023 with 0 CRISTINA Hobbs up to date: 3/10/2023    *RX updated to reflect   5/13/2023  fill date*

## 2023-05-15 ENCOUNTER — TELEPHONE (OUTPATIENT)
Dept: NEUROLOGY | Age: 68
End: 2023-05-15

## 2023-05-15 NOTE — TELEPHONE ENCOUNTER
----- Message from Elizabeth Watkins MD sent at 5/13/2023  3:20 PM CDT -----  On 4/27, his wife handed me insurance papers. Apparently his oxycontin needs precertification. I placed it on your keyboard. His insurance has not approved it. Is it in process? Can you call his insurance and see what's going on and what they need.

## 2023-05-15 NOTE — TELEPHONE ENCOUNTER
Called [phone] per [facility], transferred to [phone], spoke to [facility]. Started yusefited PA, and fax letter from Dr Jerel Power to [phone]. Key Code [id number], and Call reference # [id number]. We should hear within 24 hours. Patient is aware.

## 2023-05-16 ENCOUNTER — TELEPHONE (OUTPATIENT)
Dept: NEUROLOGY | Age: 68
End: 2023-05-16

## 2023-05-16 NOTE — TELEPHONE ENCOUNTER
Called patient insurance due to he was told that he needs PA for name brand Oxycontin ER 20mg BID. We have been ordering him generic oxycodone ER and the pharmacy has been filling name brand due to can't get generic. Name brand fell off of his formulary so that is how we found out that he had been getting name brand. Spoke to Kecia and she reworked his PA for Name Brand. We also were able to get an emergency override for a 7 day supply. We will hear from the PA within 72 hours. He will need a new script for a full 30 days. Patient is aware.

## 2023-05-19 NOTE — TELEPHONE ENCOUNTER
May 16, 2023            2:09 PM    Andrés Higgins LPN contacted Martín Him \"Big [facility]hele\"    Andrés Higgins LPN     VM     0:22 PM  Note     Called patient insurance due to he was told that he needs PA for name brand Oxycontin ER 20mg BID. We have been ordering him generic oxycodone ER and the pharmacy has been filling name brand due to can't get generic. Name brand fell off of his formulary so that is how we found out that he had been getting name brand. Spoke to Kecia and she reworked his PA for Name Brand. We also were able to get an emergency override for a 7 day supply. We will hear from the PA within 72 hours. He will need a new script for a full 30 days. Patient is aware.

## 2023-05-24 DIAGNOSIS — M47.26 OSTEOARTHRITIS OF SPINE WITH RADICULOPATHY, LUMBAR REGION: ICD-10-CM

## 2023-05-25 RX ORDER — OXYCODONE HCL 20 MG/1
20 TABLET, FILM COATED, EXTENDED RELEASE ORAL EVERY 12 HOURS
Qty: 60 TABLET | Refills: 0 | OUTPATIENT
Start: 2023-05-25 | End: 2023-06-24

## 2023-05-26 DIAGNOSIS — M47.26 OSTEOARTHRITIS OF SPINE WITH RADICULOPATHY, LUMBAR REGION: ICD-10-CM

## 2023-05-26 NOTE — TELEPHONE ENCOUNTER
Gaby Vargas has requested a refill on his medication. Last office visit : 1/10/2023   Next office visit : 7/10/2023   Last medication refill : PT RECEIVED 1 WEEKS WORTH ON 5/13/23 DUE TO INSURANCE ISSUE. HE IS DUE AGAIN AND IT IS APPROVED SO HE CAN  THE ENTIRE SCRIPT THIS TIME   Kyle Dyer : UP TO DATE       Requested Prescriptions     Pending Prescriptions Disp Refills    oxyCODONE (OXYCONTIN) 20 MG extended release tablet 60 tablet 0     Sig: Take 1 tablet by mouth in the morning and 1 tablet in the evening. Do all this for 30 days. Intended supply: 30 days. 2 times/day

## 2023-05-27 RX ORDER — OXYCODONE HCL 20 MG/1
20 TABLET, FILM COATED, EXTENDED RELEASE ORAL EVERY 12 HOURS
Qty: 60 TABLET | Refills: 0 | Status: SHIPPED | OUTPATIENT
Start: 2023-05-27 | End: 2023-06-26

## 2023-05-30 DIAGNOSIS — M05.742 RHEUMATOID ARTHRITIS INVOLVING BOTH HANDS WITH POSITIVE RHEUMATOID FACTOR (HCC): Primary | ICD-10-CM

## 2023-05-30 DIAGNOSIS — M05.741 RHEUMATOID ARTHRITIS INVOLVING BOTH HANDS WITH POSITIVE RHEUMATOID FACTOR (HCC): Primary | ICD-10-CM

## 2023-05-31 ENCOUNTER — TELEPHONE (OUTPATIENT)
Dept: NEUROLOGY | Age: 68
End: 2023-05-31

## 2023-05-31 NOTE — TELEPHONE ENCOUNTER
[address] [place] [address] at [phone] and spoke to [address], Call ref# [id number]. She could not find anything about the call to Kecia on 5-16-23. Started a new PA for Oxycontin 20mg BID, Key# [id number]. She reports that we should get determination within 24 hours. Patient is aware.

## 2023-05-31 NOTE — TELEPHONE ENCOUNTER
Spoke to patients wife who reports the insurance called them and the Oxycontin was denied. Will check with Dr Gabriel about changing his medication.

## 2023-06-06 ENCOUNTER — TELEPHONE (OUTPATIENT)
Dept: NEUROLOGY | Age: 68
End: 2023-06-06

## 2023-06-06 DIAGNOSIS — M47.26 OSTEOARTHRITIS OF SPINE WITH RADICULOPATHY, LUMBAR REGION: Primary | ICD-10-CM

## 2023-06-06 NOTE — TELEPHONE ENCOUNTER
Osvaldo Parker spoke with the patients wife and let her know that a new medication was sent to the pharmacy and they are getting it ready for him and it is around 10.00.

## 2023-06-27 DIAGNOSIS — I10 ESSENTIAL HYPERTENSION: ICD-10-CM

## 2023-06-27 DIAGNOSIS — Z00.00 ROUTINE GENERAL MEDICAL EXAMINATION AT A HEALTH CARE FACILITY: ICD-10-CM

## 2023-06-27 DIAGNOSIS — E78.00 HYPERCHOLESTEREMIA: ICD-10-CM

## 2023-06-27 DIAGNOSIS — E11.628 TYPE 2 DIABETES MELLITUS WITH OTHER SKIN COMPLICATION, WITHOUT LONG-TERM CURRENT USE OF INSULIN (HCC): ICD-10-CM

## 2023-06-27 DIAGNOSIS — E78.2 MIXED HYPERLIPIDEMIA: ICD-10-CM

## 2023-06-27 LAB
ALBUMIN SERPL-MCNC: 4.5 G/DL (ref 3.5–5.2)
ALP SERPL-CCNC: 67 U/L (ref 40–130)
ALT SERPL-CCNC: 19 U/L (ref 5–41)
ANION GAP SERPL CALCULATED.3IONS-SCNC: 8 MMOL/L (ref 7–19)
AST SERPL-CCNC: 15 U/L (ref 5–40)
BASOPHILS # BLD: 0 K/UL (ref 0–0.2)
BASOPHILS NFR BLD: 0.9 % (ref 0–1)
BILIRUB SERPL-MCNC: 0.5 MG/DL (ref 0.2–1.2)
BUN SERPL-MCNC: 12 MG/DL (ref 8–23)
CALCIUM SERPL-MCNC: 9.8 MG/DL (ref 8.8–10.2)
CHLORIDE SERPL-SCNC: 99 MMOL/L (ref 98–111)
CHOLEST SERPL-MCNC: 147 MG/DL (ref 160–199)
CO2 SERPL-SCNC: 31 MMOL/L (ref 22–29)
CREAT SERPL-MCNC: 0.8 MG/DL (ref 0.5–1.2)
CREAT UR-MCNC: 63.1 MG/DL (ref 39–259)
EOSINOPHIL # BLD: 0.1 K/UL (ref 0–0.6)
EOSINOPHIL NFR BLD: 2.6 % (ref 0–5)
ERYTHROCYTE [DISTWIDTH] IN BLOOD BY AUTOMATED COUNT: 14.1 % (ref 11.5–14.5)
GLUCOSE SERPL-MCNC: 137 MG/DL (ref 74–109)
HBA1C MFR BLD: 6.9 % (ref 4–6)
HCT VFR BLD AUTO: 44.1 % (ref 42–52)
HDLC SERPL-MCNC: 53 MG/DL (ref 55–121)
HGB BLD-MCNC: 14.1 G/DL (ref 14–18)
IMM GRANULOCYTES # BLD: 0 K/UL
LDLC SERPL CALC-MCNC: 77 MG/DL
LYMPHOCYTES # BLD: 1.4 K/UL (ref 1.1–4.5)
LYMPHOCYTES NFR BLD: 31.2 % (ref 20–40)
MCH RBC QN AUTO: 28.7 PG (ref 27–31)
MCHC RBC AUTO-ENTMCNC: 32 G/DL (ref 33–37)
MCV RBC AUTO: 89.8 FL (ref 80–94)
MICROALBUMIN UR-MCNC: <1.2 MG/DL (ref 0–19)
MICROALBUMIN/CREAT UR-RTO: NORMAL MG/G
MONOCYTES # BLD: 0.6 K/UL (ref 0–0.9)
MONOCYTES NFR BLD: 12.9 % (ref 0–10)
NEUTROPHILS # BLD: 2.4 K/UL (ref 1.5–7.5)
NEUTS SEG NFR BLD: 52.2 % (ref 50–65)
PLATELET # BLD AUTO: 145 K/UL (ref 130–400)
PMV BLD AUTO: 12.6 FL (ref 9.4–12.4)
POTASSIUM SERPL-SCNC: 4 MMOL/L (ref 3.5–5)
PROT SERPL-MCNC: 7.5 G/DL (ref 6.6–8.7)
RBC # BLD AUTO: 4.91 M/UL (ref 4.7–6.1)
SODIUM SERPL-SCNC: 138 MMOL/L (ref 136–145)
T4 FREE SERPL-MCNC: 1.11 NG/DL (ref 0.93–1.7)
TRIGL SERPL-MCNC: 84 MG/DL (ref 0–149)
TSH SERPL DL<=0.005 MIU/L-ACNC: 1.76 UIU/ML (ref 0.27–4.2)
WBC # BLD AUTO: 4.6 K/UL (ref 4.8–10.8)

## 2023-06-28 ENCOUNTER — TELEPHONE (OUTPATIENT)
Dept: FAMILY MEDICINE CLINIC | Age: 68
End: 2023-06-28

## 2023-06-29 DIAGNOSIS — M54.42 CHRONIC BILATERAL LOW BACK PAIN WITH BILATERAL SCIATICA: Primary | ICD-10-CM

## 2023-06-29 DIAGNOSIS — G89.29 CHRONIC BILATERAL LOW BACK PAIN WITH BILATERAL SCIATICA: Primary | ICD-10-CM

## 2023-06-29 DIAGNOSIS — M54.41 CHRONIC BILATERAL LOW BACK PAIN WITH BILATERAL SCIATICA: Primary | ICD-10-CM

## 2023-07-02 NOTE — TELEPHONE ENCOUNTER
"Pt requesting benedryl. Informed the Pt that they had an order for benadryl by mouth. Pt responded, "I thought I had an order for IV?". Informed the Pt that it was only by mouth. Pt stated, "I don't want it then".  " Called and left VM with patient to confirm appt on 6/13 at 1:45p.m. SR 6/13

## 2023-07-06 DIAGNOSIS — I25.10 CORONARY ARTERY DISEASE INVOLVING NATIVE CORONARY ARTERY OF NATIVE HEART WITHOUT ANGINA PECTORIS: ICD-10-CM

## 2023-07-06 DIAGNOSIS — Z72.0 TOBACCO ABUSE: ICD-10-CM

## 2023-07-06 DIAGNOSIS — I10 ESSENTIAL HYPERTENSION: ICD-10-CM

## 2023-07-06 DIAGNOSIS — J42 CHRONIC BRONCHITIS, UNSPECIFIED CHRONIC BRONCHITIS TYPE (HCC): ICD-10-CM

## 2023-07-06 DIAGNOSIS — J44.9 STAGE 2 MODERATE COPD BY GOLD CLASSIFICATION (HCC): ICD-10-CM

## 2023-07-06 RX ORDER — VERAPAMIL HYDROCHLORIDE 200 MG/1
CAPSULE, EXTENDED RELEASE ORAL
Qty: 90 CAPSULE | Refills: 3 | Status: SHIPPED | OUTPATIENT
Start: 2023-07-06

## 2023-07-06 RX ORDER — UMECLIDINIUM 62.5 UG/1
1 AEROSOL, POWDER ORAL DAILY
Qty: 90 EACH | Refills: 3 | Status: SHIPPED | OUTPATIENT
Start: 2023-07-06

## 2023-07-06 NOTE — TELEPHONE ENCOUNTER
Received fax from pharmacy requesting refill on pts medication(s). Pt was last seen in office on 2/15/2023  and has a follow up scheduled for Visit date not found. Will send request to  Catrachita Ohara  for authorization.      Requested Prescriptions     Pending Prescriptions Disp Refills    INCRUSE ELLIPTA 62.5 MCG/ACT inhaler [Pharmacy Med Name: INCRUSE ELLIPTA 62.5 MCG INH] 90 each 3     Sig: INHALE 1 PUFF INTO THE LUNGS DAILY

## 2023-07-11 DIAGNOSIS — M47.26 OSTEOARTHRITIS OF SPINE WITH RADICULOPATHY, LUMBAR REGION: ICD-10-CM

## 2023-07-11 DIAGNOSIS — M54.42 CHRONIC BILATERAL LOW BACK PAIN WITH BILATERAL SCIATICA: ICD-10-CM

## 2023-07-11 DIAGNOSIS — G89.29 CHRONIC BILATERAL LOW BACK PAIN WITH BILATERAL SCIATICA: ICD-10-CM

## 2023-07-11 DIAGNOSIS — M54.41 CHRONIC BILATERAL LOW BACK PAIN WITH BILATERAL SCIATICA: ICD-10-CM

## 2023-07-11 RX ORDER — OXYCODONE AND ACETAMINOPHEN 10; 325 MG/1; MG/1
TABLET ORAL
Qty: 120 TABLET | Refills: 0 | Status: SHIPPED | OUTPATIENT
Start: 2023-07-14 | End: 2023-08-13

## 2023-07-11 NOTE — TELEPHONE ENCOUNTER
Requested Prescriptions     Pending Prescriptions Disp Refills    oxyCODONE-acetaminophen (PERCOCET)  MG per tablet [Pharmacy Med Name: oxycodone-acetaminophen 10 mg-325 mg tablet] 120 tablet 0     Sig: TAKE ONE TABLET BY MOUTH EVERY 6 HOURS AS NEEDED FOR PAIN       Last Office Visit:  1/10/2023  Next Office Visit:  8/3/2023  Last Medication Refill:  6/14/23  Beauty Hammed up to date:  5/23    *RX updated to reflect   7/14/23  fill date*

## 2023-08-03 ENCOUNTER — OFFICE VISIT (OUTPATIENT)
Dept: NEUROLOGY | Age: 68
End: 2023-08-03
Payer: MEDICARE

## 2023-08-03 VITALS
HEART RATE: 71 BPM | BODY MASS INDEX: 38.97 KG/M2 | SYSTOLIC BLOOD PRESSURE: 138 MMHG | RESPIRATION RATE: 20 BRPM | WEIGHT: 294 LBS | DIASTOLIC BLOOD PRESSURE: 88 MMHG | HEIGHT: 73 IN

## 2023-08-03 DIAGNOSIS — G47.33 OSA (OBSTRUCTIVE SLEEP APNEA): ICD-10-CM

## 2023-08-03 DIAGNOSIS — M47.26 OSTEOARTHRITIS OF SPINE WITH RADICULOPATHY, LUMBAR REGION: Primary | ICD-10-CM

## 2023-08-03 DIAGNOSIS — G89.29 CHRONIC BILATERAL LOW BACK PAIN WITH BILATERAL SCIATICA: ICD-10-CM

## 2023-08-03 DIAGNOSIS — M05.741 RHEUMATOID ARTHRITIS INVOLVING BOTH HANDS WITH POSITIVE RHEUMATOID FACTOR (HCC): ICD-10-CM

## 2023-08-03 DIAGNOSIS — M54.42 CHRONIC BILATERAL LOW BACK PAIN WITH BILATERAL SCIATICA: ICD-10-CM

## 2023-08-03 DIAGNOSIS — M05.742 RHEUMATOID ARTHRITIS INVOLVING BOTH HANDS WITH POSITIVE RHEUMATOID FACTOR (HCC): ICD-10-CM

## 2023-08-03 DIAGNOSIS — M54.41 CHRONIC BILATERAL LOW BACK PAIN WITH BILATERAL SCIATICA: ICD-10-CM

## 2023-08-03 DIAGNOSIS — F41.1 GENERALIZED ANXIETY DISORDER: ICD-10-CM

## 2023-08-03 PROCEDURE — 3017F COLORECTAL CA SCREEN DOC REV: CPT | Performed by: PSYCHIATRY & NEUROLOGY

## 2023-08-03 PROCEDURE — G8427 DOCREV CUR MEDS BY ELIG CLIN: HCPCS | Performed by: PSYCHIATRY & NEUROLOGY

## 2023-08-03 PROCEDURE — 3078F DIAST BP <80 MM HG: CPT | Performed by: PSYCHIATRY & NEUROLOGY

## 2023-08-03 PROCEDURE — G8417 CALC BMI ABV UP PARAM F/U: HCPCS | Performed by: PSYCHIATRY & NEUROLOGY

## 2023-08-03 PROCEDURE — 1036F TOBACCO NON-USER: CPT | Performed by: PSYCHIATRY & NEUROLOGY

## 2023-08-03 PROCEDURE — 1123F ACP DISCUSS/DSCN MKR DOCD: CPT | Performed by: PSYCHIATRY & NEUROLOGY

## 2023-08-03 PROCEDURE — 99213 OFFICE O/P EST LOW 20 MIN: CPT | Performed by: PSYCHIATRY & NEUROLOGY

## 2023-08-03 PROCEDURE — 3074F SYST BP LT 130 MM HG: CPT | Performed by: PSYCHIATRY & NEUROLOGY

## 2023-08-03 NOTE — PROGRESS NOTES
[facility]  [address]  [facility], [address] [place] [address]  Phone [phone]  Fax [phone]     [facility] Follow Up Encounter  8/3/23 1:49 PM CDT    Information:   Patient Name: Day Alcala  :   1955  Age:   79 y.o. MRN:   [id number]  Account #:  [de-identified]  Today:  8/3/23    Provider: Mario Bartholomew M.D. Chief Complaint:   Chief Complaint   Patient presents with    Follow-up       Subjective:   Day Alcala is a 79 y.o. man with a history of chronic back pain, RA, SUSANA, and anxiety who is following up. He has chronic back pain with bilateral leg radicular pain. He has RA affecting his hands and they get stiff and painful. He continues to use his BiPAP during sleep and rests well. He changed his Medicare provider a few months ago and his new insurance would not cover his pain medications (oxyER and oxyIR). He could not afford them and was cut off cold turkey with little notification and few alternatives. My office attempted to get him an acception or at least precertifiacation for an alternative but were unsuccessful. In the end, he had to go back to his old insurance carrier that does cover these medications. He now is back on his usual pain medications which at least he tolerates and they allow him to maintaining activity and ability to perform routine physical activities and chores.         Objective:     Past Medical History:  Past Medical History:   Diagnosis Date    Acute sinusitis     Anxiety     Chronic back pain     Chronic bilateral low back pain with bilateral sciatica     Constipation     COPD (chronic obstructive pulmonary disease) (HCC)     Coronary artery disease involving native coronary artery of native heart without angina pectoris 2019    CPAP (continuous positive airway pressure) dependence     12cm to 20cm    Depression     Hyperlipidemia     Hypertensive disorder     OA (osteoarthritis of the spine)     Obesity     SUSANA (obstructive sleep

## 2023-08-07 DIAGNOSIS — M54.42 CHRONIC BILATERAL LOW BACK PAIN WITH BILATERAL SCIATICA: ICD-10-CM

## 2023-08-07 DIAGNOSIS — M54.41 CHRONIC BILATERAL LOW BACK PAIN WITH BILATERAL SCIATICA: ICD-10-CM

## 2023-08-07 DIAGNOSIS — G89.29 CHRONIC BILATERAL LOW BACK PAIN WITH BILATERAL SCIATICA: ICD-10-CM

## 2023-08-07 NOTE — TELEPHONE ENCOUNTER
Requested Prescriptions     Pending Prescriptions Disp Refills    OXYCONTIN 20 MG extended release tablet 60 each 0     Sig: Take 1 tablet by mouth in the morning and 1 tablet in the evening. Do all this for 60 days. Max Daily Amount: 40 mg.        Last Office Visit:  8/3/2023  Next Office Visit:  2/6/24  Last Medication Refill:  7/6/23  Dolly Beck up to date:  5/23    *RX updated to reflect   8/7/23  fill date*

## 2023-08-10 DIAGNOSIS — M47.26 OSTEOARTHRITIS OF SPINE WITH RADICULOPATHY, LUMBAR REGION: ICD-10-CM

## 2023-08-10 DIAGNOSIS — M54.42 CHRONIC BILATERAL LOW BACK PAIN WITH BILATERAL SCIATICA: ICD-10-CM

## 2023-08-10 DIAGNOSIS — M54.41 CHRONIC BILATERAL LOW BACK PAIN WITH BILATERAL SCIATICA: ICD-10-CM

## 2023-08-10 DIAGNOSIS — G89.29 CHRONIC BILATERAL LOW BACK PAIN WITH BILATERAL SCIATICA: ICD-10-CM

## 2023-08-10 RX ORDER — OXYCODONE AND ACETAMINOPHEN 10; 325 MG/1; MG/1
1 TABLET ORAL EVERY 6 HOURS PRN
Qty: 120 TABLET | Refills: 0 | Status: SHIPPED | OUTPATIENT
Start: 2023-08-13 | End: 2023-09-12

## 2023-08-10 NOTE — TELEPHONE ENCOUNTER
Requested Prescriptions     Pending Prescriptions Disp Refills    oxyCODONE-acetaminophen (PERCOCET)  MG per tablet 120 tablet 0     Sig: Take 1 tablet by mouth every 6 hours as needed for Pain for up to 30 days.        Last Office Visit:  8/3/2023  Next Office Visit:  2/6/2024  Last Medication Refill:  7/14/23  Mayela Sun up to date:  5/23    *RX updated to reflect   8/13/23  fill date*

## 2023-09-06 DIAGNOSIS — B35.3 TINEA PEDIS OF BOTH FEET: ICD-10-CM

## 2023-09-06 DIAGNOSIS — M47.26 OSTEOARTHRITIS OF SPINE WITH RADICULOPATHY, LUMBAR REGION: ICD-10-CM

## 2023-09-06 DIAGNOSIS — M54.42 CHRONIC BILATERAL LOW BACK PAIN WITH BILATERAL SCIATICA: ICD-10-CM

## 2023-09-06 DIAGNOSIS — G89.29 CHRONIC BILATERAL LOW BACK PAIN WITH BILATERAL SCIATICA: ICD-10-CM

## 2023-09-06 DIAGNOSIS — M54.41 CHRONIC BILATERAL LOW BACK PAIN WITH BILATERAL SCIATICA: ICD-10-CM

## 2023-09-06 DIAGNOSIS — E11.628 TYPE 2 DIABETES MELLITUS WITH OTHER SKIN COMPLICATION, WITHOUT LONG-TERM CURRENT USE OF INSULIN (HCC): Primary | ICD-10-CM

## 2023-09-06 DIAGNOSIS — J30.9 ALLERGIC RHINITIS, UNSPECIFIED SEASONALITY, UNSPECIFIED TRIGGER: ICD-10-CM

## 2023-09-06 RX ORDER — OXYCODONE AND ACETAMINOPHEN 10; 325 MG/1; MG/1
1 TABLET ORAL EVERY 6 HOURS PRN
Qty: 120 TABLET | Refills: 0 | Status: SHIPPED | OUTPATIENT
Start: 2023-09-12 | End: 2023-10-12

## 2023-09-06 RX ORDER — CYCLOBENZAPRINE HCL 10 MG
10 TABLET ORAL 3 TIMES DAILY PRN
Qty: 90 TABLET | Refills: 5 | Status: SHIPPED | OUTPATIENT
Start: 2023-09-06

## 2023-09-06 NOTE — TELEPHONE ENCOUNTER
Received fax from pharmacy requesting refill on pts medication(s). Pt was last seen in office on 2/15/2023  and has a follow up scheduled for 9/6/2023. Will send request to  Vika Ybarra  for patient. Requested Prescriptions     Pending Prescriptions Disp Refills    ketoconazole (NIZORAL) 2 % cream 30 g 5     Sig: Apply topically daily.

## 2023-09-06 NOTE — TELEPHONE ENCOUNTER
Requested Prescriptions     Pending Prescriptions Disp Refills    OXYCONTIN 20 MG extended release tablet 60 each 0     Sig: Take 1 tablet by mouth in the morning and 1 tablet in the evening. Do all this for 60 days. Max Daily Amount: 40 mg.    oxyCODONE-acetaminophen (PERCOCET)  MG per tablet 120 tablet 0     Sig: Take 1 tablet by mouth every 6 hours as needed for Pain for up to 30 days.        Last Office Visit:  8/3/2023  Next Office Visit:  2/6/2024  Last Medication Refill:    Oxycontin 8/8/23  Percocet 8/13/23    Ghazal Pradhan up to date:  5/23    *RX updated to reflect  fill date*  Oxycontin 9/7/23  Percocet 9/12/23

## 2023-09-06 NOTE — TELEPHONE ENCOUNTER
Received fax from pharmacy requesting refill on pts medication(s). Pt was last seen in office on 2/15/2023  and NO follow up scheduled. Will send request to  Maricruz Mena  for patient. Requested Prescriptions     Pending Prescriptions Disp Refills    fluticasone (FLONASE) 50 MCG/ACT nasal spray 3 each 3     Si sprays by Nasal route daily    blood glucose test strips (CONTOUR NEXT TEST) strip 100 each 3     Si each by In Vitro route daily As needed.

## 2023-09-06 NOTE — TELEPHONE ENCOUNTER
Requested Prescriptions     Pending Prescriptions Disp Refills    cyclobenzaprine (FLEXERIL) 10 MG tablet 90 tablet 5     Sig: Take 1 tablet by mouth 3 times daily as needed for Muscle spasms       Last Office Visit: 8/3/2023  Next Office Visit: 9/6/2023  Last Medication Refill:  8/8/22 with 5 RF

## 2023-09-07 RX ORDER — PERPHENAZINE 16 MG/1
1 TABLET, FILM COATED ORAL DAILY
Qty: 100 EACH | Refills: 3 | Status: SHIPPED | OUTPATIENT
Start: 2023-09-07

## 2023-09-07 RX ORDER — FLUTICASONE PROPIONATE 50 MCG
2 SPRAY, SUSPENSION (ML) NASAL DAILY
Qty: 3 EACH | Refills: 3 | Status: SHIPPED | OUTPATIENT
Start: 2023-09-07

## 2023-09-07 RX ORDER — KETOCONAZOLE 20 MG/G
CREAM TOPICAL
Qty: 30 G | Refills: 5 | Status: SHIPPED | OUTPATIENT
Start: 2023-09-07

## 2023-09-08 ENCOUNTER — PATIENT MESSAGE (OUTPATIENT)
Dept: NEUROLOGY | Age: 68
End: 2023-09-08

## 2023-09-11 DIAGNOSIS — F41.1 GENERALIZED ANXIETY DISORDER: Primary | ICD-10-CM

## 2023-09-11 DIAGNOSIS — G89.29 CHRONIC BILATERAL LOW BACK PAIN WITH BILATERAL SCIATICA: ICD-10-CM

## 2023-09-11 DIAGNOSIS — M54.42 CHRONIC BILATERAL LOW BACK PAIN WITH BILATERAL SCIATICA: ICD-10-CM

## 2023-09-11 DIAGNOSIS — M54.41 CHRONIC BILATERAL LOW BACK PAIN WITH BILATERAL SCIATICA: ICD-10-CM

## 2023-09-11 DIAGNOSIS — M47.26 OSTEOARTHRITIS OF SPINE WITH RADICULOPATHY, LUMBAR REGION: ICD-10-CM

## 2023-09-11 DIAGNOSIS — F41.9 ANXIETY DISORDER, UNSPECIFIED: ICD-10-CM

## 2023-09-11 RX ORDER — ALPRAZOLAM 1 MG/1
TABLET ORAL
Qty: 135 TABLET | OUTPATIENT
Start: 2023-09-11

## 2023-09-11 NOTE — TELEPHONE ENCOUNTER
Mckayla Carias has requested a refill on his medication.       Last office visit : 8/3/2023   Next office visit : 2024   Last medication refill :3-14-23  Jaguar Villatoro :       Requested Prescriptions     Pending Prescriptions Disp Refills    ALPRAZolam (XANAX) 1 MG tablet 135 tablet 5     Si 1/2 PO TID

## 2023-09-12 RX ORDER — ALPRAZOLAM 1 MG/1
TABLET ORAL
Qty: 135 TABLET | Refills: 5 | Status: SHIPPED | OUTPATIENT
Start: 2023-09-12 | End: 2023-10-10

## 2023-09-12 RX ORDER — OXYCODONE AND ACETAMINOPHEN 10; 325 MG/1; MG/1
1 TABLET ORAL EVERY 6 HOURS PRN
Qty: 120 TABLET | Refills: 0 | Status: CANCELLED | OUTPATIENT
Start: 2023-09-12 | End: 2023-10-12

## 2023-09-12 NOTE — TELEPHONE ENCOUNTER
Called [facility] to cancel the scripts for Oxycontin and Percocet. Vik Eubanks reports that they now have his medication in stock. Instructed to fill scripts. Let patient know that his medication is being filled.

## 2023-09-12 NOTE — TELEPHONE ENCOUNTER
[facility]       Requested Prescriptions     Pending Prescriptions Disp Refills    ALPRAZolam (XANAX) 1 MG tablet 135 tablet 5     Si 1/2 PO TID       Last Office Visit:  8/3/2023  Next Office Visit:  2023  Nasrin Mejia up to date:  UP TO DATE     *RX updated to reflect   2023  fill date*

## 2023-09-13 ENCOUNTER — TELEPHONE (OUTPATIENT)
Dept: NEUROLOGY | Age: 68
End: 2023-09-13

## 2023-09-13 RX ORDER — ALPRAZOLAM 1 MG/1
TABLET ORAL
Qty: 135 TABLET | OUTPATIENT
Start: 2023-09-13

## 2023-09-13 NOTE — TELEPHONE ENCOUNTER
Pt called this morning and left a vmail asking for his Xanax to be sent to [facility]. Pt was frustrated that he hadn't received his medication when it was due on 9/8/23. I called [facility] and spoke with Leonard Crabtree. She informed me they have his Xanax and it is ready to be picked up. I called pt back to inform him of this and he asked me when it was sent in. I told him Dr. Mari Cullen signed and sent it to [facility] last night 9/12/23 at 11:20 pm. Pt states he called pharmacy this morning and was told they did not have the script yet so they sent us a new refill request. I denied the refill request due to seeing the medication had already been sent. Pt states he received a text from the pharmacy saying his refill request had been denied so he called us immediately after. I told the pt that I was unsure why they told him that but I explained what happened and the reason it was denied. Pt states understanding to this and then states he is curious as to why the medication will lock itself on [employer] and not allow him to request the refill. I explained to him that it is how [employer] handles controlled substances and it goes by the dates on the script. Pt asked what is the best way to request his refills from now on. I told pt from now on request the refills 7 days in advance. I told him if he logs into [employer] and sees that the script is locked again, call us like he did this morning and leave a vmail requesting it. I also told him he can send us a simple [employer] message saying he needs a refill. Pt states he will do that from now on and he states he may end up switching pharmacies due to multiple issues with [facility]. I told pt to let us know what he decides to do. He states understanding.

## 2023-09-27 RX ORDER — HYDROCHLOROTHIAZIDE 25 MG/1
25 TABLET ORAL DAILY
Qty: 90 TABLET | Refills: 3 | Status: SHIPPED | OUTPATIENT
Start: 2023-09-27

## 2023-10-04 DIAGNOSIS — E11.628 TYPE 2 DIABETES MELLITUS WITH OTHER SKIN COMPLICATION, WITHOUT LONG-TERM CURRENT USE OF INSULIN (HCC): ICD-10-CM

## 2023-10-04 NOTE — TELEPHONE ENCOUNTER
Received fax from pharmacy requesting refill on pts medication(s). Pt was last seen in office on 2/15/2023  and has a follow up scheduled for Visit date not found. Will send request to  Robert Davis  for authorization.      Requested Prescriptions     Pending Prescriptions Disp Refills    metFORMIN (GLUCOPHAGE) 1000 MG tablet [Pharmacy Med Name: METFORMIN HCL 1,000 MG TABLET] 180 tablet 3     Sig: TAKE 1 TABLET BY MOUTH TWICE A DAY WITH MEALS

## 2023-10-06 ENCOUNTER — PATIENT MESSAGE (OUTPATIENT)
Dept: FAMILY MEDICINE CLINIC | Age: 68
End: 2023-10-06

## 2023-10-06 DIAGNOSIS — E11.628 TYPE 2 DIABETES MELLITUS WITH OTHER SKIN COMPLICATION, WITHOUT LONG-TERM CURRENT USE OF INSULIN (HCC): Primary | ICD-10-CM

## 2023-10-06 DIAGNOSIS — E78.2 MIXED HYPERLIPIDEMIA: ICD-10-CM

## 2023-10-06 DIAGNOSIS — I10 ESSENTIAL HYPERTENSION: ICD-10-CM

## 2023-10-07 DIAGNOSIS — M47.26 OSTEOARTHRITIS OF SPINE WITH RADICULOPATHY, LUMBAR REGION: ICD-10-CM

## 2023-10-07 DIAGNOSIS — M54.42 CHRONIC BILATERAL LOW BACK PAIN WITH BILATERAL SCIATICA: ICD-10-CM

## 2023-10-07 DIAGNOSIS — M54.41 CHRONIC BILATERAL LOW BACK PAIN WITH BILATERAL SCIATICA: ICD-10-CM

## 2023-10-07 DIAGNOSIS — G89.29 CHRONIC BILATERAL LOW BACK PAIN WITH BILATERAL SCIATICA: ICD-10-CM

## 2023-10-09 ENCOUNTER — OFFICE VISIT (OUTPATIENT)
Dept: FAMILY MEDICINE CLINIC | Age: 68
End: 2023-10-09
Payer: MEDICARE

## 2023-10-09 VITALS
HEART RATE: 65 BPM | OXYGEN SATURATION: 97 % | BODY MASS INDEX: 38.91 KG/M2 | TEMPERATURE: 97 F | HEIGHT: 72 IN | DIASTOLIC BLOOD PRESSURE: 70 MMHG | WEIGHT: 287.25 LBS | SYSTOLIC BLOOD PRESSURE: 114 MMHG

## 2023-10-09 DIAGNOSIS — J01.90 ACUTE NON-RECURRENT SINUSITIS, UNSPECIFIED LOCATION: ICD-10-CM

## 2023-10-09 DIAGNOSIS — Z00.00 ENCOUNTER FOR MEDICARE ANNUAL WELLNESS EXAM: Primary | ICD-10-CM

## 2023-10-09 DIAGNOSIS — F51.01 PRIMARY INSOMNIA: ICD-10-CM

## 2023-10-09 DIAGNOSIS — Z00.00 MEDICARE ANNUAL WELLNESS VISIT, SUBSEQUENT: ICD-10-CM

## 2023-10-09 DIAGNOSIS — B35.9 TINEA: ICD-10-CM

## 2023-10-09 PROCEDURE — 3078F DIAST BP <80 MM HG: CPT | Performed by: NURSE PRACTITIONER

## 2023-10-09 PROCEDURE — 3074F SYST BP LT 130 MM HG: CPT | Performed by: NURSE PRACTITIONER

## 2023-10-09 PROCEDURE — 1123F ACP DISCUSS/DSCN MKR DOCD: CPT | Performed by: NURSE PRACTITIONER

## 2023-10-09 PROCEDURE — 3017F COLORECTAL CA SCREEN DOC REV: CPT | Performed by: NURSE PRACTITIONER

## 2023-10-09 PROCEDURE — G0439 PPPS, SUBSEQ VISIT: HCPCS | Performed by: NURSE PRACTITIONER

## 2023-10-09 PROCEDURE — G8484 FLU IMMUNIZE NO ADMIN: HCPCS | Performed by: NURSE PRACTITIONER

## 2023-10-09 RX ORDER — ZOLPIDEM TARTRATE 10 MG/1
5 TABLET ORAL NIGHTLY PRN
Qty: 30 TABLET | Refills: 0 | Status: SHIPPED | OUTPATIENT
Start: 2023-10-09 | End: 2023-12-08

## 2023-10-09 RX ORDER — AZITHROMYCIN 250 MG/1
250 TABLET, FILM COATED ORAL SEE ADMIN INSTRUCTIONS
Qty: 6 TABLET | Refills: 1 | Status: SHIPPED | OUTPATIENT
Start: 2023-10-09 | End: 2023-10-14

## 2023-10-09 RX ORDER — OXYCODONE AND ACETAMINOPHEN 10; 325 MG/1; MG/1
1 TABLET ORAL EVERY 6 HOURS PRN
Qty: 120 TABLET | Refills: 0 | Status: SHIPPED | OUTPATIENT
Start: 2023-10-12 | End: 2023-11-11

## 2023-10-09 RX ORDER — METHYLPREDNISOLONE 4 MG/1
TABLET ORAL
Qty: 1 KIT | Refills: 0 | Status: SHIPPED | OUTPATIENT
Start: 2023-10-09

## 2023-10-09 RX ORDER — NYSTATIN 100000 [USP'U]/G
POWDER TOPICAL
Qty: 60 G | Refills: 5 | Status: SHIPPED | OUTPATIENT
Start: 2023-10-09

## 2023-10-09 ASSESSMENT — PATIENT HEALTH QUESTIONNAIRE - PHQ9
SUM OF ALL RESPONSES TO PHQ QUESTIONS 1-9: 0
2. FEELING DOWN, DEPRESSED OR HOPELESS: 0
SUM OF ALL RESPONSES TO PHQ9 QUESTIONS 1 & 2: 0
SUM OF ALL RESPONSES TO PHQ QUESTIONS 1-9: 0
1. LITTLE INTEREST OR PLEASURE IN DOING THINGS: 0

## 2023-10-09 ASSESSMENT — LIFESTYLE VARIABLES
HOW OFTEN DO YOU HAVE A DRINK CONTAINING ALCOHOL: NEVER
HOW MANY STANDARD DRINKS CONTAINING ALCOHOL DO YOU HAVE ON A TYPICAL DAY: PATIENT DOES NOT DRINK

## 2023-10-09 NOTE — PROGRESS NOTES
Medicare Annual Wellness Visit    Keith Francis is here for Medicare AWV (States he has his \"yearly bronchitis\" /Requesting steriods and antibiotics /Cough and congestion )    Assessment & Plan   Encounter for Medicare annual wellness exam  Medicare annual wellness visit, subsequent    Recommendations for Preventive Services Due: see orders and patient instructions/AVS.  Recommended screening schedule for the next 5-10 years is provided to the patient in written form: see Patient Instructions/AVS.     Return in 6 months (on 4/9/2024). Subjective   The following acute and/or chronic problems were also addressed today:  Chief Complaint   Patient presents with    Medicare AWV     States he has his \"yearly bronchitis\"   Requesting steriods and antibiotics   Cough and congestion          Patient's complete Health Risk Assessment and screening values have been reviewed and are found in Flowsheets. The following problems were reviewed today and where indicated follow up appointments were made and/or referrals ordered. Positive Risk Factor Screenings with Interventions:                Opioid Risk: (Low risk score <55) Opioid risk score: 38    Patient is low risk for opioid use disorder or overdose. Last PDMP Vik Lopez as Reviewed:  Review User Review Instant Review Result              Last Controlled Substance Monitoring Documentation      [facility] Office Visit from 3/12/2019 in [address] The Prescription Monitoring Report for this patient was reviewed today. filed at 03/12/2019 1410   Periodic Controlled Substance Monitoring Possible medication side effects, risk of tolerance/dependence & alternative treatments discussed., Obtaining appropriate analgesic effect of treatment. filed at 03/12/2019 1412   Chronic Pain > 80 MEDD Looked for signs of prescription misuse., Obtained or confirmed a written medication contract was on file.  filed at 03/12/2019 1412              General HRA

## 2023-10-09 NOTE — TELEPHONE ENCOUNTER
Requested Prescriptions     Pending Prescriptions Disp Refills    OXYCONTIN 20 MG extended release tablet 60 each 0     Sig: Take 1 tablet by mouth in the morning and 1 tablet in the evening. Do all this for 60 days. Max Daily Amount: 40 mg.    oxyCODONE-acetaminophen (PERCOCET)  MG per tablet 120 tablet 0     Sig: Take 1 tablet by mouth every 6 hours as needed for Pain for up to 30 days.        Last Office Visit:  8/3/2023  Next Office Visit:  2/6/2024  Last Medication Refill:  9/12/2023 with 0 CRISTINA Rutledge Hammmyriam up to date:  10/9/2023    *RX updated to reflect   10/12/2023  fill date*

## 2023-10-10 ENCOUNTER — TELEPHONE (OUTPATIENT)
Dept: FAMILY MEDICINE CLINIC | Age: 68
End: 2023-10-10

## 2023-10-10 DIAGNOSIS — E11.628 TYPE 2 DIABETES MELLITUS WITH OTHER SKIN COMPLICATION, WITHOUT LONG-TERM CURRENT USE OF INSULIN (HCC): ICD-10-CM

## 2023-10-10 DIAGNOSIS — I10 ESSENTIAL HYPERTENSION: ICD-10-CM

## 2023-10-10 DIAGNOSIS — E78.2 MIXED HYPERLIPIDEMIA: ICD-10-CM

## 2023-10-10 LAB
ALBUMIN SERPL-MCNC: 4.1 G/DL (ref 3.5–5.2)
ALP SERPL-CCNC: 75 U/L (ref 40–130)
ALT SERPL-CCNC: 20 U/L (ref 5–41)
ANION GAP SERPL CALCULATED.3IONS-SCNC: 14 MMOL/L (ref 7–19)
AST SERPL-CCNC: 13 U/L (ref 5–40)
BASOPHILS # BLD: 0 K/UL (ref 0–0.2)
BASOPHILS NFR BLD: 1 % (ref 0–1)
BILIRUB SERPL-MCNC: 0.3 MG/DL (ref 0.2–1.2)
BILIRUB UR QL STRIP: NEGATIVE
BUN SERPL-MCNC: 14 MG/DL (ref 8–23)
CALCIUM SERPL-MCNC: 9.7 MG/DL (ref 8.8–10.2)
CHLORIDE SERPL-SCNC: 102 MMOL/L (ref 98–111)
CHOLEST SERPL-MCNC: 138 MG/DL (ref 160–199)
CLARITY UR: CLEAR
CO2 SERPL-SCNC: 27 MMOL/L (ref 22–29)
COLOR UR: YELLOW
CREAT SERPL-MCNC: 1 MG/DL (ref 0.5–1.2)
CREAT UR-MCNC: 132.7 MG/DL (ref 39–259)
EOSINOPHIL # BLD: 0.1 K/UL (ref 0–0.6)
EOSINOPHIL NFR BLD: 3.1 % (ref 0–5)
ERYTHROCYTE [DISTWIDTH] IN BLOOD BY AUTOMATED COUNT: 13.7 % (ref 11.5–14.5)
GLUCOSE SERPL-MCNC: 121 MG/DL (ref 74–109)
GLUCOSE UR STRIP.AUTO-MCNC: NEGATIVE MG/DL
HBA1C MFR BLD: 6.4 % (ref 4–6)
HCT VFR BLD AUTO: 43.6 % (ref 42–52)
HDLC SERPL-MCNC: 44 MG/DL (ref 55–121)
HGB BLD-MCNC: 13.7 G/DL (ref 14–18)
HGB UR STRIP.AUTO-MCNC: NEGATIVE MG/L
IMM GRANULOCYTES # BLD: 0 K/UL
KETONES UR STRIP.AUTO-MCNC: NEGATIVE MG/DL
LDLC SERPL CALC-MCNC: 71 MG/DL
LEUKOCYTE ESTERASE UR QL STRIP.AUTO: NEGATIVE
LYMPHOCYTES # BLD: 1.6 K/UL (ref 1.1–4.5)
LYMPHOCYTES NFR BLD: 39 % (ref 20–40)
MCH RBC QN AUTO: 29 PG (ref 27–31)
MCHC RBC AUTO-ENTMCNC: 31.4 G/DL (ref 33–37)
MCV RBC AUTO: 92.2 FL (ref 80–94)
MICROALBUMIN UR-MCNC: <1.2 MG/DL (ref 0–19)
MICROALBUMIN/CREAT UR-RTO: NORMAL MG/G
MONOCYTES # BLD: 0.4 K/UL (ref 0–0.9)
MONOCYTES NFR BLD: 8.8 % (ref 0–10)
NEUTROPHILS # BLD: 2 K/UL (ref 1.5–7.5)
NEUTS SEG NFR BLD: 47.9 % (ref 50–65)
NITRITE UR QL STRIP.AUTO: NEGATIVE
PH UR STRIP.AUTO: 5.5 [PH] (ref 5–8)
PLATELET # BLD AUTO: 160 K/UL (ref 130–400)
PMV BLD AUTO: 12 FL (ref 9.4–12.4)
POTASSIUM SERPL-SCNC: 4.6 MMOL/L (ref 3.5–5)
PROT SERPL-MCNC: 6.8 G/DL (ref 6.6–8.7)
PROT UR STRIP.AUTO-MCNC: NEGATIVE MG/DL
RBC # BLD AUTO: 4.73 M/UL (ref 4.7–6.1)
SODIUM SERPL-SCNC: 143 MMOL/L (ref 136–145)
SP GR UR STRIP.AUTO: 1.01 (ref 1–1.03)
T4 FREE SERPL-MCNC: 1.18 NG/DL (ref 0.93–1.7)
TRIGL SERPL-MCNC: 117 MG/DL (ref 0–149)
TSH SERPL DL<=0.005 MIU/L-ACNC: 2.76 UIU/ML (ref 0.27–4.2)
UROBILINOGEN UR STRIP.AUTO-MCNC: 0.2 E.U./DL
WBC # BLD AUTO: 4.2 K/UL (ref 4.8–10.8)

## 2023-10-10 NOTE — TELEPHONE ENCOUNTER
----- Message from HEENA Capps sent at 10/10/2023  1:32 PM CDT -----  Please inform patient results show  CMP is normal this is your liver and kidney function as well as electrolytes. A1c is great at 6.4  Thyroid labs are good. Urine is good.

## 2023-10-10 NOTE — TELEPHONE ENCOUNTER
Called patient, spoke with: Patient regarding the results of the patients most recent labs. I advised Patient of [place] recommendations.    Patient did voice understanding

## 2023-10-10 NOTE — TELEPHONE ENCOUNTER
Called patient, spoke with: Patient regarding the results of the patients most recent labs . I advised Patient of [place] Chun recommendations.    Patient did voice understanding

## 2023-10-10 NOTE — TELEPHONE ENCOUNTER
----- Message from HEENA Giles sent at 10/10/2023  2:04 PM CDT -----  Please inform patient results show  Cholesterol looks good

## 2023-10-13 DIAGNOSIS — F41.9 ANXIETY DISORDER, UNSPECIFIED: ICD-10-CM

## 2023-10-16 RX ORDER — ALPRAZOLAM 1 MG/1
TABLET ORAL
Qty: 135 TABLET | Refills: 5 | Status: SHIPPED | OUTPATIENT
Start: 2023-10-16 | End: 2023-11-15

## 2023-10-16 NOTE — TELEPHONE ENCOUNTER
Requested Prescriptions     Pending Prescriptions Disp Refills    ALPRAZolam (XANAX) 1 MG tablet [Pharmacy Med Name: ALPRAZOLAM 1 MG TABLET] 135 tablet 5     Sig: TAKE 1 1/2 TABLETS BY MOUTH 3 TIMES A DAY       Last Office Visit:  8/3/2023  Next Office Visit:  2/6/2024  Last Medication Refill:  3/14/2023 with 5 RF  Mellisa Reyes up to date:  10/16/2023     *RX updated to reflect   10/16/2023   fill date*

## 2023-11-06 ENCOUNTER — OFFICE VISIT (OUTPATIENT)
Dept: FAMILY MEDICINE CLINIC | Age: 68
End: 2023-11-06
Payer: MEDICARE

## 2023-11-06 VITALS
DIASTOLIC BLOOD PRESSURE: 72 MMHG | HEIGHT: 72 IN | TEMPERATURE: 97 F | WEIGHT: 287.5 LBS | BODY MASS INDEX: 38.94 KG/M2 | OXYGEN SATURATION: 95 % | SYSTOLIC BLOOD PRESSURE: 134 MMHG | HEART RATE: 80 BPM

## 2023-11-06 DIAGNOSIS — M54.41 CHRONIC BILATERAL LOW BACK PAIN WITH BILATERAL SCIATICA: ICD-10-CM

## 2023-11-06 DIAGNOSIS — L30.9 ECZEMA, UNSPECIFIED TYPE: ICD-10-CM

## 2023-11-06 DIAGNOSIS — M54.42 CHRONIC BILATERAL LOW BACK PAIN WITH BILATERAL SCIATICA: ICD-10-CM

## 2023-11-06 DIAGNOSIS — G89.29 CHRONIC BILATERAL LOW BACK PAIN WITH BILATERAL SCIATICA: ICD-10-CM

## 2023-11-06 DIAGNOSIS — M47.26 OSTEOARTHRITIS OF SPINE WITH RADICULOPATHY, LUMBAR REGION: ICD-10-CM

## 2023-11-06 PROCEDURE — 1036F TOBACCO NON-USER: CPT | Performed by: NURSE PRACTITIONER

## 2023-11-06 PROCEDURE — G8484 FLU IMMUNIZE NO ADMIN: HCPCS | Performed by: NURSE PRACTITIONER

## 2023-11-06 PROCEDURE — G8417 CALC BMI ABV UP PARAM F/U: HCPCS | Performed by: NURSE PRACTITIONER

## 2023-11-06 PROCEDURE — 3017F COLORECTAL CA SCREEN DOC REV: CPT | Performed by: NURSE PRACTITIONER

## 2023-11-06 PROCEDURE — 99213 OFFICE O/P EST LOW 20 MIN: CPT | Performed by: NURSE PRACTITIONER

## 2023-11-06 PROCEDURE — 1123F ACP DISCUSS/DSCN MKR DOCD: CPT | Performed by: NURSE PRACTITIONER

## 2023-11-06 PROCEDURE — G8427 DOCREV CUR MEDS BY ELIG CLIN: HCPCS | Performed by: NURSE PRACTITIONER

## 2023-11-06 PROCEDURE — 3078F DIAST BP <80 MM HG: CPT | Performed by: NURSE PRACTITIONER

## 2023-11-06 PROCEDURE — 3075F SYST BP GE 130 - 139MM HG: CPT | Performed by: NURSE PRACTITIONER

## 2023-11-06 RX ORDER — OXYCODONE AND ACETAMINOPHEN 10; 325 MG/1; MG/1
1 TABLET ORAL EVERY 6 HOURS PRN
Qty: 120 TABLET | Refills: 0 | Status: SHIPPED | OUTPATIENT
Start: 2023-11-11 | End: 2023-11-07 | Stop reason: SDUPTHER

## 2023-11-06 RX ORDER — TRIAMCINOLONE ACETONIDE 5 MG/G
CREAM TOPICAL
Qty: 454 G | Refills: 0 | Status: SHIPPED | OUTPATIENT
Start: 2023-11-06

## 2023-11-06 ASSESSMENT — ENCOUNTER SYMPTOMS
CONSTIPATION: 0
RHINORRHEA: 0
BACK PAIN: 1
BLOOD IN STOOL: 0
EYE REDNESS: 0
WHEEZING: 0
EYE DISCHARGE: 0
COUGH: 0
CHOKING: 0
DIARRHEA: 0
SORE THROAT: 0

## 2023-11-06 NOTE — TELEPHONE ENCOUNTER
Requested Prescriptions     Pending Prescriptions Disp Refills    OXYCONTIN 20 MG extended release tablet 60 each 0     Sig: Take 1 tablet by mouth in the morning and 1 tablet in the evening. Do all this for 60 days. Max Daily Amount: 40 mg.    oxyCODONE-acetaminophen (PERCOCET)  MG per tablet 120 tablet 0     Sig: Take 1 tablet by mouth every 6 hours as needed for Pain for up to 30 days.        Last Office Visit:  8/3/2023  Next Office Visit:  2/6/2024  Last Medication Refill:  10/12/2023 with 0 CRISTINA Villatoro up to date:  10/16/2023     *RX updated to reflect   11/11/2023  fill date*

## 2023-11-07 DIAGNOSIS — M54.42 CHRONIC BILATERAL LOW BACK PAIN WITH BILATERAL SCIATICA: ICD-10-CM

## 2023-11-07 DIAGNOSIS — G89.29 CHRONIC BILATERAL LOW BACK PAIN WITH BILATERAL SCIATICA: ICD-10-CM

## 2023-11-07 DIAGNOSIS — M47.26 OSTEOARTHRITIS OF SPINE WITH RADICULOPATHY, LUMBAR REGION: ICD-10-CM

## 2023-11-07 DIAGNOSIS — M54.41 CHRONIC BILATERAL LOW BACK PAIN WITH BILATERAL SCIATICA: ICD-10-CM

## 2023-11-07 RX ORDER — OXYCODONE AND ACETAMINOPHEN 10; 325 MG/1; MG/1
1 TABLET ORAL EVERY 6 HOURS PRN
Qty: 120 TABLET | Refills: 0 | Status: SHIPPED | OUTPATIENT
Start: 2023-11-11 | End: 2023-12-11

## 2023-11-07 RX ORDER — OXYCODONE AND ACETAMINOPHEN 10; 325 MG/1; MG/1
1 TABLET ORAL EVERY 6 HOURS PRN
Qty: 120 TABLET | Refills: 0 | Status: SHIPPED | OUTPATIENT
Start: 2023-11-11 | End: 2023-11-07 | Stop reason: SDUPTHER

## 2023-11-07 NOTE — TELEPHONE ENCOUNTER
Requested Prescriptions     Pending Prescriptions Disp Refills    OXYCONTIN 20 MG extended release tablet 60 each 0     Sig: Take 1 tablet by mouth in the morning and 1 tablet in the evening. Do all this for 60 days. Max Daily Amount: 40 mg.    oxyCODONE-acetaminophen (PERCOCET)  MG per tablet 120 tablet 0     Sig: Take 1 tablet by mouth every 6 hours as needed for Pain for up to 30 days.        Last Office Visit:  8/3/2023  Next Office Visit:  2/6/2024  Last Medication Refill:  10/12/2023 with 0 CRISTINA Simms up to date:  10/16/2023      *RX updated to reflect   11/11/2023  fill date*      Dr Hermelindo Ceja patient

## 2023-12-06 DIAGNOSIS — M54.41 CHRONIC BILATERAL LOW BACK PAIN WITH BILATERAL SCIATICA: ICD-10-CM

## 2023-12-06 DIAGNOSIS — M47.26 OSTEOARTHRITIS OF SPINE WITH RADICULOPATHY, LUMBAR REGION: ICD-10-CM

## 2023-12-06 DIAGNOSIS — G89.29 CHRONIC BILATERAL LOW BACK PAIN WITH BILATERAL SCIATICA: ICD-10-CM

## 2023-12-06 DIAGNOSIS — M54.42 CHRONIC BILATERAL LOW BACK PAIN WITH BILATERAL SCIATICA: ICD-10-CM

## 2023-12-07 RX ORDER — OXYCODONE AND ACETAMINOPHEN 10; 325 MG/1; MG/1
1 TABLET ORAL EVERY 6 HOURS PRN
Qty: 120 TABLET | Refills: 0 | Status: SHIPPED | OUTPATIENT
Start: 2023-12-11 | End: 2024-01-10

## 2023-12-07 NOTE — TELEPHONE ENCOUNTER
Requested Prescriptions     Pending Prescriptions Disp Refills    oxyCODONE-acetaminophen (PERCOCET)  MG per tablet 120 tablet 0     Sig: Take 1 tablet by mouth every 6 hours as needed for Pain for up to 30 days. OXYCONTIN 20 MG extended release tablet 60 each 0     Sig: Take 1 tablet by mouth in the morning and 1 tablet in the evening. Do all this for 30 days. Max Daily Amount: 40 mg.        Last Office Visit:  8/3/2023  Next Office Visit:  2/6/2024  Last Medication Refill: 11/11/2023 with 0 RF    Clayton Lesser up to date:  10/16/2023    *RX updated to reflect   12/11/2023  fill date*

## 2023-12-31 DIAGNOSIS — E78.00 HYPERCHOLESTEREMIA: ICD-10-CM

## 2023-12-31 DIAGNOSIS — I25.10 CORONARY ARTERY DISEASE INVOLVING NATIVE CORONARY ARTERY OF NATIVE HEART WITHOUT ANGINA PECTORIS: ICD-10-CM

## 2024-01-03 RX ORDER — LOVASTATIN 40 MG/1
TABLET ORAL
Qty: 90 TABLET | Refills: 3 | Status: SHIPPED | OUTPATIENT
Start: 2024-01-03

## 2024-01-06 DIAGNOSIS — M47.26 OSTEOARTHRITIS OF SPINE WITH RADICULOPATHY, LUMBAR REGION: ICD-10-CM

## 2024-01-06 DIAGNOSIS — M54.41 CHRONIC BILATERAL LOW BACK PAIN WITH BILATERAL SCIATICA: ICD-10-CM

## 2024-01-06 DIAGNOSIS — M54.42 CHRONIC BILATERAL LOW BACK PAIN WITH BILATERAL SCIATICA: ICD-10-CM

## 2024-01-06 DIAGNOSIS — G89.29 CHRONIC BILATERAL LOW BACK PAIN WITH BILATERAL SCIATICA: ICD-10-CM

## 2024-01-08 RX ORDER — OXYCODONE AND ACETAMINOPHEN 10; 325 MG/1; MG/1
1 TABLET ORAL EVERY 6 HOURS PRN
Qty: 120 TABLET | Refills: 0 | Status: SHIPPED | OUTPATIENT
Start: 2024-01-10 | End: 2024-02-09

## 2024-01-08 NOTE — TELEPHONE ENCOUNTER
Requested Prescriptions     Pending Prescriptions Disp Refills    oxyCODONE-acetaminophen (PERCOCET)  MG per tablet 120 tablet 0     Sig: Take 1 tablet by mouth every 6 hours as needed for Pain for up to 30 days.    OXYCONTIN 20 MG extended release tablet 60 each 0     Sig: Take 1 tablet by mouth in the morning and 1 tablet in the evening. Do all this for 30 days. Max Daily Amount: 40 mg.       Last Office Visit:  8/3/2023  Next Office Visit:  2/6/2024  Last Medication Refill:  both 12/11/23  Anjel up to date:  10/16/23    *RX updated to reflect both 1/10/24  fill date*

## 2024-01-12 ENCOUNTER — TELEPHONE (OUTPATIENT)
Dept: FAMILY MEDICINE CLINIC | Age: 69
End: 2024-01-12

## 2024-01-12 DIAGNOSIS — F51.01 PRIMARY INSOMNIA: ICD-10-CM

## 2024-01-12 RX ORDER — ZOLPIDEM TARTRATE 10 MG/1
5 TABLET ORAL NIGHTLY PRN
Qty: 30 TABLET | Refills: 0 | Status: SHIPPED | OUTPATIENT
Start: 2024-01-12 | End: 2024-03-12

## 2024-01-12 RX ORDER — ZOLPIDEM TARTRATE 10 MG/1
5 TABLET ORAL NIGHTLY PRN
Qty: 30 TABLET | Refills: 0 | OUTPATIENT
Start: 2024-01-12 | End: 2024-03-12

## 2024-01-12 NOTE — TELEPHONE ENCOUNTER
Patient is requesting a refill of ambien. Patient was last seen on 11/06/2023 and has appointment on 04/09/2024. Medication was last sent in on 10/09/2023 for #30 and 0rf.

## 2024-01-17 ENCOUNTER — TELEPHONE (OUTPATIENT)
Dept: NEUROLOGY | Age: 69
End: 2024-01-17

## 2024-01-17 NOTE — TELEPHONE ENCOUNTER
Letter with information about patient pain medication not on formulary and need for PA put in Tanya's box per her request.

## 2024-01-18 ENCOUNTER — TELEPHONE (OUTPATIENT)
Dept: NEUROLOGY | Age: 69
End: 2024-01-18

## 2024-01-18 NOTE — TELEPHONE ENCOUNTER
TRISH TATE (Key: [id number])  PA Case ID #: [id number]  Need Help? Call us at [phone]  Outcome  Approved today  Your request has been approved  Authorization Expiration Date: 1/17/2025  Drug  OxyCONTIN 20MG er tablets    Form  [employer] Electronic PA Form (2017 [employer])

## 2024-01-18 NOTE — TELEPHONE ENCOUNTER
TRISH TATE (Key: [id number])  PA Case ID #: [id number]  Need Help? Call us at [phone]  Status  Sent to Plan today  Drug  OxyCONTIN 20MG er tablets    Form  [employer] Electronic PA Form (2017 [employer])

## 2024-01-27 DIAGNOSIS — B86 SCABIES: ICD-10-CM

## 2024-01-29 RX ORDER — PERMETHRIN 50 MG/G
CREAM TOPICAL
Qty: 180 G | Refills: 2 | OUTPATIENT
Start: 2024-01-29

## 2024-02-06 ENCOUNTER — OFFICE VISIT (OUTPATIENT)
Dept: NEUROLOGY | Age: 69
End: 2024-02-06
Payer: MEDICARE

## 2024-02-06 VITALS
HEIGHT: 73 IN | WEIGHT: 290 LBS | DIASTOLIC BLOOD PRESSURE: 82 MMHG | BODY MASS INDEX: 38.43 KG/M2 | SYSTOLIC BLOOD PRESSURE: 150 MMHG | RESPIRATION RATE: 17 BRPM | HEART RATE: 82 BPM

## 2024-02-06 DIAGNOSIS — M47.26 OSTEOARTHRITIS OF SPINE WITH RADICULOPATHY, LUMBAR REGION: ICD-10-CM

## 2024-02-06 DIAGNOSIS — M54.42 CHRONIC BILATERAL LOW BACK PAIN WITH BILATERAL SCIATICA: Primary | ICD-10-CM

## 2024-02-06 DIAGNOSIS — M05.742 RHEUMATOID ARTHRITIS INVOLVING BOTH HANDS WITH POSITIVE RHEUMATOID FACTOR (HCC): ICD-10-CM

## 2024-02-06 DIAGNOSIS — G47.33 OSA (OBSTRUCTIVE SLEEP APNEA): ICD-10-CM

## 2024-02-06 DIAGNOSIS — M05.741 RHEUMATOID ARTHRITIS INVOLVING BOTH HANDS WITH POSITIVE RHEUMATOID FACTOR (HCC): ICD-10-CM

## 2024-02-06 DIAGNOSIS — G89.29 CHRONIC BILATERAL LOW BACK PAIN WITH BILATERAL SCIATICA: Primary | ICD-10-CM

## 2024-02-06 DIAGNOSIS — M54.41 CHRONIC BILATERAL LOW BACK PAIN WITH BILATERAL SCIATICA: Primary | ICD-10-CM

## 2024-02-06 DIAGNOSIS — F41.1 GENERALIZED ANXIETY DISORDER: ICD-10-CM

## 2024-02-06 PROCEDURE — 3017F COLORECTAL CA SCREEN DOC REV: CPT | Performed by: PSYCHIATRY & NEUROLOGY

## 2024-02-06 PROCEDURE — 99213 OFFICE O/P EST LOW 20 MIN: CPT | Performed by: PSYCHIATRY & NEUROLOGY

## 2024-02-06 PROCEDURE — 1036F TOBACCO NON-USER: CPT | Performed by: PSYCHIATRY & NEUROLOGY

## 2024-02-06 PROCEDURE — G8427 DOCREV CUR MEDS BY ELIG CLIN: HCPCS | Performed by: PSYCHIATRY & NEUROLOGY

## 2024-02-06 PROCEDURE — 3077F SYST BP >= 140 MM HG: CPT | Performed by: PSYCHIATRY & NEUROLOGY

## 2024-02-06 PROCEDURE — 1123F ACP DISCUSS/DSCN MKR DOCD: CPT | Performed by: PSYCHIATRY & NEUROLOGY

## 2024-02-06 PROCEDURE — 3079F DIAST BP 80-89 MM HG: CPT | Performed by: PSYCHIATRY & NEUROLOGY

## 2024-02-06 PROCEDURE — G8417 CALC BMI ABV UP PARAM F/U: HCPCS | Performed by: PSYCHIATRY & NEUROLOGY

## 2024-02-06 PROCEDURE — G8484 FLU IMMUNIZE NO ADMIN: HCPCS | Performed by: PSYCHIATRY & NEUROLOGY

## 2024-02-06 RX ORDER — ALPRAZOLAM 1 MG/1
1 TABLET ORAL 3 TIMES DAILY
COMMUNITY
Start: 2024-01-11

## 2024-02-08 DIAGNOSIS — M54.42 CHRONIC BILATERAL LOW BACK PAIN WITH BILATERAL SCIATICA: ICD-10-CM

## 2024-02-08 DIAGNOSIS — M47.26 OSTEOARTHRITIS OF SPINE WITH RADICULOPATHY, LUMBAR REGION: ICD-10-CM

## 2024-02-08 DIAGNOSIS — G89.29 CHRONIC BILATERAL LOW BACK PAIN WITH BILATERAL SCIATICA: ICD-10-CM

## 2024-02-08 DIAGNOSIS — M54.41 CHRONIC BILATERAL LOW BACK PAIN WITH BILATERAL SCIATICA: ICD-10-CM

## 2024-02-08 NOTE — TELEPHONE ENCOUNTER
Requested Prescriptions     Pending Prescriptions Disp Refills    oxyCODONE-acetaminophen (PERCOCET)  MG per tablet 120 tablet 0     Sig: Take 1 tablet by mouth every 6 hours as needed for Pain for up to 30 days.    OXYCONTIN 20 MG extended release tablet 60 each 0     Sig: Take 1 tablet by mouth in the morning and 1 tablet in the evening. Do all this for 30 days. Max Daily Amount: 40 mg.       Last Office Visit:  2/6/2024  Next Office Visit:  Visit date not found  Last Medication Refill:  1/10/2024 with 0 CRISTINA Hobbs up to date:  2/8/2024    *RX updated to reflect   2/9/2024  fill date*

## 2024-02-10 RX ORDER — OXYCODONE AND ACETAMINOPHEN 10; 325 MG/1; MG/1
1 TABLET ORAL EVERY 6 HOURS PRN
Qty: 120 TABLET | Refills: 0 | Status: SHIPPED | OUTPATIENT
Start: 2024-02-10 | End: 2024-03-11

## 2024-02-10 RX ORDER — OXYCODONE AND ACETAMINOPHEN 10; 325 MG/1; MG/1
1 TABLET ORAL EVERY 6 HOURS PRN
Qty: 120 TABLET | Refills: 0 | OUTPATIENT
Start: 2024-02-10 | End: 2024-03-11

## 2024-03-04 ENCOUNTER — TELEPHONE (OUTPATIENT)
Dept: FAMILY MEDICINE CLINIC | Age: 69
End: 2024-03-04

## 2024-03-04 DIAGNOSIS — M54.42 CHRONIC BILATERAL LOW BACK PAIN WITH BILATERAL SCIATICA: ICD-10-CM

## 2024-03-04 DIAGNOSIS — M47.26 OSTEOARTHRITIS OF SPINE WITH RADICULOPATHY, LUMBAR REGION: ICD-10-CM

## 2024-03-04 DIAGNOSIS — M54.41 CHRONIC BILATERAL LOW BACK PAIN WITH BILATERAL SCIATICA: ICD-10-CM

## 2024-03-04 DIAGNOSIS — G89.29 CHRONIC BILATERAL LOW BACK PAIN WITH BILATERAL SCIATICA: ICD-10-CM

## 2024-03-04 DIAGNOSIS — F51.01 PRIMARY INSOMNIA: ICD-10-CM

## 2024-03-04 RX ORDER — ZOLPIDEM TARTRATE 10 MG/1
5 TABLET ORAL NIGHTLY PRN
Qty: 30 TABLET | Refills: 0 | OUTPATIENT
Start: 2024-03-04 | End: 2024-05-03

## 2024-03-04 RX ORDER — OXYCODONE AND ACETAMINOPHEN 10; 325 MG/1; MG/1
1 TABLET ORAL EVERY 6 HOURS PRN
Qty: 120 TABLET | Refills: 0 | Status: SHIPPED | OUTPATIENT
Start: 2024-03-11 | End: 2024-04-10

## 2024-03-04 NOTE — TELEPHONE ENCOUNTER
Requested Prescriptions     Pending Prescriptions Disp Refills    oxyCODONE-acetaminophen (PERCOCET)  MG per tablet 120 tablet 0     Sig: Take 1 tablet by mouth every 6 hours as needed for Pain for up to 30 days.    OXYCONTIN 20 MG extended release tablet 60 each 0     Sig: Take 1 tablet by mouth in the morning and 1 tablet in the evening. Do all this for 30 days. Max Daily Amount: 40 mg.       Last Office Visit:  2/6/2024  Next Office Visit:  8/8/2024  Last Medication Refill:  2/10/2024 with 0 CRISTINA Hobbs up to date:  2/8/2024     *RX updated to reflect   3/11/2024   fill date*

## 2024-03-04 NOTE — TELEPHONE ENCOUNTER
Patient is requesting a refill on the ambien. Patient was last seen 11/06/2023 their follow up is scheduled for 04/09/2024. The prescription was last filled on 01/12/2024.

## 2024-03-11 DIAGNOSIS — F41.1 GENERALIZED ANXIETY DISORDER: ICD-10-CM

## 2024-03-11 NOTE — TELEPHONE ENCOUNTER
Requested Prescriptions     Pending Prescriptions Disp Refills    ALPRAZolam (XANAX) 1 MG tablet [Pharmacy Med Name: ALPRAZOLAM 1 MG TABLET] 135 tablet      Sig: TAKE 1.5 TABLETS BY MOUTH 3 TIMES A DAY       Last Office Visit:  2/6/2024  Next Office Visit:  8/8/2024  Last Medication Refill:  1/11/2024 with 0 RF   Anjel up to date:  2/8/2024    *RX updated to reflect   3/11/2024  fill date*

## 2024-03-12 RX ORDER — ALPRAZOLAM 1 MG/1
TABLET ORAL
Qty: 135 TABLET | Refills: 5 | Status: SHIPPED | OUTPATIENT
Start: 2024-03-12 | End: 2024-09-08

## 2024-03-15 NOTE — TELEPHONE ENCOUNTER
Called patient and he said he is not taking this medication due to his lipids being within normal range. The wire was removed from the aorta.

## 2024-03-27 DIAGNOSIS — J44.1 COPD EXACERBATION (HCC): ICD-10-CM

## 2024-03-28 RX ORDER — ALBUTEROL SULFATE 90 UG/1
AEROSOL, METERED RESPIRATORY (INHALATION)
Qty: 25.5 EACH | Refills: 3 | Status: SHIPPED | OUTPATIENT
Start: 2024-03-28

## 2024-03-28 NOTE — TELEPHONE ENCOUNTER
Received fax from pharmacy requesting refill on pts medication(s). Pt was last seen in office on 11/6/2023  and has a follow up scheduled for 4/9/2024. Will send request to  Ainsley Anna  for authorization.     Requested Prescriptions     Pending Prescriptions Disp Refills    albuterol sulfate HFA (PROVENTIL;VENTOLIN;PROAIR) 108 (90 Base) MCG/ACT inhaler [Pharmacy Med Name: ALBUTEROL HFA (PROAIR) INHALER] 25.5 each 3     Sig: TAKE 2 PUFFS BY MOUTH EVERY 6 HOURS AS NEEDED FOR WHEEZE

## 2024-04-07 DIAGNOSIS — G89.29 CHRONIC BILATERAL LOW BACK PAIN WITH BILATERAL SCIATICA: ICD-10-CM

## 2024-04-07 DIAGNOSIS — M54.41 CHRONIC BILATERAL LOW BACK PAIN WITH BILATERAL SCIATICA: ICD-10-CM

## 2024-04-07 DIAGNOSIS — M47.26 OSTEOARTHRITIS OF SPINE WITH RADICULOPATHY, LUMBAR REGION: ICD-10-CM

## 2024-04-07 DIAGNOSIS — J30.9 ALLERGIC RHINITIS, UNSPECIFIED SEASONALITY, UNSPECIFIED TRIGGER: ICD-10-CM

## 2024-04-07 DIAGNOSIS — M54.42 CHRONIC BILATERAL LOW BACK PAIN WITH BILATERAL SCIATICA: ICD-10-CM

## 2024-04-07 DIAGNOSIS — J44.9 STAGE 2 MODERATE COPD BY GOLD CLASSIFICATION (HCC): ICD-10-CM

## 2024-04-07 DIAGNOSIS — Z72.0 TOBACCO ABUSE: ICD-10-CM

## 2024-04-07 DIAGNOSIS — J42 CHRONIC BRONCHITIS, UNSPECIFIED CHRONIC BRONCHITIS TYPE (HCC): ICD-10-CM

## 2024-04-07 DIAGNOSIS — J44.1 COPD EXACERBATION (HCC): ICD-10-CM

## 2024-04-08 RX ORDER — FLUTICASONE PROPIONATE 50 MCG
2 SPRAY, SUSPENSION (ML) NASAL DAILY
Qty: 3 EACH | Refills: 3 | Status: SHIPPED | OUTPATIENT
Start: 2024-04-08

## 2024-04-08 RX ORDER — ALBUTEROL SULFATE 90 UG/1
AEROSOL, METERED RESPIRATORY (INHALATION)
Qty: 25.5 EACH | Refills: 3 | OUTPATIENT
Start: 2024-04-08

## 2024-04-08 RX ORDER — UMECLIDINIUM 62.5 UG/1
1 AEROSOL, POWDER ORAL DAILY
Qty: 90 EACH | Refills: 3 | Status: SHIPPED | OUTPATIENT
Start: 2024-04-08

## 2024-04-08 NOTE — TELEPHONE ENCOUNTER
Received fax from pharmacy requesting refill on pts medication(s). Pt was last seen in office on 2023  and has a follow up scheduled for 2024. Will send request to  Ainsley Anna  for authorization.     Requested Prescriptions     Pending Prescriptions Disp Refills    umeclidinium bromide (INCRUSE ELLIPTA) 62.5 MCG/ACT inhaler 90 each 3     Sig: Inhale 1 puff into the lungs daily    fluticasone (FLONASE) 50 MCG/ACT nasal spray 3 each 3     Si sprays by Nasal route daily     Refused Prescriptions Disp Refills    albuterol sulfate HFA (PROVENTIL;VENTOLIN;PROAIR) 108 (90 Base) MCG/ACT inhaler 25.5 each 3

## 2024-04-08 NOTE — TELEPHONE ENCOUNTER
Darrian Batres has requested a refill on his medication.      Last office visit : 2/6/2024   Next office visit : 8/8/2024   Last medication refill : 3/11/24  Anjel : up to date       Requested Prescriptions     Pending Prescriptions Disp Refills    oxyCODONE-acetaminophen (PERCOCET)  MG per tablet 120 tablet 0     Sig: Take 1 tablet by mouth every 6 hours as needed for Pain for up to 30 days.    OXYCONTIN 20 MG extended release tablet 60 each 0     Sig: Take 1 tablet by mouth in the morning and 1 tablet in the evening. Do all this for 30 days. Max Daily Amount: 40 mg.

## 2024-04-09 ENCOUNTER — OFFICE VISIT (OUTPATIENT)
Dept: FAMILY MEDICINE CLINIC | Age: 69
End: 2024-04-09
Payer: MEDICARE

## 2024-04-09 VITALS
HEART RATE: 79 BPM | WEIGHT: 288 LBS | DIASTOLIC BLOOD PRESSURE: 83 MMHG | SYSTOLIC BLOOD PRESSURE: 126 MMHG | BODY MASS INDEX: 38.01 KG/M2 | OXYGEN SATURATION: 90 % | TEMPERATURE: 97.9 F

## 2024-04-09 DIAGNOSIS — E78.00 HYPERCHOLESTEREMIA: ICD-10-CM

## 2024-04-09 DIAGNOSIS — J98.4 PNEUMONITIS: ICD-10-CM

## 2024-04-09 DIAGNOSIS — Z12.5 SCREENING PSA (PROSTATE SPECIFIC ANTIGEN): ICD-10-CM

## 2024-04-09 DIAGNOSIS — J42 CHRONIC BRONCHITIS, UNSPECIFIED CHRONIC BRONCHITIS TYPE (HCC): ICD-10-CM

## 2024-04-09 DIAGNOSIS — F11.20 OPIOID DEPENDENCE WITH CURRENT USE (HCC): ICD-10-CM

## 2024-04-09 DIAGNOSIS — I10 ESSENTIAL HYPERTENSION: ICD-10-CM

## 2024-04-09 DIAGNOSIS — E11.628 TYPE 2 DIABETES MELLITUS WITH OTHER SKIN COMPLICATION, WITHOUT LONG-TERM CURRENT USE OF INSULIN (HCC): Primary | ICD-10-CM

## 2024-04-09 DIAGNOSIS — E66.01 SEVERE OBESITY (BMI 35.0-39.9) WITH COMORBIDITY (HCC): ICD-10-CM

## 2024-04-09 PROBLEM — J44.9 CHRONIC OBSTRUCTIVE PULMONARY DISEASE, UNSPECIFIED (HCC): Status: ACTIVE | Noted: 2024-04-09

## 2024-04-09 PROCEDURE — 3079F DIAST BP 80-89 MM HG: CPT | Performed by: NURSE PRACTITIONER

## 2024-04-09 PROCEDURE — G8417 CALC BMI ABV UP PARAM F/U: HCPCS | Performed by: NURSE PRACTITIONER

## 2024-04-09 PROCEDURE — 2022F DILAT RTA XM EVC RTNOPTHY: CPT | Performed by: NURSE PRACTITIONER

## 2024-04-09 PROCEDURE — 1036F TOBACCO NON-USER: CPT | Performed by: NURSE PRACTITIONER

## 2024-04-09 PROCEDURE — 3074F SYST BP LT 130 MM HG: CPT | Performed by: NURSE PRACTITIONER

## 2024-04-09 PROCEDURE — 1123F ACP DISCUSS/DSCN MKR DOCD: CPT | Performed by: NURSE PRACTITIONER

## 2024-04-09 PROCEDURE — 3046F HEMOGLOBIN A1C LEVEL >9.0%: CPT | Performed by: NURSE PRACTITIONER

## 2024-04-09 PROCEDURE — 3017F COLORECTAL CA SCREEN DOC REV: CPT | Performed by: NURSE PRACTITIONER

## 2024-04-09 PROCEDURE — 3023F SPIROM DOC REV: CPT | Performed by: NURSE PRACTITIONER

## 2024-04-09 PROCEDURE — G8427 DOCREV CUR MEDS BY ELIG CLIN: HCPCS | Performed by: NURSE PRACTITIONER

## 2024-04-09 PROCEDURE — 99214 OFFICE O/P EST MOD 30 MIN: CPT | Performed by: NURSE PRACTITIONER

## 2024-04-09 RX ORDER — LEVOFLOXACIN 750 MG/1
750 TABLET, FILM COATED ORAL DAILY
Qty: 10 TABLET | Refills: 0 | Status: SHIPPED | OUTPATIENT
Start: 2024-04-09 | End: 2024-04-19

## 2024-04-09 RX ORDER — METHYLPREDNISOLONE 4 MG/1
TABLET ORAL
Qty: 1 KIT | Refills: 0 | Status: SHIPPED | OUTPATIENT
Start: 2024-04-09

## 2024-04-09 RX ORDER — ALBUTEROL SULFATE 2.5 MG/3ML
2.5 SOLUTION RESPIRATORY (INHALATION) EVERY 6 HOURS PRN
Qty: 120 EACH | Refills: 3 | Status: SHIPPED | OUTPATIENT
Start: 2024-04-09 | End: 2024-04-09

## 2024-04-09 RX ORDER — NEBULIZER ACCESSORIES
1 KIT MISCELLANEOUS 4 TIMES DAILY PRN
Qty: 1 KIT | Refills: 0 | Status: SHIPPED | OUTPATIENT
Start: 2024-04-09

## 2024-04-09 RX ORDER — LEVALBUTEROL INHALATION SOLUTION 1.25 MG/3ML
1 SOLUTION RESPIRATORY (INHALATION) EVERY 4 HOURS PRN
Qty: 120 EACH | Refills: 0 | Status: SHIPPED | OUTPATIENT
Start: 2024-04-09

## 2024-04-09 RX ORDER — GUAIFENESIN 600 MG/1
1200 TABLET, EXTENDED RELEASE ORAL 2 TIMES DAILY
Qty: 40 TABLET | Refills: 2 | Status: SHIPPED | OUTPATIENT
Start: 2024-04-09 | End: 2024-05-09

## 2024-04-09 RX ORDER — OXYCODONE AND ACETAMINOPHEN 10; 325 MG/1; MG/1
1 TABLET ORAL EVERY 6 HOURS PRN
Qty: 120 TABLET | Refills: 0 | Status: SHIPPED | OUTPATIENT
Start: 2024-04-10 | End: 2024-05-10

## 2024-04-09 SDOH — ECONOMIC STABILITY: FOOD INSECURITY: WITHIN THE PAST 12 MONTHS, THE FOOD YOU BOUGHT JUST DIDN'T LAST AND YOU DIDN'T HAVE MONEY TO GET MORE.: NEVER TRUE

## 2024-04-09 SDOH — ECONOMIC STABILITY: FOOD INSECURITY: WITHIN THE PAST 12 MONTHS, YOU WORRIED THAT YOUR FOOD WOULD RUN OUT BEFORE YOU GOT MONEY TO BUY MORE.: NEVER TRUE

## 2024-04-09 SDOH — ECONOMIC STABILITY: INCOME INSECURITY: HOW HARD IS IT FOR YOU TO PAY FOR THE VERY BASICS LIKE FOOD, HOUSING, MEDICAL CARE, AND HEATING?: NOT HARD AT ALL

## 2024-04-09 ASSESSMENT — ENCOUNTER SYMPTOMS
EYE DISCHARGE: 0
CHOKING: 0
DIARRHEA: 0
EYE REDNESS: 0
CONSTIPATION: 0
BLOOD IN STOOL: 0
BACK PAIN: 1
SINUS PAIN: 1
SORE THROAT: 0
COUGH: 1
SHORTNESS OF BREATH: 1
RHINORRHEA: 1
WHEEZING: 1
SINUS PRESSURE: 1

## 2024-04-09 ASSESSMENT — PATIENT HEALTH QUESTIONNAIRE - PHQ9
SUM OF ALL RESPONSES TO PHQ9 QUESTIONS 1 & 2: 0
SUM OF ALL RESPONSES TO PHQ QUESTIONS 1-9: 0
1. LITTLE INTEREST OR PLEASURE IN DOING THINGS: NOT AT ALL
2. FEELING DOWN, DEPRESSED OR HOPELESS: NOT AT ALL

## 2024-04-09 NOTE — TELEPHONE ENCOUNTER
levalbuterol (XOPENEX) 1.25 MG/3ML nebulizer solution         Changed from: albuterol (PROVENTIL) (2.5 MG/3ML) 0.083% nebulizer solution    Sig: N/A    Disp: Not specified    Refills: 0    Start: 4/9/2024    Class: Normal    For: Chronic bronchitis, unspecified chronic bronchitis type (HCC)    Last ordered: Today (4/9/2024) by HEENA Jha    Last refill: 4/9/2024    Rx #: [id number]    Pharmacy comment: Alternative Requested:THE PRESCRIBED MEDICATION IS NOT COVERED BY INSURANCE. PLEASE CONSIDER CHANGING TO ONE OF THE SUGGESTED COVERED ALTERNATIVES.       To be filled at: [facility]/pharmacy #[id number] - [place] - [id number] - [place] [phone] - F [phone]       I do not see this on the patients file.

## 2024-04-09 NOTE — PROGRESS NOTES
Darrian Batres (:  1955) is a 68 y.o. male,Established patient, here for evaluation of the following chief complaint(s):  Congestion (Started 2 weeks ago. Has not gotten any better. Chest and lungs. O2 stat low. )      ASSESSMENT/PLAN:    ICD-10-CM    1. Type 2 diabetes mellitus with other skin complication, without long-term current use of insulin ([facility])  E11.628 CBC with Auto Differential     Comprehensive Metabolic Panel     Hemoglobin A1C     Lipid Panel     Microalbumin / Creatinine Urine Ratio     TSH     T4, Free      2. Essential hypertension  I10 CBC with Auto Differential     Comprehensive Metabolic Panel     Hemoglobin A1C     Lipid Panel     Microalbumin / Creatinine Urine Ratio     TSH     T4, Free      3. Hypercholesteremia  E78.00 CBC with Auto Differential     Comprehensive Metabolic Panel     Hemoglobin A1C     Lipid Panel     Microalbumin / Creatinine Urine Ratio     TSH     T4, Free      4. Screening PSA (prostate specific antigen)  Z12.5 PSA Screening      5. Severe obesity (BMI 35.0-39.9) with comorbidity ([facility])  E66.01 Stable improving. Weight is down 8 bls.       6. Opioid dependence with current use ([facility])  F11.20 Stable sees specialist.       7. Chronic bronchitis, unspecified chronic bronchitis type ([facility])  J42 albuterol (PROVENTIL) (2.5 MG/3ML) 0.083% nebulizer solution     Respiratory Therapy Supplies (NEBULIZER/TUBING/MOUTHPIECE) KIT      8. Pneumonitis  J98.4 levoFLOXacin (LEVAQUIN) 750 MG tablet     methylPREDNISolone (MEDROL, INES,) 4 MG tablet          Return in about 6 months (around 10/9/2024) for awv.    SUBJECTIVE/OBJECTIVE:  HPI  Diabetes Mellitus Type 2:   Medications:   Metformin 1000 mg bid   Home blood sugar records: patient does not test  Any episodes of hypoglycemia? no  Eye exam current (within one year): yes  Tobacco history: He  reports that he quit smoking about 13 years ago. His smoking use included cigarettes. He started smoking about 40 years ago. He has a

## 2024-04-23 DIAGNOSIS — F51.01 PRIMARY INSOMNIA: ICD-10-CM

## 2024-04-24 DIAGNOSIS — F51.01 PRIMARY INSOMNIA: ICD-10-CM

## 2024-04-24 RX ORDER — ZOLPIDEM TARTRATE 10 MG/1
5 TABLET ORAL NIGHTLY PRN
Qty: 30 TABLET | Refills: 0 | OUTPATIENT
Start: 2024-04-24 | End: 2024-06-23

## 2024-04-24 NOTE — TELEPHONE ENCOUNTER
Patient is requesting a refill on Ambien. Patient was last seen on 04/09/2024 , with a follow up on not at this time. The prescription was last filled on 01/12/2024

## 2024-04-25 RX ORDER — ZOLPIDEM TARTRATE 10 MG/1
5 TABLET ORAL NIGHTLY PRN
Qty: 30 TABLET | Refills: 0 | Status: SHIPPED | OUTPATIENT
Start: 2024-04-25 | End: 2024-06-24

## 2024-04-29 ENCOUNTER — OFFICE VISIT (OUTPATIENT)
Dept: CARDIOLOGY CLINIC | Age: 69
End: 2024-04-29
Payer: MEDICARE

## 2024-04-29 VITALS
HEIGHT: 73 IN | HEART RATE: 85 BPM | WEIGHT: 284 LBS | DIASTOLIC BLOOD PRESSURE: 78 MMHG | BODY MASS INDEX: 37.64 KG/M2 | SYSTOLIC BLOOD PRESSURE: 134 MMHG

## 2024-04-29 DIAGNOSIS — I47.10 PAROXYSMAL SVT (SUPRAVENTRICULAR TACHYCARDIA) (HCC): ICD-10-CM

## 2024-04-29 DIAGNOSIS — I10 ESSENTIAL HYPERTENSION: ICD-10-CM

## 2024-04-29 DIAGNOSIS — I25.10 CORONARY ARTERY DISEASE INVOLVING NATIVE CORONARY ARTERY OF NATIVE HEART WITHOUT ANGINA PECTORIS: ICD-10-CM

## 2024-04-29 DIAGNOSIS — E78.00 HYPERCHOLESTEREMIA: ICD-10-CM

## 2024-04-29 DIAGNOSIS — E78.00 HYPERCHOLESTEREMIA: Primary | ICD-10-CM

## 2024-04-29 DIAGNOSIS — Z95.5 HX OF HEART ARTERY STENT: ICD-10-CM

## 2024-04-29 DIAGNOSIS — E11.628 TYPE 2 DIABETES MELLITUS WITH OTHER SKIN COMPLICATION, WITHOUT LONG-TERM CURRENT USE OF INSULIN (HCC): ICD-10-CM

## 2024-04-29 DIAGNOSIS — Z12.5 SCREENING PSA (PROSTATE SPECIFIC ANTIGEN): ICD-10-CM

## 2024-04-29 DIAGNOSIS — R06.09 DOE (DYSPNEA ON EXERTION): ICD-10-CM

## 2024-04-29 DIAGNOSIS — E78.2 MIXED HYPERLIPIDEMIA: ICD-10-CM

## 2024-04-29 LAB
ALBUMIN SERPL-MCNC: 4.1 G/DL (ref 3.5–5.2)
ALP SERPL-CCNC: 70 U/L (ref 40–130)
ALT SERPL-CCNC: 20 U/L (ref 5–41)
ANION GAP SERPL CALCULATED.3IONS-SCNC: 17 MMOL/L (ref 7–19)
AST SERPL-CCNC: 16 U/L (ref 5–40)
BASOPHILS # BLD: 0 K/UL (ref 0–0.2)
BASOPHILS NFR BLD: 0.7 % (ref 0–1)
BILIRUB SERPL-MCNC: 0.4 MG/DL (ref 0.2–1.2)
BUN SERPL-MCNC: 14 MG/DL (ref 8–23)
CALCIUM SERPL-MCNC: 9.8 MG/DL (ref 8.8–10.2)
CHLORIDE SERPL-SCNC: 101 MMOL/L (ref 98–111)
CHOLEST SERPL-MCNC: 161 MG/DL (ref 160–199)
CO2 SERPL-SCNC: 25 MMOL/L (ref 22–29)
CREAT SERPL-MCNC: 1 MG/DL (ref 0.5–1.2)
CREAT UR-MCNC: 129.7 MG/DL (ref 39–259)
EOSINOPHIL # BLD: 0.2 K/UL (ref 0–0.6)
EOSINOPHIL NFR BLD: 5 % (ref 0–5)
ERYTHROCYTE [DISTWIDTH] IN BLOOD BY AUTOMATED COUNT: 14 % (ref 11.5–14.5)
GLUCOSE SERPL-MCNC: 128 MG/DL (ref 74–109)
HBA1C MFR BLD: 7.4 % (ref 4–6)
HCT VFR BLD AUTO: 45.7 % (ref 42–52)
HDLC SERPL-MCNC: 56 MG/DL (ref 55–121)
HGB BLD-MCNC: 14.5 G/DL (ref 14–18)
IMM GRANULOCYTES # BLD: 0 K/UL
LDLC SERPL CALC-MCNC: 85 MG/DL
LYMPHOCYTES # BLD: 1.5 K/UL (ref 1.1–4.5)
LYMPHOCYTES NFR BLD: 35 % (ref 20–40)
MCH RBC QN AUTO: 28.9 PG (ref 27–31)
MCHC RBC AUTO-ENTMCNC: 31.7 G/DL (ref 33–37)
MCV RBC AUTO: 91.2 FL (ref 80–94)
MICROALBUMIN UR-MCNC: <1.2 MG/DL (ref 0–19)
MICROALBUMIN/CREAT UR-RTO: NORMAL MG/G
MONOCYTES # BLD: 0.5 K/UL (ref 0–0.9)
MONOCYTES NFR BLD: 11.7 % (ref 0–10)
NEUTROPHILS # BLD: 2.1 K/UL (ref 1.5–7.5)
NEUTS SEG NFR BLD: 47.1 % (ref 50–65)
PLATELET # BLD AUTO: 149 K/UL (ref 130–400)
PMV BLD AUTO: 11.6 FL (ref 9.4–12.4)
POTASSIUM SERPL-SCNC: 4.4 MMOL/L (ref 3.5–5)
PROT SERPL-MCNC: 7 G/DL (ref 6.6–8.7)
PSA SERPL-MCNC: 4.52 NG/ML (ref 0–4)
RBC # BLD AUTO: 5.01 M/UL (ref 4.7–6.1)
SODIUM SERPL-SCNC: 143 MMOL/L (ref 136–145)
T4 FREE SERPL-MCNC: 1 NG/DL (ref 0.93–1.7)
TRIGL SERPL-MCNC: 98 MG/DL (ref 0–149)
TSH SERPL DL<=0.005 MIU/L-ACNC: 2.93 UIU/ML (ref 0.27–4.2)
WBC # BLD AUTO: 4.4 K/UL (ref 4.8–10.8)

## 2024-04-29 PROCEDURE — 1036F TOBACCO NON-USER: CPT | Performed by: INTERNAL MEDICINE

## 2024-04-29 PROCEDURE — 3078F DIAST BP <80 MM HG: CPT | Performed by: INTERNAL MEDICINE

## 2024-04-29 PROCEDURE — G8427 DOCREV CUR MEDS BY ELIG CLIN: HCPCS | Performed by: INTERNAL MEDICINE

## 2024-04-29 PROCEDURE — 3075F SYST BP GE 130 - 139MM HG: CPT | Performed by: INTERNAL MEDICINE

## 2024-04-29 PROCEDURE — 99213 OFFICE O/P EST LOW 20 MIN: CPT | Performed by: INTERNAL MEDICINE

## 2024-04-29 PROCEDURE — G8417 CALC BMI ABV UP PARAM F/U: HCPCS | Performed by: INTERNAL MEDICINE

## 2024-04-29 PROCEDURE — 3017F COLORECTAL CA SCREEN DOC REV: CPT | Performed by: INTERNAL MEDICINE

## 2024-04-29 PROCEDURE — 1123F ACP DISCUSS/DSCN MKR DOCD: CPT | Performed by: INTERNAL MEDICINE

## 2024-04-29 ASSESSMENT — ENCOUNTER SYMPTOMS
GASTROINTESTINAL NEGATIVE: 1
RESPIRATORY NEGATIVE: 1
VOMITING: 0
SHORTNESS OF BREATH: 0
DIARRHEA: 0
EYES NEGATIVE: 1
NAUSEA: 0

## 2024-04-29 NOTE — PROGRESS NOTES
[facility]                            Date:  4/29/2024  Patient: Darrian Batres  Age:  68 y.o., 1955      Reason for evaluation:         SUBJECTIVE:    Returns today for follow-up assessment seen 6/15/2023 for coronary disease shortness of breath hypertension hyperlipidemia overall doing reasonably well all conditions at goal.  He remains active denies anginal chest pain or limiting dyspnea or palpitations or claudication.  Blood pressure 134/78 heart 85.  All conditions at goal.  Lipid profile today total cholesterol 161 HDL 56 triglycerides 98 LDL 85.    Review of Systems   Constitutional: Negative.  Negative for chills, fever and unexpected weight change.   HENT: Negative.     Eyes: Negative.    Respiratory: Negative.  Negative for shortness of breath.    Cardiovascular: Negative.  Negative for chest pain.   Gastrointestinal: Negative.  Negative for diarrhea, nausea and vomiting.   Endocrine: Negative.    Genitourinary: Negative.    Musculoskeletal: Negative.    Skin: Negative.    Neurological: Negative.    All other systems reviewed and are negative.        OBJECTIVE:     /78   Pulse 85   Ht 1.854 m (6' 1\")   Wt 128.8 kg (284 lb)   BMI 37.47 kg/m²     Labs:   CBC:   Recent Labs     04/29/24  0730   WBC 4.4*   HGB 14.5   HCT 45.7        BMP:  Recent Labs     04/29/24  0730      K 4.4   CO2 25   BUN 14   CREATININE 1.0   LABGLOM 82   GLUCOSE 128*     BNP: No results for input(s): \"BNP\" in the last 72 hours.  PT/INR: No results for input(s): \"PROTIME\", \"INR\" in the last 72 hours.  APTT:No results for input(s): \"APTT\" in the last 72 hours.  CARDIAC ENZYMES:No results for input(s): \"CKTOTAL\", \"CKMB\", \"CKMBINDEX\", \"TROPONINI\" in the last 72 hours.  FASTING LIPID PANEL:  Lab Results   Component Value Date/Time    HDL 56 04/29/2024 07:30 AM    LDLCALC 85 04/29/2024 07:30 AM    TRIG 98 04/29/2024 07:30 AM     LIVER PROFILE:  Recent Labs     04/29/24  0730   AST

## 2024-04-30 ENCOUNTER — TELEPHONE (OUTPATIENT)
Dept: FAMILY MEDICINE CLINIC | Age: 69
End: 2024-04-30

## 2024-04-30 DIAGNOSIS — R97.20 ELEVATED PSA MEASUREMENT: Primary | ICD-10-CM

## 2024-04-30 NOTE — TELEPHONE ENCOUNTER
Called patient, spoke with: Patient regarding the results of the patients most recent labs.  I advised Patient of Ainsley Anna recommendations.   Patient did voice understanding

## 2024-04-30 NOTE — TELEPHONE ENCOUNTER
----- Message from HEENA Jha sent at 4/29/2024  2:20 PM CDT -----  Please inform patient results show  Cmp is normal   Psa is elevated and increased from last year. Recommend referral to urology to evaluate this further.   A1c is up at 7.4 recommend dietary changes and recheck in 3 months. If still elevated, then will need to add medication to help get better control.   Cbc is normal  Cholesterol looks great.   Thyroid labs are normal.

## 2024-05-02 ENCOUNTER — OFFICE VISIT (OUTPATIENT)
Dept: UROLOGY | Age: 69
End: 2024-05-02
Payer: MEDICARE

## 2024-05-02 VITALS — WEIGHT: 281.6 LBS | HEIGHT: 73 IN | BODY MASS INDEX: 37.32 KG/M2 | TEMPERATURE: 97.4 F

## 2024-05-02 DIAGNOSIS — R97.20 ELEVATED PSA: Primary | ICD-10-CM

## 2024-05-02 LAB
APPEARANCE FLUID: CLEAR
BILIRUBIN, POC: NORMAL
BLOOD URINE, POC: NORMAL
CLARITY, POC: CLEAR
COLOR, POC: YELLOW
GLUCOSE URINE, POC: NORMAL
KETONES, POC: NORMAL
LEUKOCYTE EST, POC: NORMAL
NITRITE, POC: NORMAL
PH, POC: 5
PROTEIN, POC: NORMAL
SPECIFIC GRAVITY, POC: 1.02
UROBILINOGEN, POC: 0.2

## 2024-05-02 PROCEDURE — 1123F ACP DISCUSS/DSCN MKR DOCD: CPT | Performed by: NURSE PRACTITIONER

## 2024-05-02 PROCEDURE — 1036F TOBACCO NON-USER: CPT | Performed by: NURSE PRACTITIONER

## 2024-05-02 PROCEDURE — G8427 DOCREV CUR MEDS BY ELIG CLIN: HCPCS | Performed by: NURSE PRACTITIONER

## 2024-05-02 PROCEDURE — 81002 URINALYSIS NONAUTO W/O SCOPE: CPT | Performed by: NURSE PRACTITIONER

## 2024-05-02 PROCEDURE — 99204 OFFICE O/P NEW MOD 45 MIN: CPT | Performed by: NURSE PRACTITIONER

## 2024-05-02 PROCEDURE — 3017F COLORECTAL CA SCREEN DOC REV: CPT | Performed by: NURSE PRACTITIONER

## 2024-05-02 PROCEDURE — G8417 CALC BMI ABV UP PARAM F/U: HCPCS | Performed by: NURSE PRACTITIONER

## 2024-05-02 RX ORDER — TRIAMCINOLONE ACETONIDE 5 MG/G
CREAM TOPICAL
COMMUNITY
Start: 2024-03-27

## 2024-05-02 RX ORDER — LOVASTATIN 40 MG/1
TABLET ORAL
COMMUNITY
Start: 2024-03-31

## 2024-05-02 NOTE — PROGRESS NOTES
Darrian Batres is a 68 y.o. male who presents today   Chief Complaint   Patient presents with    New Patient     I am here today as a new patient.        Elevated PSA: Patient is here with an elevated PSA. He has no personal history and no family history of prostate cancer.  He has no prior genitourinary history of hematuria, hematospermia, UTI. Previous PSA values are :  Lab Results   Component Value Date    PSA 4.52 (H) 04/29/2024    PSA 2.98 01/03/2023    PSA 2.20 11/02/2021    PSA 2.62 09/03/2020    PSA 1.52 03/26/2019     PSA has increased at 4.52 from 2.98.  He has been very sick recently with pneumonia and COVID over the last month.  No lower urinary tract symptoms.  He does ride a motorcycle.  History of hemorrhoidectomy with new surgical intervention causing rectal fissures has been evaluated by GI.    Past Medical History:   Diagnosis Date    Acute sinusitis     Anticoagulant long-term use     Anxiety     Chronic back pain     Chronic bilateral low back pain with bilateral sciatica     Chronic obstructive pulmonary disease, unspecified 4/9/2024    Constipation     COPD (chronic obstructive pulmonary disease) (HCC)     Coronary artery disease involving native coronary artery of native heart without angina pectoris 08/27/2019    CPAP (continuous positive airway pressure) dependence     12cm to 20cm    Depression     Hyperlipidemia     Hypertensive disorder     OA (osteoarthritis of the spine)     Obesity     SUSANA (obstructive sleep apnea)     AHI:  85.3 per HST 11/2013    Osteoarthritis of spine with radiculopathy, lumbar region     Otitis media     Rheumatoid arthritis (HCC)     Spinal stenosis     Type 2 diabetes mellitus ([facility]) 03/23/2016    Unspecified sleep apnea     uses bipap    Vertigo        Past Surgical History:   Procedure Laterality Date    ADENOIDECTOMY      BROW LIFT      due to vision    CARDIAC CATHETERIZATION  10/24/2018         COLONOSCOPY  30 yrs ago    [place], [place], normal per

## 2024-05-06 DIAGNOSIS — M54.41 CHRONIC BILATERAL LOW BACK PAIN WITH BILATERAL SCIATICA: ICD-10-CM

## 2024-05-06 DIAGNOSIS — G89.29 CHRONIC BILATERAL LOW BACK PAIN WITH BILATERAL SCIATICA: ICD-10-CM

## 2024-05-06 DIAGNOSIS — M54.42 CHRONIC BILATERAL LOW BACK PAIN WITH BILATERAL SCIATICA: ICD-10-CM

## 2024-05-06 DIAGNOSIS — M47.26 OSTEOARTHRITIS OF SPINE WITH RADICULOPATHY, LUMBAR REGION: ICD-10-CM

## 2024-05-06 ASSESSMENT — ENCOUNTER SYMPTOMS
VOMITING: 0
ABDOMINAL PAIN: 0
ABDOMINAL DISTENTION: 0
BACK PAIN: 0
NAUSEA: 0

## 2024-05-06 NOTE — TELEPHONE ENCOUNTER
Darrian Batres has requested a refill on his medication.      Last office visit : 2/6/2024   Next office visit : 8/8/2024   Last medication refill : 4/10/24  Anjel : up to date     Requested Prescriptions     Pending Prescriptions Disp Refills    oxyCODONE-acetaminophen (PERCOCET)  MG per tablet 120 tablet 0     Sig: Take 1 tablet by mouth every 6 hours as needed for Pain for up to 30 days.    OXYCONTIN 20 MG extended release tablet 60 each 0     Sig: Take 1 tablet by mouth in the morning and 1 tablet in the evening. Do all this for 30 days. Max Daily Amount: 40 mg.

## 2024-05-07 RX ORDER — OXYCODONE AND ACETAMINOPHEN 10; 325 MG/1; MG/1
1 TABLET ORAL EVERY 6 HOURS PRN
Qty: 120 TABLET | Refills: 0 | Status: SHIPPED | OUTPATIENT
Start: 2024-05-10 | End: 2024-06-09

## 2024-05-23 DIAGNOSIS — R97.20 ELEVATED PSA: ICD-10-CM

## 2024-05-24 LAB
PSA FREE MFR SERPL: 17 %
PSA FREE SERPL-MCNC: 0.6 NG/ML
PSA SERPL-MCNC: 3.5 NG/ML (ref 0–4)

## 2024-05-29 DIAGNOSIS — F51.01 PRIMARY INSOMNIA: ICD-10-CM

## 2024-05-30 RX ORDER — ZOLPIDEM TARTRATE 10 MG/1
5 TABLET ORAL NIGHTLY PRN
Qty: 30 TABLET | Refills: 0 | Status: SHIPPED | OUTPATIENT
Start: 2024-05-30 | End: 2024-07-29

## 2024-05-30 NOTE — TELEPHONE ENCOUNTER
Patient is requesting a refill on Ambien. Patient was last seen on 04/09/2024 , with a follow up on ot scheduled at this time . The prescription was last filled on 04/25/2024

## 2024-06-04 DIAGNOSIS — M54.41 CHRONIC BILATERAL LOW BACK PAIN WITH BILATERAL SCIATICA: ICD-10-CM

## 2024-06-04 DIAGNOSIS — M54.42 CHRONIC BILATERAL LOW BACK PAIN WITH BILATERAL SCIATICA: ICD-10-CM

## 2024-06-04 DIAGNOSIS — M47.26 OSTEOARTHRITIS OF SPINE WITH RADICULOPATHY, LUMBAR REGION: ICD-10-CM

## 2024-06-04 DIAGNOSIS — G89.29 CHRONIC BILATERAL LOW BACK PAIN WITH BILATERAL SCIATICA: ICD-10-CM

## 2024-06-04 RX ORDER — OXYCODONE AND ACETAMINOPHEN 10; 325 MG/1; MG/1
1 TABLET ORAL EVERY 6 HOURS PRN
Qty: 120 TABLET | Refills: 0 | Status: SHIPPED | OUTPATIENT
Start: 2024-06-06 | End: 2024-07-06

## 2024-06-04 NOTE — TELEPHONE ENCOUNTER
Requested Prescriptions     Pending Prescriptions Disp Refills    oxyCODONE-acetaminophen (PERCOCET)  MG per tablet 120 tablet 0     Sig: Take 1 tablet by mouth every 6 hours as needed for Pain for up to 30 days.    OXYCONTIN 20 MG extended release tablet 60 each 0     Sig: Take 1 tablet by mouth in the morning and 1 tablet in the evening. Do all this for 30 days. Max Daily Amount: 40 mg.       Last Office Visit: 2/6/2024  Next Office Visit: 8/8/2024  Last Medication Refill:5/10/2024

## 2024-06-27 DIAGNOSIS — I25.10 CORONARY ARTERY DISEASE INVOLVING NATIVE CORONARY ARTERY OF NATIVE HEART WITHOUT ANGINA PECTORIS: ICD-10-CM

## 2024-06-27 DIAGNOSIS — I10 ESSENTIAL HYPERTENSION: ICD-10-CM

## 2024-06-27 RX ORDER — VERAPAMIL HYDROCHLORIDE 200 MG/1
CAPSULE, EXTENDED RELEASE ORAL
Qty: 90 CAPSULE | Refills: 3 | Status: SHIPPED | OUTPATIENT
Start: 2024-06-27

## 2024-07-05 DIAGNOSIS — M54.41 CHRONIC BILATERAL LOW BACK PAIN WITH BILATERAL SCIATICA: ICD-10-CM

## 2024-07-05 DIAGNOSIS — F51.01 PRIMARY INSOMNIA: ICD-10-CM

## 2024-07-05 DIAGNOSIS — M54.42 CHRONIC BILATERAL LOW BACK PAIN WITH BILATERAL SCIATICA: ICD-10-CM

## 2024-07-05 DIAGNOSIS — G89.29 CHRONIC BILATERAL LOW BACK PAIN WITH BILATERAL SCIATICA: ICD-10-CM

## 2024-07-05 DIAGNOSIS — M47.26 OSTEOARTHRITIS OF SPINE WITH RADICULOPATHY, LUMBAR REGION: ICD-10-CM

## 2024-07-05 RX ORDER — ZOLPIDEM TARTRATE 10 MG/1
10 TABLET ORAL NIGHTLY PRN
Qty: 30 TABLET | Refills: 0 | Status: SHIPPED | OUTPATIENT
Start: 2024-07-05 | End: 2024-08-04

## 2024-07-05 RX ORDER — ZOLPIDEM TARTRATE 10 MG/1
5 TABLET ORAL NIGHTLY PRN
Qty: 30 TABLET | Refills: 0 | Status: CANCELLED | OUTPATIENT
Start: 2024-07-05 | End: 2024-09-03

## 2024-07-05 NOTE — TELEPHONE ENCOUNTER
Patient is requesting a refill of zolpidem. Patient was last seen on 4/9/2024 and has appointment on not found. Medication was last sent in on 5/30/2024 for #30 and 0 rf.

## 2024-07-08 NOTE — TELEPHONE ENCOUNTER
Requested Prescriptions     Pending Prescriptions Disp Refills    oxyCODONE-acetaminophen (PERCOCET)  MG per tablet 120 tablet 0     Sig: Take 1 tablet by mouth every 6 hours as needed for Pain for up to 30 days.    OXYCONTIN 20 MG extended release tablet 60 each 0     Sig: Take 1 tablet by mouth in the morning and 1 tablet in the evening. Do all this for 30 days. Max Daily Amount: 40 mg.       Last Office Visit:  2/6/2024  Next Office Visit:  8/8/2024  Last Medication Refill:  6/6/2024 with 0 CRISTINA Hobbs up to date:  7/5/2024     *RX updated to reflect   7/8/2024  fill date*'

## 2024-07-09 RX ORDER — OXYCODONE AND ACETAMINOPHEN 10; 325 MG/1; MG/1
1 TABLET ORAL EVERY 6 HOURS PRN
Qty: 120 TABLET | Refills: 0 | Status: SHIPPED | OUTPATIENT
Start: 2024-07-09 | End: 2024-08-08

## 2024-07-30 ENCOUNTER — PATIENT MESSAGE (OUTPATIENT)
Dept: FAMILY MEDICINE CLINIC | Age: 69
End: 2024-07-30

## 2024-07-30 DIAGNOSIS — E11.628 TYPE 2 DIABETES MELLITUS WITH OTHER SKIN COMPLICATION, WITHOUT LONG-TERM CURRENT USE OF INSULIN (HCC): Primary | ICD-10-CM

## 2024-07-30 DIAGNOSIS — E78.00 HYPERCHOLESTEREMIA: ICD-10-CM

## 2024-07-30 DIAGNOSIS — E11.628 TYPE 2 DIABETES MELLITUS WITH OTHER SKIN COMPLICATION, WITHOUT LONG-TERM CURRENT USE OF INSULIN (HCC): ICD-10-CM

## 2024-07-30 DIAGNOSIS — I10 ESSENTIAL HYPERTENSION: ICD-10-CM

## 2024-07-30 NOTE — TELEPHONE ENCOUNTER
Received My Chart Message from patient requesting refill on medication(s). Pt was last seen in office on 2024  and has a follow up scheduled for Visit date not found. Will send request to provider for authorization.     Requested Prescriptions     Pending Prescriptions Disp Refills    blood glucose test strips (CONTOUR NEXT TEST) strip 100 each 3     Si each by In Vitro route daily As needed.

## 2024-07-31 ENCOUNTER — TELEPHONE (OUTPATIENT)
Dept: FAMILY MEDICINE CLINIC | Age: 69
End: 2024-07-31

## 2024-07-31 DIAGNOSIS — E78.00 HYPERCHOLESTEREMIA: ICD-10-CM

## 2024-07-31 DIAGNOSIS — E11.628 TYPE 2 DIABETES MELLITUS WITH OTHER SKIN COMPLICATION, WITHOUT LONG-TERM CURRENT USE OF INSULIN (HCC): ICD-10-CM

## 2024-07-31 DIAGNOSIS — I10 ESSENTIAL HYPERTENSION: ICD-10-CM

## 2024-07-31 LAB
ALBUMIN SERPL-MCNC: 4.3 G/DL (ref 3.5–5.2)
ALP SERPL-CCNC: 66 U/L (ref 40–130)
ALT SERPL-CCNC: 17 U/L (ref 5–41)
ANION GAP SERPL CALCULATED.3IONS-SCNC: 17 MMOL/L (ref 7–19)
AST SERPL-CCNC: 12 U/L (ref 5–40)
BASOPHILS # BLD: 0.1 K/UL (ref 0–0.2)
BASOPHILS NFR BLD: 1.1 % (ref 0–1)
BILIRUB SERPL-MCNC: 0.4 MG/DL (ref 0.2–1.2)
BUN SERPL-MCNC: 14 MG/DL (ref 8–23)
CALCIUM SERPL-MCNC: 9.7 MG/DL (ref 8.8–10.2)
CHLORIDE SERPL-SCNC: 100 MMOL/L (ref 98–111)
CHOLEST SERPL-MCNC: 154 MG/DL (ref 160–199)
CO2 SERPL-SCNC: 26 MMOL/L (ref 22–29)
CREAT SERPL-MCNC: 0.8 MG/DL (ref 0.5–1.2)
CREAT UR-MCNC: 83.9 MG/DL (ref 39–259)
EOSINOPHIL # BLD: 0.2 K/UL (ref 0–0.6)
EOSINOPHIL NFR BLD: 3.2 % (ref 0–5)
ERYTHROCYTE [DISTWIDTH] IN BLOOD BY AUTOMATED COUNT: 14.2 % (ref 11.5–14.5)
GLUCOSE SERPL-MCNC: 124 MG/DL (ref 74–109)
HBA1C MFR BLD: 6.2 % (ref 4–6)
HCT VFR BLD AUTO: 45.9 % (ref 42–52)
HDLC SERPL-MCNC: 55 MG/DL (ref 55–121)
HGB BLD-MCNC: 14.1 G/DL (ref 14–18)
IMM GRANULOCYTES # BLD: 0 K/UL
LDLC SERPL CALC-MCNC: 79 MG/DL
LYMPHOCYTES # BLD: 1.6 K/UL (ref 1.1–4.5)
LYMPHOCYTES NFR BLD: 29.8 % (ref 20–40)
MCH RBC QN AUTO: 28.7 PG (ref 27–31)
MCHC RBC AUTO-ENTMCNC: 30.7 G/DL (ref 33–37)
MCV RBC AUTO: 93.3 FL (ref 80–94)
MICROALBUMIN UR-MCNC: <1.2 MG/DL (ref 0–19)
MICROALBUMIN/CREAT UR-RTO: NORMAL MG/G
MONOCYTES # BLD: 0.6 K/UL (ref 0–0.9)
MONOCYTES NFR BLD: 11.2 % (ref 0–10)
NEUTROPHILS # BLD: 2.9 K/UL (ref 1.5–7.5)
NEUTS SEG NFR BLD: 54.5 % (ref 50–65)
PLATELET # BLD AUTO: 149 K/UL (ref 130–400)
PMV BLD AUTO: 12.6 FL (ref 9.4–12.4)
POTASSIUM SERPL-SCNC: 4 MMOL/L (ref 3.5–5)
PROT SERPL-MCNC: 7.1 G/DL (ref 6.6–8.7)
RBC # BLD AUTO: 4.92 M/UL (ref 4.7–6.1)
SODIUM SERPL-SCNC: 143 MMOL/L (ref 136–145)
T4 FREE SERPL-MCNC: 1.09 NG/DL (ref 0.93–1.7)
TRIGL SERPL-MCNC: 100 MG/DL (ref 0–149)
TSH SERPL DL<=0.005 MIU/L-ACNC: 2.36 UIU/ML (ref 0.27–4.2)
WBC # BLD AUTO: 5.3 K/UL (ref 4.8–10.8)

## 2024-07-31 NOTE — TELEPHONE ENCOUNTER
----- Message from HEENA Snell sent at 7/31/2024  4:17 PM CDT -----  CMP: Normal sodium and potassium.  Blood glucose is 124.  Normal kidney function and normal liver function  Cholesterol is well-controlled  A1c is improved at 6.2.  This shows good control of blood sugars over the previous 3 months.  Keep up the good work.  CBC is normal  No microalbumin in the urine  Thyroid is normal

## 2024-07-31 NOTE — TELEPHONE ENCOUNTER
From: Darrian Batres  To: Ainsley Anna  Sent: 7/30/2024 1:26 PM CDT  Subject: Labs    Are my labs ordered for my 3 month check?

## 2024-08-05 DIAGNOSIS — M54.42 CHRONIC BILATERAL LOW BACK PAIN WITH BILATERAL SCIATICA: ICD-10-CM

## 2024-08-05 DIAGNOSIS — M47.26 OSTEOARTHRITIS OF SPINE WITH RADICULOPATHY, LUMBAR REGION: ICD-10-CM

## 2024-08-05 DIAGNOSIS — G89.29 CHRONIC BILATERAL LOW BACK PAIN WITH BILATERAL SCIATICA: ICD-10-CM

## 2024-08-05 DIAGNOSIS — M54.41 CHRONIC BILATERAL LOW BACK PAIN WITH BILATERAL SCIATICA: ICD-10-CM

## 2024-08-05 NOTE — TELEPHONE ENCOUNTER
Darrian S Batres has requested a refill on his medication.      Last office visit : 2/6/2024   Next office visit : 8/8/2024   Last medication refill :7/9/2024  Anjel : 7/5/2024      Requested Prescriptions     Pending Prescriptions Disp Refills    oxyCODONE-acetaminophen (PERCOCET)  MG per tablet 120 tablet 0     Sig: Take 1 tablet by mouth every 6 hours as needed for Pain for up to 30 days.    OXYCONTIN 20 MG extended release tablet 60 each 0     Sig: Take 1 tablet by mouth in the morning and 1 tablet in the evening. Do all this for 30 days. Max Daily Amount: 40 mg.       **Scripts updated to reflect due date of 8/8/2024 for earliest fill**

## 2024-08-06 RX ORDER — OXYCODONE AND ACETAMINOPHEN 10; 325 MG/1; MG/1
1 TABLET ORAL EVERY 6 HOURS PRN
Qty: 120 TABLET | Refills: 0 | Status: SHIPPED | OUTPATIENT
Start: 2024-08-08 | End: 2024-09-07

## 2024-08-08 ENCOUNTER — OFFICE VISIT (OUTPATIENT)
Dept: NEUROLOGY | Age: 69
End: 2024-08-08
Payer: MEDICARE

## 2024-08-08 VITALS
WEIGHT: 275 LBS | HEART RATE: 72 BPM | SYSTOLIC BLOOD PRESSURE: 126 MMHG | RESPIRATION RATE: 18 BRPM | HEIGHT: 73 IN | DIASTOLIC BLOOD PRESSURE: 78 MMHG | BODY MASS INDEX: 36.45 KG/M2

## 2024-08-08 DIAGNOSIS — M47.26 OSTEOARTHRITIS OF SPINE WITH RADICULOPATHY, LUMBAR REGION: ICD-10-CM

## 2024-08-08 DIAGNOSIS — G47.33 OSA (OBSTRUCTIVE SLEEP APNEA): ICD-10-CM

## 2024-08-08 DIAGNOSIS — M05.742 RHEUMATOID ARTHRITIS INVOLVING BOTH HANDS WITH POSITIVE RHEUMATOID FACTOR (HCC): ICD-10-CM

## 2024-08-08 DIAGNOSIS — M54.42 CHRONIC BILATERAL LOW BACK PAIN WITH BILATERAL SCIATICA: Primary | ICD-10-CM

## 2024-08-08 DIAGNOSIS — F41.1 GENERALIZED ANXIETY DISORDER: ICD-10-CM

## 2024-08-08 DIAGNOSIS — M54.41 CHRONIC BILATERAL LOW BACK PAIN WITH BILATERAL SCIATICA: Primary | ICD-10-CM

## 2024-08-08 DIAGNOSIS — G89.29 CHRONIC BILATERAL LOW BACK PAIN WITH BILATERAL SCIATICA: Primary | ICD-10-CM

## 2024-08-08 DIAGNOSIS — M05.741 RHEUMATOID ARTHRITIS INVOLVING BOTH HANDS WITH POSITIVE RHEUMATOID FACTOR (HCC): ICD-10-CM

## 2024-08-08 PROCEDURE — G8427 DOCREV CUR MEDS BY ELIG CLIN: HCPCS | Performed by: PSYCHIATRY & NEUROLOGY

## 2024-08-08 PROCEDURE — 3017F COLORECTAL CA SCREEN DOC REV: CPT | Performed by: PSYCHIATRY & NEUROLOGY

## 2024-08-08 PROCEDURE — G8417 CALC BMI ABV UP PARAM F/U: HCPCS | Performed by: PSYCHIATRY & NEUROLOGY

## 2024-08-08 PROCEDURE — 1036F TOBACCO NON-USER: CPT | Performed by: PSYCHIATRY & NEUROLOGY

## 2024-08-08 PROCEDURE — 1123F ACP DISCUSS/DSCN MKR DOCD: CPT | Performed by: PSYCHIATRY & NEUROLOGY

## 2024-08-08 PROCEDURE — 3078F DIAST BP <80 MM HG: CPT | Performed by: PSYCHIATRY & NEUROLOGY

## 2024-08-08 PROCEDURE — 99213 OFFICE O/P EST LOW 20 MIN: CPT | Performed by: PSYCHIATRY & NEUROLOGY

## 2024-08-08 PROCEDURE — 3074F SYST BP LT 130 MM HG: CPT | Performed by: PSYCHIATRY & NEUROLOGY

## 2024-08-08 RX ORDER — ALPRAZOLAM 1 MG/1
TABLET ORAL
Qty: 135 TABLET | Refills: 5 | Status: SHIPPED | OUTPATIENT
Start: 2024-08-08 | End: 2025-02-06

## 2024-08-08 NOTE — PROGRESS NOTES
[facility]  [address], [address]  [place] [address]  Phone [phone]  Fax [phone]     [facility] Follow Up Encounter  24 2:29 PM CDT    Information:   Patient Name: Darrian Batres  :   1955  Age:   68 y.o.  MRN:   [id number]  Account #:  [id number]  Today:  24    Provider: Hola Bosch M.D.     Chief Complaint:   Chief Complaint   Patient presents with    Follow-up    Back Pain     Pt states having increase in pain.       Subjective:   Darrian Batres is a 68 y.o. man with chronic neck pain, back pain, rheumatoid arthritis, arthralgias, and obstructive sleep apnea who is following up.  He complains of worsening neck pain, back pain, arthralgias.  He is on chronic opioids.  He has been through pain management and had side effects to steroid epidurals in the past.  He has SUSANA and does use his CPAP.  He says he is still physically active.  His wife (not present) says he stays up late and is sedentary in the daytime, rarely being active      Objective:     Past Medical History:  Past Medical History:   Diagnosis Date    Acute sinusitis     Anticoagulant long-term use     Anxiety     Chronic back pain     Chronic bilateral low back pain with bilateral sciatica     Chronic obstructive pulmonary disease, unspecified 2024    Constipation     COPD (chronic obstructive pulmonary disease) (HCC)     Coronary artery disease involving native coronary artery of native heart without angina pectoris 2019    CPAP (continuous positive airway pressure) dependence     12cm to 20cm    Depression     Hyperlipidemia     Hypertensive disorder     OA (osteoarthritis of the spine)     Obesity     SUSANA (obstructive sleep apnea)     AHI:  85.3 per HST 2013    Osteoarthritis of spine with radiculopathy, lumbar region     Otitis media     Rheumatoid arthritis (HCC)     Spinal stenosis     Type 2 diabetes mellitus (HCC) 2016    Unspecified sleep apnea     uses bipap

## 2024-08-19 ENCOUNTER — OFFICE VISIT (OUTPATIENT)
Dept: CARDIOLOGY CLINIC | Age: 69
End: 2024-08-19
Payer: MEDICARE

## 2024-08-19 ENCOUNTER — TELEPHONE (OUTPATIENT)
Dept: CARDIOLOGY CLINIC | Age: 69
End: 2024-08-19

## 2024-08-19 VITALS
WEIGHT: 275 LBS | SYSTOLIC BLOOD PRESSURE: 152 MMHG | DIASTOLIC BLOOD PRESSURE: 72 MMHG | BODY MASS INDEX: 36.45 KG/M2 | HEIGHT: 73 IN | HEART RATE: 96 BPM

## 2024-08-19 DIAGNOSIS — R06.09 DOE (DYSPNEA ON EXERTION): ICD-10-CM

## 2024-08-19 DIAGNOSIS — E78.00 HYPERCHOLESTEREMIA: ICD-10-CM

## 2024-08-19 DIAGNOSIS — R00.2 PALPITATIONS: ICD-10-CM

## 2024-08-19 DIAGNOSIS — I10 ESSENTIAL HYPERTENSION: ICD-10-CM

## 2024-08-19 DIAGNOSIS — Z95.5 HX OF HEART ARTERY STENT: ICD-10-CM

## 2024-08-19 DIAGNOSIS — I25.10 CORONARY ARTERY DISEASE INVOLVING NATIVE CORONARY ARTERY OF NATIVE HEART WITHOUT ANGINA PECTORIS: ICD-10-CM

## 2024-08-19 DIAGNOSIS — I47.10 PAROXYSMAL SVT (SUPRAVENTRICULAR TACHYCARDIA) (HCC): Primary | ICD-10-CM

## 2024-08-19 LAB
ANION GAP SERPL CALCULATED.3IONS-SCNC: 13 MMOL/L (ref 7–19)
BUN SERPL-MCNC: 17 MG/DL (ref 8–23)
CALCIUM SERPL-MCNC: 9.8 MG/DL (ref 8.8–10.2)
CHLORIDE SERPL-SCNC: 102 MMOL/L (ref 98–111)
CO2 SERPL-SCNC: 28 MMOL/L (ref 22–29)
CREAT SERPL-MCNC: 1 MG/DL (ref 0.5–1.2)
GLUCOSE SERPL-MCNC: 126 MG/DL (ref 74–109)
MAGNESIUM SERPL-MCNC: 1.9 MG/DL (ref 1.6–2.4)
POTASSIUM SERPL-SCNC: 4.2 MMOL/L (ref 3.5–5)
SODIUM SERPL-SCNC: 143 MMOL/L (ref 136–145)

## 2024-08-19 PROCEDURE — G8427 DOCREV CUR MEDS BY ELIG CLIN: HCPCS | Performed by: INTERNAL MEDICINE

## 2024-08-19 PROCEDURE — G8417 CALC BMI ABV UP PARAM F/U: HCPCS | Performed by: INTERNAL MEDICINE

## 2024-08-19 PROCEDURE — 99214 OFFICE O/P EST MOD 30 MIN: CPT | Performed by: INTERNAL MEDICINE

## 2024-08-19 PROCEDURE — 1123F ACP DISCUSS/DSCN MKR DOCD: CPT | Performed by: INTERNAL MEDICINE

## 2024-08-19 PROCEDURE — 3077F SYST BP >= 140 MM HG: CPT | Performed by: INTERNAL MEDICINE

## 2024-08-19 PROCEDURE — 1036F TOBACCO NON-USER: CPT | Performed by: INTERNAL MEDICINE

## 2024-08-19 PROCEDURE — 3078F DIAST BP <80 MM HG: CPT | Performed by: INTERNAL MEDICINE

## 2024-08-19 PROCEDURE — 93000 ELECTROCARDIOGRAM COMPLETE: CPT | Performed by: INTERNAL MEDICINE

## 2024-08-19 PROCEDURE — 3017F COLORECTAL CA SCREEN DOC REV: CPT | Performed by: INTERNAL MEDICINE

## 2024-08-19 ASSESSMENT — ENCOUNTER SYMPTOMS
GASTROINTESTINAL NEGATIVE: 1
VOMITING: 0
NAUSEA: 0
EYES NEGATIVE: 1
DIARRHEA: 0
SHORTNESS OF BREATH: 0
RESPIRATORY NEGATIVE: 1

## 2024-08-19 NOTE — TELEPHONE ENCOUNTER
Called patient to get them scheduled. Mandeep has his first opening on Oct. 7th. Patient may schedule with a NP if needed sooner. 8/19/24 AH

## 2024-08-19 NOTE — PROGRESS NOTES
Worried About Running Out of Food in the Last Year: Never true     Ran Out of Food in the Last Year: Never true   Transportation Needs: Unknown (4/9/2024)    PRAPARE - Transportation     Lack of Transportation (Medical): Not on file     Lack of Transportation (Non-Medical): No   Physical Activity: Sufficiently Active (10/9/2023)    Exercise Vital Sign     Days of Exercise per Week: 5 days     Minutes of Exercise per Session: 50 min   Stress: Not on file   Social Connections: Unknown (10/11/2023)    Received from [facility]    Family and Community Support     Help with Day-to-Day Activities: Not on file     Lonely or Isolated: Not on file   Intimate Partner Violence: Unknown (10/11/2023)    Received from [facility]    Abuse Screen     Unsafe at Home or Work/School: Not on file     Feels Threatened by Someone?: Not on file     Does Anyone Keep You from Contacting Others or Doint Things Outside the Home?: Not on file     Physical Sign of Abuse Present: Not on file   Housing Stability: Unknown (4/9/2024)    Housing Stability Vital Sign     Unable to Pay for Housing in the Last Year: Not on file     Number of Places Lived in the Last Year: Not on file     Unstable Housing in the Last Year: No       Physical Examination:  BP (!) 152/72   Pulse 96   Ht 1.854 m (6' 1\")   Wt 124.7 kg (275 lb)   BMI 36.28 kg/m²   Physical Exam  Vitals reviewed.   Constitutional:       Appearance: He is well-developed.   Neck:      Vascular: No carotid bruit or JVD.   Cardiovascular:      Rate and Rhythm: Normal rate and regular rhythm.      Heart sounds: Normal heart sounds. No murmur heard.     No friction rub. No gallop.   Pulmonary:      Effort: Pulmonary effort is normal. No respiratory distress.      Breath sounds: Normal breath sounds. No wheezing or rales.   Abdominal:      General: There is no distension.      Tenderness: There is no abdominal tenderness.   Lymphadenopathy:      Cervical: No cervical

## 2024-08-26 ENCOUNTER — TELEPHONE (OUTPATIENT)
Dept: CARDIOLOGY CLINIC | Age: 69
End: 2024-08-26

## 2024-09-04 DIAGNOSIS — M54.41 CHRONIC BILATERAL LOW BACK PAIN WITH BILATERAL SCIATICA: ICD-10-CM

## 2024-09-04 DIAGNOSIS — F41.1 GENERALIZED ANXIETY DISORDER: ICD-10-CM

## 2024-09-04 DIAGNOSIS — G89.29 CHRONIC BILATERAL LOW BACK PAIN WITH BILATERAL SCIATICA: ICD-10-CM

## 2024-09-04 DIAGNOSIS — M47.26 OSTEOARTHRITIS OF SPINE WITH RADICULOPATHY, LUMBAR REGION: ICD-10-CM

## 2024-09-04 DIAGNOSIS — M54.42 CHRONIC BILATERAL LOW BACK PAIN WITH BILATERAL SCIATICA: ICD-10-CM

## 2024-09-04 RX ORDER — ALPRAZOLAM 1 MG
TABLET ORAL
Qty: 135 TABLET | Refills: 5 | Status: CANCELLED | OUTPATIENT
Start: 2024-09-04 | End: 2025-03-05

## 2024-09-05 DIAGNOSIS — E11.628 TYPE 2 DIABETES MELLITUS WITH OTHER SKIN COMPLICATION, WITHOUT LONG-TERM CURRENT USE OF INSULIN (HCC): ICD-10-CM

## 2024-09-05 RX ORDER — OXYCODONE AND ACETAMINOPHEN 10; 325 MG/1; MG/1
1 TABLET ORAL EVERY 6 HOURS PRN
Qty: 120 TABLET | Refills: 0 | Status: SHIPPED | OUTPATIENT
Start: 2024-09-07 | End: 2024-10-07

## 2024-09-05 RX ORDER — HYDROCHLOROTHIAZIDE 25 MG/1
25 TABLET ORAL DAILY
Qty: 90 TABLET | Refills: 3 | Status: SHIPPED | OUTPATIENT
Start: 2024-09-05

## 2024-09-05 NOTE — TELEPHONE ENCOUNTER
Requested Prescriptions     Pending Prescriptions Disp Refills    oxyCODONE-acetaminophen (PERCOCET)  MG per tablet 120 tablet 0     Sig: Take 1 tablet by mouth every 6 hours as needed for Pain for up to 30 days.    OXYCONTIN 20 MG extended release tablet 60 each 0     Sig: Take 1 tablet by mouth in the morning and 1 tablet in the evening. Do all this for 30 days. Max Daily Amount: 40 mg.       Last Office Visit:  8/8/2024  Next Office Visit:  2/20/2025  Last Medication Refill:  8/8/2024   Anjel up to date:  7/5/2024    *RX updated to reflect   9/7/2024  fill date*

## 2024-09-05 NOTE — TELEPHONE ENCOUNTER
Received fax from pharmacy requesting refill on pts medication(s). Pt was last seen in office on 4/9/2024  and has a follow up scheduled for Visit date not found. Will send request to  Ainsley Anna  for authorization.     Requested Prescriptions     Pending Prescriptions Disp Refills    metFORMIN (GLUCOPHAGE) 1000 MG tablet [Pharmacy Med Name: METFORMIN HCL 1,000 MG TABLET] 180 tablet 3     Sig: TAKE 1 TABLET BY MOUTH TWICE A DAY WITH FOOD

## 2024-09-09 ENCOUNTER — OFFICE VISIT (OUTPATIENT)
Dept: CARDIOLOGY CLINIC | Age: 69
End: 2024-09-09
Payer: MEDICARE

## 2024-09-09 VITALS
DIASTOLIC BLOOD PRESSURE: 74 MMHG | HEART RATE: 45 BPM | BODY MASS INDEX: 37.11 KG/M2 | HEIGHT: 73 IN | SYSTOLIC BLOOD PRESSURE: 138 MMHG | WEIGHT: 280 LBS

## 2024-09-09 DIAGNOSIS — I10 ESSENTIAL HYPERTENSION: ICD-10-CM

## 2024-09-09 DIAGNOSIS — Z95.5 HX OF HEART ARTERY STENT: ICD-10-CM

## 2024-09-09 DIAGNOSIS — E78.2 MIXED HYPERLIPIDEMIA: ICD-10-CM

## 2024-09-09 DIAGNOSIS — R06.09 DOE (DYSPNEA ON EXERTION): ICD-10-CM

## 2024-09-09 DIAGNOSIS — I47.10 PAROXYSMAL SVT (SUPRAVENTRICULAR TACHYCARDIA) (HCC): Primary | ICD-10-CM

## 2024-09-09 DIAGNOSIS — I25.10 CORONARY ARTERY DISEASE INVOLVING NATIVE CORONARY ARTERY OF NATIVE HEART WITHOUT ANGINA PECTORIS: ICD-10-CM

## 2024-09-09 PROCEDURE — 3078F DIAST BP <80 MM HG: CPT | Performed by: INTERNAL MEDICINE

## 2024-09-09 PROCEDURE — 1036F TOBACCO NON-USER: CPT | Performed by: INTERNAL MEDICINE

## 2024-09-09 PROCEDURE — 99213 OFFICE O/P EST LOW 20 MIN: CPT | Performed by: INTERNAL MEDICINE

## 2024-09-09 PROCEDURE — 1123F ACP DISCUSS/DSCN MKR DOCD: CPT | Performed by: INTERNAL MEDICINE

## 2024-09-09 PROCEDURE — 3017F COLORECTAL CA SCREEN DOC REV: CPT | Performed by: INTERNAL MEDICINE

## 2024-09-09 PROCEDURE — G8427 DOCREV CUR MEDS BY ELIG CLIN: HCPCS | Performed by: INTERNAL MEDICINE

## 2024-09-09 PROCEDURE — G8417 CALC BMI ABV UP PARAM F/U: HCPCS | Performed by: INTERNAL MEDICINE

## 2024-09-09 PROCEDURE — 3075F SYST BP GE 130 - 139MM HG: CPT | Performed by: INTERNAL MEDICINE

## 2024-09-09 ASSESSMENT — ENCOUNTER SYMPTOMS
GASTROINTESTINAL NEGATIVE: 1
EYES NEGATIVE: 1
VOMITING: 0
RESPIRATORY NEGATIVE: 1
NAUSEA: 0
DIARRHEA: 0
SHORTNESS OF BREATH: 0

## 2024-09-13 DIAGNOSIS — R00.2 PALPITATIONS: ICD-10-CM

## 2024-10-01 DIAGNOSIS — M54.41 CHRONIC BILATERAL LOW BACK PAIN WITH BILATERAL SCIATICA: ICD-10-CM

## 2024-10-01 DIAGNOSIS — M47.26 OSTEOARTHRITIS OF SPINE WITH RADICULOPATHY, LUMBAR REGION: ICD-10-CM

## 2024-10-01 DIAGNOSIS — G89.29 CHRONIC BILATERAL LOW BACK PAIN WITH BILATERAL SCIATICA: ICD-10-CM

## 2024-10-01 DIAGNOSIS — M54.42 CHRONIC BILATERAL LOW BACK PAIN WITH BILATERAL SCIATICA: ICD-10-CM

## 2024-10-01 RX ORDER — OXYCODONE AND ACETAMINOPHEN 10; 325 MG/1; MG/1
1 TABLET ORAL EVERY 6 HOURS PRN
Qty: 120 TABLET | Refills: 0 | Status: SHIPPED | OUTPATIENT
Start: 2024-10-01 | End: 2024-10-31

## 2024-10-01 NOTE — TELEPHONE ENCOUNTER
Requested Prescriptions     Pending Prescriptions Disp Refills    oxyCODONE-acetaminophen (PERCOCET)  MG per tablet 120 tablet 0     Sig: Take 1 tablet by mouth every 6 hours as needed for Pain for up to 30 days.    OXYCONTIN 20 MG extended release tablet 60 each 0     Sig: Take 1 tablet by mouth in the morning and 1 tablet in the evening. Do all this for 30 days. Max Daily Amount: 40 mg.       Last Office Visit:  8/8/2024  Next Office Visit:  2/20/2025  Last Medication Refill:  9/7/2024 with 0 CRISTINA Hobbs up to date: 10/1/2024     *RX updated to reflect   10/1/2024  fill date*

## 2024-10-10 DIAGNOSIS — R00.2 PALPITATIONS: Primary | ICD-10-CM

## 2024-10-31 DIAGNOSIS — M54.42 CHRONIC BILATERAL LOW BACK PAIN WITH BILATERAL SCIATICA: ICD-10-CM

## 2024-10-31 DIAGNOSIS — M47.26 OSTEOARTHRITIS OF SPINE WITH RADICULOPATHY, LUMBAR REGION: ICD-10-CM

## 2024-10-31 DIAGNOSIS — G89.29 CHRONIC BILATERAL LOW BACK PAIN WITH BILATERAL SCIATICA: ICD-10-CM

## 2024-10-31 DIAGNOSIS — M54.41 CHRONIC BILATERAL LOW BACK PAIN WITH BILATERAL SCIATICA: ICD-10-CM

## 2024-10-31 RX ORDER — OXYCODONE AND ACETAMINOPHEN 10; 325 MG/1; MG/1
1 TABLET ORAL EVERY 6 HOURS PRN
Qty: 120 TABLET | Refills: 0 | Status: SHIPPED | OUTPATIENT
Start: 2024-10-31 | End: 2024-11-30

## 2024-10-31 NOTE — TELEPHONE ENCOUNTER
Requested Prescriptions     Pending Prescriptions Disp Refills    oxyCODONE-acetaminophen (PERCOCET)  MG per tablet 120 tablet 0     Sig: Take 1 tablet by mouth every 6 hours as needed for Pain for up to 30 days.    OXYCONTIN 20 MG extended release tablet 60 each 0     Sig: Take 1 tablet by mouth in the morning and 1 tablet in the evening. Do all this for 30 days. Max Daily Amount: 40 mg.       Last Office Visit:  8/8/2024  Next Office Visit:  2/20/2025  Last Medication Refill:  10/1/2024 with 0 CRISTINA Hobbs up to date:  10/1/2024    *RX updated to reflect   10/31/2024  fill date*

## 2024-11-25 ENCOUNTER — OFFICE VISIT (OUTPATIENT)
Dept: CARDIOLOGY CLINIC | Age: 69
End: 2024-11-25
Payer: MEDICARE

## 2024-11-25 VITALS
WEIGHT: 268 LBS | BODY MASS INDEX: 35.52 KG/M2 | SYSTOLIC BLOOD PRESSURE: 138 MMHG | HEART RATE: 77 BPM | DIASTOLIC BLOOD PRESSURE: 40 MMHG | HEIGHT: 73 IN

## 2024-11-25 DIAGNOSIS — I10 ESSENTIAL HYPERTENSION: ICD-10-CM

## 2024-11-25 DIAGNOSIS — R06.09 DOE (DYSPNEA ON EXERTION): ICD-10-CM

## 2024-11-25 DIAGNOSIS — I47.10 PAROXYSMAL SVT (SUPRAVENTRICULAR TACHYCARDIA) (HCC): Primary | ICD-10-CM

## 2024-11-25 DIAGNOSIS — E78.2 MIXED HYPERLIPIDEMIA: ICD-10-CM

## 2024-11-25 DIAGNOSIS — Z95.5 HX OF HEART ARTERY STENT: ICD-10-CM

## 2024-11-25 DIAGNOSIS — I25.10 CORONARY ARTERY DISEASE INVOLVING NATIVE CORONARY ARTERY OF NATIVE HEART WITHOUT ANGINA PECTORIS: ICD-10-CM

## 2024-11-25 PROCEDURE — 3075F SYST BP GE 130 - 139MM HG: CPT | Performed by: INTERNAL MEDICINE

## 2024-11-25 PROCEDURE — 99213 OFFICE O/P EST LOW 20 MIN: CPT | Performed by: INTERNAL MEDICINE

## 2024-11-25 PROCEDURE — 1036F TOBACCO NON-USER: CPT | Performed by: INTERNAL MEDICINE

## 2024-11-25 PROCEDURE — G8417 CALC BMI ABV UP PARAM F/U: HCPCS | Performed by: INTERNAL MEDICINE

## 2024-11-25 PROCEDURE — 1159F MED LIST DOCD IN RCRD: CPT | Performed by: INTERNAL MEDICINE

## 2024-11-25 PROCEDURE — G8484 FLU IMMUNIZE NO ADMIN: HCPCS | Performed by: INTERNAL MEDICINE

## 2024-11-25 PROCEDURE — 1123F ACP DISCUSS/DSCN MKR DOCD: CPT | Performed by: INTERNAL MEDICINE

## 2024-11-25 PROCEDURE — 3017F COLORECTAL CA SCREEN DOC REV: CPT | Performed by: INTERNAL MEDICINE

## 2024-11-25 PROCEDURE — 3078F DIAST BP <80 MM HG: CPT | Performed by: INTERNAL MEDICINE

## 2024-11-25 PROCEDURE — G8427 DOCREV CUR MEDS BY ELIG CLIN: HCPCS | Performed by: INTERNAL MEDICINE

## 2024-11-25 ASSESSMENT — ENCOUNTER SYMPTOMS
RESPIRATORY NEGATIVE: 1
GASTROINTESTINAL NEGATIVE: 1
VOMITING: 0
DIARRHEA: 0
SHORTNESS OF BREATH: 0
NAUSEA: 0
EYES NEGATIVE: 1

## 2024-11-25 NOTE — PROGRESS NOTES
on file   Social Connections: Unknown (10/11/2023)    Received from [facility]    Family and Community Support     Help with Day-to-Day Activities: Not on file     Lonely or Isolated: Not on file   Intimate Partner Violence: Unknown (10/11/2023)    [facility]    Abuse Screen     Unsafe at Home or Work/School: Not on file     Feels Threatened by Someone?: Not on file     Does Anyone Keep You from Contacting Others or Doint Things Outside the Home?: Not on file     Physical Sign of Abuse Present: Not on file   Housing Stability: Unknown (4/9/2024)    Housing Stability Vital Sign     Unable to Pay for Housing in the Last Year: Not on file     Number of Places Lived in the Last Year: Not on file     Unstable Housing in the Last Year: No       Physical Examination:  BP (!) 138/40   Pulse 77   Ht 1.854 m (6' 1\")   Wt 121.6 kg (268 lb)   BMI 35.36 kg/m²   Physical Exam  Vitals reviewed.   Constitutional:       Appearance: He is well-developed.   Neck:      Vascular: No carotid bruit or JVD.   Cardiovascular:      Rate and Rhythm: Normal rate and regular rhythm.      Heart sounds: Normal heart sounds. No murmur heard.     No friction rub. No gallop.   Pulmonary:      Effort: Pulmonary effort is normal. No respiratory distress.      Breath sounds: Normal breath sounds. No wheezing or rales.   Abdominal:      General: There is no distension.      Tenderness: There is no abdominal tenderness.   Lymphadenopathy:      Cervical: No cervical adenopathy.   Skin:     General: Skin is warm and dry.             ASSESSMENT:     Diagnosis Orders   1. Paroxysmal SVT (supraventricular tachycardia) (HCC)        2. Essential hypertension        3. Coronary artery disease involving native coronary artery of native heart without angina pectoris        4. Mixed hyperlipidemia        5. RECIO (dyspnea on exertion)        6. Hx of heart artery

## 2024-12-03 DIAGNOSIS — M54.41 CHRONIC BILATERAL LOW BACK PAIN WITH BILATERAL SCIATICA: ICD-10-CM

## 2024-12-03 DIAGNOSIS — M54.42 CHRONIC BILATERAL LOW BACK PAIN WITH BILATERAL SCIATICA: ICD-10-CM

## 2024-12-03 DIAGNOSIS — G89.29 CHRONIC BILATERAL LOW BACK PAIN WITH BILATERAL SCIATICA: ICD-10-CM

## 2024-12-03 DIAGNOSIS — M47.26 OSTEOARTHRITIS OF SPINE WITH RADICULOPATHY, LUMBAR REGION: ICD-10-CM

## 2024-12-03 RX ORDER — OXYCODONE AND ACETAMINOPHEN 10; 325 MG/1; MG/1
1 TABLET ORAL EVERY 6 HOURS PRN
Qty: 120 TABLET | Refills: 0 | Status: SHIPPED | OUTPATIENT
Start: 2024-12-03 | End: 2025-01-02

## 2024-12-03 RX ORDER — OXYCODONE HYDROCHLORIDE 20 MG/1
20 TABLET, FILM COATED, EXTENDED RELEASE ORAL EVERY 12 HOURS
Qty: 60 EACH | Refills: 0 | Status: SHIPPED | OUTPATIENT
Start: 2024-12-03 | End: 2025-01-02

## 2024-12-03 NOTE — TELEPHONE ENCOUNTER
Requested Prescriptions     Pending Prescriptions Disp Refills    oxyCODONE-acetaminophen (PERCOCET)  MG per tablet 120 tablet 0     Sig: Take 1 tablet by mouth every 6 hours as needed for Pain for up to 30 days.    OXYCONTIN 20 MG extended release tablet 60 each 0     Sig: Take 1 tablet by mouth in the morning and 1 tablet in the evening. Do all this for 30 days. Max Daily Amount: 40 mg.       Last Office Visit:  8/8/2024  Next Office Visit:  2/20/2025  Last Medication Refill:  10/31/2024 with 0 CRISTINA Hobbs up to date:  10/1/2024    *RX updated to reflect   12/3/2024  fill date*

## 2024-12-25 DIAGNOSIS — I25.10 ATHEROSCLEROTIC HEART DISEASE OF NATIVE CORONARY ARTERY WITHOUT ANGINA PECTORIS: ICD-10-CM

## 2024-12-25 DIAGNOSIS — E78.00 PURE HYPERCHOLESTEROLEMIA, UNSPECIFIED: ICD-10-CM

## 2024-12-27 RX ORDER — LOVASTATIN 40 MG/1
TABLET ORAL
Qty: 90 TABLET | Refills: 3 | Status: SHIPPED | OUTPATIENT
Start: 2024-12-27

## 2025-01-09 DIAGNOSIS — M47.26 OSTEOARTHRITIS OF SPINE WITH RADICULOPATHY, LUMBAR REGION: ICD-10-CM

## 2025-01-09 DIAGNOSIS — M54.41 CHRONIC BILATERAL LOW BACK PAIN WITH BILATERAL SCIATICA: ICD-10-CM

## 2025-01-09 DIAGNOSIS — M54.42 CHRONIC BILATERAL LOW BACK PAIN WITH BILATERAL SCIATICA: ICD-10-CM

## 2025-01-09 DIAGNOSIS — G89.29 CHRONIC BILATERAL LOW BACK PAIN WITH BILATERAL SCIATICA: ICD-10-CM

## 2025-01-09 RX ORDER — OXYCODONE HYDROCHLORIDE 20 MG/1
20 TABLET, FILM COATED, EXTENDED RELEASE ORAL EVERY 12 HOURS
Qty: 60 EACH | Refills: 0 | Status: SHIPPED | OUTPATIENT
Start: 2025-01-09 | End: 2025-02-08

## 2025-01-09 RX ORDER — OXYCODONE AND ACETAMINOPHEN 10; 325 MG/1; MG/1
1 TABLET ORAL EVERY 6 HOURS PRN
Qty: 120 TABLET | Refills: 0 | Status: SHIPPED | OUTPATIENT
Start: 2025-01-09 | End: 2025-02-08

## 2025-01-09 NOTE — TELEPHONE ENCOUNTER
Requested Prescriptions     Pending Prescriptions Disp Refills    oxyCODONE-acetaminophen (PERCOCET)  MG per tablet 120 tablet 0     Sig: Take 1 tablet by mouth every 6 hours as needed for Pain for up to 30 days.    OXYCONTIN 20 MG extended release tablet 60 each 0     Sig: Take 1 tablet by mouth in the morning and 1 tablet in the evening. Do all this for 30 days. Max Daily Amount: 40 mg.       Last Office Visit:  8/8/2024  Next Office Visit:  2/20/2025  Last Medication Refill: 12/3/2024 with 0 CRISTINA Hobbs up to date:  1/7/2025    *RX updated to reflect   1/9/2025  fill date*

## 2025-02-04 DIAGNOSIS — M54.41 CHRONIC BILATERAL LOW BACK PAIN WITH BILATERAL SCIATICA: ICD-10-CM

## 2025-02-04 DIAGNOSIS — M54.42 CHRONIC BILATERAL LOW BACK PAIN WITH BILATERAL SCIATICA: ICD-10-CM

## 2025-02-04 DIAGNOSIS — M47.26 OSTEOARTHRITIS OF SPINE WITH RADICULOPATHY, LUMBAR REGION: ICD-10-CM

## 2025-02-04 DIAGNOSIS — G89.29 CHRONIC BILATERAL LOW BACK PAIN WITH BILATERAL SCIATICA: ICD-10-CM

## 2025-02-04 DIAGNOSIS — F41.1 GENERALIZED ANXIETY DISORDER: ICD-10-CM

## 2025-02-04 RX ORDER — OXYCODONE HYDROCHLORIDE 20 MG/1
20 TABLET, FILM COATED, EXTENDED RELEASE ORAL EVERY 12 HOURS
Qty: 60 EACH | Refills: 0 | Status: SHIPPED | OUTPATIENT
Start: 2025-02-04 | End: 2025-03-06

## 2025-02-04 RX ORDER — OXYCODONE AND ACETAMINOPHEN 10; 325 MG/1; MG/1
1 TABLET ORAL EVERY 6 HOURS PRN
Qty: 120 TABLET | Refills: 0 | Status: SHIPPED | OUTPATIENT
Start: 2025-02-04 | End: 2025-03-06

## 2025-02-04 RX ORDER — ALPRAZOLAM 1 MG/1
TABLET ORAL
Qty: 135 TABLET | Refills: 5 | Status: SHIPPED | OUTPATIENT
Start: 2025-02-04 | End: 2025-08-05

## 2025-02-04 NOTE — TELEPHONE ENCOUNTER
Requested Prescriptions     Pending Prescriptions Disp Refills    ALPRAZolam (XANAX) 1 MG tablet 135 tablet 5     Sig: TAKE 1.5 TABLETS BY MOUTH 3 TIMES A DAY    oxyCODONE-acetaminophen (PERCOCET)  MG per tablet 120 tablet 0     Sig: Take 1 tablet by mouth every 6 hours as needed for Pain for up to 30 days.    OXYCONTIN 20 MG extended release tablet 60 each 0     Sig: Take 1 tablet by mouth in the morning and 1 tablet in the evening. Do all this for 30 days. Max Daily Amount: 40 mg.       Last Office Visit:  8/8/2024  Next Office Visit:  2/20/2025  Last Medication Refill:  8/8/2024 with 5 RF & 1/9/2025 with 0RF   Anjel up to date:  1/7/2025     *RX updated to reflect   2/4/2025  fill date*

## 2025-02-17 DIAGNOSIS — G89.29 CHRONIC BILATERAL LOW BACK PAIN WITH BILATERAL SCIATICA: ICD-10-CM

## 2025-02-17 DIAGNOSIS — M54.42 CHRONIC BILATERAL LOW BACK PAIN WITH BILATERAL SCIATICA: ICD-10-CM

## 2025-02-17 DIAGNOSIS — M54.41 CHRONIC BILATERAL LOW BACK PAIN WITH BILATERAL SCIATICA: ICD-10-CM

## 2025-02-17 RX ORDER — OXYCODONE HYDROCHLORIDE 20 MG/1
20 TABLET, FILM COATED, EXTENDED RELEASE ORAL EVERY 12 HOURS
Qty: 60 EACH | Refills: 0 | Status: SHIPPED | OUTPATIENT
Start: 2025-02-17 | End: 2025-03-19

## 2025-02-17 NOTE — TELEPHONE ENCOUNTER
Requested Prescriptions     Pending Prescriptions Disp Refills    OXYCONTIN 20 MG extended release tablet 60 each 0     Sig: Take 1 tablet by mouth in the morning and 1 tablet in the evening. Do all this for 30 days. Max Daily Amount: 40 mg.       Last Office Visit:  8/8/2024  Next Office Visit:  2/20/2025  Last Medication Refill: 02/04/2025  Anjel up to date:  1/7/2025    *RX updated to reflect   3/06/2025  fill date*

## 2025-02-20 ENCOUNTER — OFFICE VISIT (OUTPATIENT)
Dept: NEUROLOGY | Age: 70
End: 2025-02-20

## 2025-02-20 VITALS
DIASTOLIC BLOOD PRESSURE: 83 MMHG | WEIGHT: 272 LBS | HEART RATE: 75 BPM | RESPIRATION RATE: 16 BRPM | HEIGHT: 73 IN | SYSTOLIC BLOOD PRESSURE: 140 MMHG | BODY MASS INDEX: 36.05 KG/M2

## 2025-02-20 DIAGNOSIS — M47.26 OSTEOARTHRITIS OF SPINE WITH RADICULOPATHY, LUMBAR REGION: ICD-10-CM

## 2025-02-20 DIAGNOSIS — M05.742 RHEUMATOID ARTHRITIS INVOLVING BOTH HANDS WITH POSITIVE RHEUMATOID FACTOR (HCC): ICD-10-CM

## 2025-02-20 DIAGNOSIS — F41.1 GENERALIZED ANXIETY DISORDER: ICD-10-CM

## 2025-02-20 DIAGNOSIS — G47.33 OSA (OBSTRUCTIVE SLEEP APNEA): ICD-10-CM

## 2025-02-20 DIAGNOSIS — G89.29 CHRONIC BILATERAL LOW BACK PAIN WITH BILATERAL SCIATICA: Primary | ICD-10-CM

## 2025-02-20 DIAGNOSIS — M54.42 CHRONIC BILATERAL LOW BACK PAIN WITH BILATERAL SCIATICA: Primary | ICD-10-CM

## 2025-02-20 DIAGNOSIS — M54.41 CHRONIC BILATERAL LOW BACK PAIN WITH BILATERAL SCIATICA: Primary | ICD-10-CM

## 2025-02-20 DIAGNOSIS — M05.741 RHEUMATOID ARTHRITIS INVOLVING BOTH HANDS WITH POSITIVE RHEUMATOID FACTOR (HCC): ICD-10-CM

## 2025-02-20 RX ORDER — OXYCODONE AND ACETAMINOPHEN 10; 325 MG/1; MG/1
TABLET ORAL
COMMUNITY

## 2025-02-20 RX ORDER — OXYCODONE HCL 20 MG/1
TABLET, FILM COATED, EXTENDED RELEASE ORAL
COMMUNITY

## 2025-02-20 NOTE — PROGRESS NOTES
[facility] Neurology  [address], [address]  [place] [address]  Phone [phone]  Fax [phone]     [facility] Neurology Follow Up Encounter  25 1:46 PM CST    Information:   Patient Name: Darrian Batres  :   1955  Age:   69 y.o.  MRN:   [id number]  Account #:  [id number]  Today:  25    Provider: Hola Bosch M.D.     Chief Complaint:   Chief Complaint   Patient presents with    Follow-up     States things are going well        Subjective:   Darrian Batres is a 69 y.o. man with chronic neck pain, back pain, osteo and rheumatoid arthritis who is following up.  His pharmacy does not always have his medications when he needs it.  He has gotten out of synch such that his medications are not fillable at the same times.  This messes up his ability to go out of town.  He has chronic neck pain, back pain.  He has pain and stiffness in his hands.  He does use his CPAP during sleep.  He has had no recent vertigo.      Objective:     Past Medical History:  Past Medical History:   Diagnosis Date    Acute sinusitis     Anticoagulant long-term use     Anxiety     Chronic back pain     Chronic bilateral low back pain with bilateral sciatica     Chronic obstructive pulmonary disease, unspecified 2024    Constipation     COPD (chronic obstructive pulmonary disease) ([facility])     Coronary artery disease involving native coronary artery of native heart without angina pectoris 2019    CPAP (continuous positive airway pressure) dependence     12cm to 20cm    Depression     Hyperlipidemia     Hypertensive disorder     OA (osteoarthritis of the spine)     Obesity     SUSANA (obstructive sleep apnea)     AHI:  85.3 per HST 2013    Osteoarthritis of spine with radiculopathy, lumbar region     Otitis media     Rheumatoid arthritis (HCC)     Spinal stenosis     Type 2 diabetes mellitus ([facility]) 2016    Unspecified sleep apnea     uses bipap    Vertigo        Past Surgical History:   Procedure

## 2025-03-05 DIAGNOSIS — G89.29 CHRONIC BILATERAL LOW BACK PAIN WITH BILATERAL SCIATICA: ICD-10-CM

## 2025-03-05 DIAGNOSIS — M54.42 CHRONIC BILATERAL LOW BACK PAIN WITH BILATERAL SCIATICA: ICD-10-CM

## 2025-03-05 DIAGNOSIS — M54.41 CHRONIC BILATERAL LOW BACK PAIN WITH BILATERAL SCIATICA: ICD-10-CM

## 2025-03-05 DIAGNOSIS — M47.26 OSTEOARTHRITIS OF SPINE WITH RADICULOPATHY, LUMBAR REGION: ICD-10-CM

## 2025-03-05 NOTE — TELEPHONE ENCOUNTER
Requested Prescriptions     Pending Prescriptions Disp Refills    oxyCODONE-acetaminophen (PERCOCET)  MG per tablet 120 tablet 0     Sig: Take 1 tablet by mouth every 6 hours as needed for Pain for up to 30 days.    OXYCONTIN 20 MG extended release tablet 60 each 0     Sig: Take 1 tablet by mouth in the morning and 1 tablet in the evening. Do all this for 30 days. Max Daily Amount: 40 mg.       Last Office Visit:  2/20/2025  Next Office Visit:  8/28/2025  Last Medication Refill:  2/4/2025  Anjel up to date:  1/7/25    *RX updated to reflect   3/5/25  fill date*

## 2025-03-06 RX ORDER — OXYCODONE AND ACETAMINOPHEN 10; 325 MG/1; MG/1
1 TABLET ORAL EVERY 6 HOURS PRN
Qty: 120 TABLET | Refills: 0 | Status: SHIPPED | OUTPATIENT
Start: 2025-03-06 | End: 2025-04-05

## 2025-03-06 RX ORDER — OXYCODONE HYDROCHLORIDE 20 MG/1
20 TABLET, FILM COATED, EXTENDED RELEASE ORAL EVERY 12 HOURS
Qty: 60 EACH | Refills: 0 | Status: SHIPPED | OUTPATIENT
Start: 2025-03-06 | End: 2025-04-05

## 2025-03-10 ENCOUNTER — OFFICE VISIT (OUTPATIENT)
Dept: CARDIOLOGY CLINIC | Age: 70
End: 2025-03-10
Payer: MEDICARE

## 2025-03-10 VITALS
HEART RATE: 66 BPM | WEIGHT: 267 LBS | SYSTOLIC BLOOD PRESSURE: 140 MMHG | BODY MASS INDEX: 35.39 KG/M2 | HEIGHT: 73 IN | DIASTOLIC BLOOD PRESSURE: 88 MMHG

## 2025-03-10 DIAGNOSIS — R06.09 DOE (DYSPNEA ON EXERTION): ICD-10-CM

## 2025-03-10 DIAGNOSIS — I10 ESSENTIAL HYPERTENSION: ICD-10-CM

## 2025-03-10 DIAGNOSIS — Z95.5 HX OF HEART ARTERY STENT: ICD-10-CM

## 2025-03-10 DIAGNOSIS — I25.10 CORONARY ARTERY DISEASE INVOLVING NATIVE CORONARY ARTERY OF NATIVE HEART WITHOUT ANGINA PECTORIS: ICD-10-CM

## 2025-03-10 DIAGNOSIS — I25.10 ATHEROSCLEROSIS OF NATIVE CORONARY ARTERY OF NATIVE HEART WITHOUT ANGINA PECTORIS: Primary | ICD-10-CM

## 2025-03-10 DIAGNOSIS — E78.2 MIXED HYPERLIPIDEMIA: ICD-10-CM

## 2025-03-10 PROCEDURE — 1123F ACP DISCUSS/DSCN MKR DOCD: CPT | Performed by: INTERNAL MEDICINE

## 2025-03-10 PROCEDURE — G8417 CALC BMI ABV UP PARAM F/U: HCPCS | Performed by: INTERNAL MEDICINE

## 2025-03-10 PROCEDURE — 1159F MED LIST DOCD IN RCRD: CPT | Performed by: INTERNAL MEDICINE

## 2025-03-10 PROCEDURE — 3079F DIAST BP 80-89 MM HG: CPT | Performed by: INTERNAL MEDICINE

## 2025-03-10 PROCEDURE — 99213 OFFICE O/P EST LOW 20 MIN: CPT | Performed by: INTERNAL MEDICINE

## 2025-03-10 PROCEDURE — 1036F TOBACCO NON-USER: CPT | Performed by: INTERNAL MEDICINE

## 2025-03-10 PROCEDURE — G8427 DOCREV CUR MEDS BY ELIG CLIN: HCPCS | Performed by: INTERNAL MEDICINE

## 2025-03-10 PROCEDURE — 3077F SYST BP >= 140 MM HG: CPT | Performed by: INTERNAL MEDICINE

## 2025-03-10 PROCEDURE — 3017F COLORECTAL CA SCREEN DOC REV: CPT | Performed by: INTERNAL MEDICINE

## 2025-03-10 ASSESSMENT — ENCOUNTER SYMPTOMS
DIARRHEA: 0
GASTROINTESTINAL NEGATIVE: 1
NAUSEA: 0
SHORTNESS OF BREATH: 0
EYES NEGATIVE: 1
VOMITING: 0
RESPIRATORY NEGATIVE: 1

## 2025-03-10 NOTE — PROGRESS NOTES
[facility] Progress [facility]                            Date:  3/10/2025  Patient: Darrian Batres  Age:  69 y.o., 1955      Reason for evaluation:         SUBJECTIVE:    Returns today for follow-up assessment.  Follow-up for coronary artery disease hypertension hyperlipidemia previous stent shortness of breath overall doing well.  All conditions at goal.  Blood pressure 140/88 heart rate 66.  Denies exertional dyspnea chest pain palpitations or other complaints.    Review of Systems   Constitutional: Negative.  Negative for chills, fever and unexpected weight change.   HENT: Negative.     Eyes: Negative.    Respiratory: Negative.  Negative for shortness of breath.    Cardiovascular: Negative.  Negative for chest pain.   Gastrointestinal: Negative.  Negative for diarrhea, nausea and vomiting.   Endocrine: Negative.    Genitourinary: Negative.    Musculoskeletal: Negative.    Skin: Negative.    Neurological: Negative.    All other systems reviewed and are negative.        OBJECTIVE:    BP (!) 140/88   Pulse 66   Ht 1.854 m (6' 1\")   Wt 121.1 kg (267 lb)   BMI 35.23 kg/m²     Labs:   CBC: No results for input(s): \"WBC\", \"HGB\", \"HCT\", \"PLT\" in the last 72 hours.  BMP:No results for input(s): \"NA\", \"K\", \"CO2\", \"BUN\", \"CREATININE\", \"LABGLOM\", \"GLUCOSE\" in the last 72 hours.  BNP: No results for input(s): \"BNP\" in the last 72 hours.  PT/INR: No results for input(s): \"PROTIME\", \"INR\" in the last 72 hours.  APTT:No results for input(s): \"APTT\" in the last 72 hours.  CARDIAC ENZYMES:No results for input(s): \"CKTOTAL\", \"CKMB\", \"CKMBINDEX\", \"TROPONINI\" in the last 72 hours.  FASTING LIPID PANEL:  Lab Results   Component Value Date/Time    HDL 55 07/31/2024 07:56 AM    TRIG 100 07/31/2024 07:56 AM     LIVER PROFILE:No results for input(s): \"AST\", \"ALT\", \"LABALBU\" in the last 72 hours.        Past Medical History:   Diagnosis Date    Acute sinusitis     Anticoagulant long-term use     Anxiety     Chronic

## 2025-03-11 DIAGNOSIS — G89.29 CHRONIC BILATERAL LOW BACK PAIN WITH BILATERAL SCIATICA: ICD-10-CM

## 2025-03-11 DIAGNOSIS — G89.29 CHRONIC BILATERAL LOW BACK PAIN WITH BILATERAL SCIATICA: Primary | ICD-10-CM

## 2025-03-11 DIAGNOSIS — M54.42 CHRONIC BILATERAL LOW BACK PAIN WITH BILATERAL SCIATICA: Primary | ICD-10-CM

## 2025-03-11 DIAGNOSIS — M54.42 CHRONIC BILATERAL LOW BACK PAIN WITH BILATERAL SCIATICA: ICD-10-CM

## 2025-03-11 DIAGNOSIS — M54.41 CHRONIC BILATERAL LOW BACK PAIN WITH BILATERAL SCIATICA: ICD-10-CM

## 2025-03-11 DIAGNOSIS — M54.41 CHRONIC BILATERAL LOW BACK PAIN WITH BILATERAL SCIATICA: Primary | ICD-10-CM

## 2025-03-11 RX ORDER — OXYCODONE HCL 20 MG/1
20 TABLET, FILM COATED, EXTENDED RELEASE ORAL 2 TIMES DAILY
Qty: 60 EACH | Refills: 0 | Status: SHIPPED | OUTPATIENT
Start: 2025-03-11 | End: 2025-04-10

## 2025-03-11 NOTE — TELEPHONE ENCOUNTER
Per your last office visit pt could fill prescription on or before 3/9. Pharmacy stated there was no note to fill early and they are needing new script or it will be filled on 3/ 15. I discontinued last script and put a note on new one that it was okay to fill on 3/11/25.

## 2025-03-27 DIAGNOSIS — I25.10 CORONARY ARTERY DISEASE INVOLVING NATIVE CORONARY ARTERY OF NATIVE HEART WITHOUT ANGINA PECTORIS: ICD-10-CM

## 2025-03-27 DIAGNOSIS — I10 ESSENTIAL HYPERTENSION: ICD-10-CM

## 2025-03-28 RX ORDER — VERAPAMIL HYDROCHLORIDE 200 MG/1
CAPSULE, EXTENDED RELEASE ORAL DAILY
Qty: 90 CAPSULE | Refills: 3 | Status: SHIPPED | OUTPATIENT
Start: 2025-03-28

## 2025-04-10 DIAGNOSIS — M54.41 CHRONIC BILATERAL LOW BACK PAIN WITH BILATERAL SCIATICA: ICD-10-CM

## 2025-04-10 DIAGNOSIS — M54.42 CHRONIC BILATERAL LOW BACK PAIN WITH BILATERAL SCIATICA: ICD-10-CM

## 2025-04-10 DIAGNOSIS — M47.26 OSTEOARTHRITIS OF SPINE WITH RADICULOPATHY, LUMBAR REGION: ICD-10-CM

## 2025-04-10 DIAGNOSIS — G89.29 CHRONIC BILATERAL LOW BACK PAIN WITH BILATERAL SCIATICA: ICD-10-CM

## 2025-04-10 RX ORDER — OXYCODONE HCL 20 MG/1
20 TABLET, FILM COATED, EXTENDED RELEASE ORAL 2 TIMES DAILY
Qty: 60 EACH | Refills: 0 | Status: SHIPPED | OUTPATIENT
Start: 2025-04-10 | End: 2025-05-10

## 2025-04-10 RX ORDER — OXYCODONE AND ACETAMINOPHEN 10; 325 MG/1; MG/1
1 TABLET ORAL EVERY 6 HOURS PRN
Qty: 120 TABLET | Refills: 0 | Status: SHIPPED | OUTPATIENT
Start: 2025-04-10 | End: 2025-05-10

## 2025-04-10 NOTE — TELEPHONE ENCOUNTER
Requested Prescriptions     Pending Prescriptions Disp Refills    oxyCODONE (OXYCONTIN) 20 MG extended release tablet 60 each 0     Sig: Take 1 tablet by mouth 2 times daily for 30 days. Max Daily Amount: 40 mg    oxyCODONE-acetaminophen (PERCOCET)  MG per tablet 120 tablet 0     Sig: Take 1 tablet by mouth every 6 hours as needed for Pain for up to 30 days.       Last Office Visit:  Visit date not found  Next Office Visit:  Visit date not found  Last Medication Refill:  3/11/25 and 3/6/2025  Anjel up to date:  4/10/25    *RX updated to reflect   4/10/25  fill date*

## 2025-04-22 ENCOUNTER — PATIENT MESSAGE (OUTPATIENT)
Age: 70
End: 2025-04-22

## 2025-04-22 DIAGNOSIS — F51.01 PRIMARY INSOMNIA: ICD-10-CM

## 2025-04-24 RX ORDER — ZOLPIDEM TARTRATE 10 MG/1
10 TABLET ORAL NIGHTLY PRN
Qty: 30 TABLET | Refills: 1 | Status: SHIPPED | OUTPATIENT
Start: 2025-04-24 | End: 2025-06-23

## 2025-05-07 DIAGNOSIS — M54.42 CHRONIC BILATERAL LOW BACK PAIN WITH BILATERAL SCIATICA: ICD-10-CM

## 2025-05-07 DIAGNOSIS — G89.29 CHRONIC BILATERAL LOW BACK PAIN WITH BILATERAL SCIATICA: ICD-10-CM

## 2025-05-07 DIAGNOSIS — M54.41 CHRONIC BILATERAL LOW BACK PAIN WITH BILATERAL SCIATICA: ICD-10-CM

## 2025-05-07 DIAGNOSIS — M47.26 OSTEOARTHRITIS OF SPINE WITH RADICULOPATHY, LUMBAR REGION: ICD-10-CM

## 2025-05-07 RX ORDER — OXYCODONE HCL 20 MG/1
20 TABLET, FILM COATED, EXTENDED RELEASE ORAL 2 TIMES DAILY
Qty: 60 EACH | Refills: 0 | Status: SHIPPED | OUTPATIENT
Start: 2025-05-09 | End: 2025-06-08

## 2025-05-07 RX ORDER — OXYCODONE AND ACETAMINOPHEN 10; 325 MG/1; MG/1
1 TABLET ORAL EVERY 6 HOURS PRN
Qty: 120 TABLET | Refills: 0 | Status: SHIPPED | OUTPATIENT
Start: 2025-05-09 | End: 2025-06-08

## 2025-05-07 NOTE — TELEPHONE ENCOUNTER
Darrian Batres has requested a refill on his medication.      Last office visit : 02/20/2025  Next office visit : 08/28/2025  Last medication refill : 04/10/2025  Anjel : 04/10/2025      Requested Prescriptions     Pending Prescriptions Disp Refills    oxyCODONE (OXYCONTIN) 20 MG extended release tablet 60 each 0     Sig: Take 1 tablet by mouth 2 times daily for 30 days. Max Daily Amount: 40 mg    oxyCODONE-acetaminophen (PERCOCET)  MG per tablet 120 tablet 0     Sig: Take 1 tablet by mouth every 6 hours as needed for Pain for up to 30 days.

## 2025-05-20 ENCOUNTER — TELEPHONE (OUTPATIENT)
Dept: NEUROLOGY | Age: 70
End: 2025-05-20

## 2025-05-20 NOTE — TELEPHONE ENCOUNTER
Patient wife called and stated the pharmacy was needing to speak with our staff due to the large doses of opioids the patient is taking. I called [facility] and spoke to Dre. I told him based on records he has been on all the same medications since 2023. He is concerned the dosage is too high and would like to know if Dr. Bosch will consider lowering patient dosages due to respiratory depression risk.

## 2025-05-20 NOTE — TELEPHONE ENCOUNTER
Darrian wife  called stating that their oxycontin medication  is at pharmacy, but pharmacy is needing approval from provider.  Per wife the pharmacy wants to know that pt is ok to continue with these medications.   Please call pharmacy, wife is there now. Please call Felicita to advise. [phone]    Last Appt:  2/20/2025  Next Appt:   8/28/2025  [facility]   [address]

## 2025-06-10 DIAGNOSIS — G89.29 CHRONIC BILATERAL LOW BACK PAIN WITH BILATERAL SCIATICA: ICD-10-CM

## 2025-06-10 DIAGNOSIS — M54.41 CHRONIC BILATERAL LOW BACK PAIN WITH BILATERAL SCIATICA: ICD-10-CM

## 2025-06-10 DIAGNOSIS — I10 ESSENTIAL HYPERTENSION: ICD-10-CM

## 2025-06-10 DIAGNOSIS — E78.00 HYPERCHOLESTEREMIA: ICD-10-CM

## 2025-06-10 DIAGNOSIS — M54.42 CHRONIC BILATERAL LOW BACK PAIN WITH BILATERAL SCIATICA: ICD-10-CM

## 2025-06-10 DIAGNOSIS — Z12.5 SCREENING PSA (PROSTATE SPECIFIC ANTIGEN): ICD-10-CM

## 2025-06-10 DIAGNOSIS — E11.9 TYPE 2 DIABETES MELLITUS WITHOUT COMPLICATION, WITHOUT LONG-TERM CURRENT USE OF INSULIN (HCC): Primary | ICD-10-CM

## 2025-06-10 RX ORDER — OXYCODONE HCL 20 MG/1
20 TABLET, FILM COATED, EXTENDED RELEASE ORAL 2 TIMES DAILY
Qty: 60 EACH | Refills: 0 | Status: SHIPPED | OUTPATIENT
Start: 2025-06-10 | End: 2025-07-10

## 2025-06-10 NOTE — TELEPHONE ENCOUNTER
Requested Prescriptions     Pending Prescriptions Disp Refills    oxyCODONE (OXYCONTIN) 20 MG extended release tablet 60 each 0     Sig: Take 1 tablet by mouth 2 times daily for 30 days. Max Daily Amount: 40 mg       Last Office Visit:  2/20/2025  Next Office Visit:  8/28/2025  Last Medication Refill:  5/7/2025  Anjel up to date:  UP TO DATE    *RX updated to reflect   6/10/2025  fill date*

## 2025-06-12 DIAGNOSIS — M54.41 CHRONIC BILATERAL LOW BACK PAIN WITH BILATERAL SCIATICA: ICD-10-CM

## 2025-06-12 DIAGNOSIS — M54.42 CHRONIC BILATERAL LOW BACK PAIN WITH BILATERAL SCIATICA: ICD-10-CM

## 2025-06-12 DIAGNOSIS — G89.29 CHRONIC BILATERAL LOW BACK PAIN WITH BILATERAL SCIATICA: ICD-10-CM

## 2025-06-12 DIAGNOSIS — M47.26 OSTEOARTHRITIS OF SPINE WITH RADICULOPATHY, LUMBAR REGION: ICD-10-CM

## 2025-06-12 NOTE — TELEPHONE ENCOUNTER
Requested Prescriptions     Pending Prescriptions Disp Refills    oxyCODONE-acetaminophen (PERCOCET)  MG per tablet 120 tablet 0     Sig: Take 1 tablet by mouth every 6 hours as needed for Pain for up to 30 days.       Last Office Visit:  2/20/2025  Next Office Visit:  8/28/2025  Last Medication Refill:  5/9/2025  Anjel up to date:  up to date    *RX updated to reflect   6/12/2025  fill date*

## 2025-06-13 RX ORDER — OXYCODONE AND ACETAMINOPHEN 10; 325 MG/1; MG/1
1 TABLET ORAL EVERY 6 HOURS PRN
Qty: 120 TABLET | Refills: 0 | Status: SHIPPED | OUTPATIENT
Start: 2025-06-13 | End: 2025-07-13

## 2025-06-23 DIAGNOSIS — I10 ESSENTIAL HYPERTENSION: ICD-10-CM

## 2025-06-23 DIAGNOSIS — Z12.5 SCREENING PSA (PROSTATE SPECIFIC ANTIGEN): ICD-10-CM

## 2025-06-23 DIAGNOSIS — E11.9 TYPE 2 DIABETES MELLITUS WITHOUT COMPLICATION, WITHOUT LONG-TERM CURRENT USE OF INSULIN (HCC): ICD-10-CM

## 2025-06-23 DIAGNOSIS — E78.00 HYPERCHOLESTEREMIA: ICD-10-CM

## 2025-06-23 LAB
ALBUMIN SERPL-MCNC: 4.5 G/DL (ref 3.5–5.2)
ALP SERPL-CCNC: 75 U/L (ref 40–129)
ALT SERPL-CCNC: 18 U/L (ref 10–50)
ANION GAP SERPL CALCULATED.3IONS-SCNC: 12 MMOL/L (ref 8–16)
AST SERPL-CCNC: 17 U/L (ref 10–50)
BASOPHILS # BLD: 0 K/UL (ref 0–0.2)
BASOPHILS NFR BLD: 0.7 % (ref 0–1)
BILIRUB SERPL-MCNC: 0.4 MG/DL (ref 0.2–1.2)
BILIRUB UR QL STRIP: NEGATIVE
BUN SERPL-MCNC: 12 MG/DL (ref 8–23)
CALCIUM SERPL-MCNC: 10.2 MG/DL (ref 8.8–10.2)
CHLORIDE SERPL-SCNC: 102 MMOL/L (ref 98–107)
CHOLEST SERPL-MCNC: 152 MG/DL (ref 0–199)
CLARITY UR: CLEAR
CO2 SERPL-SCNC: 29 MMOL/L (ref 22–29)
COLOR UR: YELLOW
CREAT SERPL-MCNC: 0.9 MG/DL (ref 0.7–1.2)
CREAT UR-MCNC: 60.8 MG/DL (ref 39–259)
EOSINOPHIL # BLD: 0.2 K/UL (ref 0–0.6)
EOSINOPHIL NFR BLD: 3.4 % (ref 0–5)
ERYTHROCYTE [DISTWIDTH] IN BLOOD BY AUTOMATED COUNT: 14.1 % (ref 11.5–14.5)
GLUCOSE SERPL-MCNC: 138 MG/DL (ref 70–99)
GLUCOSE UR STRIP.AUTO-MCNC: NEGATIVE MG/DL
HBA1C MFR BLD: 6.7 % (ref 4–5.6)
HCT VFR BLD AUTO: 45.1 % (ref 42–52)
HDLC SERPL-MCNC: 54 MG/DL (ref 40–60)
HGB BLD-MCNC: 14.6 G/DL (ref 14–18)
HGB UR STRIP.AUTO-MCNC: NEGATIVE MG/L
IMM GRANULOCYTES # BLD: 0 K/UL
KETONES UR STRIP.AUTO-MCNC: NEGATIVE MG/DL
LDLC SERPL CALC-MCNC: 79 MG/DL
LEUKOCYTE ESTERASE UR QL STRIP.AUTO: NEGATIVE
LYMPHOCYTES # BLD: 1.2 K/UL (ref 1.1–4.5)
LYMPHOCYTES NFR BLD: 26.7 % (ref 20–40)
MCH RBC QN AUTO: 29.4 PG (ref 27–31)
MCHC RBC AUTO-ENTMCNC: 32.4 G/DL (ref 33–37)
MCV RBC AUTO: 90.7 FL (ref 80–94)
MICROALBUMIN UR-MCNC: <1.2 MG/DL (ref 0–1.99)
MICROALBUMIN/CREAT UR-RTO: NORMAL MG/G (ref 0–29)
MONOCYTES # BLD: 0.6 K/UL (ref 0–0.9)
MONOCYTES NFR BLD: 12.4 % (ref 0–10)
NEUTROPHILS # BLD: 2.5 K/UL (ref 1.5–7.5)
NEUTS SEG NFR BLD: 56.6 % (ref 50–65)
NITRITE UR QL STRIP.AUTO: NEGATIVE
PH UR STRIP.AUTO: 6 [PH] (ref 5–8)
PLATELET # BLD AUTO: 164 K/UL (ref 130–400)
PMV BLD AUTO: 12.3 FL (ref 9.4–12.4)
POTASSIUM SERPL-SCNC: 4.6 MMOL/L (ref 3.5–5.1)
PROT SERPL-MCNC: 7.1 G/DL (ref 6.4–8.3)
PROT UR STRIP.AUTO-MCNC: NEGATIVE MG/DL
PSA SERPL-MCNC: 5.4 NG/ML (ref 0–4)
RBC # BLD AUTO: 4.97 M/UL (ref 4.7–6.1)
SODIUM SERPL-SCNC: 143 MMOL/L (ref 136–145)
SP GR UR STRIP.AUTO: 1.01 (ref 1–1.03)
T4 FREE SERPL-MCNC: 1.1 NG/DL (ref 0.93–1.7)
TRIGL SERPL-MCNC: 94 MG/DL (ref 0–149)
TSH SERPL DL<=0.005 MIU/L-ACNC: 1.68 UIU/ML (ref 0.27–4.2)
UROBILINOGEN UR STRIP.AUTO-MCNC: 0.2 E.U./DL
WBC # BLD AUTO: 4.4 K/UL (ref 4.8–10.8)

## 2025-06-24 ENCOUNTER — RESULTS FOLLOW-UP (OUTPATIENT)
Age: 70
End: 2025-06-24

## 2025-06-27 ENCOUNTER — HOSPITAL ENCOUNTER (OUTPATIENT)
Dept: GENERAL RADIOLOGY | Age: 70
Discharge: HOME | End: 2025-06-27
Payer: MEDICARE

## 2025-06-27 ENCOUNTER — OFFICE VISIT (OUTPATIENT)
Age: 70
End: 2025-06-27
Payer: MEDICARE

## 2025-06-27 VITALS
WEIGHT: 274.5 LBS | DIASTOLIC BLOOD PRESSURE: 80 MMHG | HEIGHT: 73 IN | BODY MASS INDEX: 36.38 KG/M2 | OXYGEN SATURATION: 98 % | HEART RATE: 72 BPM | TEMPERATURE: 99.4 F | SYSTOLIC BLOOD PRESSURE: 132 MMHG

## 2025-06-27 DIAGNOSIS — Z87.891 PERSONAL HISTORY OF TOBACCO USE: ICD-10-CM

## 2025-06-27 DIAGNOSIS — K59.03 OPIOID-INDUCED CONSTIPATION: ICD-10-CM

## 2025-06-27 DIAGNOSIS — Z00.00 MEDICARE ANNUAL WELLNESS VISIT, SUBSEQUENT: Primary | ICD-10-CM

## 2025-06-27 DIAGNOSIS — F11.20 OPIOID DEPENDENCE WITH CURRENT USE (HCC): ICD-10-CM

## 2025-06-27 DIAGNOSIS — T40.2X5A OPIOID-INDUCED CONSTIPATION: ICD-10-CM

## 2025-06-27 DIAGNOSIS — E11.628 TYPE 2 DIABETES MELLITUS WITH OTHER SKIN COMPLICATION, WITHOUT LONG-TERM CURRENT USE OF INSULIN (HCC): ICD-10-CM

## 2025-06-27 DIAGNOSIS — J42 CHRONIC BRONCHITIS, UNSPECIFIED CHRONIC BRONCHITIS TYPE (HCC): ICD-10-CM

## 2025-06-27 PROCEDURE — 71271 CT THORAX LUNG CANCER SCR C-: CPT

## 2025-06-27 SDOH — ECONOMIC STABILITY: FOOD INSECURITY: WITHIN THE PAST 12 MONTHS, YOU WORRIED THAT YOUR FOOD WOULD RUN OUT BEFORE YOU GOT MONEY TO BUY MORE.: NEVER TRUE

## 2025-06-27 SDOH — ECONOMIC STABILITY: FOOD INSECURITY: WITHIN THE PAST 12 MONTHS, THE FOOD YOU BOUGHT JUST DIDN'T LAST AND YOU DIDN'T HAVE MONEY TO GET MORE.: NEVER TRUE

## 2025-06-27 ASSESSMENT — PATIENT HEALTH QUESTIONNAIRE - PHQ9
4. FEELING TIRED OR HAVING LITTLE ENERGY: NOT AT ALL
10. IF YOU CHECKED OFF ANY PROBLEMS, HOW DIFFICULT HAVE THESE PROBLEMS MADE IT FOR YOU TO DO YOUR WORK, TAKE CARE OF THINGS AT HOME, OR GET ALONG WITH OTHER PEOPLE: NOT DIFFICULT AT ALL
9. THOUGHTS THAT YOU WOULD BE BETTER OFF DEAD, OR OF HURTING YOURSELF: NOT AT ALL
7. TROUBLE CONCENTRATING ON THINGS, SUCH AS READING THE NEWSPAPER OR WATCHING TELEVISION: NOT AT ALL
SUM OF ALL RESPONSES TO PHQ QUESTIONS 1-9: 0
1. LITTLE INTEREST OR PLEASURE IN DOING THINGS: NOT AT ALL
6. FEELING BAD ABOUT YOURSELF - OR THAT YOU ARE A FAILURE OR HAVE LET YOURSELF OR YOUR FAMILY DOWN: NOT AT ALL
SUM OF ALL RESPONSES TO PHQ QUESTIONS 1-9: 0
8. MOVING OR SPEAKING SO SLOWLY THAT OTHER PEOPLE COULD HAVE NOTICED. OR THE OPPOSITE, BEING SO FIGETY OR RESTLESS THAT YOU HAVE BEEN MOVING AROUND A LOT MORE THAN USUAL: NOT AT ALL
5. POOR APPETITE OR OVEREATING: NOT AT ALL
SUM OF ALL RESPONSES TO PHQ QUESTIONS 1-9: 0
2. FEELING DOWN, DEPRESSED OR HOPELESS: NOT AT ALL
SUM OF ALL RESPONSES TO PHQ QUESTIONS 1-9: 0
3. TROUBLE FALLING OR STAYING ASLEEP: NOT AT ALL

## 2025-06-27 ASSESSMENT — LIFESTYLE VARIABLES
HOW MANY STANDARD DRINKS CONTAINING ALCOHOL DO YOU HAVE ON A TYPICAL DAY: PATIENT DOES NOT DRINK
HOW OFTEN DO YOU HAVE A DRINK CONTAINING ALCOHOL: NEVER

## 2025-06-27 NOTE — PROGRESS NOTES
Take 1 tablet by mouth every morning (before breakfast) 1 hour before eating Yes Ainsley Anna APRN   oxyCODONE-acetaminophen (PERCOCET)  MG per tablet Take 1 tablet by mouth every 6 hours as needed for Pain for up to 30 days. Yes Hola Bosch MD   oxyCODONE (OXYCONTIN) 20 MG extended release tablet Take 1 tablet by mouth 2 times daily for 30 days. Max Daily Amount: 40 mg Yes Hola Bosch MD   verapamil (VERELAN) 200 MG extended release capsule TAKE 1 CAPSULE BY MOUTH EVERY DAY Yes Hannah Manjarrez APRN - NP   ALPRAZolam (XANAX) 1 MG tablet TAKE 1.5 TABLETS BY MOUTH 3 TIMES A DAY Yes Hola Bosch MD   metFORMIN (GLUCOPHAGE) 1000 MG tablet TAKE 1 TABLET BY MOUTH TWICE A DAY WITH FOOD Yes Malathi Lerma APRN   hydroCHLOROthiazide (HYDRODIURIL) 25 MG tablet TAKE 1 TABLET BY MOUTH EVERY DAY Yes Hannah Manjarrez APRN - NP   blood glucose test strips (CONTOUR NEXT TEST) strip 1 each by In Vitro route daily As needed. Yes Ainsley Anna APRN   triamcinolone (ARISTOCORT) 0.5 % cream APPLY TO AFFECTED AREA 3 TIMES A DAY Yes Provider, MD Jeny   Respiratory Therapy Supplies (NEBULIZER/TUBING/MOUTHPIECE) KIT 1 kit by Does not apply route 4 times daily as needed (wheezing sob) Yes Ainsley Anna APRN   umeclidinium bromide (INCRUSE ELLIPTA) 62.5 MCG/ACT inhaler Inhale 1 puff into the lungs daily Yes Ainsley Anna APRN   fluticasone (FLONASE) 50 MCG/ACT nasal spray 2 sprays by Nasal route daily Yes Ainsley Anna APRN   albuterol sulfate HFA (PROVENTIL;VENTOLIN;PROAIR) 108 (90 Base) MCG/ACT inhaler TAKE 2 PUFFS BY MOUTH EVERY 6 HOURS AS NEEDED FOR WHEEZE Yes Ainsley Anna APRN   ketoconazole (NIZORAL) 2 % cream Apply topically daily. Yes Ainsley Anna APRN   cyclobenzaprine (FLEXERIL) 10 MG tablet Take 1 tablet by mouth 3 times daily as needed for Muscle spasms Yes Hola Bosch MD   [facility] ASPIRIN ADULT LOW DOSE 81 MG chewable tablet TAKE 1 TABLET BY MOUTH

## 2025-06-27 NOTE — PATIENT INSTRUCTIONS

## 2025-07-01 ENCOUNTER — RESULTS FOLLOW-UP (OUTPATIENT)
Age: 70
End: 2025-07-01

## 2025-07-01 DIAGNOSIS — R31.9 HEMATURIA SYNDROME: ICD-10-CM

## 2025-07-01 DIAGNOSIS — R31.0 GROSS HEMATURIA: Primary | ICD-10-CM

## 2025-07-02 ENCOUNTER — HOSPITAL ENCOUNTER (OUTPATIENT)
Dept: GENERAL RADIOLOGY | Age: 70
Discharge: HOME | End: 2025-07-02
Payer: MEDICARE

## 2025-07-02 ENCOUNTER — RESULTS FOLLOW-UP (OUTPATIENT)
Dept: UROLOGY | Age: 70
End: 2025-07-02

## 2025-07-02 DIAGNOSIS — R31.9 HEMATURIA SYNDROME: ICD-10-CM

## 2025-07-02 DIAGNOSIS — R31.0 GROSS HEMATURIA: ICD-10-CM

## 2025-07-02 PROCEDURE — 74178 CT ABD&PLV WO CNTR FLWD CNTR: CPT

## 2025-07-02 PROCEDURE — 6360000004 HC RX CONTRAST MEDICATION: Performed by: NURSE PRACTITIONER

## 2025-07-02 RX ORDER — IOPAMIDOL 755 MG/ML
75 INJECTION, SOLUTION INTRAVASCULAR
Status: COMPLETED | OUTPATIENT
Start: 2025-07-02 | End: 2025-07-02

## 2025-07-02 RX ADMIN — IOPAMIDOL 75 ML: 755 INJECTION, SOLUTION INTRAVENOUS at 09:09

## 2025-07-03 ENCOUNTER — OFFICE VISIT (OUTPATIENT)
Dept: UROLOGY | Age: 70
End: 2025-07-03
Payer: MEDICARE

## 2025-07-03 VITALS — BODY MASS INDEX: 36.5 KG/M2 | TEMPERATURE: 97.7 F | HEIGHT: 73 IN | WEIGHT: 275.4 LBS

## 2025-07-03 DIAGNOSIS — R31.0 GROSS HEMATURIA: ICD-10-CM

## 2025-07-03 DIAGNOSIS — R39.12 BENIGN PROSTATIC HYPERPLASIA WITH WEAK URINARY STREAM: ICD-10-CM

## 2025-07-03 DIAGNOSIS — R97.20 ELEVATED PSA: Primary | ICD-10-CM

## 2025-07-03 DIAGNOSIS — N40.1 BENIGN PROSTATIC HYPERPLASIA WITH WEAK URINARY STREAM: ICD-10-CM

## 2025-07-03 LAB
APPEARANCE FLUID: CLEAR
BILIRUBIN, POC: NORMAL
BLOOD URINE, POC: NORMAL
CLARITY, POC: CLEAR
COLOR, POC: YELLOW
GLUCOSE URINE, POC: NORMAL MG/DL
KETONES, POC: NORMAL MG/DL
LEUKOCYTE EST, POC: NORMAL
NITRITE, POC: NORMAL
PH, POC: 7
PROTEIN, POC: NORMAL MG/DL
SPECIFIC GRAVITY, POC: 1.02
UROBILINOGEN, POC: 0.2 MG/DL

## 2025-07-03 PROCEDURE — 51798 US URINE CAPACITY MEASURE: CPT | Performed by: NURSE PRACTITIONER

## 2025-07-03 PROCEDURE — 1160F RVW MEDS BY RX/DR IN RCRD: CPT | Performed by: NURSE PRACTITIONER

## 2025-07-03 PROCEDURE — 1123F ACP DISCUSS/DSCN MKR DOCD: CPT | Performed by: NURSE PRACTITIONER

## 2025-07-03 PROCEDURE — 99214 OFFICE O/P EST MOD 30 MIN: CPT | Performed by: NURSE PRACTITIONER

## 2025-07-03 PROCEDURE — 1159F MED LIST DOCD IN RCRD: CPT | Performed by: NURSE PRACTITIONER

## 2025-07-03 PROCEDURE — G8427 DOCREV CUR MEDS BY ELIG CLIN: HCPCS | Performed by: NURSE PRACTITIONER

## 2025-07-03 PROCEDURE — 81002 URINALYSIS NONAUTO W/O SCOPE: CPT | Performed by: NURSE PRACTITIONER

## 2025-07-03 PROCEDURE — G8417 CALC BMI ABV UP PARAM F/U: HCPCS | Performed by: NURSE PRACTITIONER

## 2025-07-03 PROCEDURE — 1036F TOBACCO NON-USER: CPT | Performed by: NURSE PRACTITIONER

## 2025-07-03 PROCEDURE — 3017F COLORECTAL CA SCREEN DOC REV: CPT | Performed by: NURSE PRACTITIONER

## 2025-07-03 RX ORDER — TAMSULOSIN HYDROCHLORIDE 0.4 MG/1
0.4 CAPSULE ORAL DAILY
Qty: 90 CAPSULE | Refills: 3 | Status: SHIPPED | OUTPATIENT
Start: 2025-07-03

## 2025-07-03 NOTE — PROGRESS NOTES
large filling defects within the opacified portions of the urinary collecting systems or urinary bladder. No intraperitoneal fluid or pneumoperitoneum.  Evaluation of the gastrointestinal tract is limited without intraluminal contrast. No   small bowel obstruction. The appendix Is believed to be seen and appears unremarkable. Sigmoid colonic diverticulosis without diverticulitis. The portal vein is patent. Celiac trunk superior mesenteric artery main renal arteries inferior mesenteric   artery opacify with contrast. Atheromatous calcifications. No lymphadenopathy. Small fat-containing left inguinal hernia. Osseous degenerative changes.        IMPRESSION:     No etiology for the patient's hematuria identified on this exam.     Sigmoid colonic diverticulosis.      All CT scans are performed using dose optimization techniques as appropriate to the performed exam and include   at least one of the following: Automated exposure control, adjustment of the mA and/or kV according to size, and the use of iterative reconstruction technique.      ______________________________________   Electronically signed by: AVELINO BHAGAT M.D.  Date:     07/02/2025  Time:    09:19             1. Elevated PSA  PSA has increased at 5.4 however this was obtained likely during episode of kidney stone versus hematuria.  It has fluctuated in the past and I would like to recheck this.  RUTH deferred due to anal narrowing scarring.  Follow-up in 2 months with repeat PSA.  We did discuss this was still elevated he will need an MRI versus biopsy.  He will need to be placed under anesthesia for biopsy per patient request.    - PSA, Diagnostic; Future  - POCT Urinalysis no Micro    2. Gross hematuria  Patient with 2 episodes of hematuria this year.  He does have risk factors including occupational and smoking history.  He also had associated symptoms including flank pain and dysuria.  Has never had stones however did pass some sand substance.  I did obtain

## 2025-07-08 ASSESSMENT — ENCOUNTER SYMPTOMS
ABDOMINAL PAIN: 0
NAUSEA: 0
VOMITING: 0
BACK PAIN: 0
ABDOMINAL DISTENTION: 0

## 2025-07-11 DIAGNOSIS — M47.26 OSTEOARTHRITIS OF SPINE WITH RADICULOPATHY, LUMBAR REGION: ICD-10-CM

## 2025-07-11 DIAGNOSIS — M54.41 CHRONIC BILATERAL LOW BACK PAIN WITH BILATERAL SCIATICA: ICD-10-CM

## 2025-07-11 DIAGNOSIS — M54.42 CHRONIC BILATERAL LOW BACK PAIN WITH BILATERAL SCIATICA: ICD-10-CM

## 2025-07-11 DIAGNOSIS — G89.29 CHRONIC BILATERAL LOW BACK PAIN WITH BILATERAL SCIATICA: ICD-10-CM

## 2025-07-14 RX ORDER — OXYCODONE AND ACETAMINOPHEN 10; 325 MG/1; MG/1
1 TABLET ORAL EVERY 6 HOURS PRN
Qty: 120 TABLET | Refills: 0 | Status: SHIPPED | OUTPATIENT
Start: 2025-07-14 | End: 2025-08-13

## 2025-07-14 NOTE — TELEPHONE ENCOUNTER
Darrian Batres has requested a refill on his medication.      Last office visit : 2/20/2025   Next office visit : 8/28/2025   Last medication refill : 6/13/25  Anjel : up to date       Requested Prescriptions     Pending Prescriptions Disp Refills    oxyCODONE-acetaminophen (PERCOCET)  MG per tablet 120 tablet 0     Sig: Take 1 tablet by mouth every 6 hours as needed for Pain for up to 30 days.     Dr. ERROL louie

## 2025-08-10 DIAGNOSIS — F41.1 GENERALIZED ANXIETY DISORDER: ICD-10-CM

## 2025-08-11 DIAGNOSIS — M54.41 CHRONIC BILATERAL LOW BACK PAIN WITH BILATERAL SCIATICA: ICD-10-CM

## 2025-08-11 DIAGNOSIS — G89.29 CHRONIC BILATERAL LOW BACK PAIN WITH BILATERAL SCIATICA: ICD-10-CM

## 2025-08-11 DIAGNOSIS — M47.26 OSTEOARTHRITIS OF SPINE WITH RADICULOPATHY, LUMBAR REGION: ICD-10-CM

## 2025-08-11 DIAGNOSIS — M54.42 CHRONIC BILATERAL LOW BACK PAIN WITH BILATERAL SCIATICA: ICD-10-CM

## 2025-08-11 RX ORDER — ALPRAZOLAM 1 MG/1
TABLET ORAL
Qty: 135 TABLET | Refills: 3 | Status: SHIPPED | OUTPATIENT
Start: 2025-08-11 | End: 2026-08-11

## 2025-08-12 RX ORDER — OXYCODONE HCL 20 MG/1
20 TABLET, FILM COATED, EXTENDED RELEASE ORAL 2 TIMES DAILY
Qty: 60 EACH | Refills: 0 | Status: SHIPPED | OUTPATIENT
Start: 2025-08-12 | End: 2025-09-11

## 2025-08-12 RX ORDER — OXYCODONE AND ACETAMINOPHEN 10; 325 MG/1; MG/1
1 TABLET ORAL EVERY 6 HOURS PRN
Qty: 120 TABLET | Refills: 0 | Status: SHIPPED | OUTPATIENT
Start: 2025-08-13 | End: 2025-09-12

## 2025-08-23 DIAGNOSIS — E11.628 TYPE 2 DIABETES MELLITUS WITH OTHER SKIN COMPLICATION, WITHOUT LONG-TERM CURRENT USE OF INSULIN (HCC): ICD-10-CM

## 2025-08-28 DIAGNOSIS — R97.20 ELEVATED PSA: ICD-10-CM

## 2025-08-28 LAB — PSA SERPL-MCNC: 5.53 NG/ML (ref 0–4)

## 2025-09-05 RX ORDER — HYDROCHLOROTHIAZIDE 25 MG/1
25 TABLET ORAL DAILY
Qty: 90 TABLET | Refills: 3 | Status: SHIPPED | OUTPATIENT
Start: 2025-09-05

## (undated) DEVICE — SINGLE PORT MANIFOLD: Brand: NEPTUNE 2

## (undated) DEVICE — CANNULA NSL AD L7FT DIV O2 CO2 W/ M LUERLOCK TRMPT CONN

## (undated) DEVICE — ENDO KIT,LOURDES HOSPITAL: Brand: MEDLINE INDUSTRIES, INC.